# Patient Record
Sex: FEMALE | Race: WHITE | NOT HISPANIC OR LATINO | Employment: OTHER | URBAN - METROPOLITAN AREA
[De-identification: names, ages, dates, MRNs, and addresses within clinical notes are randomized per-mention and may not be internally consistent; named-entity substitution may affect disease eponyms.]

---

## 2017-01-11 ENCOUNTER — OFFICE VISIT (OUTPATIENT)
Dept: FAMILY MEDICINE CLINIC | Facility: CLINIC | Age: 57
End: 2017-01-11

## 2017-01-11 VITALS
BODY MASS INDEX: 27.06 KG/M2 | DIASTOLIC BLOOD PRESSURE: 80 MMHG | WEIGHT: 178 LBS | HEART RATE: 104 BPM | TEMPERATURE: 98 F | SYSTOLIC BLOOD PRESSURE: 110 MMHG | RESPIRATION RATE: 16 BRPM

## 2017-01-11 DIAGNOSIS — Z87.81 HISTORY OF FRACTURED KNEECAP: Primary | ICD-10-CM

## 2017-01-11 DIAGNOSIS — S42.91XG: ICD-10-CM

## 2017-01-11 PROCEDURE — 99213 OFFICE O/P EST LOW 20 MIN: CPT | Performed by: FAMILY MEDICINE

## 2017-01-11 RX ORDER — HYDROCODONE BITARTRATE AND ACETAMINOPHEN 7.5; 325 MG/1; MG/1
1 TABLET ORAL EVERY 6 HOURS PRN
Qty: 120 TABLET | Refills: 0 | Status: SHIPPED | OUTPATIENT
Start: 2017-01-11 | End: 2017-05-17

## 2017-01-11 NOTE — PROGRESS NOTES
Subjective   Octavia Rice is a 56 y.o. female.     History of Present Illness   Physical therapy for shoulders and left knee routinely causing pain. Walking without assistance.  Shoulder and neck soreness.  Doing more activities around home.  Slow to recall some words and complete sentence.  Uses ice on back, Lidocaine patches have helped.  Taking pain medication each 4 hours during day especially with physical therapy.   Glucose doing much better, continues to loose weight.  Constipation slowly improved with drinking more fluids.     The following portions of the patient's history were reviewed and updated as appropriate: allergies, current medications, past family history, past medical history, past social history, past surgical history and problem list.    Review of Systems   Constitutional: Negative.    Eyes: Negative.    Respiratory: Negative.    Cardiovascular: Negative.    Gastrointestinal: Positive for nausea.   Endocrine: Negative.    Musculoskeletal: Positive for arthralgias and neck stiffness. Negative for joint swelling.   Skin: Negative.    Neurological: Positive for headaches. Negative for dizziness, tremors and numbness (left hand fingers 4-5).   Psychiatric/Behavioral: Positive for confusion.       Objective   Physical Exam   Constitutional: She appears well-developed.   Neck: Neck supple.   Pulmonary/Chest: Effort normal.   Musculoskeletal: She exhibits no edema.        Right shoulder: She exhibits decreased range of motion and tenderness.        Left shoulder: She exhibits tenderness.        Right elbow: Normal.       Left elbow: Normal.   Left knee in brace   Neurological: She is alert.   Recall 3/3 items   Vitals reviewed.      Assessment/Plan   Octavia was seen today for follow-up.    Diagnoses and all orders for this visit:    History of fractured kneecap  -     HYDROcodone-acetaminophen (NORCO) 7.5-325 MG per tablet; Take 1 tablet by mouth Every 6 (Six) Hours As Needed for moderate pain  (4-6).    Fracture dislocation of right shoulder joint, with delayed healing, subsequent encounter

## 2017-01-11 NOTE — MR AVS SNAPSHOT
Octavia WOODWARD Dwayne   1/11/2017 11:15 AM   Office Visit    Provider:  Lex Medeiros MD   Department:  [facility] MEDIC[facility]   Dept Phone:  [phone]                Your Full Care Plan              Today's Medication Changes          These changes are accurate as of: 1/11/17 12:31 PM.  If you have any questions, ask your nurse or doctor.               Medication(s)that have changed:     lidocaine 5 %   Commonly known as:  LIDODERM   APPLY 1 PATCH TO AFFECTED AREA. LEAVE ON 12 HOURS AND OFF FOR 12 HOURS   What changed:  Another medication with the same name was removed. Continue taking this medication, and follow the directions you see here.   Changed by:  Lex Medeiros MD            Where to Get Your Medications      You can get these medications from any pharmacy     Bring a paper prescription for each of these medications     HYDROcodone-acetaminophen 7.5-325 MG per tablet                  Your Updated Medication List          This list is accurate as of: 1/11/17 12:31 PM.  Always use your most recent med list.                calcium carbonate 500 MG chewable tablet   Commonly known as:  TUMS   Chew 2 tablets 3 (Three) Times a Day As Needed for indigestion or heartburn.        MG capsule   Take 100 mg by mouth 2 (Two) Times a Day.       glucose blood test strip   Commonly known as:  ONE TOUCH ULTRA TEST   Check X 1 per day       * HYDROcodone-acetaminophen 7.5-325 MG per tablet   Commonly known as:  NORCO       * HYDROcodone-acetaminophen 7.5-325 MG per tablet   Commonly known as:  NORCO   Take 1 tablet by mouth Every 6 (Six) Hours As Needed for moderate pain (4-6).       Insulin Pen Needle 32G X 4 MM misc   Commonly known as:  BD PEN NEEDLE ELVIA U/F   Uses X 1/day       lidocaine 5 %   Commonly known as:  LIDODERM       Liraglutide 18 MG/3ML solution pen-injector   1.8 mg sub q daily       metFORMIN  MG 24 hr tablet   Commonly known  as:  GLUCOPHAGE-XR   Use 1 tablet bid with food       Naloxegol Oxalate 12.5 MG tablet   Commonly known as:  MOVANTIK   Take 1 tablet by mouth Daily.       ondansetron 4 MG tablet   Commonly known as:  ZOFRAN   Take 1 tablet by mouth Every 8 (Eight) Hours As Needed for nausea or vomiting.       simvastatin 40 MG tablet   Commonly known as:  ZOCOR       * Notice:  This list has 2 medication(s) that are the same as other medications prescribed for you. Read the directions carefully, and ask your doctor or other care provider to review them with you.            You Were Diagnosed With        Codes Comments    History of fractured kneecap    -  Primary ICD-10-CM: Z87.81  ICD-9-CM: V15.51     Fracture dislocation of right shoulder joint, with delayed healing, subsequent encounter     ICD-10-CM: S42.91XG  ICD-9-CM: V54.11       Instructions     None    Patient Instructions History      IsentioharBizanga Signup     [facility] [employer] allows you to send messages to your doctor, view your test results, renew your prescriptions, schedule appointments, and more. To sign up, go to [employer] and click on the Sign Up Now link in the New User? box. Enter your [employer] Activation Code exactly as it appears below along with the last four digits of your Social Security Number and your Date of Birth () to complete the sign-up process. If you do not sign up before the expiration date, you must request a new code.    [employer] Activation Code: [email or web address]  Expires: 2017 12:31 PM    If you have questions, you can email [email or web address] or call [phone] to talk to our [employer] staff. Remember, [employer] is NOT to be used for urgent needs. For medical emergencies, dial 911.               Other Info from Your Visit           Your Appointments     2017  9:00 AM EST   Follow Up with Priscilla RUSSO MD   [facility] INTERNAL MEDICINE AND ENDOCRINOLOGY (--)    [address]  [facility] [address]   [phone]           Arrive 15 minutes prior to appointment.              Allergies     Codeine  Nausea And Vomiting    Morphine And Related  Itching    Severe hives    Penicillins      Had pcn as child and unsure of reaction      Reason for Visit     Follow-up           Vital Signs     Blood Pressure Pulse Temperature Respirations Weight Body Mass Index    110/80 104 98 °F (36.7 °C) 16 178 lb (80.7 kg) 27.06 kg/m2    Smoking Status                   Current Every Day Smoker           Problems and Diagnoses Noted     Fracture dislocation of right shoulder joint    History of fractured kneecap    -  Primary

## 2017-01-16 ENCOUNTER — TELEPHONE (OUTPATIENT)
Dept: FAMILY MEDICINE CLINIC | Facility: CLINIC | Age: 57
End: 2017-01-16

## 2017-01-16 NOTE — TELEPHONE ENCOUNTER
----- Message from Levi Maldonado sent at 1/16/2017  8:12 AM EST -----  Regarding: having trouble filling pain med  Contact: [phone]  Having trouble getting pain med. The drug store will not fill it till the 25th. Please call and speak with her    Pt wants new rx for pain meds increased in dosing, advised pt that will need to come in.  BBherrera, CMA

## 2017-01-17 ENCOUNTER — OFFICE VISIT (OUTPATIENT)
Dept: FAMILY MEDICINE CLINIC | Facility: CLINIC | Age: 57
End: 2017-01-17

## 2017-01-17 VITALS
BODY MASS INDEX: 26.36 KG/M2 | OXYGEN SATURATION: 97 % | WEIGHT: 178 LBS | HEIGHT: 69 IN | HEART RATE: 97 BPM | DIASTOLIC BLOOD PRESSURE: 70 MMHG | SYSTOLIC BLOOD PRESSURE: 130 MMHG | TEMPERATURE: 98 F

## 2017-01-17 DIAGNOSIS — S42.91XG: ICD-10-CM

## 2017-01-17 DIAGNOSIS — Z87.81 HISTORY OF FRACTURED KNEECAP: Primary | ICD-10-CM

## 2017-01-17 PROCEDURE — 99213 OFFICE O/P EST LOW 20 MIN: CPT | Performed by: FAMILY MEDICINE

## 2017-01-17 RX ORDER — TRAMADOL HYDROCHLORIDE 50 MG/1
50 TABLET ORAL EVERY 6 HOURS PRN
Qty: 120 TABLET | Refills: 0 | Status: SHIPPED | OUTPATIENT
Start: 2017-01-17 | End: 2017-05-17

## 2017-01-17 NOTE — MR AVS SNAPSHOT
Octavia Rice   1/17/2017 9:45 AM   Office Visit    Provider:  Lex Medeiros MD   Department:  [facility] MEDICINE   Dept Phone:  [phone]                Your Full Care Plan              Today's Medication Changes          These changes are accurate as of: 1/17/17 10:53 AM.  If you have any questions, ask your nurse or doctor.               New Medication(s)Ordered:     traMADol 50 MG tablet   Commonly known as:  ULTRAM   Take 1 tablet by mouth Every 6 (Six) Hours As Needed for moderate pain (4-6).   Started by:  Lex Medeiros MD            Where to Get Your Medications      You can get these medications from any pharmacy     Bring a paper prescription for each of these medications     traMADol 50 MG tablet                  Your Updated Medication List          This list is accurate as of: 1/17/17 10:53 AM.  Always use your most recent med list.                calcium carbonate 500 MG chewable tablet   Commonly known as:  TUMS   Chew 2 tablets 3 (Three) Times a Day As Needed for indigestion or heartburn.        MG capsule   Take 100 mg by mouth 2 (Two) Times a Day.       glucose blood test strip   Commonly known as:  ONE TOUCH ULTRA TEST   Check X 1 per day       * HYDROcodone-acetaminophen 7.5-325 MG per tablet   Commonly known as:  NORCO       * HYDROcodone-acetaminophen 7.5-325 MG per tablet   Commonly known as:  NORCO   Take 1 tablet by mouth Every 6 (Six) Hours As Needed for moderate pain (4-6).       Insulin Pen Needle 32G X 4 MM misc   Commonly known as:  BD PEN NEEDLE ELVIA U/F   Uses X 1/day       lidocaine 5 %   Commonly known as:  LIDODERM       Liraglutide 18 MG/3ML solution pen-injector   1.8 mg sub q daily       metFORMIN  MG 24 hr tablet   Commonly known as:  GLUCOPHAGE-XR   Use 1 tablet bid with food       Naloxegol Oxalate 12.5 MG tablet   Commonly known as:  MOVANTIK   Take 1 tablet by mouth Daily.       ondansetron 4 MG tablet   Commonly known as:  ZOFRAN   Take 1 tablet by mouth Every 8 (Eight) Hours As Needed for nausea or vomiting.       simvastatin 40 MG tablet   Commonly known as:  ZOCOR       traMADol 50 MG tablet   Commonly known as:  ULTRAM   Take 1 tablet by mouth Every 6 (Six) Hours As Needed for moderate pain (4-6).       * Notice:  This list has 2 medication(s) that are the same as other medications prescribed for you. Read the directions carefully, and ask your doctor or other care provider to review them with you.            You Were Diagnosed With        Codes Comments    History of fractured kneecap    -  Primary ICD-10-CM: Z87.81  ICD-9-CM: V15.51     Fracture dislocation of right shoulder joint, with delayed healing, subsequent encounter     ICD-10-CM: S42.91XG  ICD-9-CM: V54.11       Instructions     None    Patient Instructions History      AlgentisharIM-Sense Signup     [facility] [employer] allows you to send messages to your doctor, view your test results, renew your prescriptions, schedule appointments, and more. To sign up, go to [employer] and click on the Sign Up Now link in the New User? box. Enter your [employer] Activation Code exactly as it appears below along with the last four digits of your Social Security Number and your Date of Birth () to complete the sign-up process. If you do not sign up before the expiration date, you must request a new code.    [employer] Activation Code: [email or web address]  Expires: 2017 12:31 PM    If you have questions, you can email [email or web address] or call [phone] to talk to our [employer] staff. Remember, [employer] is NOT to be used for urgent needs. For medical emergencies, dial 911.               Other Info from Your Visit           Your Appointments     2017  9:00 AM EST   Follow Up with Priscilla RUSSO MD   [facility]OCRINOLOGY (--)    [address]  "[address] [phone]           Arrive 15 minutes prior to appointment.              Allergies     Codeine  Nausea And Vomiting    Morphine And Related  Itching    Severe hives    Penicillins      Had pcn as child and unsure of reaction      Reason for Visit     Med Refill           Vital Signs     Blood Pressure Pulse Temperature Height Weight Oxygen Saturation    130/70 97 98 °F (36.7 °C) 69\" (175.3 cm) 178 lb (80.7 kg) 97%    Body Mass Index Smoking Status                26.29 kg/m2 Current Every Day Smoker          Problems and Diagnoses Noted     Fracture dislocation of right shoulder joint    History of fractured kneecap    -  Primary      "

## 2017-01-17 NOTE — PROGRESS NOTES
Subjective   Octavia Rice is a 56 y.o. female.     History of Present Illness   Had a rough two days pharmacy will not fill medication until Jan 25.  Taking additional tablets prior to doing physical therapy.   Sleeping poor.  Hurts upper back, shoulders and knees. No nausea. Bowels working again without cramping.  Energy low.  Cries.  Not steady walking unassisted especially steps.  Headache,  Appetite slow without much taste. Last orthopedic procedure Oct 31, physical therapy started late Dec. Home from nursing facility Dec 7.   Home glucose running in normal range.   The following portions of the patient's history were reviewed and updated as appropriate: allergies, current medications, past family history, past medical history, past social history, past surgical history and problem list.    Review of Systems   Constitutional: Negative.    Eyes: Negative.    Respiratory: Negative.    Cardiovascular: Negative.    Gastrointestinal: Negative.    Endocrine: Negative.    Musculoskeletal: Negative.    Skin: Negative.        Objective   Physical Exam   Constitutional: She appears lethargic. She appears distressed.   HENT:   Head: Normocephalic.   Neck: Neck supple.   Pulmonary/Chest: Effort normal.   Abdominal: Soft.   Musculoskeletal:   Brace left knee.  Moving slowly.    Neurological: She appears lethargic.   Skin: Skin is warm.   Psychiatric: She has a normal mood and affect.   Vitals reviewed.      Assessment/Plan   Octavia was seen today for med refill.    Diagnoses and all orders for this visit:    History of fractured kneecap  -     traMADol (ULTRAM) 50 MG tablet; Take 1 tablet by mouth Every 6 (Six) Hours As Needed for moderate pain (4-6).    Fracture dislocation of right shoulder joint, with delayed healing, subsequent encounter

## 2017-01-19 ENCOUNTER — OFFICE VISIT (OUTPATIENT)
Dept: ENDOCRINOLOGY | Facility: CLINIC | Age: 57
End: 2017-01-19

## 2017-01-19 VITALS
WEIGHT: 185.6 LBS | SYSTOLIC BLOOD PRESSURE: 132 MMHG | BODY MASS INDEX: 27.49 KG/M2 | DIASTOLIC BLOOD PRESSURE: 82 MMHG | OXYGEN SATURATION: 98 % | HEIGHT: 69 IN | HEART RATE: 92 BPM

## 2017-01-19 DIAGNOSIS — E11.8 UNCONTROLLED TYPE 2 DIABETES MELLITUS WITH COMPLICATION, UNSPECIFIED LONG TERM INSULIN USE STATUS: Primary | ICD-10-CM

## 2017-01-19 DIAGNOSIS — E11.65 UNCONTROLLED TYPE 2 DIABETES MELLITUS WITH COMPLICATION, UNSPECIFIED LONG TERM INSULIN USE STATUS: Primary | ICD-10-CM

## 2017-01-19 DIAGNOSIS — E78.2 MIXED HYPERLIPIDEMIA: ICD-10-CM

## 2017-01-19 DIAGNOSIS — IMO0002 UNCONTROLLED TYPE 2 DIABETES MELLITUS WITH COMPLICATION, WITHOUT LONG-TERM CURRENT USE OF INSULIN: ICD-10-CM

## 2017-01-19 DIAGNOSIS — IMO0001 UNCONTROLLED TYPE 2 DIABETES MELLITUS WITHOUT COMPLICATION, WITHOUT LONG-TERM CURRENT USE OF INSULIN: ICD-10-CM

## 2017-01-19 LAB
25(OH)D3 SERPL-MCNC: 24.7 NG/ML
ALBUMIN SERPL-MCNC: 4.5 G/DL (ref 3.2–4.8)
ALBUMIN/GLOB SERPL: 1.8 G/DL (ref 1.5–2.5)
ALP SERPL-CCNC: 92 U/L (ref 25–100)
ALT SERPL W P-5'-P-CCNC: 10 U/L (ref 7–40)
ANION GAP SERPL CALCULATED.3IONS-SCNC: 14 MMOL/L (ref 3–11)
ARTICHOKE IGE QN: 75 MG/DL (ref 0–130)
AST SERPL-CCNC: 9 U/L (ref 0–33)
BASOPHILS # BLD AUTO: 0.02 10*3/MM3 (ref 0–0.2)
BASOPHILS NFR BLD AUTO: 0.2 % (ref 0–1)
BILIRUB SERPL-MCNC: 0.5 MG/DL (ref 0.3–1.2)
BUN BLD-MCNC: 9 MG/DL (ref 9–23)
BUN/CREAT SERPL: 22.5 (ref 7–25)
CALCIUM SPEC-SCNC: 10 MG/DL (ref 8.7–10.4)
CHLORIDE SERPL-SCNC: 105 MMOL/L (ref 99–109)
CHOLEST SERPL-MCNC: 157 MG/DL (ref 0–200)
CO2 SERPL-SCNC: 23 MMOL/L (ref 20–31)
CREAT BLD-MCNC: 0.4 MG/DL (ref 0.6–1.3)
DEPRECATED RDW RBC AUTO: 44.3 FL (ref 37–54)
EOSINOPHIL # BLD AUTO: 0.04 10*3/MM3 (ref 0.1–0.3)
EOSINOPHIL NFR BLD AUTO: 0.4 % (ref 0–3)
ERYTHROCYTE [DISTWIDTH] IN BLOOD BY AUTOMATED COUNT: 13.4 % (ref 11.3–14.5)
GFR SERPL CREATININE-BSD FRML MDRD: >150 ML/MIN/1.73
GLOBULIN UR ELPH-MCNC: 2.5 GM/DL
GLUCOSE BLD-MCNC: 119 MG/DL (ref 70–100)
GLUCOSE BLDC GLUCOMTR-MCNC: 138 MG/DL (ref 70–130)
HBA1C MFR BLD: 6.5 %
HCT VFR BLD AUTO: 42.1 % (ref 34.5–44)
HDLC SERPL-MCNC: 42 MG/DL (ref 40–60)
HGB BLD-MCNC: 14 G/DL (ref 11.5–15.5)
IMM GRANULOCYTES # BLD: 0.04 10*3/MM3 (ref 0–0.03)
IMM GRANULOCYTES NFR BLD: 0.4 % (ref 0–0.6)
LYMPHOCYTES # BLD AUTO: 2.47 10*3/MM3 (ref 0.6–4.8)
LYMPHOCYTES NFR BLD AUTO: 21.7 % (ref 24–44)
MCH RBC QN AUTO: 29.9 PG (ref 27–31)
MCHC RBC AUTO-ENTMCNC: 33.3 G/DL (ref 32–36)
MCV RBC AUTO: 89.8 FL (ref 80–99)
MONOCYTES # BLD AUTO: 0.58 10*3/MM3 (ref 0–1)
MONOCYTES NFR BLD AUTO: 5.1 % (ref 0–12)
NEUTROPHILS # BLD AUTO: 8.25 10*3/MM3 (ref 1.5–8.3)
NEUTROPHILS NFR BLD AUTO: 72.2 % (ref 41–71)
PLATELET # BLD AUTO: 303 10*3/MM3 (ref 150–450)
PMV BLD AUTO: 10.7 FL (ref 6–12)
POTASSIUM BLD-SCNC: 4.2 MMOL/L (ref 3.5–5.5)
PROT SERPL-MCNC: 7 G/DL (ref 5.7–8.2)
RBC # BLD AUTO: 4.69 10*6/MM3 (ref 3.89–5.14)
SODIUM BLD-SCNC: 142 MMOL/L (ref 132–146)
TRIGL SERPL-MCNC: 228 MG/DL (ref 0–150)
TSH SERPL DL<=0.05 MIU/L-ACNC: 2.51 MIU/ML (ref 0.35–5.35)
WBC NRBC COR # BLD: 11.4 10*3/MM3 (ref 3.5–10.8)

## 2017-01-19 PROCEDURE — 82306 VITAMIN D 25 HYDROXY: CPT | Performed by: INTERNAL MEDICINE

## 2017-01-19 PROCEDURE — 80053 COMPREHEN METABOLIC PANEL: CPT | Performed by: INTERNAL MEDICINE

## 2017-01-19 PROCEDURE — 82043 UR ALBUMIN QUANTITATIVE: CPT | Performed by: INTERNAL MEDICINE

## 2017-01-19 PROCEDURE — 83036 HEMOGLOBIN GLYCOSYLATED A1C: CPT | Performed by: INTERNAL MEDICINE

## 2017-01-19 PROCEDURE — 82947 ASSAY GLUCOSE BLOOD QUANT: CPT | Performed by: INTERNAL MEDICINE

## 2017-01-19 PROCEDURE — 80061 LIPID PANEL: CPT | Performed by: INTERNAL MEDICINE

## 2017-01-19 PROCEDURE — 99214 OFFICE O/P EST MOD 30 MIN: CPT | Performed by: INTERNAL MEDICINE

## 2017-01-19 PROCEDURE — 84443 ASSAY THYROID STIM HORMONE: CPT | Performed by: INTERNAL MEDICINE

## 2017-01-19 PROCEDURE — 85025 COMPLETE CBC W/AUTO DIFF WBC: CPT | Performed by: INTERNAL MEDICINE

## 2017-01-19 PROCEDURE — 82570 ASSAY OF URINE CREATININE: CPT | Performed by: INTERNAL MEDICINE

## 2017-01-19 RX ORDER — CHOLECALCIFEROL (VITAMIN D3) 50 MCG
2000 TABLET ORAL DAILY
Qty: 90 TABLET | Refills: 3 | Status: SHIPPED | OUTPATIENT
Start: 2017-01-19 | End: 2017-10-29 | Stop reason: SDUPTHER

## 2017-01-19 RX ORDER — METFORMIN HYDROCHLORIDE 500 MG/1
1000 TABLET, EXTENDED RELEASE ORAL
Qty: 180 TABLET | Refills: 3 | Status: SHIPPED | OUTPATIENT
Start: 2017-01-19 | End: 2017-10-04 | Stop reason: SDUPTHER

## 2017-01-19 NOTE — MR AVS SNAPSHOT
Octavia Rice   1/19/2017 9:00 AM   Office Visit    Dept Phone:  [phone]   Encounter #:  [id number]    Provider:  Priscilla RUSSO MD   Department:  [facility] INTERNAL MEDICINE AND ENDOCRINOLOGY                Your Full Care Plan              Today's Medication Changes          These changes are accurate as of: 1/19/17  9:57 AM.  If you have any questions, ask your nurse or doctor.               New Medication(s)Ordered:     Vitamin D 2000 UNITS tablet   Take 2,000 Units by mouth Daily.   Started by:  Priscilla RUSSO MD         Medication(s)that have changed:     * glucose blood test strip   Commonly known as:  ONE TOUCH ULTRA TEST   Check X 1 per day   What changed:  Another medication with the same name was added. Make sure you understand how and when to take each.   Changed by:  SANDER Pozo       * glucose blood test strip   Use as instructed 3 times a day   What changed:  You were already taking a medication with the same name, and this prescription was added. Make sure you understand how and when to take each.   Changed by:  Priscilla RUSSO MD       HYDROcodone-acetaminophen 7.5-325 MG per tablet   Commonly known as:  NORCO   Take 1 tablet by mouth Every 6 (Six) Hours As Needed for moderate pain (4-6).   What changed:  Another medication with the same name was removed. Continue taking this medication, and follow the directions you see here.   Changed by:  Lex Medeiros MD       Liraglutide 18 MG/3ML solution pen-injector   Take 1.8 mg by mouth Daily. 1.8 mg sub q daily   What changed:    - how much to take  - how to take this  - when to take this   Changed by:  Priscilla RUSSO MD       metFORMIN  MG 24 hr tablet   Commonly known as:  GLUCOPHAGE-XR   Take 2 tablets by mouth Daily With Breakfast. Use 1 tablet bid with food   What changed:    - how much to take  - how to take this  - when to take this   Changed by:  Priscilla RUSSO MD       *  Notice:  This list has 2 medication(s) that are the same as other medications prescribed for you. Read the directions carefully, and ask your doctor or other care provider to review them with you.         Where to Get Your Medications      These medications were sent to [facility]/pharmacy #[id number] - [place], [place] - [address] [facility] - [phone]  - [phone] FX  [address] [address]     Phone:  [phone]     glucose blood test strip    Insulin Pen Needle 32G X 4 MM misc    Liraglutide 18 MG/3ML solution pen-injector    metFORMIN  MG 24 hr tablet    Vitamin D 2000 UNITS tablet                  Your Updated Medication List          This list is accurate as of: 1/19/17  9:57 AM.  Always use your most recent med list.                calcium carbonate 500 MG chewable tablet   Commonly known as:  TUMS   Chew 2 tablets 3 (Three) Times a Day As Needed for indigestion or heartburn.        MG capsule   Take 100 mg by mouth 2 (Two) Times a Day.       * glucose blood test strip   Commonly known as:  ONE TOUCH ULTRA TEST   Check X 1 per day       * glucose blood test strip   Use as instructed 3 times a day       HYDROcodone-acetaminophen 7.5-325 MG per tablet   Commonly known as:  NORCO   Take 1 tablet by mouth Every 6 (Six) Hours As Needed for moderate pain (4-6).       Insulin Pen Needle 32G X 4 MM misc   Commonly known as:  BD PEN NEEDLE ELVIA U/F   Uses X 1/day       lidocaine 5 %   Commonly known as:  LIDODERM       Liraglutide 18 MG/3ML solution pen-injector   Take 1.8 mg by mouth Daily. 1.8 mg sub q daily       metFORMIN  MG 24 hr tablet   Commonly known as:  GLUCOPHAGE-XR   Take 2 tablets by mouth Daily With Breakfast. Use 1 tablet bid with food       Naloxegol Oxalate 12.5 MG tablet   Commonly known as:  MOVANTIK   Take 1 tablet by mouth Daily.       ondansetron 4 MG tablet   Commonly known as:  ZOFRAN   Take 1 tablet by mouth Every 8 (Eight) Hours As Needed for nausea or vomiting.        simvastatin 40 MG tablet   Commonly known as:  ZOCOR       traMADol 50 MG tablet   Commonly known as:  ULTRAM   Take 1 tablet by mouth Every 6 (Six) Hours As Needed for moderate pain (4-6).       Vitamin D 2000 UNITS tablet   Take 2,000 Units by mouth Daily.       * Notice:  This list has 2 medication(s) that are the same as other medications prescribed for you. Read the directions carefully, and ask your doctor or other care provider to review them with you.            We Performed the Following     CBC & Differential     CBC Auto Differential     Comprehensive Metabolic Panel     Lipid Panel     Microalbumin / Creatinine Urine Ratio     POC Glucose Fingerstick     POC Glycosylated Hemoglobin (Hb A1C)     TSH     Vitamin D 25 Hydroxy       You Were Diagnosed With        Codes Comments    Uncontrolled type 2 diabetes mellitus with complication, unspecified long term insulin use status    -  Primary ICD-10-CM: E11.8, E11.65  ICD-9-CM: 250.92     Mixed hyperlipidemia     ICD-10-CM: E78.2  ICD-9-CM: 272.2     Uncontrolled type 2 diabetes mellitus with complication, without long-term current use of insulin     ICD-10-CM: E11.8, E11.65  ICD-9-CM: 250.82     Uncontrolled type 2 diabetes mellitus without complication, without long-term current use of insulin     ICD-10-CM: E11.65  ICD-9-CM: 250.02       Instructions     None    Patient Instructions History      Upcoming Appointments     Visit Type Date Time Department    FOLLOW UP 1/19/2017  9:00 AM [facility]    OFFICE VISIT 5/17/2017  8:00 AM [facility]      MyChart Signup     [facility] [employer] allows you to send messages to your doctor, view your test results, renew your prescriptions, schedule appointments, and more. To sign up, go to [employer] and click on the Sign Up Now link in the New User? box. Enter your [employer] Activation Code exactly as it appears below along with the last four digits of your Social Security Number and your Date of  "Birth () to complete the sign-up process. If you do not sign up before the expiration date, you must request a new code.    [employer] Activation Code: [email or web address]  Expires: 2017 12:31 PM    If you have questions, you can email [email or web address] or call [phone] to talk to our [employer] staff. Remember, [employer] is NOT to be used for urgent needs. For medical emergencies, dial 911.               Other Info from Your Visit           Your Appointments     May 17, 2017  8:00 AM EDT   Office Visit with Priscilla RUSSO MD   [facility] INTERNAL MEDICINE AND ENDOCRINOLOGY (--)    [address] [place] [address] [phone]           Arrive 15 minutes prior to appointment.              Allergies     Codeine  Nausea And Vomiting    Morphine And Related  Itching    Severe hives    Penicillins      Had pcn as child and unsure of reaction      Reason for Visit     Diabetes F/u for type 2 diabetes, testing 1-2x weekly ~ pt stated that her readings have been running in the normal ranges.       Vital Signs     Blood Pressure Pulse Height Weight Oxygen Saturation Body Mass Index    132/82 92 69\" (175.3 cm) 185 lb 9.6 oz (84.2 kg) 98% 27.41 kg/m2    Smoking Status                   Former Smoker           Problems and Diagnoses Noted     High cholesterol or triglycerides    Type II diabetes mellitus without control      Results     POC Glycosylated Hemoglobin (Hb A1C)      Component Value Standard Range & Units    Hemoglobin A1C 6.5 %                POC Glucose Fingerstick      Component Value Standard Range & Units    Glucose 138 70 - 130 mg/dL                    "

## 2017-01-19 NOTE — PROGRESS NOTES
"Chief complaint  Diabetes (F/u for type 2 diabetes, testing 1-2x weekly ~ pt stated that her readings have been running in the normal ranges. )    Subjective   Octavia Rice is a 56 y.o. female is here today for follow-up of:    Diabetes Mellitus type 2: dx in 2003    Diabetic complications: none  Eye exam current (within one year): no retinopathy      Current diabetic medications include Metformin  mg - 2 tablets daily  Victoza 1.8 mg daily   When she wa sin the hospital and PT - received insulin.     Past medications: 08/2016 Jardiance 10 mg daily in am  was added. She stopped it September 2016 because of frequent urination      Monitoring   not checking regularly.  Glucometer  verio onetouch given .    Home blood sugar records: glucometer downloaded, reviewed and scanned to chart  Hypoglycemia:    Nutrition:   discussed  carb consistent diet 45-60 gm carb per meal.   Current diet: in general, a \"healthy\" diet    Current exercise: physical therapy twice a week    Foot care and dental care: discussed    Labs:   Lab Results   Component Value Date    HGBA1C 6.5 01/19/2017     Lab Results   Component Value Date    CREATININE 0.40 (L) 11/01/2016   No results found for: LDLCALCNo results found for: MICROALBUR, [id number]  Lab Results   Component Value Date    TSH 1.672 02/04/2016       Patient  Had a fall in Oct 2016 and she had head trauma and shoulder trauma, completed physical therapy, s/p surgery. She is still in chronic pain, had dehydration, UTI .  Patient lost weight, has no appetite. SHe is in chronic pain and continues PT at home.     Other med problems: include hyperlipidemia on simvastatin.     Medications    Current Outpatient Prescriptions:   •  calcium carbonate (TUMS) 500 MG chewable tablet, Chew 2 tablets 3 (Three) Times a Day As Needed for indigestion or heartburn., Disp: , Rfl:   •  docusate sodium 100 MG capsule, Take 100 mg by mouth 2 (Two) Times a Day., Disp: , Rfl: 0  •  glucose blood (ONE " TOUCH ULTRA TEST) test strip, Check X 1 per day, Disp: 50 each, Rfl: 5  •  glucose blood test strip, Use as instructed 3 times a day, Disp: 100 each, Rfl: 11  •  HYDROcodone-acetaminophen (NORCO) 7.5-325 MG per tablet, Take 1 tablet by mouth Every 6 (Six) Hours As Needed for moderate pain (4-6)., Disp: 120 tablet, Rfl: 0  •  Insulin Pen Needle (BD PEN NEEDLE ELVIA U/F) 32G X 4 MM misc, Uses X 1/day, Disp: 100 each, Rfl: 3  •  lidocaine (LIDODERM) 5 %, APPLY 1 PATCH TO AFFECTED AREA. LEAVE ON 12 HOURS AND OFF FOR 12 HOURS, Disp: , Rfl: 0  •  Liraglutide 18 MG/3ML solution pen-injector, Take 1.8 mg by mouth Daily. 1.8 mg sub q daily, Disp: 10 pen, Rfl: 3  •  metFORMIN XR (GLUCOPHAGE-XR) 500 MG 24 hr tablet, Take 2 tablets by mouth Daily With Breakfast. Use 1 tablet bid with food, Disp: 180 tablet, Rfl: 3  •  Naloxegol Oxalate (MOVANTIK) 12.5 MG tablet, Take 1 tablet by mouth Daily., Disp: 30 tablet, Rfl: 1  •  ondansetron (ZOFRAN) 4 MG tablet, Take 1 tablet by mouth Every 8 (Eight) Hours As Needed for nausea or vomiting., Disp: 40 tablet, Rfl: 1  •  simvastatin (ZOCOR) 40 MG tablet, Take 1 tablet by mouth every night., Disp: , Rfl:   •  traMADol (ULTRAM) 50 MG tablet, Take 1 tablet by mouth Every 6 (Six) Hours As Needed for moderate pain (4-6)., Disp: 120 tablet, Rfl: 0    PMH  The following portions of the patient's history were reviewed and updated as appropriate: allergies, current medications, past family history, past medical history, past social history, past surgical history and problem list.    Review of systems  Review of Systems   Constitutional: Positive for appetite change (decreased appetite), fatigue and unexpected weight change (weight loss).   HENT: Negative for congestion.    Eyes: Negative for visual disturbance.   Respiratory: Negative.  Negative for shortness of breath.    Cardiovascular: Negative for chest pain and leg swelling.   Gastrointestinal: Positive for diarrhea (occasional) and nausea.  "Negative for constipation.   Endocrine: Negative for cold intolerance, polydipsia and polyuria.   Musculoskeletal: Positive for arthralgias (shoulder pain with shooting pain down the arm). Negative for back pain, joint swelling and myalgias.   Skin: Negative for rash and wound.   Allergic/Immunologic: Positive for environmental allergies.   Neurological: Positive for headaches. Negative for numbness.   Hematological: Negative.    Psychiatric/Behavioral: Positive for behavioral problems. Negative for self-injury.       Physical exam  Objective   Blood pressure 132/82, pulse 92, height 69\" (175.3 cm), weight 185 lb 9.6 oz (84.2 kg), SpO2 98 %. Body mass index is 27.41 kg/(m^2).  Physical Exam   Constitutional: She is oriented to person, place, and time. She appears well-developed and well-nourished.   HENT:   Head: Normocephalic and atraumatic.   Mouth/Throat: Oropharynx is clear and moist.   Neck: No tracheal deviation present. No thyromegaly present.   Cardiovascular: Normal rate, regular rhythm, normal heart sounds and intact distal pulses.    Pulmonary/Chest: Effort normal and breath sounds normal.   Musculoskeletal: She exhibits no edema, tenderness or deformity.        Right shoulder: She exhibits decreased range of motion and pain.        Left shoulder: She exhibits decreased range of motion and pain.   Neurological: She is alert and oriented to person, place, and time.   Skin: Skin is warm and dry.   Psychiatric: Her behavior is normal. Judgment and thought content normal. She exhibits a depressed mood.   Nursing note and vitals reviewed.        LABS AND IMAGING  Results for orders placed or performed in visit on 01/19/17   POC Glycosylated Hemoglobin (Hb A1C)   Result Value Ref Range    Hemoglobin A1C 6.5 %   POC Glucose Fingerstick   Result Value Ref Range    Glucose 138 (A) 70 - 130 mg/dL                   Assessment:         Diagnoses and all orders for this visit:    Uncontrolled type 2 diabetes mellitus " with complication, unspecified long term insulin use status  -     POC Glycosylated Hemoglobin (Hb A1C)  -     POC Glucose Fingerstick    Mixed hyperlipidemia    Uncontrolled type 2 diabetes mellitus with complication, without long-term current use of insulin  -     Insulin Pen Needle (BD PEN NEEDLE ELVIA U/F) 32G X 4 MM misc; Uses X 1/day  -     metFORMIN XR (GLUCOPHAGE-XR) 500 MG 24 hr tablet; Take 2 tablets by mouth Daily With Breakfast. Use 1 tablet bid with food    Uncontrolled type 2 diabetes mellitus without complication, without long-term current use of insulin  -     Liraglutide 18 MG/3ML solution pen-injector; Take 1.8 mg by mouth Daily. 1.8 mg sub q daily    Other orders  -     glucose blood test strip; Use as instructed 3 times a day        Plan:      1.  RX changes:   Continue Victoza and metformin for now, monitor glucose and call the office if the glucose is above 200.     2.  Education performed during this visit: glucose meter dispensed to patient, home glucose monitoring emphasized, all medications, side effects and compliance discussed carefully and Hypoglycemia management and prevention reviewed.      3.  Follow up:  3 months.       18   min  of  30 min face-to-face visit time spent for coordination of care and counselling regarding identified problems as outlined in the objective, assessment and discussion portions of the documentation.

## 2017-01-20 LAB
CREAT 24H UR-MCNC: 175.2 MG/DL
MICROALB/CRT. RATIO UR: 12.8 MG/G CREAT (ref 0–30)
MICROALBUMIN UR-MCNC: 22.4 UG/ML

## 2017-02-14 ENCOUNTER — OFFICE VISIT (OUTPATIENT)
Dept: FAMILY MEDICINE CLINIC | Facility: CLINIC | Age: 57
End: 2017-02-14

## 2017-02-14 VITALS
RESPIRATION RATE: 16 BRPM | SYSTOLIC BLOOD PRESSURE: 130 MMHG | TEMPERATURE: 98 F | WEIGHT: 178 LBS | BODY MASS INDEX: 26.29 KG/M2 | HEART RATE: 116 BPM | DIASTOLIC BLOOD PRESSURE: 90 MMHG

## 2017-02-14 DIAGNOSIS — J06.9 ACUTE URI: Primary | ICD-10-CM

## 2017-02-14 DIAGNOSIS — F17.219 CIGARETTE NICOTINE DEPENDENCE WITH NICOTINE-INDUCED DISORDER: ICD-10-CM

## 2017-02-14 DIAGNOSIS — S42.91XG: ICD-10-CM

## 2017-02-14 PROCEDURE — 99213 OFFICE O/P EST LOW 20 MIN: CPT | Performed by: FAMILY MEDICINE

## 2017-02-14 RX ORDER — NICOTINE 21 MG/24HR
1 PATCH, TRANSDERMAL 24 HOURS TRANSDERMAL EVERY 24 HOURS
Qty: 28 PATCH | Refills: 2 | Status: SHIPPED | OUTPATIENT
Start: 2017-02-14 | End: 2017-08-15 | Stop reason: DRUGHIGH

## 2017-02-14 RX ORDER — AZITHROMYCIN 250 MG/1
TABLET, FILM COATED ORAL
Qty: 5 TABLET | Refills: 0 | Status: SHIPPED | OUTPATIENT
Start: 2017-02-14 | End: 2017-05-17

## 2017-02-14 NOTE — PROGRESS NOTES
Subjective   Octavia Rice is a 57 y.o. female.     History of Present Illness   Congestion and drainage two days. Sneezing. Feverish and chill.   Right shoulder limited movement since surgery, getting therapy twice a week.  A1c 6.5% at endocrinology   Bowels improved and became loose, stopped the medication, more fiber and water helps keep regular.  Resumed smoking.   The following portions of the patient's history were reviewed and updated as appropriate: allergies, current medications, past family history, past medical history, past social history, past surgical history and problem list.    Review of Systems   Constitutional: Negative.    Eyes: Negative.    Respiratory: Negative.    Cardiovascular: Negative.    Musculoskeletal: Negative.    Skin: Negative.        Objective   Physical Exam   Constitutional: She appears well-developed. No distress.   HENT:   Right Ear: Tympanic membrane normal.   Left Ear: Tympanic membrane normal.   Nose: Nose normal.   Mouth/Throat: Oropharynx is clear and moist.   Head congestion   Pulmonary/Chest: Effort normal.   Musculoskeletal:   Moving more easily, limited using right arm.  Hands not swollen.  Walking without any assistance devices.    Vitals reviewed.      Assessment/Plan   Octavia was seen today for follow-up.    Diagnoses and all orders for this visit:    Acute URI  -     azithromycin (ZITHROMAX) 250 MG tablet; Take 2 tablets the first day, then 1 tablet daily for 4 days.    Fracture dislocation of right shoulder joint, with delayed healing, subsequent encounter    Cigarette nicotine dependence with nicotine-induced disorder  -     nicotine (NICODERM CQ) 21 MG/24HR patch; Place 1 patch on the skin Daily.        Needs to quit smoking  Continue physical therapy for recovery.   To see orthopedic for left kneecap appliance removal March 1.

## 2017-03-02 ENCOUNTER — TRANSCRIBE ORDERS (OUTPATIENT)
Dept: ADMINISTRATIVE | Facility: HOSPITAL | Age: 57
End: 2017-03-02

## 2017-03-02 DIAGNOSIS — G56.22 ULNAR NEUROPATHY OF LEFT UPPER EXTREMITY: Primary | ICD-10-CM

## 2017-03-15 ENCOUNTER — HOSPITAL ENCOUNTER (OUTPATIENT)
Dept: NEUROLOGY | Facility: HOSPITAL | Age: 57
Discharge: HOME | End: 2017-03-15
Attending: ORTHOPAEDIC SURGERY | Admitting: ORTHOPAEDIC SURGERY

## 2017-03-15 ENCOUNTER — APPOINTMENT (OUTPATIENT)
Dept: NEUROLOGY | Facility: HOSPITAL | Age: 57
End: 2017-03-15
Attending: ORTHOPAEDIC SURGERY

## 2017-03-15 ENCOUNTER — TELEPHONE (OUTPATIENT)
Dept: FAMILY MEDICINE CLINIC | Facility: CLINIC | Age: 57
End: 2017-03-15

## 2017-03-15 DIAGNOSIS — G56.22 ULNAR NEUROPATHY OF LEFT UPPER EXTREMITY: ICD-10-CM

## 2017-03-15 PROCEDURE — 95908 NRV CNDJ TST 3-4 STUDIES: CPT

## 2017-03-15 PROCEDURE — 95886 MUSC TEST DONE W/N TEST COMP: CPT

## 2017-03-15 NOTE — TELEPHONE ENCOUNTER
----- Message from Tere Miramontes MA sent at 3/15/2017 10:31 AM EDT -----  Patient would like to talk to you about getting a referral. Wouldn't provide further information. #[phone]      Pt needed help with referral to mental health providers.  Spoke with pt and gave her names of providers that we refer to, and also sent a sheet with names.  MAGALY Jackson

## 2017-05-10 ENCOUNTER — TELEPHONE (OUTPATIENT)
Dept: INTERNAL MEDICINE | Facility: CLINIC | Age: 57
End: 2017-05-10

## 2017-05-17 ENCOUNTER — OFFICE VISIT (OUTPATIENT)
Dept: ENDOCRINOLOGY | Facility: CLINIC | Age: 57
End: 2017-05-17

## 2017-05-17 VITALS
OXYGEN SATURATION: 98 % | SYSTOLIC BLOOD PRESSURE: 126 MMHG | WEIGHT: 193 LBS | BODY MASS INDEX: 28.5 KG/M2 | HEART RATE: 89 BPM | DIASTOLIC BLOOD PRESSURE: 78 MMHG

## 2017-05-17 DIAGNOSIS — IMO0001 UNCONTROLLED TYPE 2 DIABETES MELLITUS WITHOUT COMPLICATION, WITHOUT LONG-TERM CURRENT USE OF INSULIN: Primary | ICD-10-CM

## 2017-05-17 LAB
GLUCOSE BLDC GLUCOMTR-MCNC: 129 MG/DL (ref 70–130)
HBA1C MFR BLD: 6.6 %

## 2017-05-17 PROCEDURE — 82947 ASSAY GLUCOSE BLOOD QUANT: CPT | Performed by: INTERNAL MEDICINE

## 2017-05-17 PROCEDURE — 99213 OFFICE O/P EST LOW 20 MIN: CPT | Performed by: INTERNAL MEDICINE

## 2017-05-17 PROCEDURE — 83036 HEMOGLOBIN GLYCOSYLATED A1C: CPT | Performed by: INTERNAL MEDICINE

## 2017-05-17 RX ORDER — SIMVASTATIN 40 MG
40 TABLET ORAL NIGHTLY
Qty: 90 TABLET | Refills: 0 | Status: SHIPPED | OUTPATIENT
Start: 2017-05-17 | End: 2017-05-17 | Stop reason: SDUPTHER

## 2017-05-17 RX ORDER — ACETAMINOPHEN 160 MG
1 TABLET,DISINTEGRATING ORAL DAILY
Refills: 3 | COMMUNITY
Start: 2017-04-17 | End: 2017-07-24 | Stop reason: SDUPTHER

## 2017-05-17 RX ORDER — SIMVASTATIN 40 MG
40 TABLET ORAL NIGHTLY
Qty: 90 TABLET | Refills: 2 | Status: SHIPPED | OUTPATIENT
Start: 2017-05-17 | End: 2018-01-07 | Stop reason: SDUPTHER

## 2017-05-25 ENCOUNTER — TELEPHONE (OUTPATIENT)
Dept: ENDOCRINOLOGY | Facility: CLINIC | Age: 57
End: 2017-05-25

## 2017-07-24 ENCOUNTER — HOSPITAL ENCOUNTER (OUTPATIENT)
Dept: GENERAL RADIOLOGY | Facility: HOSPITAL | Age: 57
Discharge: HOME | End: 2017-07-24
Admitting: ORTHOPAEDIC SURGERY

## 2017-07-24 ENCOUNTER — APPOINTMENT (OUTPATIENT)
Dept: PREADMISSION TESTING | Facility: HOSPITAL | Age: 57
End: 2017-07-24

## 2017-07-24 VITALS — BODY MASS INDEX: 30.37 KG/M2 | WEIGHT: 205.03 LBS | HEIGHT: 69 IN

## 2017-07-24 LAB
ANION GAP SERPL CALCULATED.3IONS-SCNC: 8 MMOL/L (ref 3–11)
BUN BLD-MCNC: 13 MG/DL (ref 9–23)
BUN/CREAT SERPL: 26 (ref 7–25)
CALCIUM SPEC-SCNC: 9.6 MG/DL (ref 8.7–10.4)
CHLORIDE SERPL-SCNC: 107 MMOL/L (ref 99–109)
CO2 SERPL-SCNC: 24 MMOL/L (ref 20–31)
CREAT BLD-MCNC: 0.5 MG/DL (ref 0.6–1.3)
DEPRECATED RDW RBC AUTO: 44 FL (ref 37–54)
ERYTHROCYTE [DISTWIDTH] IN BLOOD BY AUTOMATED COUNT: 12.6 % (ref 11.3–14.5)
GFR SERPL CREATININE-BSD FRML MDRD: 127 ML/MIN/1.73
GLUCOSE BLD-MCNC: 167 MG/DL (ref 70–100)
HBA1C MFR BLD: 6.5 % (ref 4.8–5.6)
HCT VFR BLD AUTO: 41.8 % (ref 34.5–44)
HGB BLD-MCNC: 13.4 G/DL (ref 11.5–15.5)
MCH RBC QN AUTO: 30.6 PG (ref 27–31)
MCHC RBC AUTO-ENTMCNC: 32.1 G/DL (ref 32–36)
MCV RBC AUTO: 95.4 FL (ref 80–99)
PLATELET # BLD AUTO: 258 10*3/MM3 (ref 150–450)
PMV BLD AUTO: 9.9 FL (ref 6–12)
POTASSIUM BLD-SCNC: 3.9 MMOL/L (ref 3.5–5.5)
RBC # BLD AUTO: 4.38 10*6/MM3 (ref 3.89–5.14)
SODIUM BLD-SCNC: 139 MMOL/L (ref 132–146)
WBC NRBC COR # BLD: 7.7 10*3/MM3 (ref 3.5–10.8)

## 2017-07-24 PROCEDURE — 83036 HEMOGLOBIN GLYCOSYLATED A1C: CPT | Performed by: ORTHOPAEDIC SURGERY

## 2017-07-24 PROCEDURE — 71020 HC CHEST PA AND LATERAL: CPT

## 2017-07-24 PROCEDURE — 85027 COMPLETE CBC AUTOMATED: CPT | Performed by: ORTHOPAEDIC SURGERY

## 2017-07-24 PROCEDURE — 93010 ELECTROCARDIOGRAM REPORT: CPT | Performed by: INTERNAL MEDICINE

## 2017-07-24 PROCEDURE — 93005 ELECTROCARDIOGRAM TRACING: CPT

## 2017-07-24 PROCEDURE — 80048 BASIC METABOLIC PNL TOTAL CA: CPT | Performed by: ORTHOPAEDIC SURGERY

## 2017-07-24 PROCEDURE — 36415 COLL VENOUS BLD VENIPUNCTURE: CPT

## 2017-07-24 RX ORDER — IBUPROFEN 200 MG
600 TABLET ORAL EVERY 12 HOURS PRN
COMMUNITY
End: 2017-08-08 | Stop reason: HOSPADM

## 2017-07-24 RX ORDER — NAPROXEN 250 MG/1
250 TABLET ORAL 2 TIMES DAILY PRN
COMMUNITY
End: 2017-08-08 | Stop reason: HOSPADM

## 2017-07-24 RX ORDER — ACETAMINOPHEN 325 MG/1
650 TABLET ORAL EVERY 6 HOURS PRN
COMMUNITY
End: 2017-08-15

## 2017-07-24 NOTE — DISCHARGE INSTRUCTIONS
The following information and instructions were given:    NPO after MN except sips of water with routine prescribed medication (except blood thinner, diabetes, or weight reducing medication) unless otherwise instructed by your physician.  Do not eat, drink, smoke or chew gum after MN the night before surgery. This also includes no mints.    DO NOT shave, wear makeup or dark nail polish.    Remove all jewelry (advised to go to jeweler if unable to remove).    Leave anything you consider valuable at home.    Leave your suitcase in the car until after your surgery.    Bring the following with you (if applicable)   -picture ID and insurance cards   -Co-pay/deductible required by insurance   -Medications in the original bottles (not a list) including all over-the-counter  medications if not brought to PAT   -Copy of advance directive, living will or power of  documents if not  brought to PAT   -CPAP or BIPAP mask and tubing (do not bring machine)   -Skin prep instructions sheet   -PAT Pass   Education booklet, brochure, handout or binder given to patient.    Pain Control After Surgery handout given to patient.    Respirex use (handout given to patient) and pneumonia prevention.    Signs and Symptoms of infection.    DVT Prevention stressing the importance of ambulation.    Patient to apply Chlorhexadine wipes to surgical area (as instructed) the night before procedure and the AM of procedure.      Shoulder book given

## 2017-08-07 ENCOUNTER — ANESTHESIA (OUTPATIENT)
Dept: PERIOP | Facility: HOSPITAL | Age: 57
End: 2017-08-07

## 2017-08-07 ENCOUNTER — HOSPITAL ENCOUNTER (INPATIENT)
Facility: HOSPITAL | Age: 57
LOS: 1 days | Discharge: HOME | End: 2017-08-08
Attending: ORTHOPAEDIC SURGERY | Admitting: ORTHOPAEDIC SURGERY

## 2017-08-07 ENCOUNTER — APPOINTMENT (OUTPATIENT)
Dept: GENERAL RADIOLOGY | Facility: HOSPITAL | Age: 57
End: 2017-08-07

## 2017-08-07 ENCOUNTER — ANESTHESIA EVENT (OUTPATIENT)
Dept: PERIOP | Facility: HOSPITAL | Age: 57
End: 2017-08-07

## 2017-08-07 DIAGNOSIS — Z78.9 IMPAIRED MOBILITY AND ADLS: Primary | ICD-10-CM

## 2017-08-07 DIAGNOSIS — Z74.09 IMPAIRED MOBILITY AND ADLS: Primary | ICD-10-CM

## 2017-08-07 PROBLEM — Z72.0 TOBACCO ABUSE: Status: ACTIVE | Noted: 2017-08-07

## 2017-08-07 PROBLEM — G47.00 INSOMNIA: Status: ACTIVE | Noted: 2017-08-07

## 2017-08-07 PROBLEM — S42.291A CLOSED 4-PART FRACTURE OF PROXIMAL END OF RIGHT HUMERUS: Status: ACTIVE | Noted: 2017-08-07

## 2017-08-07 PROBLEM — E11.9 TYPE 2 DIABETES MELLITUS: Status: ACTIVE | Noted: 2017-08-07

## 2017-08-07 PROBLEM — F41.9 ANXIETY: Status: ACTIVE | Noted: 2017-08-07

## 2017-08-07 LAB
ALBUMIN SERPL-MCNC: 3.8 G/DL (ref 3.2–4.8)
ALBUMIN/GLOB SERPL: 1.6 G/DL (ref 1.5–2.5)
ALP SERPL-CCNC: 74 U/L (ref 25–100)
ALT SERPL W P-5'-P-CCNC: 15 U/L (ref 7–40)
ANION GAP SERPL CALCULATED.3IONS-SCNC: 19 MMOL/L (ref 3–11)
AST SERPL-CCNC: 22 U/L (ref 0–33)
BASOPHILS # BLD AUTO: 0.01 10*3/MM3 (ref 0–0.2)
BASOPHILS NFR BLD AUTO: 0.1 % (ref 0–1)
BILIRUB SERPL-MCNC: 0.5 MG/DL (ref 0.3–1.2)
BILIRUB UR QL STRIP: NEGATIVE
BUN BLD-MCNC: 16 MG/DL (ref 9–23)
BUN/CREAT SERPL: 22.9 (ref 7–25)
CALCIUM SPEC-SCNC: 8.8 MG/DL (ref 8.7–10.4)
CHLORIDE SERPL-SCNC: 106 MMOL/L (ref 99–109)
CLARITY UR: CLEAR
CO2 SERPL-SCNC: 14 MMOL/L (ref 20–31)
COLOR UR: YELLOW
CREAT BLD-MCNC: 0.7 MG/DL (ref 0.6–1.3)
DEPRECATED RDW RBC AUTO: 43.8 FL (ref 37–54)
EOSINOPHIL # BLD AUTO: 0 10*3/MM3 (ref 0–0.3)
EOSINOPHIL NFR BLD AUTO: 0 % (ref 0–3)
ERYTHROCYTE [DISTWIDTH] IN BLOOD BY AUTOMATED COUNT: 12.6 % (ref 11.3–14.5)
GFR SERPL CREATININE-BSD FRML MDRD: 86 ML/MIN/1.73
GLOBULIN UR ELPH-MCNC: 2.4 GM/DL
GLUCOSE BLD-MCNC: 414 MG/DL (ref 70–100)
GLUCOSE BLDC GLUCOMTR-MCNC: 125 MG/DL (ref 70–130)
GLUCOSE BLDC GLUCOMTR-MCNC: 363 MG/DL (ref 70–130)
GLUCOSE BLDC GLUCOMTR-MCNC: 400 MG/DL (ref 70–130)
GLUCOSE BLDC GLUCOMTR-MCNC: 401 MG/DL (ref 70–130)
GLUCOSE BLDC GLUCOMTR-MCNC: 411 MG/DL (ref 70–130)
GLUCOSE BLDC GLUCOMTR-MCNC: 480 MG/DL (ref 70–130)
GLUCOSE UR STRIP-MCNC: ABNORMAL MG/DL
HBA1C MFR BLD: 7.2 % (ref 4.8–5.6)
HCT VFR BLD AUTO: 35.9 % (ref 34.5–44)
HGB BLD-MCNC: 11.6 G/DL (ref 11.5–15.5)
HGB UR QL STRIP.AUTO: NEGATIVE
IMM GRANULOCYTES # BLD: 0.03 10*3/MM3 (ref 0–0.03)
IMM GRANULOCYTES NFR BLD: 0.2 % (ref 0–0.6)
KETONES UR QL STRIP: ABNORMAL
LEUKOCYTE ESTERASE UR QL STRIP.AUTO: NEGATIVE
LYMPHOCYTES # BLD AUTO: 0.93 10*3/MM3 (ref 0.6–4.8)
LYMPHOCYTES NFR BLD AUTO: 6.1 % (ref 24–44)
MCH RBC QN AUTO: 30.7 PG (ref 27–31)
MCHC RBC AUTO-ENTMCNC: 32.3 G/DL (ref 32–36)
MCV RBC AUTO: 95 FL (ref 80–99)
MONOCYTES # BLD AUTO: 0.35 10*3/MM3 (ref 0–1)
MONOCYTES NFR BLD AUTO: 2.3 % (ref 0–12)
NEUTROPHILS # BLD AUTO: 13.91 10*3/MM3 (ref 1.5–8.3)
NEUTROPHILS NFR BLD AUTO: 91.3 % (ref 41–71)
NITRITE UR QL STRIP: NEGATIVE
PH UR STRIP.AUTO: <=5 [PH] (ref 5–8)
PLATELET # BLD AUTO: 224 10*3/MM3 (ref 150–450)
PMV BLD AUTO: 10.2 FL (ref 6–12)
POTASSIUM BLD-SCNC: 4.6 MMOL/L (ref 3.5–5.5)
POTASSIUM BLDA-SCNC: 4.1 MMOL/L (ref 3.5–5.3)
PROT SERPL-MCNC: 6.2 G/DL (ref 5.7–8.2)
PROT UR QL STRIP: NEGATIVE
RBC # BLD AUTO: 3.78 10*6/MM3 (ref 3.89–5.14)
SODIUM BLD-SCNC: 139 MMOL/L (ref 132–146)
SP GR UR STRIP: 1.02 (ref 1–1.03)
UROBILINOGEN UR QL STRIP: ABNORMAL
WBC NRBC COR # BLD: 15.23 10*3/MM3 (ref 3.5–10.8)

## 2017-08-07 PROCEDURE — 0RPJ04Z REMOVAL OF INTERNAL FIXATION DEVICE FROM RIGHT SHOULDER JOINT, OPEN APPROACH: ICD-10-PCS | Performed by: ORTHOPAEDIC SURGERY

## 2017-08-07 PROCEDURE — 25010000002 HYDROMORPHONE PER 4 MG: Performed by: ORTHOPAEDIC SURGERY

## 2017-08-07 PROCEDURE — 25010000002 ONDANSETRON PER 1 MG: Performed by: NURSE ANESTHETIST, CERTIFIED REGISTERED

## 2017-08-07 PROCEDURE — 25010000002 FENTANYL CITRATE (PF) 100 MCG/2ML SOLUTION: Performed by: NURSE ANESTHETIST, CERTIFIED REGISTERED

## 2017-08-07 PROCEDURE — C1713 ANCHOR/SCREW BN/BN,TIS/BN: HCPCS | Performed by: ORTHOPAEDIC SURGERY

## 2017-08-07 PROCEDURE — 80053 COMPREHEN METABOLIC PANEL: CPT | Performed by: NURSE PRACTITIONER

## 2017-08-07 PROCEDURE — 25010000002 VANCOMYCIN: Performed by: ORTHOPAEDIC SURGERY

## 2017-08-07 PROCEDURE — 25010000002 FENTANYL CITRATE (PF) 100 MCG/2ML SOLUTION: Performed by: ANESTHESIOLOGY

## 2017-08-07 PROCEDURE — 25010000002 PHENYLEPHRINE PER 1 ML: Performed by: NURSE ANESTHETIST, CERTIFIED REGISTERED

## 2017-08-07 PROCEDURE — 25010000002 LORAZEPAM PER 2 MG: Performed by: INTERNAL MEDICINE

## 2017-08-07 PROCEDURE — 84132 ASSAY OF SERUM POTASSIUM: CPT | Performed by: ANESTHESIOLOGY

## 2017-08-07 PROCEDURE — 63710000001 INSULIN LISPRO (HUMAN) PER 5 UNITS: Performed by: NURSE PRACTITIONER

## 2017-08-07 PROCEDURE — 82962 GLUCOSE BLOOD TEST: CPT

## 2017-08-07 PROCEDURE — 0RRJ00Z REPLACEMENT OF RIGHT SHOULDER JOINT WITH REVERSE BALL AND SOCKET SYNTHETIC SUBSTITUTE, OPEN APPROACH: ICD-10-PCS | Performed by: ORTHOPAEDIC SURGERY

## 2017-08-07 PROCEDURE — 99406 BEHAV CHNG SMOKING 3-10 MIN: CPT | Performed by: NURSE PRACTITIONER

## 2017-08-07 PROCEDURE — 73030 X-RAY EXAM OF SHOULDER: CPT

## 2017-08-07 PROCEDURE — C1776 JOINT DEVICE (IMPLANTABLE): HCPCS | Performed by: ORTHOPAEDIC SURGERY

## 2017-08-07 PROCEDURE — 25010000002 MIDAZOLAM PER 1 MG: Performed by: ANESTHESIOLOGY

## 2017-08-07 PROCEDURE — 99222 1ST HOSP IP/OBS MODERATE 55: CPT | Performed by: NURSE PRACTITIONER

## 2017-08-07 PROCEDURE — 0LS30ZZ REPOSITION RIGHT UPPER ARM TENDON, OPEN APPROACH: ICD-10-PCS | Performed by: ORTHOPAEDIC SURGERY

## 2017-08-07 PROCEDURE — 85025 COMPLETE CBC W/AUTO DIFF WBC: CPT | Performed by: NURSE PRACTITIONER

## 2017-08-07 PROCEDURE — 81003 URINALYSIS AUTO W/O SCOPE: CPT | Performed by: NURSE PRACTITIONER

## 2017-08-07 PROCEDURE — 25010000002 PROPOFOL 10 MG/ML EMULSION: Performed by: NURSE ANESTHETIST, CERTIFIED REGISTERED

## 2017-08-07 PROCEDURE — 25010000002 ROPIVACAINE PER 1 MG: Performed by: NURSE ANESTHETIST, CERTIFIED REGISTERED

## 2017-08-07 PROCEDURE — 83036 HEMOGLOBIN GLYCOSYLATED A1C: CPT | Performed by: NURSE PRACTITIONER

## 2017-08-07 DEVICE — IMPLANTABLE DEVICE: Type: IMPLANTABLE DEVICE | Status: FUNCTIONAL

## 2017-08-07 DEVICE — STEM HUM PRI EQUINX PF 11MM: Type: IMPLANTABLE DEVICE | Status: FUNCTIONAL

## 2017-08-07 DEVICE — SCRW COMPR EQUINOXE LK 4.5X18MM: Type: IMPLANTABLE DEVICE | Status: FUNCTIONAL

## 2017-08-07 DEVICE — SCRW COMPR EQUINOXE LK 4.5X34MM: Type: IMPLANTABLE DEVICE | Status: FUNCTIONAL

## 2017-08-07 DEVICE — PLT/JOINT GLEN EQUINOXE STD/POST: Type: IMPLANTABLE DEVICE | Status: FUNCTIONAL

## 2017-08-07 DEVICE — SCRW EQUINOXE TORQ DEFINE REV SHLDR KT: Type: IMPLANTABLE DEVICE | Status: FUNCTIONAL

## 2017-08-07 DEVICE — TRY HUM EQUINOXE ADPT REV SHLDR PLS0: Type: IMPLANTABLE DEVICE | Status: FUNCTIONAL

## 2017-08-07 DEVICE — LINER HUM EQUINOXE REV SHLDR 38 PLS0: Type: IMPLANTABLE DEVICE | Status: FUNCTIONAL

## 2017-08-07 DEVICE — SCRW LK EQUINOXE GLENOSPHERE REV/SHLDR: Type: IMPLANTABLE DEVICE | Status: FUNCTIONAL

## 2017-08-07 DEVICE — GLENOSPHERE SHLDR/REV EQUINOXE 38MM: Type: IMPLANTABLE DEVICE | Status: FUNCTIONAL

## 2017-08-07 DEVICE — SCRW COMPR EQUINOXE LK 4.5X26MM: Type: IMPLANTABLE DEVICE | Status: FUNCTIONAL

## 2017-08-07 RX ORDER — BUPIVACAINE HYDROCHLORIDE 2.5 MG/ML
INJECTION, SOLUTION EPIDURAL; INFILTRATION; INTRACAUDAL AS NEEDED
Status: DISCONTINUED | OUTPATIENT
Start: 2017-08-07 | End: 2017-08-07 | Stop reason: SURG

## 2017-08-07 RX ORDER — SENNA AND DOCUSATE SODIUM 50; 8.6 MG/1; MG/1
2 TABLET, FILM COATED ORAL 2 TIMES DAILY
Status: DISCONTINUED | OUTPATIENT
Start: 2017-08-07 | End: 2017-08-08 | Stop reason: HOSPADM

## 2017-08-07 RX ORDER — ROPIVACAINE HYDROCHLORIDE 2 MG/ML
6 INJECTION, SOLUTION EPIDURAL; INFILTRATION CONTINUOUS
Status: DISCONTINUED | OUTPATIENT
Start: 2017-08-07 | End: 2017-08-07

## 2017-08-07 RX ORDER — ONDANSETRON 2 MG/ML
4 INJECTION INTRAMUSCULAR; INTRAVENOUS ONCE AS NEEDED
Status: DISCONTINUED | OUTPATIENT
Start: 2017-08-07 | End: 2017-08-07 | Stop reason: HOSPADM

## 2017-08-07 RX ORDER — NICOTINE POLACRILEX 4 MG
15 LOZENGE BUCCAL
Status: DISCONTINUED | OUTPATIENT
Start: 2017-08-07 | End: 2017-08-08 | Stop reason: HOSPADM

## 2017-08-07 RX ORDER — NICOTINE 21 MG/24HR
1 PATCH, TRANSDERMAL 24 HOURS TRANSDERMAL
Status: DISCONTINUED | OUTPATIENT
Start: 2017-08-07 | End: 2017-08-08 | Stop reason: HOSPADM

## 2017-08-07 RX ORDER — MIDAZOLAM HYDROCHLORIDE 1 MG/ML
INJECTION INTRAMUSCULAR; INTRAVENOUS AS NEEDED
Status: DISCONTINUED | OUTPATIENT
Start: 2017-08-07 | End: 2017-08-07 | Stop reason: SURG

## 2017-08-07 RX ORDER — SODIUM CHLORIDE, SODIUM LACTATE, POTASSIUM CHLORIDE, CALCIUM CHLORIDE 600; 310; 30; 20 MG/100ML; MG/100ML; MG/100ML; MG/100ML
9 INJECTION, SOLUTION INTRAVENOUS CONTINUOUS
Status: DISCONTINUED | OUTPATIENT
Start: 2017-08-07 | End: 2017-08-07 | Stop reason: SDUPTHER

## 2017-08-07 RX ORDER — ONDANSETRON 2 MG/ML
4 INJECTION INTRAMUSCULAR; INTRAVENOUS EVERY 6 HOURS PRN
Status: DISCONTINUED | OUTPATIENT
Start: 2017-08-07 | End: 2017-08-08 | Stop reason: HOSPADM

## 2017-08-07 RX ORDER — FENTANYL CITRATE 50 UG/ML
50 INJECTION, SOLUTION INTRAMUSCULAR; INTRAVENOUS
Status: DISCONTINUED | OUTPATIENT
Start: 2017-08-07 | End: 2017-08-07 | Stop reason: HOSPADM

## 2017-08-07 RX ORDER — ONDANSETRON 2 MG/ML
INJECTION INTRAMUSCULAR; INTRAVENOUS AS NEEDED
Status: DISCONTINUED | OUTPATIENT
Start: 2017-08-07 | End: 2017-08-07 | Stop reason: SURG

## 2017-08-07 RX ORDER — ATORVASTATIN CALCIUM 20 MG/1
20 TABLET, FILM COATED ORAL DAILY
Status: DISCONTINUED | OUTPATIENT
Start: 2017-08-07 | End: 2017-08-08 | Stop reason: HOSPADM

## 2017-08-07 RX ORDER — PROPOFOL 10 MG/ML
VIAL (ML) INTRAVENOUS AS NEEDED
Status: DISCONTINUED | OUTPATIENT
Start: 2017-08-07 | End: 2017-08-07 | Stop reason: SURG

## 2017-08-07 RX ORDER — HYDROCODONE BITARTRATE AND ACETAMINOPHEN 10; 325 MG/1; MG/1
1 TABLET ORAL EVERY 4 HOURS PRN
Status: DISCONTINUED | OUTPATIENT
Start: 2017-08-07 | End: 2017-08-08 | Stop reason: HOSPADM

## 2017-08-07 RX ORDER — ATORVASTATIN CALCIUM 20 MG/1
20 TABLET, FILM COATED ORAL DAILY
Status: DISCONTINUED | OUTPATIENT
Start: 2017-08-08 | End: 2017-08-07

## 2017-08-07 RX ORDER — MELATONIN
2000 DAILY
Status: DISCONTINUED | OUTPATIENT
Start: 2017-08-08 | End: 2017-08-08 | Stop reason: HOSPADM

## 2017-08-07 RX ORDER — FAMOTIDINE 20 MG/1
20 TABLET, FILM COATED ORAL ONCE
Status: COMPLETED | OUTPATIENT
Start: 2017-08-07 | End: 2017-08-07

## 2017-08-07 RX ORDER — FENTANYL CITRATE 50 UG/ML
INJECTION, SOLUTION INTRAMUSCULAR; INTRAVENOUS AS NEEDED
Status: DISCONTINUED | OUTPATIENT
Start: 2017-08-07 | End: 2017-08-07 | Stop reason: SURG

## 2017-08-07 RX ORDER — CLINDAMYCIN PHOSPHATE 900 MG/50ML
900 INJECTION, SOLUTION INTRAVENOUS ONCE
Status: COMPLETED | OUTPATIENT
Start: 2017-08-07 | End: 2017-08-07

## 2017-08-07 RX ORDER — NALOXONE HCL 0.4 MG/ML
0.1 VIAL (ML) INJECTION
Status: DISCONTINUED | OUTPATIENT
Start: 2017-08-07 | End: 2017-08-08 | Stop reason: HOSPADM

## 2017-08-07 RX ORDER — DEXTROSE MONOHYDRATE 25 G/50ML
25 INJECTION, SOLUTION INTRAVENOUS
Status: DISCONTINUED | OUTPATIENT
Start: 2017-08-07 | End: 2017-08-08 | Stop reason: HOSPADM

## 2017-08-07 RX ORDER — MAGNESIUM HYDROXIDE 1200 MG/15ML
LIQUID ORAL AS NEEDED
Status: DISCONTINUED | OUTPATIENT
Start: 2017-08-07 | End: 2017-08-07 | Stop reason: HOSPADM

## 2017-08-07 RX ORDER — DOCUSATE SODIUM 100 MG/1
100 CAPSULE, LIQUID FILLED ORAL 2 TIMES DAILY PRN
Status: DISCONTINUED | OUTPATIENT
Start: 2017-08-07 | End: 2017-08-08 | Stop reason: HOSPADM

## 2017-08-07 RX ORDER — SODIUM CHLORIDE 0.9 % (FLUSH) 0.9 %
1-10 SYRINGE (ML) INJECTION AS NEEDED
Status: DISCONTINUED | OUTPATIENT
Start: 2017-08-07 | End: 2017-08-07 | Stop reason: HOSPADM

## 2017-08-07 RX ORDER — BISACODYL 5 MG/1
10 TABLET, DELAYED RELEASE ORAL DAILY PRN
Status: DISCONTINUED | OUTPATIENT
Start: 2017-08-07 | End: 2017-08-08 | Stop reason: HOSPADM

## 2017-08-07 RX ORDER — ONDANSETRON 4 MG/1
4 TABLET, FILM COATED ORAL EVERY 6 HOURS PRN
Status: DISCONTINUED | OUTPATIENT
Start: 2017-08-07 | End: 2017-08-08 | Stop reason: HOSPADM

## 2017-08-07 RX ORDER — ACETAMINOPHEN 325 MG/1
650 TABLET ORAL EVERY 6 HOURS SCHEDULED
Status: DISCONTINUED | OUTPATIENT
Start: 2017-08-07 | End: 2017-08-08 | Stop reason: HOSPADM

## 2017-08-07 RX ORDER — SODIUM CHLORIDE, SODIUM LACTATE, POTASSIUM CHLORIDE, CALCIUM CHLORIDE 600; 310; 30; 20 MG/100ML; MG/100ML; MG/100ML; MG/100ML
100 INJECTION, SOLUTION INTRAVENOUS CONTINUOUS
Status: DISCONTINUED | OUTPATIENT
Start: 2017-08-07 | End: 2017-08-08 | Stop reason: HOSPADM

## 2017-08-07 RX ORDER — LORAZEPAM 2 MG/ML
0.25 INJECTION INTRAMUSCULAR ONCE
Status: COMPLETED | OUTPATIENT
Start: 2017-08-07 | End: 2017-08-07

## 2017-08-07 RX ORDER — ROPIVACAINE HYDROCHLORIDE 2 MG/ML
6 INJECTION, SOLUTION EPIDURAL; INFILTRATION CONTINUOUS
Status: DISCONTINUED | OUTPATIENT
Start: 2017-08-07 | End: 2017-08-08

## 2017-08-07 RX ORDER — FAMOTIDINE 10 MG/ML
20 INJECTION, SOLUTION INTRAVENOUS ONCE
Status: DISCONTINUED | OUTPATIENT
Start: 2017-08-07 | End: 2017-08-07 | Stop reason: HOSPADM

## 2017-08-07 RX ORDER — SODIUM CHLORIDE 450 MG/100ML
50 INJECTION, SOLUTION INTRAVENOUS CONTINUOUS
Status: DISCONTINUED | OUTPATIENT
Start: 2017-08-07 | End: 2017-08-08 | Stop reason: HOSPADM

## 2017-08-07 RX ORDER — HYDROMORPHONE HYDROCHLORIDE 1 MG/ML
0.5 INJECTION, SOLUTION INTRAMUSCULAR; INTRAVENOUS; SUBCUTANEOUS
Status: DISCONTINUED | OUTPATIENT
Start: 2017-08-07 | End: 2017-08-08 | Stop reason: HOSPADM

## 2017-08-07 RX ORDER — LIDOCAINE HYDROCHLORIDE 10 MG/ML
0.5 INJECTION, SOLUTION EPIDURAL; INFILTRATION; INTRACAUDAL; PERINEURAL ONCE AS NEEDED
Status: COMPLETED | OUTPATIENT
Start: 2017-08-07 | End: 2017-08-07

## 2017-08-07 RX ORDER — LIDOCAINE HYDROCHLORIDE 10 MG/ML
INJECTION, SOLUTION EPIDURAL; INFILTRATION; INTRACAUDAL; PERINEURAL AS NEEDED
Status: DISCONTINUED | OUTPATIENT
Start: 2017-08-07 | End: 2017-08-07 | Stop reason: SURG

## 2017-08-07 RX ORDER — VANCOMYCIN/0.9 % SOD CHLORIDE 1.5G/250ML
15 PLASTIC BAG, INJECTION (ML) INTRAVENOUS ONCE
Status: COMPLETED | OUTPATIENT
Start: 2017-08-08 | End: 2017-08-08

## 2017-08-07 RX ADMIN — INSULIN LISPRO 7 UNITS: 100 INJECTION, SOLUTION INTRAVENOUS; SUBCUTANEOUS at 22:18

## 2017-08-07 RX ADMIN — PHENYLEPHRINE HYDROCHLORIDE 100 MCG: 10 INJECTION INTRAVENOUS at 16:48

## 2017-08-07 RX ADMIN — HYDROCODONE BITARTRATE AND ACETAMINOPHEN 1 TABLET: 10; 325 TABLET ORAL at 21:03

## 2017-08-07 RX ADMIN — HYDROMORPHONE HYDROCHLORIDE 0.5 MG: 1 INJECTION, SOLUTION INTRAMUSCULAR; INTRAVENOUS; SUBCUTANEOUS at 18:48

## 2017-08-07 RX ADMIN — FAMOTIDINE 20 MG: 20 TABLET ORAL at 13:05

## 2017-08-07 RX ADMIN — FENTANYL CITRATE 50 MCG: 50 INJECTION INTRAMUSCULAR; INTRAVENOUS at 17:50

## 2017-08-07 RX ADMIN — FENTANYL CITRATE 100 MCG: 50 INJECTION, SOLUTION INTRAMUSCULAR; INTRAVENOUS at 14:00

## 2017-08-07 RX ADMIN — CLINDAMYCIN PHOSPHATE 900 MG: 900 INJECTION, SOLUTION INTRAVENOUS at 14:40

## 2017-08-07 RX ADMIN — Medication 5 MG: at 22:17

## 2017-08-07 RX ADMIN — HYDROMORPHONE HYDROCHLORIDE 0.5 MG: 1 INJECTION, SOLUTION INTRAMUSCULAR; INTRAVENOUS; SUBCUTANEOUS at 22:17

## 2017-08-07 RX ADMIN — MIDAZOLAM HYDROCHLORIDE 2 MG: 1 INJECTION, SOLUTION INTRAMUSCULAR; INTRAVENOUS at 14:00

## 2017-08-07 RX ADMIN — BUPIVACAINE HYDROCHLORIDE 15 ML: 2.5 INJECTION, SOLUTION EPIDURAL; INFILTRATION; INTRACAUDAL; PERINEURAL at 14:00

## 2017-08-07 RX ADMIN — ONDANSETRON 4 MG: 2 INJECTION INTRAMUSCULAR; INTRAVENOUS at 15:45

## 2017-08-07 RX ADMIN — VANCOMYCIN HYDROCHLORIDE 1500 MG: 1 INJECTION, POWDER, LYOPHILIZED, FOR SOLUTION INTRAVENOUS at 14:37

## 2017-08-07 RX ADMIN — PROPOFOL 50 MG: 10 INJECTION, EMULSION INTRAVENOUS at 17:03

## 2017-08-07 RX ADMIN — PHENYLEPHRINE HYDROCHLORIDE 100 MCG: 10 INJECTION INTRAVENOUS at 16:52

## 2017-08-07 RX ADMIN — LORAZEPAM 0.25 MG: 2 INJECTION INTRAMUSCULAR; INTRAVENOUS at 22:17

## 2017-08-07 RX ADMIN — BUPIVACAINE HYDROCHLORIDE 10 ML: 2.5 INJECTION, SOLUTION EPIDURAL; INFILTRATION; INTRACAUDAL; PERINEURAL at 17:05

## 2017-08-07 RX ADMIN — PHENYLEPHRINE HYDROCHLORIDE 100 MCG: 10 INJECTION INTRAVENOUS at 15:36

## 2017-08-07 RX ADMIN — SODIUM CHLORIDE, POTASSIUM CHLORIDE, SODIUM LACTATE AND CALCIUM CHLORIDE: 600; 310; 30; 20 INJECTION, SOLUTION INTRAVENOUS at 15:17

## 2017-08-07 RX ADMIN — LIDOCAINE HYDROCHLORIDE 50 MG: 10 INJECTION, SOLUTION EPIDURAL; INFILTRATION; INTRACAUDAL; PERINEURAL at 15:23

## 2017-08-07 RX ADMIN — PHENYLEPHRINE HYDROCHLORIDE 100 MCG: 10 INJECTION INTRAVENOUS at 15:55

## 2017-08-07 RX ADMIN — Medication 2 TABLET: at 21:03

## 2017-08-07 RX ADMIN — PROPOFOL 200 MG: 10 INJECTION, EMULSION INTRAVENOUS at 15:23

## 2017-08-07 RX ADMIN — FENTANYL CITRATE 50 MCG: 50 INJECTION INTRAMUSCULAR; INTRAVENOUS at 17:45

## 2017-08-07 RX ADMIN — LIDOCAINE HYDROCHLORIDE 0.5 ML: 10 INJECTION, SOLUTION EPIDURAL; INFILTRATION; INTRACAUDAL; PERINEURAL at 13:06

## 2017-08-07 RX ADMIN — ROPIVACAINE HYDROCHLORIDE 6 ML/HR: 2 INJECTION, SOLUTION EPIDURAL; INFILTRATION at 17:30

## 2017-08-07 RX ADMIN — SODIUM CHLORIDE, POTASSIUM CHLORIDE, SODIUM LACTATE AND CALCIUM CHLORIDE 9 ML/HR: 600; 310; 30; 20 INJECTION, SOLUTION INTRAVENOUS at 13:07

## 2017-08-07 RX ADMIN — ATORVASTATIN CALCIUM 20 MG: 20 TABLET, FILM COATED ORAL at 22:17

## 2017-08-07 RX ADMIN — ACETAMINOPHEN 650 MG: 325 TABLET, FILM COATED ORAL at 21:03

## 2017-08-07 NOTE — ANESTHESIA PREPROCEDURE EVALUATION
Anesthesia Evaluation     Patient summary reviewed and Nursing notes reviewed          Airway   Mallampati: II  TM distance: >3 FB  Neck ROM: full  no difficulty expected  Dental - normal exam     Pulmonary - normal exam   (+) a smoker Former, asthma,   Cardiovascular - normal exam    (+) hyperlipidemia      Neuro/Psych- negative ROS  GI/Hepatic/Renal/Endo    (+) obesity,  diabetes mellitus,     Musculoskeletal (-) negative ROS    Abdominal  - normal exam    Bowel sounds: normal.   Substance History - negative use     OB/GYN negative ob/gyn ROS         Other                                        Anesthesia Plan    ASA 3     general   (Peripheral nerve block + cath for post op pain relief)  intravenous induction   Anesthetic plan and risks discussed with patient.    Plan discussed with CRNA.

## 2017-08-07 NOTE — OP NOTE
DATE OF OPERATION: 08/07/17  PREOPERATIVE DIAGNOSIS: Right shoulder proximal humerus fracture with malunion and posttraumatic arthritis.    POSTOPERATIVE DIAGNOSES:  1. Right shoulder proximal humerus fracture with malunion and posttraumatic arthritis with rotator cuff insufficiency  2. Biceps tenosynovitis.     PROCEDURES PERFORMED:  1. Right reverse total shoulder arthroplasty.    2. Right biceps tenodesis.    3. Removal deep implant, right shoulder - proximal humerus plate  SURGEON: Jaguar Marsh MD  ASSISTANTS:  1. Norman Lanza MD, PGY-5.    ANESTHESIA: General plus block.    ESTIMATED BLOOD LOSS:250mL.    COMPLICATIONS: None.    DISPOSITION: Recovery room in stable condition.    IMPLANTS: [employer] Equinoxe reverse total shoulder system, 11 mm stem press-fit, 0 metal liner tray, 38-0 polyethylene tray, standard baseplate with 4 screws with locking caps, and a 38mm glenosphere.    INDICATIONS: This is a -year-old 57-year-old female who sustained a right proximal humerus fracture treated with open reduction internal fixation approximate 10 months ago.  It was fixed due to the age of the patient and Carrol extremity trauma including a left proximal humerus fracture treated nonoperatively as well as a patella fracture treated operatively.  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  Due to significant pain and limited function shoulder arthroplasty was discussed and after a discussion of risks, benefits, and alternatives, wished to proceed with shoulder arthroplasty.  DESCRIPTION OF PROCEDURE: On the day of surgery, the patient identified the right shoulder as the correct operative extremity. This was initialed by the surgeon with the patients's acknowledgment. The patient underwent placement of an interscalene block and was taken to the operating room and placed in the supine position. Upon induction of adequate anesthesia, the patient was brought up to the beach  chair position and the shoulder and upper extremity were prepped and draped in the usual sterile fashion. Timeout confirmed the correct patient and operative extremity as well as that antibiotics were on board.  Her previous incision was utilized and was carried sharply through the skin and subcutaneous tissue. Medial and lateral flaps were developed over the deltopectoral fascia. The cephalic vein was identified and mobilized medially with the deltoid but was ligated due to friability. The subdeltoid and subpectoral spaces were mobilized and a blunt retractor was placed deep to this.  The proximal humerus plate was identified and subperiosteally exposed.  Old suture material was removed.  Screws were removed uneventfully as was the plate.  The fracture appears to have united though in a malunion position.  The clavipectoral fascia was opened on the lateral edge of the conjoined tendon and the retractor was moved deep to this. The leading edge of the pectoralis was released exposing the long head of the biceps. This was tenosynovitic. It was tenodesed to the pectoralis and released proximal to this. The clavipectoral fascia was opened on the lateral edge of conjoined tendon and the retractor moved deep to this.  The bicipital groove was exposed and a lesser tuberosity osteotomy was performed and rotator interval was released to the glenoid exposing the humeral head. The inferior capsule was released directly off the humerus to allow greater than 90° of external rotation. The anatomic neck was exposed and extensive deformity was identified and the humeral head osteotomy was performed in approximately 20° of retroversion. The remainder of the osteophytes were removed.  Malunion of the greater tuberosity was accepted to minimize risk of nonunion.  In adductor in the arm, the supraspinatus peeled off the proximal humerus with the infraspinatus remained intact.  This made the decision to proceed with a reverse shoulder  replacement especially in light of the severe deformity present.  The canal was then entered, reamed, and broached. The final stem impacted in in approximately 20° of retroversion. A head protector was placed. The humerus was subluxed posteriorly. The glenoid was then exposed. Circumferential labral excision and capsular release were performed. A 270° mobilization of the subscapularis was carried out as well.  A centering hole was drilled. The glenoid was gently reamed and then the large central hole for the baseplate was drilled and the cage glenoid baseplate impacted in.  Next, the inferior screw hole was drilled and a screw placed with excellent purchase.  Next, an charity-inferior screw was placed, then a postero-inferior screw, then an charity-superior screw placed with acceptable purchase in all.  The glenosphere was then inserted and locked into place with a set screw.  The humerus was carefully subluxed back anteriorly. A liner tray and polyethylene were placed and trialing was carried out. The appropriate final sizes were chosen and locked into place.  The shoulder was then reduced.  This allowed nearly full passive range of motion with no instability. The joint was copiously irrigated with orthopedic irrigation mixed with Betadine after the final implants were assembled and locked into place.  The lesser tuberosity osteotomy was repaired through drill holes to the proximal humerus. Passive range range of motion will be full elevation but external rotation will be limited to 30° in the perioperative period. The deltopectoral interval was approximated with 0 Vicryl, the subcutaneous tissue with 2-0 Vicryl, and the skin with Monocryl and Dermabond. A sterile dressing was placed. Anesthesia was reversed and the patient was taken to the recovery room in stable condition. All instrument, needle, and sponge counts were correct.      Jaguar Marsh MD*

## 2017-08-07 NOTE — H&P
Pre-Op H&P    Chief complaint: RUE pain    HPI:    Patient is a 57 y.o.female presents with s/p closed fracture of right humeral neck and underwent ORIF of right valgus impacted 4 part proximal humerus fracture on  10/31/16.  Here today for removal of right proximal humerus plate, right total shoulder arthroplasty vs reverse total shoulder arthroplasty     Review of Systems:  General ROS: negative for chills, fever or skin lesions;  No changes since last office visit  Cardiovascular ROS: no chest pain or dyspnea on exertion  Respiratory ROS: no cough, shortness of breath, or wheezing    Allergies:   Allergies   Allergen Reactions   • Codeine Nausea And Vomiting   • Morphine And Related Itching     Severe hives- purple color to face   • Penicillins      Had pcn as child and unsure of reaction       Home Meds    Prescriptions Prior to Admission   Medication Sig Dispense Refill Last Dose   • acetaminophen (TYLENOL) 325 MG tablet Take 650 mg by mouth Every 6 (Six) Hours As Needed for Mild Pain (1-3).   8/6/2017 at 2350   • Cholecalciferol (VITAMIN D) 2000 UNITS tablet Take 2,000 Units by mouth Daily. 90 tablet 3 Past Week at Unknown time   • ibuprofen (ADVIL,MOTRIN) 200 MG tablet Take 600 mg by mouth Every 12 (Twelve) Hours As Needed for Mild Pain (1-3).   Past Week at Unknown time   • Liraglutide (VICTOZA) 18 MG/3ML solution pen-injector Inject 1 pen under the skin Every Night. 1.8   8/6/2017 at 2350   • metFORMIN XR (GLUCOPHAGE-XR) 500 MG 24 hr tablet Take 2 tablets by mouth Daily With Breakfast. Use 1 tablet bid with food (Patient taking differently: Take 500 mg by mouth Daily With Breakfast. Use 1 tablet bid with food) 180 tablet 3 8/6/2017 at 2350   • nicotine (NICODERM CQ) 21 MG/24HR patch Place 1 patch on the skin Daily. 28 patch 2 8/6/2017 at Unknown time   • simvastatin (ZOCOR) 40 MG tablet Take 1 tablet by mouth Every Night. 90 tablet 2 8/6/2017 at 2350   • naproxen (NAPROSYN) 250 MG tablet Take 250 mg by  "mouth 2 (Two) Times a Day As Needed for Mild Pain (1-3).   More than a month at Unknown time       PMH:   Past Medical History:   Diagnosis Date   • Acute upper respiratory infection    • Community acquired pneumonia    • Depression    • Diabetes mellitus     dx 1998- checks fsbs weekly   • Fracture, stress, metatarsal     STRESS FRACTURE ALONG THE SHAFT OF THE FIFTH RIGHT METATARSAL   • Hyperlipidemia    • IBS (irritable bowel syndrome)    • Irritable bowel syndrome    • Obesity    • Shoulder pain    • Type 2 diabetes mellitus, uncontrolled    • Vitamin D deficiency      PSH:    Past Surgical History:   Procedure Laterality Date   • COLONOSCOPY      within last 5 years   • KNEE ARTHROSCOPY W/ MENISCAL REPAIR Right    • PATELLA OPEN REDUCTION INTERNAL FIXATION Left 10/27/2016    Procedure: LEFT PATELLA OPEN REDUCTION INTERNAL FIXATION;  Surgeon: Marshall Meyers MD;  Location:  [facility];  Service:    • SHOULDER OPEN REDUCTION INTERNAL FIXATION Right 10/27/2016    Procedure: RIGHT SHOULDER CLOSED REDUCTION ;  Surgeon: Marshall Meyers MD;  Location:  [facility];  Service:    • SHOULDER OPEN REDUCTION INTERNAL FIXATION Right 10/31/2016    Procedure: RIGHT SHOULDER OPEN REDUCTION INTERNAL FIXATION WITH PLATE FOR FRACTURE;  Surgeon: Jaguar Marsh MD;  Location:  [facility];  Service:    • TONSILLECTOMY     • TOTAL ABDOMINAL HYSTERECTOMY WITH SALPINGO OOPHORECTOMY     • URETHRAL SUSPENSION      FOR STRESS INCONTINENCE       Immunization History:  Influenza: yes 2016  Pneumococcal: yes < 5 years  Tetanus: unknown    Social History:   Tobacco:   History   Smoking Status   • Former Smoker   • Packs/day: 1.00   • Types: Cigarettes   Smokeless Tobacco   • Never Used     Comment: quit 10/27/2016      Alcohol:   History   Alcohol Use   • Yes     Comment: occasionally       Physical Exam:/74 (BP Location: Right arm, Patient Position: Lying)  Pulse 86  Temp 97.6 °F (36.4 °C) (Tympanic)   Resp 16  Ht 69\" (175.3 " cm)  Wt 203 lb (92.1 kg)  SpO2 96%  BMI 29.98 kg/m2    General Appearance:    Alert, cooperative, no distress, appears stated age   Head:    Normocephalic, without obvious abnormality, atraumatic   Lungs:     Clear to auscultation bilaterally, respirations unlabored    Heart:   Regular rate and rhythm, S1 and S2 normal, no murmur, rub    or gallop    Abdomen:    Soft, non-tender.  +bowel sounds   Breast Exam:    deferred   Genitalia:    deferred   Extremities:   Extremities normal, atraumatic, no cyanosis or edema   Skin:   Skin color, texture, turgor normal, no rashes or lesions   Neurologic:   Grossly intact   Results Review  I reviewed the patient's new clinical results.    Cancer Staging (if applicable)  Cancer Patient: __ yes _x_no __unknown; If yes, clinical stage T:__ N:__M:__, stage group or __N/A    Impression/Plan:  S/P closed fracture of right humeral neck and underwent ORIF of right valgus impacted 4 part proximal humerus fracture on  10/31/16.  Here today for removal of right proximal humerus plate, right total shoulder arthroplasty vs reverse total shoulder arthroplasty      Trang Yeh, APRN 8/7/2017 12:59 PM

## 2017-08-07 NOTE — BRIEF OP NOTE
TOTAL SHOULDER ARTHROPLASTY  Procedure Note    Octavia Rice  8/7/2017    Pre-op Diagnosis:   * No pre-op diagnosis entered *    Post-op Diagnosis:     Post-Op Diagnosis Codes:     * Closed 4-part fracture of proximal end of right humerus, with malunion, subsequent encounter [S42.201P]     * Right bicipital tenosynovitis [M75.21]    Procedure/CPT® Codes:  GA RECONSTR TOTAL SHOULDER IMPLANT [08177]  GA REPAIR BICEPS LONG TENDON [95950]  GA REMOVAL DEEP IMPLANT [66698]    Procedure(s):  REMOVAL RIGHT PROXIMAL HUMERUS PLATE, RIGHT REVERSE TOTAL SHOULDER ARTHROPLASTY, SHOULDER HARDWARE REMOVAL    Surgeon(s):  MD Norman Mauricio MD, PGY-5    Anesthesia: General with Block    Staff:   Circulator: Jackelyn Ricardo RN  Scrub Person: Bereket Smith  Vendor Representative: Bharath Ramirez  Nursing Assistant: Gayatri Covarrubias CNA    Estimated Blood Loss: *No blood loss documented*  Urine Voided: * No values recorded between 8/7/2017  3:16 PM and 8/7/2017  4:59 PM *    Specimens:                * No specimens in log *      Drains:           Findings: per dictation    Complications: none      Jaguar Marsh MD     Date: 8/7/2017  Time: 4:59 PM

## 2017-08-07 NOTE — ANESTHESIA POSTPROCEDURE EVALUATION
Patient: Octavia Rice    Procedure Summary     Date Anesthesia Start Anesthesia Stop Room / Location    08/07/17 1517 1726 [facility] / [facility]       Procedure Diagnosis Surgeon Provider    REMOVAL RIGHT PROXIMAL HUMERUS PLATE, RIGHT REVERSE TOTAL SHOULDER ARTHROPLASTY, SHOULDER HARDWARE REMOVAL (Right Shoulder) Closed 4-part fracture of proximal end of right humerus, with malunion, subsequent encounter; Right bicipital tenosynovitis MD Antonio Mauricio MD          Anesthesia Type: general  Last vitals  /72       Temp     98   Pulse 88      Resp     16   SpO2 99         Post Anesthesia Care and Evaluation    Patient location during evaluation: PACU  Patient participation: complete - patient participated  Level of consciousness: awake and alert  Pain score: 0  Pain management: adequate  Airway patency: patent  Anesthetic complications: No anesthetic complications  PONV Status: none  Cardiovascular status: hemodynamically stable and acceptable  Respiratory status: nonlabored ventilation, acceptable and nasal cannula  Hydration status: acceptable

## 2017-08-07 NOTE — ANESTHESIA PROCEDURE NOTES
Peripheral Block    Patient location during procedure: pre-op  Reason for block: at surgeon's request and post-op pain management  Performed by  Anesthesiologist: MARLYS KELLEY  Assisted by: JACQUELINE KEARNEY  Preanesthetic Checklist  Completed: patient identified, site marked, surgical consent, pre-op evaluation, timeout performed, IV checked, risks and benefits discussed and monitors and equipment checked  Prep:  Sterile barriers:cap, gloves, mask and sterile barriers  Prep: ChloraPrep  Patient monitoring: blood pressure monitoring, continuous pulse oximetry and EKG  Procedure  Sedation:yes  Guidance:ultrasound guided  Images:still images obtained    Laterality:right  Block Type:interscalene  Injection Technique:catheter  Needle Type:Tuohy and echogenic  Needle Gauge:18 G  Catheter Size:20 G (20g)  Cath Depth at skin: 7 cm  Medications  Local Injected:bupivacaine 0.25% Local Amount Injected:15mL  Post Assessment  Injection Assessment: negative aspiration for heme, no paresthesia on injection and incremental injection  Patient Tolerance:comfortable throughout block  Complications:no  Additional Notes  Procedure:                 The pt was placed in semifowlers position with a slight tilt of the thorax contralateral to the insertion site.  The Insertion Site was prepped and draped in sterile fashion.  The pt was anesthetized with  IV Sedation( see meds) and  Skin and cutaneous tissue was infiltrated and anesthetized with 1% Lidocaine 3 mls via a 25g needle.  Utilizing ultrasound guidance, a BBraun 2 inch 18 g Contiplex echogenic touhy needle was advanced in-plane.  Hydro dissection of tissue was achieved with Normal saline. Major vessels(carotid and Internal Jugular) where visualized as the brachial plexus was approached at the approximate level of C-7/ T-1.  Cervical 5 and Branches of Cervical 6 nerve roots where visualized and the needle tip was placed posterior at the level of C-6 roots.  LA spread was  visualized and injection was made incrementally every 5 mls with aspiration. Injection pressure was normal or little, there was no intraneural injection, no vascular injection.      The BBraun 20 g wire stylet  catheter was then placed under US guidance on the posterior aspect of the Brachial Plexus.  Location of catheter was confirmed with NS injection visualized with US . The tuohy was then removed and the skin was sealed with Skin AFix at catheter insertion site.  Skin was prepped with mastisol and the labeled catheter  was secured with steristrips and a CHG tegaderm. Thank You.

## 2017-08-07 NOTE — ANESTHESIA PROCEDURE NOTES
Airway  Urgency: elective    Date/Time: 8/7/2017 3:44 PM  End Time:8/7/2017 3:44 PM  Airway not difficult    General Information and Staff    Patient location during procedure: OR  Anesthesiologist: CELESTE RAYO  CRNA: KISHORE HERNÁNDEZ    Indications and Patient Condition  Indications for airway management: airway protection    Preoxygenated: yes  Mask difficulty assessment: 1 - vent by mask    Final Airway Details  Final airway type: supraglottic airway      Successful airway: classic  Size 4    Number of attempts at approach: 1    Additional Comments  LMA placed without difficulty, ventilation with assist, equal breath sounds and symmetric chest rise and fall

## 2017-08-08 VITALS
HEIGHT: 69 IN | WEIGHT: 203 LBS | TEMPERATURE: 98.3 F | OXYGEN SATURATION: 96 % | RESPIRATION RATE: 16 BRPM | SYSTOLIC BLOOD PRESSURE: 112 MMHG | DIASTOLIC BLOOD PRESSURE: 66 MMHG | BODY MASS INDEX: 30.07 KG/M2 | HEART RATE: 97 BPM

## 2017-08-08 PROBLEM — S42.201S: Status: ACTIVE | Noted: 2017-08-07

## 2017-08-08 LAB
ANION GAP SERPL CALCULATED.3IONS-SCNC: 6 MMOL/L (ref 3–11)
ARTICHOKE IGE QN: 87 MG/DL (ref 0–130)
BASOPHILS # BLD AUTO: 0.01 10*3/MM3 (ref 0–0.2)
BASOPHILS NFR BLD AUTO: 0.1 % (ref 0–1)
BUN BLD-MCNC: 16 MG/DL (ref 9–23)
BUN/CREAT SERPL: 40 (ref 7–25)
CALCIUM SPEC-SCNC: 9.1 MG/DL (ref 8.7–10.4)
CHLORIDE SERPL-SCNC: 104 MMOL/L (ref 99–109)
CHOLEST SERPL-MCNC: 152 MG/DL (ref 0–200)
CO2 SERPL-SCNC: 28 MMOL/L (ref 20–31)
CREAT BLD-MCNC: 0.4 MG/DL (ref 0.6–1.3)
DEPRECATED RDW RBC AUTO: 43.9 FL (ref 37–54)
EOSINOPHIL # BLD AUTO: 0 10*3/MM3 (ref 0–0.3)
EOSINOPHIL NFR BLD AUTO: 0 % (ref 0–3)
ERYTHROCYTE [DISTWIDTH] IN BLOOD BY AUTOMATED COUNT: 12.8 % (ref 11.3–14.5)
GFR SERPL CREATININE-BSD FRML MDRD: >150 ML/MIN/1.73
GLUCOSE BLD-MCNC: 168 MG/DL (ref 70–100)
GLUCOSE BLDC GLUCOMTR-MCNC: 178 MG/DL (ref 70–130)
GLUCOSE BLDC GLUCOMTR-MCNC: 196 MG/DL (ref 70–130)
GLUCOSE BLDC GLUCOMTR-MCNC: 225 MG/DL (ref 70–130)
GLUCOSE BLDC GLUCOMTR-MCNC: 240 MG/DL (ref 70–130)
HCT VFR BLD AUTO: 33.1 % (ref 34.5–44)
HDLC SERPL-MCNC: 48 MG/DL (ref 40–60)
HGB BLD-MCNC: 11.1 G/DL (ref 11.5–15.5)
IMM GRANULOCYTES # BLD: 0.02 10*3/MM3 (ref 0–0.03)
IMM GRANULOCYTES NFR BLD: 0.2 % (ref 0–0.6)
LYMPHOCYTES # BLD AUTO: 0.89 10*3/MM3 (ref 0.6–4.8)
LYMPHOCYTES NFR BLD AUTO: 8.1 % (ref 24–44)
MCH RBC QN AUTO: 31.4 PG (ref 27–31)
MCHC RBC AUTO-ENTMCNC: 33.5 G/DL (ref 32–36)
MCV RBC AUTO: 93.8 FL (ref 80–99)
MONOCYTES # BLD AUTO: 0.91 10*3/MM3 (ref 0–1)
MONOCYTES NFR BLD AUTO: 8.3 % (ref 0–12)
NEUTROPHILS # BLD AUTO: 9.13 10*3/MM3 (ref 1.5–8.3)
NEUTROPHILS NFR BLD AUTO: 83.3 % (ref 41–71)
PLATELET # BLD AUTO: 216 10*3/MM3 (ref 150–450)
PMV BLD AUTO: 10.3 FL (ref 6–12)
POTASSIUM BLD-SCNC: 4.3 MMOL/L (ref 3.5–5.5)
RBC # BLD AUTO: 3.53 10*6/MM3 (ref 3.89–5.14)
SODIUM BLD-SCNC: 138 MMOL/L (ref 132–146)
TRIGL SERPL-MCNC: 166 MG/DL (ref 0–150)
WBC NRBC COR # BLD: 10.96 10*3/MM3 (ref 3.5–10.8)

## 2017-08-08 PROCEDURE — 25010000002 VANCOMYCIN HCL IN NACL 1.5-0.9 GM/500ML-% SOLUTION: Performed by: ORTHOPAEDIC SURGERY

## 2017-08-08 PROCEDURE — 25010000002 ROPIVACAINE PER 1 MG: Performed by: NURSE ANESTHETIST, CERTIFIED REGISTERED

## 2017-08-08 PROCEDURE — 85025 COMPLETE CBC W/AUTO DIFF WBC: CPT | Performed by: NURSE PRACTITIONER

## 2017-08-08 PROCEDURE — 25010000002 HYDROMORPHONE PER 4 MG: Performed by: ORTHOPAEDIC SURGERY

## 2017-08-08 PROCEDURE — 82962 GLUCOSE BLOOD TEST: CPT

## 2017-08-08 PROCEDURE — 80061 LIPID PANEL: CPT | Performed by: NURSE PRACTITIONER

## 2017-08-08 PROCEDURE — G0108 DIAB MANAGE TRN  PER INDIV: HCPCS | Performed by: REGISTERED NURSE

## 2017-08-08 PROCEDURE — 80048 BASIC METABOLIC PNL TOTAL CA: CPT | Performed by: NURSE PRACTITIONER

## 2017-08-08 PROCEDURE — 63710000001 INSULIN LISPRO (HUMAN) PER 5 UNITS: Performed by: NURSE PRACTITIONER

## 2017-08-08 PROCEDURE — 97530 THERAPEUTIC ACTIVITIES: CPT | Performed by: OCCUPATIONAL THERAPIST

## 2017-08-08 PROCEDURE — 63710000001 DIPHENHYDRAMINE PER 50 MG: Performed by: PHYSICIAN ASSISTANT

## 2017-08-08 PROCEDURE — 97166 OT EVAL MOD COMPLEX 45 MIN: CPT | Performed by: OCCUPATIONAL THERAPIST

## 2017-08-08 RX ORDER — ROPIVACAINE HYDROCHLORIDE 2 MG/ML
6 INJECTION, SOLUTION EPIDURAL; INFILTRATION CONTINUOUS
Status: DISCONTINUED | OUTPATIENT
Start: 2017-08-08 | End: 2017-08-08 | Stop reason: HOSPADM

## 2017-08-08 RX ORDER — HYDROCODONE BITARTRATE AND ACETAMINOPHEN 10; 325 MG/1; MG/1
1 TABLET ORAL EVERY 4 HOURS PRN
Qty: 40 TABLET | Refills: 0 | Status: SHIPPED | OUTPATIENT
Start: 2017-08-08 | End: 2017-08-15 | Stop reason: DRUGHIGH

## 2017-08-08 RX ORDER — DIPHENHYDRAMINE HCL 25 MG
25 CAPSULE ORAL EVERY 6 HOURS PRN
Status: DISCONTINUED | OUTPATIENT
Start: 2017-08-08 | End: 2017-08-08 | Stop reason: HOSPADM

## 2017-08-08 RX ADMIN — HYDROCODONE BITARTRATE AND ACETAMINOPHEN 1 TABLET: 10; 325 TABLET ORAL at 02:38

## 2017-08-08 RX ADMIN — DIPHENHYDRAMINE HYDROCHLORIDE 25 MG: 25 CAPSULE ORAL at 07:29

## 2017-08-08 RX ADMIN — Medication 2 TABLET: at 08:40

## 2017-08-08 RX ADMIN — HYDROCODONE BITARTRATE AND ACETAMINOPHEN 1 TABLET: 10; 325 TABLET ORAL at 07:29

## 2017-08-08 RX ADMIN — VANCOMYCIN HCL-SODIUM CHLORIDE IV SOLN 1.5 GM/250ML-0.9% 1500 MG: 1.5-0.9/25 SOLUTION at 02:00

## 2017-08-08 RX ADMIN — INSULIN LISPRO 3 UNITS: 100 INJECTION, SOLUTION INTRAVENOUS; SUBCUTANEOUS at 09:10

## 2017-08-08 RX ADMIN — HYDROCODONE BITARTRATE AND ACETAMINOPHEN 1 TABLET: 10; 325 TABLET ORAL at 10:55

## 2017-08-08 RX ADMIN — VITAMIN D, TAB 1000IU (100/BT) 2000 UNITS: 25 TAB at 08:40

## 2017-08-08 RX ADMIN — HYDROMORPHONE HYDROCHLORIDE 0.5 MG: 1 INJECTION, SOLUTION INTRAMUSCULAR; INTRAVENOUS; SUBCUTANEOUS at 00:12

## 2017-08-08 RX ADMIN — HYDROMORPHONE HYDROCHLORIDE 0.5 MG: 1 INJECTION, SOLUTION INTRAMUSCULAR; INTRAVENOUS; SUBCUTANEOUS at 02:44

## 2017-08-08 RX ADMIN — HYDROMORPHONE HYDROCHLORIDE 0.5 MG: 1 INJECTION, SOLUTION INTRAMUSCULAR; INTRAVENOUS; SUBCUTANEOUS at 06:56

## 2017-08-08 RX ADMIN — INSULIN LISPRO 7 UNITS: 100 INJECTION, SOLUTION INTRAVENOUS; SUBCUTANEOUS at 00:12

## 2017-08-08 RX ADMIN — Medication 6 ML/HR: at 11:37

## 2017-08-08 NOTE — PROGRESS NOTES
Discharge Planning Assessment  [facility]     Patient Name: Octavia Rice  MRN: [id number]  Today's Date: 8/8/2017    Admit Date: 8/7/2017          Discharge Needs Assessment       08/08/17 1201    Living Environment    Lives With spouse    Living Arrangements house    Home Accessibility bed and bath are not on the first floor;stairs within home    Number of Stairs Within Home 12    Transportation Available car;family or friend will provide    Living Environment    Quality Of Family Relationships supportive;helpful;involved    Able to Return to Prior Living Arrangements yes    Living Arrangement Comments Ms. Rice lives with her  in a two story home in [facility].  Her  will be able to assist her at home as needed.    Discharge Needs Assessment    Concerns To Be Addressed no discharge needs identified    Readmission Within The Last 30 Days no previous admission in last 30 days    Equipment Currently Used at Home glucometer;raised toilet;shower chair    Equipment Needed After Discharge none    Discharge Disposition home or self-care    Discharge Contact Information if Applicable  Keegan, feliberto [phone]            Discharge Plan       08/08/17 1204    Case Management/Social Work Plan    Plan Home    Patient/Family In Agreement With Plan yes    Additional Comments Met with Ms. Rice and her , Keegan, at the bedside to discuss discharge planning.  Ms. Rice's  will be assisting her at home.  No DME needs.  She has used [place] outpatient PT in the past, but will not need PT initially after surgery.  Keegan will be transporting patient home by car at discharge.  CM will continue to follow.        Discharge Placement     No information found        Expected Discharge Date and Time     Expected Discharge Date Expected Discharge Time    Aug 8, 2017               Demographic Summary       08/08/17 5679    Referral Information    Admission Type inpatient    Arrived From home or  self-care    Reason For Consult discharge planning    Record Reviewed clinical discipline documentation;history and physical;medical record    Contact Information    Permission Granted to Share Information With ;family/designee    Primary Care Physician Information    Name Lex Medeiros MD            Functional Status       08/08/17 1159    Functional Status Prior    Ambulation 0-->independent    Transferring 0-->independent    Toileting 0-->independent    Bathing 0-->independent    Dressing 0-->independent    Eating 0-->independent    Communication 0-->understands/communicates without difficulty    Swallowing 0-->swallows foods/liquids without difficulty    IADL    Medications independent    Meal Preparation independent    Housekeeping independent    Laundry independent    Shopping independent    Oral Care independent    Activity Tolerance    Current Activity Limitations --   See OT notes    Usual Activity Tolerance good    Cognitive/Perceptual/Developmental    Current Mental Status/Cognitive Functioning no deficits noted    Employment/Financial    Employment/Finance Comments Ms. Rice has prescription drug coverage with [employer].  Verified insurance with patient.  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            Psychosocial     None            Abuse/Neglect     None            Legal       08/08/17 1200    Legal    Legal Comments No advance directives.            Substance Abuse       08/08/17 1200    Substance Use, Patient    Substance Use current tobacco use;current alcohol use    Tobacco Type cigarettes, tobacco    Number of Cigarette Packs per Day 1    Reported Level Of Alcohol Use social    Problems Related to Alcohol Use no            Patient Forms     None          Gabrielle Braun

## 2017-08-08 NOTE — CONSULTS
Reviewed home management of her type 2 diabetes. Discussed her A1C result of 7.2% and urged good glucose control to promote good wound healing.   listened intently; pt fell asleep during the visit.  Urged them to test glucose bid and contact PCP if ac >180 mg/dl 2 test in a row post op.  She has a meter and both were knowledgeable regarding her meds and monitoring.

## 2017-08-08 NOTE — CONSULTS
Hospital Medicine Consult    Date of Admission: 8/7/2017  Date of Consult: 08/07/17    Primary Care Physician: Lex Medeiros MD  Referring Physician:     Chief complaint/Reason for consultation: Medical management    Subjective     History of Present Illness    Ms. Octavia Rice is a 57 year old  who presents with s/p closed fracture of right humeral neck and underwent ORIF of right valgus impacted 4 part proximal humerus fracture on 10/31/16. She was admitted on 8/7/17 per Dr. Marsh for removal of right proximal humerus plate, right total shoulder arthroplasty vs reverse total shoulder arthroplasty. Hospital Medicine service was consulted for medical management.    She has PMH significant for type 2 diabetes, hyperlipidemia, obesity, tobacco abuse, anxiety, and insomnia. She was diagnosed with T2DM in 1998. She follows her PCP and Endocrinologist for management. She currently takes Victoza 18 mg/3 ml inject 1 pen under skin nightly and Metformin  mg BID. She typically checks her fingerstick twice per week. Her average fasting fingerstick is 120's. Her last Hgb A1C was 6.50 on 7/24/17. Her last lipid panel was drawn on 1/19/2017 and results are as follows: , HDL 42, LDL 75, Triglycerides 228. She currently takes Simvastatin 40 mg nightly. She does not follow any particular diet such as a diabetic diet. She states that she eats basically what she wants. Typically she eats lots of fruits, all types of meat, occasional pasta, and eats out at fast food restaurants such as [employer] and [employer] 1-2 times per week. She has not been able to be as active since her shoulder fracture last year. She reports that she usually does household chores like dishes and laundry and walks about 1 mile twice per week. She currently smokes cigarettes and uses a nicotine patch.     At the time of my evaluation, she reports current 10/10 pain in right shoulder. She denies F/C, headaches, visual  disturbances, SOA, CP, palpitations, leg swelling, abdominal pain, N/V/D, constipation, polydipsia, polyphagia, polyuria, back pain, dizziness, light-headedness, syncope, or any other acute symptoms. She does report urinary frequency and urgency. She denies dysuria. Her Hgb A1C today was 7.20 and her glucose from her CMP was 414. Subsequent fingerstick was 411. She did report that she ate a sandwhich from [employer] with fries today. Hospital Medicine service will proceed with medical management.      Review of Systems   Constitutional: Negative for chills, fatigue and fever.   Eyes: Negative for visual disturbance.   Respiratory: Negative for cough, shortness of breath and wheezing.    Cardiovascular: Negative for chest pain, palpitations and leg swelling.   Gastrointestinal: Negative for abdominal pain, constipation, diarrhea, nausea and vomiting.   Endocrine: Negative for polydipsia, polyphagia and polyuria.   Genitourinary: Positive for frequency and urgency. Negative for decreased urine volume, difficulty urinating, dysuria, hematuria, vaginal discharge and vaginal pain.   Musculoskeletal: Positive for arthralgias (reports 10/10 pain of right shoulder). Negative for back pain, gait problem, joint swelling and myalgias.   Skin: Negative for color change and pallor.   Neurological: Negative for dizziness, tremors, weakness, light-headedness and headaches.   Hematological: Does not bruise/bleed easily.   Psychiatric/Behavioral: Negative for confusion.   All other systems reviewed and are negative.     Otherwise complete ROS is negative except as mentioned above.    Past Medical History:  has a past medical history of Acute upper respiratory infection; Community acquired pneumonia; Depression; Diabetes mellitus; Fracture, stress, metatarsal; Hyperlipidemia; IBS (irritable bowel syndrome); Irritable bowel syndrome; Obesity; Shoulder pain; Type 2 diabetes mellitus (8/7/2017); Type 2 diabetes mellitus, uncontrolled;  and Vitamin D deficiency.    Past Surgical History:  has a past surgical history that includes Tonsillectomy; Urethral Suspension; Total abdominal hysterectomy w/ bilateral salpingoophorectomy; Patella Open Reduction Internal Fixation (Left, 10/27/2016); Shoulder Open Reduction Internal Fixation (Right, 10/27/2016); Shoulder Open Reduction Internal Fixation (Right, 10/31/2016); Knee arthroscopy w/ meniscal repair (Right); and Colonoscopy.    Family History: family history includes Arthritis in her mother; Asthma in her father; Cancer in her mother; Diabetes in her father and mother; Heart attack in her other; Heart defect in her father; Hypertension in her other; Stroke in her father.    Social History:  reports that she has quit smoking. Her smoking use included Cigarettes. She smoked 1.00 pack per day. She has never used smokeless tobacco. She reports that she drinks alcohol. She reports that she does not use illicit drugs.    Medications: Scheduled Meds:    acetaminophen 650 mg Oral Q6H   insulin lispro 0-7 Units Subcutaneous 4x Daily AC & at Bedtime   sennosides-docusate sodium 2 tablet Oral BID   [START ON 8/8/2017] vancomycin 15 mg/kg Intravenous Once     Continuous Infusions:    lactated ringers 100 mL/hr    ropivacaine (NAROPIN) 0.2% peripheral nerve cath (moog) 200 mL 6 mL/hr Last Rate: 6 mL/hr (08/07/17 1730)   sodium chloride 50 mL/hr      PRN Meds:.bisacodyl  •  dextrose  •  dextrose  •  docusate sodium  •  glucagon (human recombinant)  •  HYDROcodone-acetaminophen  •  HYDROmorphone **AND** naloxone  •  magnesium hydroxide  •  ondansetron **OR** ondansetron  Allergies   Allergen Reactions   • Codeine Nausea And Vomiting   • Morphine And Related Itching     Severe hives- purple color to face   • Penicillins      Had pcn as child and unsure of reaction       Objective    Physical Exam:   Vital Signs have been reviewed  Physical Exam   Constitutional: She is oriented to person, place, and time. She appears  well-developed and well-nourished. She is cooperative. She is easily aroused.   Obese  female. Appears in acute pain. No apparent distress noted.   HENT:   Head: Normocephalic and atraumatic.   Eyes: EOM are normal. Pupils are equal, round, and reactive to light. No scleral icterus.   Neck: Normal range of motion. Neck supple. No thyromegaly present.   Cardiovascular: Normal rate, regular rhythm, normal heart sounds and intact distal pulses.  Exam reveals no gallop and no friction rub.    No murmur heard.  Pulses:       Radial pulses are 2+ on the right side, and 2+ on the left side.        Dorsalis pedis pulses are 2+ on the right side, and 2+ on the left side.        Posterior tibial pulses are 2+ on the right side, and 2+ on the left side.   Pulmonary/Chest: Effort normal and breath sounds normal. No respiratory distress. She has no wheezes. She has no rales. She exhibits no tenderness.   Abdominal: Soft. Bowel sounds are normal. She exhibits no distension and no mass. There is no tenderness. There is no rebound and no guarding. No hernia.   Musculoskeletal: She exhibits tenderness (of right shoulder to palpation). She exhibits no edema or deformity.        Right shoulder: She exhibits decreased range of motion and tenderness. She exhibits no swelling and no effusion.   Sling and swath   Lymphadenopathy:     She has no cervical adenopathy.   Neurological: She is alert, oriented to person, place, and time and easily aroused. GCS eye subscore is 4. GCS verbal subscore is 5. GCS motor subscore is 6.   Skin: Skin is warm and dry.   Psychiatric: Her speech is normal and behavior is normal. Judgment and thought content normal. Her mood appears anxious. Cognition and memory are normal.   Vitals reviewed.    Results Reviewed:  I have personally reviewed current lab, radiology, and data and agree with results.    Lab Results (last 24 hours)     Procedure Component Value Units Date/Time    POC Glucose Fingerstick  [[id number]]  (Normal) Collected:  08/07/17 1310    Specimen:  Blood Updated:  08/07/17 1315     Glucose 125 mg/dL     Narrative:       Meter: [id number] : [id number] Brumiel Ita    OR Potassium [[id number]]  (Normal) Collected:  08/07/17 1320    Specimen:  Blood Updated:  08/07/17 1323     Potassium, OR 4.10 mmol/L     CBC & Differential [[id number]] Collected:  08/07/17 2050    Specimen:  Blood Updated:  08/07/17 2106    Narrative:       The following orders were created for panel order CBC & Differential.  Procedure                               Abnormality         Status                     ---------                               -----------         ------                     CBC Auto Differential[[id number]]        Abnormal            Final result                 Please view results for these tests on the individual orders.    CBC Auto Differential [[id number]]  (Abnormal) Collected:  08/07/17 2050    Specimen:  Blood Updated:  08/07/17 2106     WBC 15.23 (H) 10*3/mm3      RBC 3.78 (L) 10*6/mm3      Hemoglobin 11.6 g/dL      Hematocrit 35.9 %      MCV 95.0 fL      MCH 30.7 pg      MCHC 32.3 g/dL      RDW 12.6 %      RDW-SD 43.8 fl      MPV 10.2 fL      Platelets 224 10*3/mm3      Neutrophil % 91.3 (H) %      Lymphocyte % 6.1 (L) %      Monocyte % 2.3 %      Eosinophil % 0.0 %      Basophil % 0.1 %      Immature Grans % 0.2 %      Neutrophils, Absolute 13.91 (H) 10*3/mm3      Lymphocytes, Absolute 0.93 10*3/mm3      Monocytes, Absolute 0.35 10*3/mm3      Eosinophils, Absolute 0.00 10*3/mm3      Basophils, Absolute 0.01 10*3/mm3      Immature Grans, Absolute 0.03 10*3/mm3     Hemoglobin A1c [[id number]]  (Abnormal) Collected:  08/07/17 2050    Specimen:  Blood Updated:  08/07/17 2128     Hemoglobin A1C 7.20 (H) %     Narrative:       The American Diabetes Association recommends maintenance of Hemoglobin A1C at 7.0% or lower. Goals for Hemoglobin A1C reduction may need to be modified if hypoglycemia is a problem.     Comprehensive Metabolic Panel [[id number]]  (Abnormal) Collected:  08/07/17 2050    Specimen:  Blood Updated:  08/07/17 2129     Glucose 414 (C) mg/dL      BUN 16 mg/dL      Creatinine 0.70 mg/dL      Sodium 139 mmol/L      Potassium 4.6 mmol/L      Chloride 106 mmol/L      CO2 14.0 (L) mmol/L      Calcium 8.8 mg/dL      Total Protein 6.2 g/dL      Albumin 3.80 g/dL      ALT (SGPT) 15 U/L      AST (SGOT) 22 U/L      Alkaline Phosphatase 74 U/L      Total Bilirubin 0.5 mg/dL      eGFR Non African Amer 86 mL/min/1.73      Globulin 2.4 gm/dL      A/G Ratio 1.6 g/dL      BUN/Creatinine Ratio 22.9     Anion Gap 19.0 (H) mmol/L     Narrative:       National Kidney Foundation Guidelines    Stage     Description        GFR  1         Normal or High     90+  2         Mild decrease      60-89  3         Moderate decrease  30-59  4         Severe decrease    15-29  5         Kidney failure     <15          Results for GERRI HAYES (MRN [id number]) as of 8/7/2017 20:38   Ref. Range 7/24/2017 08:40 8/7/2017 13:10   Glucose Latest Ref Range: 70 - 130 mg/dL 167 (H) 125       Results for GERRI HAYES (MRN [id number]) as of 8/7/2017 20:38   Ref. Range 1/19/2017 09:15 5/17/2017 08:17 7/24/2017 08:40   Hemoglobin A1C Latest Ref Range: 4.80 - 5.60 % 6.5 6.6 6.50 (H)       Results for GERRI HAYES (MRN [id number]) as of 8/7/2017 20:38   Ref. Range 7/24/2017 08:40   Glucose Latest Ref Range: 70 - 100 mg/dL 167 (H)   Sodium Latest Ref Range: 132 - 146 mmol/L 139   Potassium Latest Ref Range: 3.5 - 5.5 mmol/L 3.9   CO2 Latest Ref Range: 20.0 - 31.0 mmol/L 24.0   Chloride Latest Ref Range: 99 - 109 mmol/L 107   Anion Gap Latest Ref Range: 3.0 - 11.0 mmol/L 8.0   Creatinine Latest Ref Range: 0.60 - 1.30 mg/dL 0.50 (L)   BUN Latest Ref Range: 9 - 23 mg/dL 13   BUN/Creatinine Ratio Latest Ref Range: 7.0 - 25.0  26.0 (H)   Calcium Latest Ref Range: 8.7 - 10.4 mg/dL 9.6   eGFR Non African Amer Latest Ref Range: >60 mL/min/1.73 127      Assessment/Plan   Assessment & Plan     Managed per Orthopedic Surgery, Dr. Marsh  1. Closed 4-part fracture of proximal end of right humerus   -antibiotics   -PRN pain control    Managed per Hospital Medicine service  1. Type 2 diabetes  -takes Victoza and Metformin  -last Hgb A1C 6.50 on 7/24/17  -Hgb A1C today 7.20  -Glucose on    -hold Victoza and Metformin while inpatient   -FSBG AC/HS and Q3H until 0600   -low dose SSI (nurse to call APRN with serial fingersticks at 0000, 0300, and 0600)   -regular, consistent carb diet    -STAT CMP, CBC, and UA   -routine AM labs   -consult Diabetes Educator in the AM   -consult Nutrition in the AM     2. Hyperlipidemia  -takes Zocor 40 mg QHS (not formulary)   -ordered Atorvastatin 20 mg QHS   -continue home meds   -FLP in the AM    3. Vitamin D deficiency  -takes Vitamin D 2000 units daily    -continue home meds    4. Anxiety   -IV ativan 0.25 mg x 1 dose    5. Insomnia   -Melatonin 5 mg nightly    6. Tobacco abuse   -nicotine patch   -smoking cessation education    I advised the patient of the risks in continuing to use tobacco, and I provided this patient with smoking cessation educational materials.  I also discussed how to quit smoking and the patient has expressed the willingness to quit.      During this visit, I spent 3-10 mintues counseling the patient regarding smoking cessation.       5. Obesity  -complicates all aspects of care    I discussed the patients findings and my recommendations with: patient and family    SANDER Mueller  08/07/17  8:57 PM    Consults

## 2017-08-08 NOTE — CONSULTS
"Diabetes Education  Assessment/Teaching    Patient Name:  Octavia Rice  YOB: 1960  MRN: [id number]  Admit Date:  8/7/2017      Assessment Date:  8/8/2017       Most Recent Value    General Information      Referral From:  A1c, Blood glucose, MD order    Height  5' 9\" (1.753 m)    Height Method  Stated    Weight  203 lb (92.1 kg)    Weight Method  Stated    Pregnancy Assessment     Diabetes History     What type of diabetes do you have?  Pre-diabetes    Length of Diabetes Diagnosis  Newly diagnosed <6 months    Current DM knowledge  good    Have you had diabetes education/teaching in the past?  no [she stated several family members have diabetes and she has helped a grandparent monitor etc.]    Do you test your blood sugar at home?  no    Education Preferences     Nutrition Information     Assessment Topics     Healthy Eating - Assessment  Needs education    Being Active - Assessment  Needs education    Taking Medication - Assessment  N/A-unable to assess    Problem Solving - Assessment  Needs education    Reducing Risk - Assessment  Needs education    Healthy Coping - Assessment  Competent    Monitoring - Assessment  Needs education    DM Goals                Most Recent Value    DM Education Needs     Meter  Meter provided    Meter Type  One Touch    Frequency of Testing  Daily    Blood Glucose Target  -- [provided ADA diagnostic criteria and discussed need for good glucoses for wound healing]    Medication  -- [discussed how we use SSI IP and why]    Problem Solving  Hypoglycemia, Hyperglycemia, Sick days, Signs, Symptoms, Treatment    Reducing Risks  A1C testing    Physical Activity  -- [here for spinal surgery]    Healthy Coping  Appropriate    Discharge Plan  Home    Motivation  Engaged, Strong    Teaching Method  Explanation, Discussion, Handouts, Teach back, Demonstration    Patient Response  Verbalized understanding            Other Comments:  Patient educated on Pre-diabetes, diabetes and " the disease process, types of DM, diagnosis/A1C, monitoring, signs and symptoms, activity and exercise and how to prevent disease from progressing. Changing behavior and goal setting relative to diet, exercise and healthy lifestyle was emphasized. Patient provided a new donated OTU Verio Flex meter and taught how to use it. Pt. was also advised to contact PCP for prescription for lancets and strips. Pt. verbalized understanding and also completed a return demonstration on using the meter using Teach Back method. Pt advised to consult with PCP for further instructions, discuss A1C result, and POC. Education handouts provided, questions answered and pt. advised to call with any other questions or concerns.        Electronically signed by:  Lay Gill RN  08/08/17 1:35 PM

## 2017-08-08 NOTE — THERAPY EVALUATION
Acute Care - Occupational Therapy Initial Evaluation  [facility]     Patient Name: Octavia Rice  : 1960  MRN: [id number]  Today's Date: 2017  Onset of Illness/Injury or Date of Surgery Date: 17  Date of Referral to OT: 17  Referring Physician: MD Maximo    Admit Date: 2017       ICD-10-CM ICD-9-CM   1. Impaired mobility and ADLs Z74.09 799.89     Patient Active Problem List   Diagnosis   • Hyperlipidemia   • Uncontrolled type 2 diabetes mellitus   • Vitamin D deficiency   • Current every day smoker   • Fracture dislocation of right shoulder joint   • Acute post-operative pain   • Achilles tendonitis   • Acute upper respiratory infection   • Asthmatic bronchitis   • Community acquired pneumonia   • Fatigue   • IBS (irritable bowel syndrome)   • Obesity   • Sinusitis   • Unspecified fracture of upper end of right humerus, sequela   • Type 2 diabetes mellitus   • Anxiety   • Insomnia   • Tobacco abuse     Past Medical History:   Diagnosis Date   • Acute upper respiratory infection    • Anxiety 2017   • Community acquired pneumonia    • Depression    • Diabetes mellitus     dx 1998- checks fsbs weekly   • Fracture, stress, metatarsal     STRESS FRACTURE ALONG THE SHAFT OF THE FIFTH RIGHT METATARSAL   • Hyperlipidemia    • IBS (irritable bowel syndrome)    • Insomnia 2017   • Irritable bowel syndrome    • Obesity    • Shoulder pain    • Tobacco abuse 2017   • Type 2 diabetes mellitus 2017   • Type 2 diabetes mellitus, uncontrolled    • Vitamin D deficiency      Past Surgical History:   Procedure Laterality Date   • COLONOSCOPY      within last 5 years   • KNEE ARTHROSCOPY W/ MENISCAL REPAIR Right    • PATELLA OPEN REDUCTION INTERNAL FIXATION Left 10/27/2016    Procedure: LEFT PATELLA OPEN REDUCTION INTERNAL FIXATION;  Surgeon: Marshall Meyers MD;  [facility];  Service:    • SHOULDER OPEN REDUCTION INTERNAL FIXATION Right 10/27/2016    Procedure: RIGHT SHOULDER  CLOSED REDUCTION ;  Surgeon: Marshall Meyers MD;  Location:  [facility];  Service:    • SHOULDER OPEN REDUCTION INTERNAL FIXATION Right 10/31/2016    Procedure: RIGHT SHOULDER OPEN REDUCTION INTERNAL FIXATION WITH PLATE FOR FRACTURE;  Surgeon: Jaguar Marsh MD;  Location:  [facility];  Service:    • TONSILLECTOMY     • TOTAL ABDOMINAL HYSTERECTOMY WITH SALPINGO OOPHORECTOMY     • TOTAL SHOULDER ARTHROPLASTY W/ DISTAL CLAVICLE EXCISION Right 8/7/2017    Procedure: REMOVAL RIGHT PROXIMAL HUMERUS PLATE, RIGHT REVERSE TOTAL SHOULDER ARTHROPLASTY, SHOULDER HARDWARE REMOVAL;  Surgeon: Jaguar Marsh MD;  Location:  [facility];  Service:    • URETHRAL SUSPENSION      FOR STRESS INCONTINENCE          OT ASSESSMENT FLOWSHEET (last 72 hours)      OT Evaluation       08/08/17 1426 08/08/17 1201 08/08/17 1159 08/08/17 1145 08/08/17 0840    Rehab Evaluation    Evaluation Not Performed    --   per OT pt does not need PT eval; to d/c today  -     General Information    Equipment Currently Used at Home  glucometer;raised toilet;shower chair  -RS       Living Environment    Lives With  spouse  -RS       Living Arrangements  house  -RS       Home Accessibility  bed and bath are not on the first floor;stairs within home  -RS       Number of Stairs Within Home  12  -RS       Transportation Available  car;family or friend will provide  -RS       Clinical Impression    Anticipated Discharge Disposition home with assist  -ST        Functional Level Prior    Ambulation   0-->independent  -RS      Transferring   0-->independent  -RS      Toileting   0-->independent  -RS      Bathing   0-->independent  -RS      Dressing   0-->independent  -RS      Eating   0-->independent  -RS      Communication   0-->understands/communicates without difficulty  -RS      Swallowing   0-->swallows foods/liquids without difficulty  -RS      Muscle Tone Assessment    Muscle Tone Assessment     RUE  -VIVIENNE    RUE Muscle Tone Assessment     moderately  increased tone  -VIVIENNE      08/08/17 0827 08/08/17 0710 08/07/17 2000 08/07/17 1848       Rehab Evaluation    Document Type  evaluation;therapy note (daily note);discharge summary  -ST       Subjective Information  no complaints;agree to therapy  -ST       Evaluation Not Performed unable to evaluate, medical status change   pain after OT  -EH        Patient Effort, Rehab Treatment  good  -ST       Symptoms Noted During/After Treatment  increased pain   RN notified, potential to be re-blocked  -ST       General Information    Patient Profile Review  yes  -ST       Onset of Illness/Injury or Date of Surgery Date  08/07/17  -ST       Referring Physician  MD Maximo  -ST       General Observations  supine upon arrival; IV and nerve cath intact;  present  -ST       Pertinent History Of Current Problem  Pt with closed fx of R humeral neck with ORIF 4-part, malunion 10/31/16; presents for hardware removal of humerus plate. S/P R reverse TSA  -ST       Precautions/Limitations  other (see comments)   sling, ROM precautions per MD, interscalene cath  -ST       Prior Level of Function  independent:;all household mobility;transfer;feeding;grooming;min assist:;dressing;bathing;home management   required A w/dressing/UBD d/t previous ROM limitations/pain  -ST       Equipment Currently Used at Home  none  -ST       Plans/Goals Discussed With  patient and family;agreed upon  -ST       Risks Reviewed  patient and family:;LOB;nausea/vomiting;dizziness;increased discomfort;change in vital signs  -ST       Benefits Reviewed  patient and family:;improve function;increase independence;increase strength;increase balance;decrease pain;increase knowledge  -ST       Barriers to Rehab  previous functional deficit  -ST       Living Environment    Lives With  spouse  -ST       Living Arrangements  house  -ST       Home Accessibility  bed and bath are not on the first floor;stairs within home  -ST       Number of Stairs Within Home  12  -ST        Stair Railings at Home  inside, present at both sides  -ST       Living Environment Comment  pt's spouse to be available 24/7  -ST       Clinical Impression    Date of Referral to OT  08/07/17  -ST       OT Diagnosis  impaired ADL performance; OT to perform: PROM FE no limitations, ER to 30 degrees, IR to chest, AROM, hand, wrist, elbow, scapular retractions  -ST       Prognosis  good  -ST       Patient/Family Goals Statement  return home/dec. pain  -ST       Criteria for Skilled Therapeutic Interventions Met  yes  -ST       Rehab Potential  good, to achieve stated therapy goals  -ST       Therapy Frequency  evaluation only  -ST       Anticipated Equipment Needs At Discharge  --   none  -ST       Anticipated Discharge Disposition  home with assist  -ST       Pain Assessment    Pain Assessment  0-10  -ST       Pain Score  5  -ST       Post Pain Score  8  -ST       Pain Type  Acute pain  -ST       Pain Location  Shoulder  -ST       Pain Orientation  Right  -ST       Pain Intervention(s)  Medication (See MAR);Repositioned  -ST       Vision Assessment/Intervention    Visual Impairment  WNL  -ST       Cognitive Assessment/Intervention    Current Cognitive/Communication Assessment  functional   lethargic from pain meds  -ST       Orientation Status  oriented x 4  -ST       Follows Commands/Answers Questions  100% of the time  -ST       Personal Safety  WNL/WFL  -ST       Personal Safety Interventions  fall prevention program maintained;gait belt;nonskid shoes/slippers when out of bed  -ST       ROM (Range of Motion)    General ROM Detail  LUE WFL; RUE not formally tested d/t sx precautions  -ST       MMT (Manual Muscle Testing)    General MMT Assessment Detail  CARMENE WFL; RUE not formally tested d/t sx precautions  -ST       Muscle Tone Assessment    Muscle Tone Assessment   RUE  -BH RUE  -KB     RUE Muscle Tone Assessment   moderately increased tone  -BH moderately increased tone  -KB     Bed Mobility,  Assessment/Treatment    Bed Mobility, Assistive Device  head of bed elevated  -       Bed Mob, Supine to Sit, [place]  conditional independence  -       Bed Mob, Sit to Supine, [place]  conditional independence  -ST       Transfer Assessment/Treatment    Transfers, Sit-Stand [place]  supervision required  -       Transfers, Stand-Sit [place]  supervision required  -       Functional Mobility    Functional Mobility- Ind. Level  supervision required  -       Functional Mobility-Distance (Feet)  15  -ST       Functional Mobility- Comment  mobility in room; no LOB or dizziness noted  -       Upper Body Bathing Assessment/Training    UB Bathing Assess/Train Assistive Device  walker technique  -       UB Bathing Assess/Train, Position  sitting  -ST       UB Bathing Assess/Train, [place] Level  supervision required  -       UB Bathing Assess/Train, Impairments  ROM decreased;strength decreased  -       UB Bathing Assess/Train, Comment  simulation of axillary care sitting EOB with UE passively hanging over EOB  -       Upper Body Dressing Assessment/Training    UB Dressing Assess/Train, Clothing Type  doffing:;donning:;pull over;t-shirt   sling  -       UB Dressing Assess/Train, Assist Device  walker technique  -       UB Dressing Assess/Train, Position  sitting  -       UB Dressing Assess/Train, [place]  minimum assist (75% patient effort)  -       UB Dressing Assess/Train, Impairments  ROM decreased;strength decreased  -       UB Dressing Assess/Train, Comment  pt's  assisting pt s/p education and demo by OT; pt's  successfully donned/doffed sling X2 trials w/appropriate adjustments made; pt able to assist with donning shirt by dressing sx UE first   -       Lower Body Dressing Assessment/Training    LB Dressing Assess/Train, Clothing Type  donning:;pants   underwear  -ST       LB Dressing Assess/Train, Position  sitting  -ST       LB Dressing  Assess/Train, [place]  supervision required  -ST       LB Dressing Assess/Train, Impairments  ROM decreased  -ST       LB Dressing Assess/Train, Comment  completed walker technique   -ST       Therapy Exercises    Right Upper Extremity  PROM:;AAROM:;AROM:;10 reps;supine   AROM: digit f/e, scapular retractions, requiring AAROM...  -ST       RUE Resistance  --   wrist/elbow f/e, pronation/supination; PROM: IR chest/ER 30.  -ST       Left Upper Extremity  --   Pt only able to tolerate ~50 degrees of shoulder flexion...  -ST       LUE Resistance  --   d/t pain; RN notified and potential for re-block  -ST       Bilateral Upper Extremity  --   pt's  completed reps of all exercises for teach back  -ST       BUE Resistance  --   at home  -ST       Sensory Assessment/Intervention    Light Touch  RUE  -ST       RUE Light Touch  mild impairment   d/t nerve cath  -ST       General Therapy Interventions    Home Exercise Program  completed education on MD protocol; pt's  with appropriate teach-back   -ST       Positioning and Restraints    Pre-Treatment Position  in bed  -ST       Post Treatment Position  bed  -ST       In Bed  notified nsg;supine;call light within reach;encouraged to call for assist;with family/caregiver;RUE elevated  -ST         User Key  (r) = Recorded By, (t) = Taken By, (c) = Cosigned By    Initials Name Effective Dates    EH Delmi Perry, PT 06/19/15 -     ST Gena Nicholas, OTR 02/20/17 -     ALFRED Miller, LPN 01/25/16 -     BUCK Braun V 05/02/16 -     VIVIENNE Coelho, RN 06/16/16 -      Milly Mckeon RN 06/16/16 -            Occupational Therapy Education     Title: PT OT SLP Therapies (Active)     Topic: Occupational Therapy (Done)     Point: ADL training (Done)    Description: Instruct learner(s) on proper safety adaptation and remediation techniques during self care or transfers.   Instruct in proper use of assistive devices.    Learning Progress Summary     Learner Readiness Method Response Comment Documented by Status   Patient Acceptance E,TB,D,H VU,DU benfits of activity, sling use/donning/doffing, wear schedule, ROM HEP, shoulder percautions, UBD, axillary care, home/activity mods ST 08/08/17 1044 Done   Family Acceptance E,TB,D,H VU,DU benfits of activity, sling use/donning/doffing, wear schedule, ROM HEP, shoulder percautions, UBD, axillary care, home/activity mods ST 08/08/17 1044 Done               Point: Home exercise program (Done)    Description: Instruct learner(s) on appropriate technique for monitoring, assisting and/or progressing therapeutic exercises/activities.    Learning Progress Summary    Learner Readiness Method Response Comment Documented by Status   Patient Acceptance E,TB,D,H VU,DU benfits of activity, sling use/donning/doffing, wear schedule, ROM HEP, shoulder percautions, UBD, axillary care, home/activity mods ST 08/08/17 1044 Done   Family Acceptance E,TB,D,H VU,DU benfits of activity, sling use/donning/doffing, wear schedule, ROM HEP, shoulder percautions, UBD, axillary care, home/activity mods ST 08/08/17 1044 Done               Point: Precautions (Done)    Description: Instruct learner(s) on prescribed precautions during self-care and functional transfers.    Learning Progress Summary    Learner Readiness Method Response Comment Documented by Status   Patient Acceptance E,TB,D,H VU,DU benfits of activity, sling use/donning/doffing, wear schedule, ROM HEP, shoulder percautions, UBD, axillary care, home/activity mods ST 08/08/17 1044 Done   Family Acceptance E,TB,D,H VU,DU benfits of activity, sling use/donning/doffing, wear schedule, ROM HEP, shoulder percautions, UBD, axillary care, home/activity mods ST 08/08/17 1044 Done               Point: Body mechanics (Done)    Description: Instruct learner(s) on proper positioning and spine alignment during self-care, functional mobility activities and/or exercises.    Learning Progress Summary     Learner Readiness Method Response Comment Documented by Status   Patient Acceptance E,TB,D,H VU,DU benfits of activity, sling use/donning/doffing, wear schedule, ROM HEP, shoulder percautions, UBD, axillary care, home/activity mods  08/08/17 1044 Done   Family Acceptance E,TB,D,H VU,DU benfits of activity, sling use/donning/doffing, wear schedule, ROM HEP, shoulder percautions, UBD, axillary care, home/activity mods  08/08/17 1044 Done                      User Key     Initials Effective Dates Name Provider Type Discipline     02/20/17 -  Gena Nicholas OTR Occupational Therapist OT                  OT Recommendation and Plan  Anticipated Equipment Needs At Discharge:  (none)  Anticipated Discharge Disposition: home with assist  Therapy Frequency: evaluation only  Plan of Care Review  Plan Of Care Reviewed With: patient, spouse  Outcome Summary/Follow up Plan: Pt demonstrates significant pain, with ability to isra. ~only 50 degrees shoulder flexion. Pt/family however exhibit excellent understanding of all exercises, ROM and shoulder precautions, walekr dressing techniques and UBD/axillary care. Plan to d/c home today with 24/7 assist.           OT Goals       08/08/17 1423          Patient Education OT LTG    Patient Education OT LTG, Time to Achieve 2 days  -      Patient Education OT LTG, Education Type written program;HEP;precautions per surgeon;brace use/care;positioning;yaa/doff brace  -      Patient Education OT LTG, Education Understanding demonstrates adequately;verbalizes understanding  -      Patient Education OT LTG Outcome goal met  -ST        User Key  (r) = Recorded By, (t) = Taken By, (c) = Cosigned By    Initials Name Provider Type     UMER Singh Occupational Therapist                Outcome Measures       08/08/17 0710          How much help from another is currently needed...    Putting on and taking off regular lower body clothing? 2  -ST      Bathing (including  washing, rinsing, and drying) 2  -ST      Toileting (which includes using toilet bed pan or urinal) 2  -ST      Putting on and taking off regular upper body clothing 2  -ST      Taking care of personal grooming (such as brushing teeth) 3  -ST      Eating meals 3  -ST      Score 14  -ST      Functional Assessment    Outcome Measure Options AM-PAC 6 Clicks Daily Activity (OT)  -ST        User Key  (r) = Recorded By, (t) = Taken By, (c) = Cosigned By    Initials Name Provider Type    ST UMER Singh Occupational Therapist          Time Calculation:   OT Start Time: 0710    Therapy Charges for Today     Code Description Service Date Service Provider Modifiers Qty    [id number]  OT THERAPEUTIC ACT EA 15 MIN 8/8/2017 UMER Singh GO 2    [id number]  OT EVAL MOD COMPLEXITY 3 8/8/2017 UMER Singh GO 1               UMER Reagan  8/8/2017

## 2017-08-08 NOTE — PROGRESS NOTES
[facility]    Acute pain service Inpatient Progress Note    Patient Name: Octavia Rice  :  1960  MRN:  [id number]        Acute Pain  Service Inpatient Progress Note:    Analgesia:Good  Pain Score:5/10  LOC: alert and awake  Resp Status: room air  Cardiac: VS stable  Side Effects:None  Catheter Site:clean  Cath type: peripheral nerve cath(MOOG pump)  Infusion rate: 6ml/hr  Catheter Plan:Catheter to remain Insitu and Continue catheter infusion rate unchanged  Comments: Patient having shoulder and axillary pain overnight.  I bolused her with 6ml of 0.2% ropivacaine.  Will plan to send home with on-q today.

## 2017-08-08 NOTE — PROGRESS NOTES
Orthopedic Daily Progress Note      CC: right painful proximal humerus malunion s/p removal of hardware, right reverse total shoulder arthroplasty / biceps tenodesis    Pain well controlled this morning but rated pain 7-9 out of 10 over night.  General: no fevers, chills  Abdomen: denies nausea, vomiting, or diarrhea    No other complaints    Physical Exam:  I have reviewed the vital signs.  Temp:  [97.4 °F (36.3 °C)-98.9 °F (37.2 °C)] 97.9 °F (36.6 °C)  Heart Rate:  [] 100  Resp:  [16-18] 16  BP: (106-143)/(53-74) 117/71    Objective  General Appearance:    Alert, cooperative, no distress  Extremities: No clubbing, cyanosis, or edema to lower extremities  Pulses:  2+ in distal surgical extremity  Skin: Dressing Clean/dry/intact      Results Review:    I have reviewed the labs, radiology results and diagnostic studies:    3 view right shoulder radiographs demonstrate well positioned implants with reduced humeral / glenosphere articulation.  Stem is within medullary canal, extends roughly 2 cm distal to most distal screw hole from previous plate.      Results from last 7 days  Lab Units 08/08/17  0507   WBC 10*3/mm3 10.96*   HEMOGLOBIN g/dL 11.1*   PLATELETS 10*3/mm3 216       Results from last 7 days  Lab Units 08/08/17  0507   SODIUM mmol/L 138   POTASSIUM mmol/L 4.3   CO2 mmol/L 28.0   CREATININE mg/dL 0.40*   GLUCOSE mg/dL 168*       I have reviewed the medications.    Assessment/Problem List  POD# 1 S/p removal of hardware, right reverse total shoulder arthroplasty / biceps tenodesis    Plan  Ultrasling at all times.  PT/OT  Continue indwelling catheter -- will ask anesthesia pain team to reevaluate block as she had some pain control issues overnight.  Anticipate dc home today.  F/u 1 week [facility] clinic next Tuesday 8/15/17 for wound check.      Discharge Planning: I expect patient to be discharged to home today.    Norman Lanza MD  08/08/17  7:57 AM

## 2017-08-08 NOTE — NURSING NOTE
Acute Pain Service:  On-Q teaching completed with patient and spouse.  Video demonstration, handout and bracelet provided with [facility] on call central phone number.  Instructed to call with any questions or concerns.  Patient verbalized understanding.  Service will continue to follow until catheter DC'd.  Please contact patient at [phone] or [place] at [phone] if needed.       Dressing loose. Changed dressing sterile technique.  Catheter intact and stabilized at skin.  Replaced steristrips and chg tegaderm.

## 2017-08-08 NOTE — PLAN OF CARE
Problem: Patient Care Overview (Adult)  Goal: Plan of Care Review  Outcome: Outcome(s) achieved Date Met:  08/08/17 08/08/17 1423   Coping/Psychosocial Response Interventions   Plan Of Care Reviewed With patient;spouse   Outcome Evaluation   Outcome Summary/Follow up Plan Pt demonstrates significant pain, with ability to isra. ~only 50 degrees shoulder flexion. Pt/family however exhibit excellent understanding of all exercises, ROM and shoulder precautions, walker dressing techniques and UBD/axillary care. Plan to d/c home today with 24/7 assist.          Problem: Inpatient Occupational Therapy  Goal: Patient Education Goal LTG- OT  Outcome: Outcome(s) achieved Date Met:  08/08/17 08/08/17 1423   Patient Education OT LTG   Patient Education OT LTG, Time to Achieve 2 days   Patient Education OT LTG, Education Type written program;HEP;precautions per surgeon;brace use/care;positioning;yaa/doff brace   Patient Education OT LTG, Education Understanding demonstrates adequately;verbalizes understanding   Patient Education OT LTG Outcome goal met

## 2017-08-09 NOTE — PROGRESS NOTES
Patient: Octavia Rice     8/9/2017 1:24 PM      Chief complaint-Post-op pain following the    VAS pain score 4    Site Exams: Clean, Dry, Intact    Side Effects/Complications:None     Plan:Catheter to remain insitu    OnQ/Pump rate @ 8ml/hr  Patient male health care provider asked questions and discussed turning On Q up for 2 hours to decrease pain score and re score pain again.

## 2017-08-10 NOTE — PROGRESS NOTES
ELVIS Graham    Nerve Cath Post Op Call    Patient Name: Octavia Rice  :  1960  MRN:  [id number]  Date of Discharge: 2017    Nerve Cath Post Op Call:    Analgesia:Good  Pain Score:3/10  Side Effects:None  Catheter Site:clean  Patient Controlled ON Q pump infusion rate: 6ml/hr  Catheter Plan:Will continue with plan at home without changes and The patient was instructed to call ON CALL Anesthesia provider for any questions or problems

## 2017-08-12 NOTE — PROGRESS NOTES
ELVIS Graham    Nerve Cath Post Op Call    Patient Name: Octavia Rice  :  1960  MRN:  [id number]  Date of Discharge: 2017    Nerve Cath Post Op Call:    Catheter Plan:Patient/Family member report nerve catheter previously discontinued, tip intact

## 2017-08-12 NOTE — THERAPY DISCHARGE NOTE
Acute Care - Occupational Therapy Discharge Summary  [facility]     Patient Name: Octavia Rice  : 1960  MRN: [id number]    Today's Date: 2017  Onset of Illness/Injury or Date of Surgery Date: 17    Date of Referral to OT: 17  Referring Physician: MD Maximo      Admit Date: 2017        OT Recommendation and Plan    Visit Dx:    ICD-10-CM ICD-9-CM   1. Impaired mobility and ADLs Z74.09 799.89                     OT Goals       17 1423          Patient Education OT LTG    Patient Education OT LTG, Time to Achieve 2 days  -ST      Patient Education OT LTG, Education Type written program;HEP;precautions per surgeon;brace use/care;positioning;yaa/doff brace  -ST      Patient Education OT LTG, Education Understanding demonstrates adequately;verbalizes understanding  -ST      Patient Education OT LTG Outcome goal met  -ST        User Key  (r) = Recorded By, (t) = Taken By, (c) = Cosigned By    Initials Name Provider Type     Gena Nicholas OTR Occupational Therapist                  OT Discharge Summary  Reason for Discharge: Discharge from facility  Outcomes Achieved: Refer to plan of care for updates on goals achieved  Discharge Destination: Home      Radha Jenkins OT  2017

## 2017-08-15 ENCOUNTER — OFFICE VISIT (OUTPATIENT)
Dept: FAMILY MEDICINE CLINIC | Facility: CLINIC | Age: 57
End: 2017-08-15

## 2017-08-15 VITALS
HEART RATE: 108 BPM | DIASTOLIC BLOOD PRESSURE: 76 MMHG | WEIGHT: 208 LBS | SYSTOLIC BLOOD PRESSURE: 120 MMHG | RESPIRATION RATE: 16 BRPM | BODY MASS INDEX: 30.72 KG/M2 | TEMPERATURE: 98.7 F

## 2017-08-15 DIAGNOSIS — S42.201S UNSPECIFIED FRACTURE OF UPPER END OF RIGHT HUMERUS, SEQUELA: ICD-10-CM

## 2017-08-15 DIAGNOSIS — K59.00 CONSTIPATION, UNSPECIFIED CONSTIPATION TYPE: ICD-10-CM

## 2017-08-15 DIAGNOSIS — Z96.611 STATUS POST TOTAL SHOULDER REPLACEMENT, RIGHT: Primary | ICD-10-CM

## 2017-08-15 DIAGNOSIS — F17.219 CIGARETTE NICOTINE DEPENDENCE WITH NICOTINE-INDUCED DISORDER: ICD-10-CM

## 2017-08-15 PROCEDURE — 99214 OFFICE O/P EST MOD 30 MIN: CPT | Performed by: FAMILY MEDICINE

## 2017-08-15 RX ORDER — LIDOCAINE 50 MG/G
1 PATCH TOPICAL EVERY 24 HOURS
Qty: 30 PATCH | Refills: 3 | Status: SHIPPED | OUTPATIENT
Start: 2017-08-15 | End: 2018-11-16 | Stop reason: SDUPTHER

## 2017-08-15 RX ORDER — LIDOCAINE 50 MG/G
PATCH TOPICAL
Refills: 3 | COMMUNITY
Start: 2017-08-04 | End: 2017-08-15 | Stop reason: SDUPTHER

## 2017-08-15 RX ORDER — HYDROCODONE BITARTRATE AND ACETAMINOPHEN 7.5; 325 MG/1; MG/1
1 TABLET ORAL EVERY 8 HOURS PRN
Qty: 90 TABLET | Refills: 0 | Status: SHIPPED | OUTPATIENT
Start: 2017-08-15 | End: 2017-09-12 | Stop reason: DRUGHIGH

## 2017-08-15 RX ORDER — DOCUSATE SODIUM 100 MG/1
100 CAPSULE, LIQUID FILLED ORAL 2 TIMES DAILY
Qty: 60 CAPSULE | Refills: 3 | Status: SHIPPED | OUTPATIENT
Start: 2017-08-15 | End: 2018-11-16

## 2017-08-15 RX ORDER — NICOTINE 21 MG/24HR
1 PATCH, TRANSDERMAL 24 HOURS TRANSDERMAL EVERY 24 HOURS
Qty: 30 PATCH | Refills: 3 | Status: SHIPPED | OUTPATIENT
Start: 2017-08-15 | End: 2018-11-16

## 2017-08-15 NOTE — PROGRESS NOTES
Subjective   Octavia Rice is a 57 y.o. female.     History of Present Illness   One week post surgery for right shoulder replacement.  Took FPC July 1. Sleeping more in recliner.  Taking Benadryl with pain medicine at night.  Resumed smoking.   Using more water and fiber, tried OTC stool softener and occasional MOM for bowels.   The following portions of the patient's history were reviewed and updated as appropriate: allergies, current medications, past family history, past medical history, past social history, past surgical history and problem list.    Review of Systems   Constitutional: Negative.    Respiratory: Negative.    Cardiovascular: Negative.    Gastrointestinal: Positive for constipation.   Musculoskeletal: Positive for arthralgias.       Objective   Physical Exam   Constitutional: She is oriented to person, place, and time. She appears well-developed.   Right arm in sling with brusing of elbow. Right hand not swollen.    Neck: Neck supple.   Cardiovascular: Regular rhythm.    Pulmonary/Chest: Effort normal and breath sounds normal.   Musculoskeletal: She exhibits no edema.   Neurological: She is alert and oriented to person, place, and time.   Vitals reviewed.      Assessment/Plan   Octavia was seen today for follow-up.    Diagnoses and all orders for this visit:    Status post total shoulder replacement, right  -     lidocaine (LIDODERM) 5 %; Place 1 patch on the skin Daily. Remove & Discard patch within 12 hours or as directed by MD  -     HYDROcodone-acetaminophen (NORCO) 7.5-325 MG per tablet; Take 1 tablet by mouth Every 8 (Eight) Hours As Needed for Moderate Pain .    Unspecified fracture of upper end of right humerus, sequela    Constipation, unspecified constipation type  -     docusate sodium (COLACE) 100 MG capsule; Take 1 capsule by mouth 2 (Two) Times a Day.    Cigarette nicotine dependence with nicotine-induced disorder  -     nicotine (NICODERM CQ) 14 MG/24HR patch; Place 1 patch on  the skin Daily.

## 2017-09-12 ENCOUNTER — OFFICE VISIT (OUTPATIENT)
Dept: FAMILY MEDICINE CLINIC | Facility: CLINIC | Age: 57
End: 2017-09-12

## 2017-09-12 VITALS
DIASTOLIC BLOOD PRESSURE: 90 MMHG | BODY MASS INDEX: 30.86 KG/M2 | SYSTOLIC BLOOD PRESSURE: 120 MMHG | WEIGHT: 209 LBS | TEMPERATURE: 98.8 F | HEART RATE: 84 BPM | RESPIRATION RATE: 16 BRPM

## 2017-09-12 DIAGNOSIS — Z96.611 STATUS POST TOTAL SHOULDER REPLACEMENT, RIGHT: Primary | ICD-10-CM

## 2017-09-12 PROCEDURE — 99213 OFFICE O/P EST LOW 20 MIN: CPT | Performed by: FAMILY MEDICINE

## 2017-09-12 RX ORDER — HYDROCODONE BITARTRATE AND ACETAMINOPHEN 5; 325 MG/1; MG/1
1 TABLET ORAL EVERY 8 HOURS PRN
Qty: 60 TABLET | Refills: 0 | Status: SHIPPED | OUTPATIENT
Start: 2017-09-12 | End: 2017-10-10

## 2017-09-12 NOTE — PROGRESS NOTES
Subjective   Octavia Rice is a 57 y.o. female.     History of Present Illness   Right shoulder surgery to begin 12 weeks of physical therapy next week.  Right hand changes of disuse atrophy in palm.  No hand swelling.  Right shoulder scar formation.thick at proximal end.  Sleeping eratic, not comfortable.  Uses ice bags on right shoulder.  Walking more.  Drinking more water, bowels not a problem.   Home glucose average 105.  Appetite returning.   Using less pain medications and taking ibuprofen twice daily.  Pain medicine at night and late afternoon. Left ulnar nerve sensitivity.   The following portions of the patient's history were reviewed and updated as appropriate: allergies, current medications, past family history, past medical history, past social history, past surgical history and problem list.    Review of Systems   Constitutional: Negative.    HENT: Negative.    Respiratory: Negative.    Cardiovascular: Negative.    Gastrointestinal: Negative for constipation.   Musculoskeletal: Positive for arthralgias.   Skin: Negative.        Objective   Physical Exam   Constitutional: She appears well-developed.   Right arm in sling.    Neck: No thyromegaly present.   Pulmonary/Chest: Effort normal.   Musculoskeletal: She exhibits no edema.   Lymphadenopathy:     She has no cervical adenopathy.   Vitals reviewed.      Assessment/Plan   Octavia was seen today for follow-up.    Diagnoses and all orders for this visit:    Status post total shoulder replacement, right  -     HYDROcodone-acetaminophen (NORCO) 5-325 MG per tablet; Take 1 tablet by mouth Every 8 (Eight) Hours As Needed for Moderate Pain .      Not as distressed as past. Steady improvement of strength.   ANTIONETTE reviewed.

## 2017-10-04 ENCOUNTER — OFFICE VISIT (OUTPATIENT)
Dept: ENDOCRINOLOGY | Facility: CLINIC | Age: 57
End: 2017-10-04

## 2017-10-04 VITALS
BODY MASS INDEX: 30.33 KG/M2 | HEIGHT: 69 IN | WEIGHT: 204.8 LBS | OXYGEN SATURATION: 98 % | SYSTOLIC BLOOD PRESSURE: 130 MMHG | DIASTOLIC BLOOD PRESSURE: 80 MMHG | HEART RATE: 90 BPM

## 2017-10-04 DIAGNOSIS — IMO0001 UNCONTROLLED TYPE 2 DIABETES MELLITUS WITHOUT COMPLICATION, WITHOUT LONG-TERM CURRENT USE OF INSULIN: ICD-10-CM

## 2017-10-04 DIAGNOSIS — E11.8 UNCONTROLLED TYPE 2 DIABETES MELLITUS WITH COMPLICATION, UNSPECIFIED LONG TERM INSULIN USE STATUS: ICD-10-CM

## 2017-10-04 DIAGNOSIS — E78.2 MIXED HYPERLIPIDEMIA: ICD-10-CM

## 2017-10-04 DIAGNOSIS — E11.65 UNCONTROLLED TYPE 2 DIABETES MELLITUS WITH COMPLICATION, UNSPECIFIED LONG TERM INSULIN USE STATUS: ICD-10-CM

## 2017-10-04 DIAGNOSIS — E55.9 VITAMIN D DEFICIENCY: Primary | ICD-10-CM

## 2017-10-04 DIAGNOSIS — IMO0002 UNCONTROLLED TYPE 2 DIABETES MELLITUS WITH COMPLICATION, WITHOUT LONG-TERM CURRENT USE OF INSULIN: ICD-10-CM

## 2017-10-04 PROCEDURE — 90686 IIV4 VACC NO PRSV 0.5 ML IM: CPT | Performed by: INTERNAL MEDICINE

## 2017-10-04 PROCEDURE — 99213 OFFICE O/P EST LOW 20 MIN: CPT | Performed by: INTERNAL MEDICINE

## 2017-10-04 PROCEDURE — 90471 IMMUNIZATION ADMIN: CPT | Performed by: INTERNAL MEDICINE

## 2017-10-04 RX ORDER — METFORMIN HYDROCHLORIDE 500 MG/1
1000 TABLET, EXTENDED RELEASE ORAL
Qty: 180 TABLET | Refills: 3 | Status: SHIPPED | OUTPATIENT
Start: 2017-10-04 | End: 2018-01-19 | Stop reason: SDUPTHER

## 2017-10-04 NOTE — PROGRESS NOTES
"Chief complaint  Diabetes (F/u for type 2 diabetes, pt stated she has not been checking blood sugars regularly)    Subjective   Octavia Rice is a 57 y.o. female is here today for follow-up of:    Diabetes Mellitus type 2: dx in 2003.    Diabetic complications: none  Eye exam current (within one year): no retinopathy, recent exam.     Current diabetic medications include Metformin  mg - 2 tablets daily  Victoza 1.8 mg daily.     Past medications: 08/2016 Jardiance trial was unsuccessful - frequent urination      Monitoring   not checking regularly.  Glucometer  verio onetouch given .    Home blood sugar records: glucometer downloaded, reviewed and scanned to chart  Hypoglycemia:    Nutrition:   discussed  carb consistent diet 45-60 gm carb per meal.   Current diet: in general, a \"healthy\" diet    Current exercise: physical therapy twice a week    Foot care and dental care: discussed    Labs:   Lab Results   Component Value Date    HGBA1C 7.20 (H) 08/07/2017     Lab Results   Component Value Date    CREATININE 0.40 (L) 08/08/2017   No results found for: LDLCALC  Lab Results   Component Value Date    MICROALBUR 22.4 01/19/2017     Lab Results   Component Value Date    TSH 2.506 01/19/2017       Patient had a fall in Oct 2016 and she had head trauma and shoulder trauma, completed physical therapy, s/p surgery. She is still in chronic pain . APpetite improved and she gained weight/   Cant sleep well. She underwent right shoulder surgery, planned  For left ulnar nerve surgery, left knee surgery - next Friday.   She is going through PT, doing better. She started driving and going to therapist soon. The pain is improved.     Other med problems: include hyperlipidemia on simvastatin.     Medications    Current Outpatient Prescriptions:   •  Cholecalciferol (VITAMIN D) 2000 UNITS tablet, Take 2,000 Units by mouth Daily., Disp: 90 tablet, Rfl: 3  •  docusate sodium (COLACE) 100 MG capsule, Take 1 capsule by mouth 2 " (Two) Times a Day., Disp: 60 capsule, Rfl: 3  •  HYDROcodone-acetaminophen (NORCO) 5-325 MG per tablet, Take 1 tablet by mouth Every 8 (Eight) Hours As Needed for Moderate Pain ., Disp: 60 tablet, Rfl: 0  •  lidocaine (LIDODERM) 5 %, Place 1 patch on the skin Daily. Remove & Discard patch within 12 hours or as directed by MD, Disp: 30 patch, Rfl: 3  •  Liraglutide (VICTOZA) 18 MG/3ML solution pen-injector, Inject 1 pen under the skin Every Night. 1.8, Disp: , Rfl:   •  metFORMIN XR (GLUCOPHAGE-XR) 500 MG 24 hr tablet, Take 2 tablets by mouth Daily With Breakfast. Use 1 tablet bid with food (Patient taking differently: Take 500 mg by mouth Daily With Breakfast. Use 1 tablet bid with food), Disp: 180 tablet, Rfl: 3  •  nicotine (NICODERM CQ) 14 MG/24HR patch, Place 1 patch on the skin Daily., Disp: 30 patch, Rfl: 3  •  simvastatin (ZOCOR) 40 MG tablet, Take 1 tablet by mouth Every Night., Disp: 90 tablet, Rfl: 2    PMH  The following portions of the patient's history were reviewed and updated as appropriate: allergies, current medications, past family history, past medical history, past social history, past surgical history and problem list.    Review of systems  Review of Systems   Constitutional: Positive for appetite change (decreased appetite), fatigue and unexpected weight change (weight loss).   HENT: Negative for congestion.    Eyes: Negative for visual disturbance.   Respiratory: Negative.  Negative for shortness of breath.    Cardiovascular: Negative for chest pain and leg swelling.   Gastrointestinal: Positive for diarrhea (occasional) and nausea. Negative for constipation.   Endocrine: Negative for cold intolerance, polydipsia and polyuria.   Musculoskeletal: Positive for arthralgias (shoulder pain with shooting pain down the arm). Negative for back pain, joint swelling and myalgias.   Skin: Negative for rash and wound.   Allergic/Immunologic: Positive for environmental allergies.   Neurological: Positive  "for headaches. Negative for numbness.   Hematological: Negative.    Psychiatric/Behavioral: Positive for behavioral problems. Negative for self-injury.       Physical exam  Objective   Blood pressure 130/80, pulse 90, height 69\" (175.3 cm), weight 204 lb 12.8 oz (92.9 kg), SpO2 98 %. Body mass index is 30.24 kg/(m^2).  Physical Exam   Constitutional: She is oriented to person, place, and time. She appears well-developed and well-nourished.   HENT:   Head: Normocephalic and atraumatic.   Mouth/Throat: Oropharynx is clear and moist.   Neck: No tracheal deviation present. No thyromegaly present.   Cardiovascular: Normal rate, regular rhythm, normal heart sounds and intact distal pulses.    Pulmonary/Chest: Effort normal and breath sounds normal.   Musculoskeletal: She exhibits no edema, tenderness or deformity.        Right shoulder: She exhibits decreased range of motion and pain.        Left shoulder: She exhibits decreased range of motion and pain.    Octavia had a diabetic foot exam performed today.    Neurological Sensory Findings - Unaltered hot/cold right ankle/foot discrimination and unaltered hot/cold left ankle/foot discrimination. Unaltered sharp/dull right ankle/foot discrimination and unaltered sharp/dull left ankle/foot discrimination.    Vascular Status -  Her exam exhibits right foot vasculature normal. Her exam exhibits no right foot edema. Her exam exhibits left foot vasculature normal. Her exam exhibits no left foot edema.   Skin Integrity  -  Her right foot skin is intact.     Octavia 's left foot skin is intact. .  Neurological: She is alert and oriented to person, place, and time.   Skin: Skin is warm and dry.   Psychiatric: Her behavior is normal. Judgment and thought content normal. She exhibits a depressed mood.   Nursing note and vitals reviewed.        LABS AND IMAGING  Results for orders placed or performed during the hospital encounter of 08/07/17   OR Potassium   Result Value Ref Range    " Potassium, OR 4.10 3.5 - 5.3 mmol/L   Hemoglobin A1c   Result Value Ref Range    Hemoglobin A1C 7.20 (H) 4.80 - 5.60 %   Urinalysis With / Culture If Indicated   Result Value Ref Range    Color, UA Yellow Yellow, Straw    Appearance, UA Clear Clear    pH, UA <=5.0 5.0 - 8.0    Specific Gravity, UA 1.024 1.001 - 1.030    Glucose, UA >=1000 mg/dL (3+) (A) Negative    Ketones, UA Trace (A) Negative    Bilirubin, UA Negative Negative    Blood, UA Negative Negative    Protein, UA Negative Negative    Leuk Esterase, UA Negative Negative    Nitrite, UA Negative Negative    Urobilinogen, UA 0.2 E.U./dL 0.2 - 1.0 E.U./dL   CBC Auto Differential   Result Value Ref Range    WBC 15.23 (H) 3.50 - 10.80 10*3/mm3    RBC 3.78 (L) 3.89 - 5.14 10*6/mm3    Hemoglobin 11.6 11.5 - 15.5 g/dL    Hematocrit 35.9 34.5 - 44.0 %    MCV 95.0 80.0 - 99.0 fL    MCH 30.7 27.0 - 31.0 pg    MCHC 32.3 32.0 - 36.0 g/dL    RDW 12.6 11.3 - 14.5 %    RDW-SD 43.8 37.0 - 54.0 fl    MPV 10.2 6.0 - 12.0 fL    Platelets 224 150 - 450 10*3/mm3    Neutrophil % 91.3 (H) 41.0 - 71.0 %    Lymphocyte % 6.1 (L) 24.0 - 44.0 %    Monocyte % 2.3 0.0 - 12.0 %    Eosinophil % 0.0 0.0 - 3.0 %    Basophil % 0.1 0.0 - 1.0 %    Immature Grans % 0.2 0.0 - 0.6 %    Neutrophils, Absolute 13.91 (H) 1.50 - 8.30 10*3/mm3    Lymphocytes, Absolute 0.93 0.60 - 4.80 10*3/mm3    Monocytes, Absolute 0.35 0.00 - 1.00 10*3/mm3    Eosinophils, Absolute 0.00 0.00 - 0.30 10*3/mm3    Basophils, Absolute 0.01 0.00 - 0.20 10*3/mm3    Immature Grans, Absolute 0.03 0.00 - 0.03 10*3/mm3   Comprehensive Metabolic Panel   Result Value Ref Range    Glucose 414 (C) 70 - 100 mg/dL    BUN 16 9 - 23 mg/dL    Creatinine 0.70 0.60 - 1.30 mg/dL    Sodium 139 132 - 146 mmol/L    Potassium 4.6 3.5 - 5.5 mmol/L    Chloride 106 99 - 109 mmol/L    CO2 14.0 (L) 20.0 - 31.0 mmol/L    Calcium 8.8 8.7 - 10.4 mg/dL    Total Protein 6.2 5.7 - 8.2 g/dL    Albumin 3.80 3.20 - 4.80 g/dL    ALT (SGPT) 15 7 - 40 U/L     AST (SGOT) 22 0 - 33 U/L    Alkaline Phosphatase 74 25 - 100 U/L    Total Bilirubin 0.5 0.3 - 1.2 mg/dL    eGFR Non African Amer 86 >60 mL/min/1.73    Globulin 2.4 gm/dL    A/G Ratio 1.6 1.5 - 2.5 g/dL    BUN/Creatinine Ratio 22.9 7.0 - 25.0    Anion Gap 19.0 (H) 3.0 - 11.0 mmol/L   Lipid Panel   Result Value Ref Range    Total Cholesterol 152 0 - 200 mg/dL    Triglycerides 166 (H) 0 - 150 mg/dL    HDL Cholesterol 48 40 - 60 mg/dL    LDL Cholesterol  87 0 - 130 mg/dL   Basic Metabolic Panel   Result Value Ref Range    Glucose 168 (H) 70 - 100 mg/dL    BUN 16 9 - 23 mg/dL    Creatinine 0.40 (L) 0.60 - 1.30 mg/dL    Sodium 138 132 - 146 mmol/L    Potassium 4.3 3.5 - 5.5 mmol/L    Chloride 104 99 - 109 mmol/L    CO2 28.0 20.0 - 31.0 mmol/L    Calcium 9.1 8.7 - 10.4 mg/dL    eGFR Non African Amer >150 >60 mL/min/1.73    BUN/Creatinine Ratio 40.0 (H) 7.0 - 25.0    Anion Gap 6.0 3.0 - 11.0 mmol/L   CBC Auto Differential   Result Value Ref Range    WBC 10.96 (H) 3.50 - 10.80 10*3/mm3    RBC 3.53 (L) 3.89 - 5.14 10*6/mm3    Hemoglobin 11.1 (L) 11.5 - 15.5 g/dL    Hematocrit 33.1 (L) 34.5 - 44.0 %    MCV 93.8 80.0 - 99.0 fL    MCH 31.4 (H) 27.0 - 31.0 pg    MCHC 33.5 32.0 - 36.0 g/dL    RDW 12.8 11.3 - 14.5 %    RDW-SD 43.9 37.0 - 54.0 fl    MPV 10.3 6.0 - 12.0 fL    Platelets 216 150 - 450 10*3/mm3    Neutrophil % 83.3 (H) 41.0 - 71.0 %    Lymphocyte % 8.1 (L) 24.0 - 44.0 %    Monocyte % 8.3 0.0 - 12.0 %    Eosinophil % 0.0 0.0 - 3.0 %    Basophil % 0.1 0.0 - 1.0 %    Immature Grans % 0.2 0.0 - 0.6 %    Neutrophils, Absolute 9.13 (H) 1.50 - 8.30 10*3/mm3    Lymphocytes, Absolute 0.89 0.60 - 4.80 10*3/mm3    Monocytes, Absolute 0.91 0.00 - 1.00 10*3/mm3    Eosinophils, Absolute 0.00 0.00 - 0.30 10*3/mm3    Basophils, Absolute 0.01 0.00 - 0.20 10*3/mm3    Immature Grans, Absolute 0.02 0.00 - 0.03 10*3/mm3   POC Glucose Fingerstick   Result Value Ref Range    Glucose 125 70 - 130 mg/dL   POC Glucose Fingerstick   Result Value  Ref Range    Glucose 401 (H) 70 - 130 mg/dL   POC Glucose Fingerstick   Result Value Ref Range    Glucose 480 (C) 70 - 130 mg/dL   POC Glucose Fingerstick   Result Value Ref Range    Glucose 411 (H) 70 - 130 mg/dL   POC Glucose Fingerstick   Result Value Ref Range    Glucose 363 (H) 70 - 130 mg/dL   POC Glucose Fingerstick   Result Value Ref Range    Glucose 400 (H) 70 - 130 mg/dL   POC Glucose Fingerstick   Result Value Ref Range    Glucose 196 (H) 70 - 130 mg/dL   POC Glucose Fingerstick   Result Value Ref Range    Glucose 178 (H) 70 - 130 mg/dL   POC Glucose Fingerstick   Result Value Ref Range    Glucose 225 (H) 70 - 130 mg/dL   POC Glucose Fingerstick   Result Value Ref Range    Glucose 240 (H) 70 - 130 mg/dL                   Assessment:         Diagnoses and all orders for this visit:    Vitamin D deficiency    Uncontrolled type 2 diabetes mellitus without complication, without long-term current use of insulin        Plan:      Glycemic control is well.         Continue Victoza and metformin for now, monitor glucose and call the office if the glucose is above 200.        Follow up:  3 months.

## 2017-10-10 ENCOUNTER — OFFICE VISIT (OUTPATIENT)
Dept: FAMILY MEDICINE CLINIC | Facility: CLINIC | Age: 57
End: 2017-10-10

## 2017-10-10 VITALS
RESPIRATION RATE: 16 BRPM | HEART RATE: 88 BPM | BODY MASS INDEX: 30.86 KG/M2 | TEMPERATURE: 98.4 F | DIASTOLIC BLOOD PRESSURE: 90 MMHG | SYSTOLIC BLOOD PRESSURE: 120 MMHG | WEIGHT: 209 LBS

## 2017-10-10 DIAGNOSIS — F51.01 PRIMARY INSOMNIA: Primary | ICD-10-CM

## 2017-10-10 PROCEDURE — 99213 OFFICE O/P EST LOW 20 MIN: CPT | Performed by: FAMILY MEDICINE

## 2017-10-10 RX ORDER — AMITRIPTYLINE HYDROCHLORIDE 10 MG/1
10 TABLET, FILM COATED ORAL NIGHTLY
Qty: 30 TABLET | Refills: 2 | Status: SHIPPED | OUTPATIENT
Start: 2017-10-10 | End: 2017-12-19 | Stop reason: SDUPTHER

## 2017-10-10 NOTE — PROGRESS NOTES
Subjective   Octavia Rice is a 57 y.o. female.     History of Present Illness   Not sleeping well.  Pain after physical therapy.  Uncomfortable in large crowds (football game).  Not doing housework.  creis easily.  Enjoys spending time with grand daughter. Does not feel well.  Glucose 7.2% A1c average.  Hydrocodone not very effective relieving pain, using Tylenol.   To see orthopedic to remove hardware from knee. Knees more agile, right shoulder limited range of motion to abduct or reach back.   The following portions of the patient's history were reviewed and updated as appropriate: allergies, current medications, past family history, past medical history, past social history, past surgical history and problem list.    Review of Systems   Constitutional: Negative.    Respiratory: Negative.    Cardiovascular: Negative.    Gastrointestinal: Positive for diarrhea.   Musculoskeletal: Positive for arthralgias.       Objective   Physical Exam   Constitutional: She appears well-developed.   Neck: Neck supple.   Pulmonary/Chest: Effort normal.   Musculoskeletal: She exhibits no edema.        Right shoulder: She exhibits decreased range of motion. She exhibits no bony tenderness and no swelling.   Neurological: She is alert.   Psychiatric: Her mood appears anxious.   Vitals reviewed.      Assessment/Plan   Octavia was seen today for follow-up.    Diagnoses and all orders for this visit:    Primary insomnia  -     amitriptyline (ELAVIL) 10 MG tablet; Take 1 tablet by mouth Every Night.        Consider seeing counselor again.

## 2017-10-20 ENCOUNTER — TRANSCRIBE ORDERS (OUTPATIENT)
Dept: LAB | Facility: HOSPITAL | Age: 57
End: 2017-10-20

## 2017-10-20 ENCOUNTER — LAB (OUTPATIENT)
Dept: LAB | Facility: HOSPITAL | Age: 57
End: 2017-10-20

## 2017-10-20 DIAGNOSIS — Z79.899 LONG TERM USE OF DRUG: ICD-10-CM

## 2017-10-20 DIAGNOSIS — M06.9 RHEUMATOID ARTHRITIS, INVOLVING UNSPECIFIED SITE, UNSPECIFIED RHEUMATOID FACTOR PRESENCE: Primary | ICD-10-CM

## 2017-10-20 DIAGNOSIS — M06.9 RHEUMATOID ARTHRITIS, INVOLVING UNSPECIFIED SITE, UNSPECIFIED RHEUMATOID FACTOR PRESENCE: ICD-10-CM

## 2017-10-20 LAB
ALBUMIN SERPL-MCNC: 4.5 G/DL (ref 3.2–4.8)
ALBUMIN/GLOB SERPL: 1.8 G/DL (ref 1.5–2.5)
ALP SERPL-CCNC: 88 U/L (ref 25–100)
ALT SERPL W P-5'-P-CCNC: 15 U/L (ref 7–40)
ANION GAP SERPL CALCULATED.3IONS-SCNC: 10 MMOL/L (ref 3–11)
AST SERPL-CCNC: 12 U/L (ref 0–33)
BASOPHILS # BLD AUTO: 0.04 10*3/MM3 (ref 0–0.2)
BASOPHILS NFR BLD AUTO: 0.3 % (ref 0–1)
BILIRUB SERPL-MCNC: 0.6 MG/DL (ref 0.3–1.2)
BUN BLD-MCNC: 20 MG/DL (ref 9–23)
BUN/CREAT SERPL: 15.4 (ref 7–25)
CALCIUM SPEC-SCNC: 9.6 MG/DL (ref 8.7–10.4)
CHLORIDE SERPL-SCNC: 103 MMOL/L (ref 99–109)
CO2 SERPL-SCNC: 27 MMOL/L (ref 20–31)
CREAT BLD-MCNC: 1.3 MG/DL (ref 0.6–1.3)
DEPRECATED RDW RBC AUTO: 45.8 FL (ref 37–54)
EOSINOPHIL # BLD AUTO: 0.08 10*3/MM3 (ref 0–0.3)
EOSINOPHIL NFR BLD AUTO: 0.6 % (ref 0–3)
ERYTHROCYTE [DISTWIDTH] IN BLOOD BY AUTOMATED COUNT: 14.6 % (ref 11.3–14.5)
GFR SERPL CREATININE-BSD FRML MDRD: 42 ML/MIN/1.73
GLOBULIN UR ELPH-MCNC: 2.5 GM/DL
GLUCOSE BLD-MCNC: 150 MG/DL (ref 70–100)
HCT VFR BLD AUTO: 40.4 % (ref 34.5–44)
HGB BLD-MCNC: 12.8 G/DL (ref 11.5–15.5)
IMM GRANULOCYTES # BLD: 0.04 10*3/MM3 (ref 0–0.03)
IMM GRANULOCYTES NFR BLD: 0.3 % (ref 0–0.6)
LYMPHOCYTES # BLD AUTO: 1.09 10*3/MM3 (ref 0.6–4.8)
LYMPHOCYTES NFR BLD AUTO: 8.5 % (ref 24–44)
MCH RBC QN AUTO: 28.1 PG (ref 27–31)
MCHC RBC AUTO-ENTMCNC: 31.7 G/DL (ref 32–36)
MCV RBC AUTO: 88.6 FL (ref 80–99)
MONOCYTES # BLD AUTO: 0.53 10*3/MM3 (ref 0–1)
MONOCYTES NFR BLD AUTO: 4.1 % (ref 0–12)
NEUTROPHILS # BLD AUTO: 11.1 10*3/MM3 (ref 1.5–8.3)
NEUTROPHILS NFR BLD AUTO: 86.2 % (ref 41–71)
PLATELET # BLD AUTO: 245 10*3/MM3 (ref 150–450)
PMV BLD AUTO: 10.2 FL (ref 6–12)
POTASSIUM BLD-SCNC: 4 MMOL/L (ref 3.5–5.5)
PROT SERPL-MCNC: 7 G/DL (ref 5.7–8.2)
RBC # BLD AUTO: 4.56 10*6/MM3 (ref 3.89–5.14)
SODIUM BLD-SCNC: 140 MMOL/L (ref 132–146)
WBC NRBC COR # BLD: 12.88 10*3/MM3 (ref 3.5–10.8)

## 2017-10-20 PROCEDURE — 85025 COMPLETE CBC W/AUTO DIFF WBC: CPT | Performed by: INTERNAL MEDICINE

## 2017-10-20 PROCEDURE — 36415 COLL VENOUS BLD VENIPUNCTURE: CPT | Performed by: INTERNAL MEDICINE

## 2017-10-20 PROCEDURE — 80053 COMPREHEN METABOLIC PANEL: CPT | Performed by: INTERNAL MEDICINE

## 2017-10-24 ENCOUNTER — TRANSCRIBE ORDERS (OUTPATIENT)
Dept: LAB | Facility: HOSPITAL | Age: 57
End: 2017-10-24

## 2017-10-24 ENCOUNTER — LAB (OUTPATIENT)
Dept: LAB | Facility: HOSPITAL | Age: 57
End: 2017-10-24

## 2017-10-24 DIAGNOSIS — Z01.818 PREOP EXAMINATION: ICD-10-CM

## 2017-10-24 DIAGNOSIS — Z87.68 PERSONAL HISTORY OF PERINATAL PROBLEMS: ICD-10-CM

## 2017-10-24 DIAGNOSIS — R73.09 OTHER ABNORMAL GLUCOSE: ICD-10-CM

## 2017-10-24 DIAGNOSIS — Z87.68 PERSONAL HISTORY OF PERINATAL PROBLEMS: Primary | ICD-10-CM

## 2017-10-24 LAB
ANION GAP SERPL CALCULATED.3IONS-SCNC: 6 MMOL/L (ref 3–11)
BASOPHILS # BLD AUTO: 0.02 10*3/MM3 (ref 0–0.2)
BASOPHILS NFR BLD AUTO: 0.2 % (ref 0–1)
BUN BLD-MCNC: 10 MG/DL (ref 9–23)
BUN/CREAT SERPL: 14.3 (ref 7–25)
CALCIUM SPEC-SCNC: 10 MG/DL (ref 8.7–10.4)
CHLORIDE SERPL-SCNC: 104 MMOL/L (ref 99–109)
CO2 SERPL-SCNC: 26 MMOL/L (ref 20–31)
CREAT BLD-MCNC: 0.7 MG/DL (ref 0.6–1.3)
DEPRECATED RDW RBC AUTO: 45.1 FL (ref 37–54)
EOSINOPHIL # BLD AUTO: 0.05 10*3/MM3 (ref 0–0.3)
EOSINOPHIL NFR BLD AUTO: 0.5 % (ref 0–3)
ERYTHROCYTE [DISTWIDTH] IN BLOOD BY AUTOMATED COUNT: 13.4 % (ref 11.3–14.5)
GFR SERPL CREATININE-BSD FRML MDRD: 86 ML/MIN/1.73
GLUCOSE BLD-MCNC: 158 MG/DL (ref 70–100)
HCT VFR BLD AUTO: 41.8 % (ref 34.5–44)
HGB BLD-MCNC: 13.5 G/DL (ref 11.5–15.5)
IMM GRANULOCYTES # BLD: 0.04 10*3/MM3 (ref 0–0.03)
IMM GRANULOCYTES NFR BLD: 0.4 % (ref 0–0.6)
LYMPHOCYTES # BLD AUTO: 3.29 10*3/MM3 (ref 0.6–4.8)
LYMPHOCYTES NFR BLD AUTO: 34.1 % (ref 24–44)
MCH RBC QN AUTO: 29.9 PG (ref 27–31)
MCHC RBC AUTO-ENTMCNC: 32.3 G/DL (ref 32–36)
MCV RBC AUTO: 92.5 FL (ref 80–99)
MONOCYTES # BLD AUTO: 0.55 10*3/MM3 (ref 0–1)
MONOCYTES NFR BLD AUTO: 5.7 % (ref 0–12)
NEUTROPHILS # BLD AUTO: 5.69 10*3/MM3 (ref 1.5–8.3)
NEUTROPHILS NFR BLD AUTO: 59.1 % (ref 41–71)
PLATELET # BLD AUTO: 281 10*3/MM3 (ref 150–450)
PMV BLD AUTO: 11 FL (ref 6–12)
POTASSIUM BLD-SCNC: 4.1 MMOL/L (ref 3.5–5.5)
RBC # BLD AUTO: 4.52 10*6/MM3 (ref 3.89–5.14)
SODIUM BLD-SCNC: 136 MMOL/L (ref 132–146)
WBC NRBC COR # BLD: 9.64 10*3/MM3 (ref 3.5–10.8)

## 2017-10-24 PROCEDURE — 80048 BASIC METABOLIC PNL TOTAL CA: CPT | Performed by: ORTHOPAEDIC SURGERY

## 2017-10-24 PROCEDURE — 36415 COLL VENOUS BLD VENIPUNCTURE: CPT

## 2017-10-24 PROCEDURE — 85025 COMPLETE CBC W/AUTO DIFF WBC: CPT | Performed by: ORTHOPAEDIC SURGERY

## 2017-10-29 DIAGNOSIS — IMO0002 UNCONTROLLED TYPE 2 DIABETES MELLITUS WITH COMPLICATION, WITHOUT LONG-TERM CURRENT USE OF INSULIN: ICD-10-CM

## 2017-10-29 DIAGNOSIS — E11.65 UNCONTROLLED TYPE 2 DIABETES MELLITUS WITH COMPLICATION, UNSPECIFIED LONG TERM INSULIN USE STATUS: ICD-10-CM

## 2017-10-29 DIAGNOSIS — IMO0001 UNCONTROLLED TYPE 2 DIABETES MELLITUS WITHOUT COMPLICATION, WITHOUT LONG-TERM CURRENT USE OF INSULIN: ICD-10-CM

## 2017-10-29 DIAGNOSIS — E11.8 UNCONTROLLED TYPE 2 DIABETES MELLITUS WITH COMPLICATION, UNSPECIFIED LONG TERM INSULIN USE STATUS: ICD-10-CM

## 2017-10-29 DIAGNOSIS — E78.2 MIXED HYPERLIPIDEMIA: ICD-10-CM

## 2017-10-29 RX ORDER — ACETAMINOPHEN 160 MG
TABLET,DISINTEGRATING ORAL
Qty: 90 CAPSULE | Refills: 3 | Status: SHIPPED | OUTPATIENT
Start: 2017-10-29 | End: 2018-11-16

## 2017-11-16 ENCOUNTER — OFFICE VISIT (OUTPATIENT)
Dept: FAMILY MEDICINE CLINIC | Facility: CLINIC | Age: 57
End: 2017-11-16

## 2017-11-16 VITALS
HEIGHT: 69 IN | OXYGEN SATURATION: 98 % | SYSTOLIC BLOOD PRESSURE: 118 MMHG | TEMPERATURE: 98.7 F | DIASTOLIC BLOOD PRESSURE: 78 MMHG | BODY MASS INDEX: 31.25 KG/M2 | HEART RATE: 95 BPM | RESPIRATION RATE: 16 BRPM | WEIGHT: 211 LBS

## 2017-11-16 DIAGNOSIS — I77.6 VASCULITIS (HCC): Primary | ICD-10-CM

## 2017-11-16 PROBLEM — D17.9 LIPOMA: Status: ACTIVE | Noted: 2017-11-16

## 2017-11-16 LAB
ALBUMIN SERPL-MCNC: 4.6 G/DL (ref 3.2–4.8)
ALBUMIN/GLOB SERPL: 1.7 G/DL (ref 1.5–2.5)
ALP SERPL-CCNC: 108 U/L (ref 25–100)
ALT SERPL W P-5'-P-CCNC: 22 U/L (ref 7–40)
ANION GAP SERPL CALCULATED.3IONS-SCNC: 14 MMOL/L (ref 3–11)
AST SERPL-CCNC: 20 U/L (ref 0–33)
BASOPHILS # BLD AUTO: 0.03 10*3/MM3 (ref 0–0.2)
BASOPHILS NFR BLD AUTO: 0.3 % (ref 0–1)
BILIRUB SERPL-MCNC: 0.3 MG/DL (ref 0.3–1.2)
BUN BLD-MCNC: 12 MG/DL (ref 9–23)
BUN/CREAT SERPL: 20 (ref 7–25)
CALCIUM SPEC-SCNC: 10.3 MG/DL (ref 8.7–10.4)
CHLORIDE SERPL-SCNC: 107 MMOL/L (ref 99–109)
CO2 SERPL-SCNC: 20 MMOL/L (ref 20–31)
CREAT BLD-MCNC: 0.6 MG/DL (ref 0.6–1.3)
CRP SERPL-MCNC: 0.56 MG/DL (ref 0–1)
DEPRECATED RDW RBC AUTO: 44.3 FL (ref 37–54)
EOSINOPHIL # BLD AUTO: 0.11 10*3/MM3 (ref 0–0.3)
EOSINOPHIL NFR BLD AUTO: 1.2 % (ref 0–3)
ERYTHROCYTE [DISTWIDTH] IN BLOOD BY AUTOMATED COUNT: 13.3 % (ref 11.3–14.5)
ERYTHROCYTE [SEDIMENTATION RATE] IN BLOOD: 9 MM/HR (ref 0–30)
GFR SERPL CREATININE-BSD FRML MDRD: 103 ML/MIN/1.73
GLOBULIN UR ELPH-MCNC: 2.7 GM/DL
GLUCOSE BLD-MCNC: 134 MG/DL (ref 70–100)
HCT VFR BLD AUTO: 39.7 % (ref 34.5–44)
HGB BLD-MCNC: 13 G/DL (ref 11.5–15.5)
IMM GRANULOCYTES # BLD: 0.03 10*3/MM3 (ref 0–0.03)
IMM GRANULOCYTES NFR BLD: 0.3 % (ref 0–0.6)
INR PPP: 1 (ref 0.9–1.1)
LYMPHOCYTES # BLD AUTO: 2.93 10*3/MM3 (ref 0.6–4.8)
LYMPHOCYTES NFR BLD AUTO: 31.9 % (ref 24–44)
MCH RBC QN AUTO: 29.7 PG (ref 27–31)
MCHC RBC AUTO-ENTMCNC: 32.7 G/DL (ref 32–36)
MCV RBC AUTO: 90.6 FL (ref 80–99)
MONOCYTES # BLD AUTO: 0.54 10*3/MM3 (ref 0–1)
MONOCYTES NFR BLD AUTO: 5.9 % (ref 0–12)
NEUTROPHILS # BLD AUTO: 5.55 10*3/MM3 (ref 1.5–8.3)
NEUTROPHILS NFR BLD AUTO: 60.4 % (ref 41–71)
PLATELET # BLD AUTO: 269 10*3/MM3 (ref 150–450)
PMV BLD AUTO: 11 FL (ref 6–12)
POTASSIUM BLD-SCNC: 4 MMOL/L (ref 3.5–5.5)
PROT SERPL-MCNC: 7.3 G/DL (ref 5.7–8.2)
RBC # BLD AUTO: 4.38 10*6/MM3 (ref 3.89–5.14)
SODIUM BLD-SCNC: 141 MMOL/L (ref 132–146)
WBC NRBC COR # BLD: 9.19 10*3/MM3 (ref 3.5–10.8)

## 2017-11-16 PROCEDURE — 86140 C-REACTIVE PROTEIN: CPT | Performed by: NURSE PRACTITIONER

## 2017-11-16 PROCEDURE — 36415 COLL VENOUS BLD VENIPUNCTURE: CPT | Performed by: NURSE PRACTITIONER

## 2017-11-16 PROCEDURE — 86038 ANTINUCLEAR ANTIBODIES: CPT | Performed by: NURSE PRACTITIONER

## 2017-11-16 PROCEDURE — 85652 RBC SED RATE AUTOMATED: CPT | Performed by: NURSE PRACTITIONER

## 2017-11-16 PROCEDURE — 85025 COMPLETE CBC W/AUTO DIFF WBC: CPT | Performed by: NURSE PRACTITIONER

## 2017-11-16 PROCEDURE — 99214 OFFICE O/P EST MOD 30 MIN: CPT | Performed by: NURSE PRACTITIONER

## 2017-11-16 PROCEDURE — 86063 ANTISTREPTOLYSIN O SCREEN: CPT | Performed by: NURSE PRACTITIONER

## 2017-11-16 PROCEDURE — 80053 COMPREHEN METABOLIC PANEL: CPT | Performed by: NURSE PRACTITIONER

## 2017-11-16 PROCEDURE — 85610 PROTHROMBIN TIME: CPT | Performed by: NURSE PRACTITIONER

## 2017-11-16 RX ORDER — ASPIRIN 325 MG
325 TABLET ORAL DAILY
COMMUNITY
End: 2018-11-16

## 2017-11-16 RX ORDER — METHYLPREDNISOLONE 4 MG/1
TABLET ORAL
Qty: 21 TABLET | Refills: 0 | Status: SHIPPED | OUTPATIENT
Start: 2017-11-16 | End: 2017-12-18

## 2017-11-16 RX ORDER — CLINDAMYCIN HYDROCHLORIDE 300 MG/1
CAPSULE ORAL
Refills: 0 | COMMUNITY
Start: 2017-10-04 | End: 2017-12-18

## 2017-11-16 NOTE — PROGRESS NOTES
Subjective   Octavia Rice is a 57 y.o. female.     History of Present Illness Ms Rice presents with a 4 day history of intensely pruritic bilat red hand rash. It is no where else on her body. Recent knee surgery and started on Asa. Also taking clindamycin. She babysits her grandchild and is worried that she has yeast on her hands. Treated with benadryl without relief. Itching woke her from sleep. Has not had this before. No other symptoms. Has diabetes. Last A1c was 7.2.    The following portions of the patient's history were reviewed and updated as appropriate: allergies, current medications, past family history, past medical history, past social history, past surgical history and problem list.    Review of Systems   Constitutional: Negative for fatigue, fever and unexpected weight change.   HENT: Negative for congestion, hearing loss, nosebleeds, rhinorrhea, sore throat, trouble swallowing and voice change.    Eyes: Negative for pain, discharge, redness and visual disturbance.   Respiratory: Negative for cough, chest tightness, shortness of breath and wheezing.    Cardiovascular: Negative for chest pain, palpitations and leg swelling.   Gastrointestinal: Negative for abdominal distention, abdominal pain, anal bleeding, blood in stool, constipation, diarrhea, nausea and vomiting.   Endocrine: Negative for cold intolerance, heat intolerance, polydipsia, polyphagia and polyuria.   Genitourinary: Negative for dysuria, flank pain, frequency and hematuria.   Musculoskeletal: Negative for arthralgias, gait problem, joint swelling and myalgias.   Skin: Positive for rash. Negative for color change.   Neurological: Negative for dizziness, tremors, seizures, syncope, speech difficulty, weakness, numbness and headaches.   Hematological: Negative.    Psychiatric/Behavioral: Negative.        Objective   Physical Exam   Constitutional: She is oriented to person, place, and time. She appears well-developed and well-nourished.  No distress.   HENT:   Head: Normocephalic and atraumatic.   Right Ear: External ear normal.   Left Ear: External ear normal.   Nose: Nose normal.   Eyes: Conjunctivae are normal. Pupils are equal, round, and reactive to light. Right eye exhibits no discharge. Left eye exhibits no discharge. No scleral icterus.   Neck: Neck supple.   Cardiovascular: Normal rate and regular rhythm.    Pulmonary/Chest: Effort normal.   Musculoskeletal: She exhibits no edema or deformity.   Neurological: She is alert and oriented to person, place, and time. She exhibits normal muscle tone. Coordination normal.   Skin: Skin is warm and dry. No rash noted.   bilat palms with non-blanching petechial type rash.   Psychiatric: She has a normal mood and affect. Her behavior is normal. Judgment and thought content normal.   Nursing note and vitals reviewed.          Assessment/Plan   Octavia was seen today for hand problem.    Diagnoses and all orders for this visit:    Vasculitis  -     POC Glycosylated Hemoglobin (Hb A1C)  -     CBC & Differential  -     Comprehensive Metabolic Panel  -     POC INR  -     CECI With / DsDNA, RNP, Sjogrens A / B, Smith  -     Antistreptolysin O Titer  -     C-reactive Protein  -     Sedimentation Rate  -     MethylPREDNISolone (MEDROL, MANINDER,) 4 MG tablet; Take as directed on package instructions.  -     CBC Auto Differential    Dr Medeiros assessed the patient as well. She will follow up with him symptoms persist or worsen.  Discussed the nature of the disease including, risks, complications, implications, management, safe and proper use of medications. Encouraged therapeutic lifestyle changes including low calorie diet with plenty of fruits and vegetables, daily exercise, medication compliance, and keeping scheduled follow up appointments with me and any other providers. Encouraged patient to have appointment for complete physical, fasting labs, appropriate screenings, and immunizations on an annual basis.  I  personally spent over half of a total 30 minutes face to face with the patient in counseling and discussion and/or coordination of care as described above.

## 2017-11-17 LAB — ASO AB SERPL-ACNC: NEGATIVE [IU]/ML

## 2017-11-18 LAB — ANA SER QL: NEGATIVE

## 2017-11-21 ENCOUNTER — OFFICE VISIT (OUTPATIENT)
Dept: FAMILY MEDICINE CLINIC | Facility: CLINIC | Age: 57
End: 2017-11-21

## 2017-11-21 VITALS
RESPIRATION RATE: 16 BRPM | TEMPERATURE: 98.5 F | SYSTOLIC BLOOD PRESSURE: 130 MMHG | WEIGHT: 210 LBS | BODY MASS INDEX: 31.01 KG/M2 | HEART RATE: 84 BPM | DIASTOLIC BLOOD PRESSURE: 80 MMHG

## 2017-11-21 DIAGNOSIS — I77.6 VASCULITIS (HCC): Primary | ICD-10-CM

## 2017-11-21 PROCEDURE — 99213 OFFICE O/P EST LOW 20 MIN: CPT | Performed by: FAMILY MEDICINE

## 2017-11-21 NOTE — PROGRESS NOTES
Subjective   Octavia Rice is a 57 y.o. female.     History of Present Illness   Hand rash much improved, completing steroid pack.  Itching decreased.  No rash on feet, neck or face.  No joint stiffness, no fever.  Right shoulder slowly improving aided with Tylenol for analgesic.  Blood studies all well in normal for inflammatory changes.   The following portions of the patient's history were reviewed and updated as appropriate: allergies, current medications, past family history, past medical history, past social history, past surgical history and problem list.    Review of Systems   Constitutional: Negative.    Eyes: Negative.    Respiratory: Negative.    Cardiovascular: Negative.    Skin: Positive for rash.       Objective   Physical Exam   Constitutional: She appears well-developed and well-nourished. No distress.   Eyes: Pupils are equal, round, and reactive to light.   Neck: Neck supple.   Pulmonary/Chest: Effort normal.   Neurological: She is alert.   Skin: Rash (minimal redness of palms worse on right) noted.   Vitals reviewed.      Assessment/Plan   Octavia was seen today for follow-up.    Diagnoses and all orders for this visit:    Vasculitis      Finish steroid.

## 2017-12-07 ENCOUNTER — HOSPITAL ENCOUNTER (OUTPATIENT)
Dept: GENERAL RADIOLOGY | Facility: HOSPITAL | Age: 57
Discharge: HOME | End: 2017-12-07
Attending: ORTHOPAEDIC SURGERY | Admitting: ORTHOPAEDIC SURGERY

## 2017-12-07 ENCOUNTER — LAB (OUTPATIENT)
Dept: LAB | Facility: HOSPITAL | Age: 57
End: 2017-12-07

## 2017-12-07 ENCOUNTER — TRANSCRIBE ORDERS (OUTPATIENT)
Dept: ADMINISTRATIVE | Facility: HOSPITAL | Age: 57
End: 2017-12-07

## 2017-12-07 DIAGNOSIS — Z01.818 PRE-OP EXAMINATION: Primary | ICD-10-CM

## 2017-12-07 DIAGNOSIS — R73.09 OTHER ABNORMAL GLUCOSE: ICD-10-CM

## 2017-12-07 DIAGNOSIS — Z01.818 PREOP EXAMINATION: ICD-10-CM

## 2017-12-07 DIAGNOSIS — Z87.68 PERSONAL HISTORY OF PERINATAL PROBLEMS: ICD-10-CM

## 2017-12-07 DIAGNOSIS — Z87.68 PERSONAL HISTORY OF PERINATAL PROBLEMS: Primary | ICD-10-CM

## 2017-12-07 LAB
ANION GAP SERPL CALCULATED.3IONS-SCNC: 5 MMOL/L (ref 3–11)
BUN BLD-MCNC: 8 MG/DL (ref 9–23)
BUN/CREAT SERPL: 13.3 (ref 7–25)
CALCIUM SPEC-SCNC: 9.3 MG/DL (ref 8.7–10.4)
CHLORIDE SERPL-SCNC: 105 MMOL/L (ref 99–109)
CO2 SERPL-SCNC: 27 MMOL/L (ref 20–31)
CREAT BLD-MCNC: 0.6 MG/DL (ref 0.6–1.3)
DEPRECATED RDW RBC AUTO: 46.1 FL (ref 37–54)
ERYTHROCYTE [DISTWIDTH] IN BLOOD BY AUTOMATED COUNT: 13.8 % (ref 11.3–14.5)
GFR SERPL CREATININE-BSD FRML MDRD: 103 ML/MIN/1.73
GLUCOSE BLD-MCNC: 154 MG/DL (ref 70–100)
HBA1C MFR BLD: 8.5 % (ref 4.8–5.6)
HCT VFR BLD AUTO: 42.3 % (ref 34.5–44)
HGB BLD-MCNC: 13.5 G/DL (ref 11.5–15.5)
MCH RBC QN AUTO: 29.3 PG (ref 27–31)
MCHC RBC AUTO-ENTMCNC: 31.9 G/DL (ref 32–36)
MCV RBC AUTO: 91.8 FL (ref 80–99)
PLATELET # BLD AUTO: 325 10*3/MM3 (ref 150–450)
PMV BLD AUTO: 10.6 FL (ref 6–12)
POTASSIUM BLD-SCNC: 4 MMOL/L (ref 3.5–5.5)
RBC # BLD AUTO: 4.61 10*6/MM3 (ref 3.89–5.14)
SODIUM BLD-SCNC: 137 MMOL/L (ref 132–146)
WBC NRBC COR # BLD: 9.51 10*3/MM3 (ref 3.5–10.8)

## 2017-12-07 PROCEDURE — 80048 BASIC METABOLIC PNL TOTAL CA: CPT

## 2017-12-07 PROCEDURE — 85027 COMPLETE CBC AUTOMATED: CPT

## 2017-12-07 PROCEDURE — 36415 COLL VENOUS BLD VENIPUNCTURE: CPT

## 2017-12-07 PROCEDURE — 93010 ELECTROCARDIOGRAM REPORT: CPT | Performed by: INTERNAL MEDICINE

## 2017-12-07 PROCEDURE — 83036 HEMOGLOBIN GLYCOSYLATED A1C: CPT

## 2017-12-07 PROCEDURE — 93005 ELECTROCARDIOGRAM TRACING: CPT

## 2017-12-07 PROCEDURE — 71020 HC CHEST PA AND LATERAL: CPT

## 2017-12-18 ENCOUNTER — OFFICE VISIT (OUTPATIENT)
Dept: FAMILY MEDICINE CLINIC | Facility: CLINIC | Age: 57
End: 2017-12-18

## 2017-12-18 VITALS
BODY MASS INDEX: 31.45 KG/M2 | DIASTOLIC BLOOD PRESSURE: 70 MMHG | SYSTOLIC BLOOD PRESSURE: 120 MMHG | WEIGHT: 213 LBS | HEART RATE: 92 BPM | RESPIRATION RATE: 16 BRPM | TEMPERATURE: 98.1 F

## 2017-12-18 DIAGNOSIS — J06.9 ACUTE URI: Primary | ICD-10-CM

## 2017-12-18 PROCEDURE — 99213 OFFICE O/P EST LOW 20 MIN: CPT | Performed by: FAMILY MEDICINE

## 2017-12-18 RX ORDER — AZITHROMYCIN 250 MG/1
TABLET, FILM COATED ORAL
Qty: 5 TABLET | Refills: 0 | Status: SHIPPED | OUTPATIENT
Start: 2017-12-18 | End: 2018-01-19

## 2017-12-18 NOTE — PROGRESS NOTES
Subjective   Octavia Rice is a 57 y.o. female.     History of Present Illness   Headache, sore throat, cough two days. Feverish. Not checked glucose.  Had left ulnar tunnel surgery three days ago.   The following portions of the patient's history were reviewed and updated as appropriate: allergies, current medications, past family history, past medical history, past social history, past surgical history and problem list.    Review of Systems   Constitutional: Positive for appetite change and fatigue.   HENT: Positive for congestion, sneezing and sore throat.    Eyes: Positive for pain.   Respiratory: Negative for cough and shortness of breath.    Cardiovascular: Negative.    Musculoskeletal: Positive for myalgias.   Neurological: Positive for headaches.       Objective   Physical Exam   Constitutional: She appears well-developed.   HENT:   Right Ear: External ear normal.   Left Ear: External ear normal.   Mouth/Throat: Posterior oropharyngeal erythema present.   Head congestion   Neck: Neck supple.   Pulmonary/Chest: Breath sounds normal.   Musculoskeletal:   Sling left arm with splint left forearm.    Lymphadenopathy:     She has no cervical adenopathy.   Vitals reviewed.      Assessment/Plan   Octavia was seen today for uri.    Diagnoses and all orders for this visit:    Acute URI  -     azithromycin (ZITHROMAX) 250 MG tablet; Take 2 tablets the first day, then 1 tablet daily for 4 days.

## 2017-12-19 DIAGNOSIS — F51.01 PRIMARY INSOMNIA: ICD-10-CM

## 2017-12-19 RX ORDER — AMITRIPTYLINE HYDROCHLORIDE 10 MG/1
TABLET, FILM COATED ORAL
Qty: 30 TABLET | Refills: 2 | Status: SHIPPED | OUTPATIENT
Start: 2017-12-19 | End: 2018-01-25 | Stop reason: SDUPTHER

## 2018-01-07 RX ORDER — SIMVASTATIN 40 MG
TABLET ORAL
Qty: 90 TABLET | Refills: 2 | Status: SHIPPED | OUTPATIENT
Start: 2018-01-07 | End: 2019-01-20 | Stop reason: SDUPTHER

## 2018-01-19 ENCOUNTER — OFFICE VISIT (OUTPATIENT)
Dept: ENDOCRINOLOGY | Facility: CLINIC | Age: 58
End: 2018-01-19

## 2018-01-19 VITALS
SYSTOLIC BLOOD PRESSURE: 132 MMHG | HEART RATE: 90 BPM | DIASTOLIC BLOOD PRESSURE: 80 MMHG | OXYGEN SATURATION: 98 % | HEIGHT: 69 IN | WEIGHT: 213.2 LBS | BODY MASS INDEX: 31.58 KG/M2

## 2018-01-19 DIAGNOSIS — E11.65 UNCONTROLLED TYPE 2 DIABETES MELLITUS WITH COMPLICATION, UNSPECIFIED LONG TERM INSULIN USE STATUS: ICD-10-CM

## 2018-01-19 DIAGNOSIS — IMO0002 UNCONTROLLED TYPE 2 DIABETES MELLITUS WITH COMPLICATION, WITHOUT LONG-TERM CURRENT USE OF INSULIN: ICD-10-CM

## 2018-01-19 DIAGNOSIS — E78.2 MIXED HYPERLIPIDEMIA: ICD-10-CM

## 2018-01-19 DIAGNOSIS — E11.8 UNCONTROLLED TYPE 2 DIABETES MELLITUS WITH COMPLICATION, UNSPECIFIED LONG TERM INSULIN USE STATUS: ICD-10-CM

## 2018-01-19 DIAGNOSIS — E55.9 VITAMIN D DEFICIENCY: ICD-10-CM

## 2018-01-19 DIAGNOSIS — IMO0001 UNCONTROLLED TYPE 2 DIABETES MELLITUS WITHOUT COMPLICATION, WITHOUT LONG-TERM CURRENT USE OF INSULIN: Primary | ICD-10-CM

## 2018-01-19 PROCEDURE — 99214 OFFICE O/P EST MOD 30 MIN: CPT | Performed by: INTERNAL MEDICINE

## 2018-01-19 RX ORDER — HYDROCODONE BITARTRATE AND ACETAMINOPHEN 7.5; 325 MG/1; MG/1
TABLET ORAL
Refills: 0 | COMMUNITY
Start: 2017-12-17 | End: 2018-11-16

## 2018-01-19 RX ORDER — METFORMIN HYDROCHLORIDE 500 MG/1
1000 TABLET, EXTENDED RELEASE ORAL
Qty: 360 TABLET | Refills: 3 | Status: SHIPPED | OUTPATIENT
Start: 2018-01-19 | End: 2018-03-07 | Stop reason: SDUPTHER

## 2018-01-19 RX ORDER — PEN NEEDLE, DIABETIC 32GX 5/32"
NEEDLE, DISPOSABLE MISCELLANEOUS
Refills: 3 | COMMUNITY
Start: 2017-12-05 | End: 2018-05-24 | Stop reason: SDUPTHER

## 2018-01-19 NOTE — PROGRESS NOTES
"Chief complaint  Diabetes (F/u for type 2 diabetes and vitamin d deficiency) and Vitamin D Deficiency    Subjective   Octavia Rice is a 57 y.o. female is here today for follow-up of:    Diabetes Mellitus type 2: dx in 2003.    Diabetic complications: none  Eye exam current (within one year): no retinopathy, recent exam.     Current diabetic medications include Metformin  mg - 2 tablets daily  Victoza 1.8 mg daily.     Past medications: 08/2016 Jardiance trial was unsuccessful - frequent urination    Monitoring   not checking regularly.  Glucometer  verio onetouch given .    Home blood sugar records:170-180. 2-3 times a week.     Nutrition:   discussed  carb consistent diet 45-60 gm carb per meal.   Current diet: in general, a \"healthy\" diet    Current exercise: physical therapy twice a week    Foot care and dental care: discussed    Labs:   Lab Results   Component Value Date    HGBA1C 8.50 (H) 12/07/2017     Lab Results   Component Value Date    CREATININE 0.60 12/07/2017   No results found for: LDLCALC  Lab Results   Component Value Date    MICROALBUR 22.4 01/19/2017     Lab Results   Component Value Date    TSH 2.506 01/19/2017       Patient had a fall in Oct 2016 and she had head trauma and shoulder trauma, completed physical therapy, s/p surgery. She is still in chronic pain . She had a shoulder replacement surgery and hand surgery, doing better.   She sees counselor, feels depressed.   Cant sleep well. She underwent right shoulder surgery, planned    Other med problems: include hyperlipidemia on simvastatin.     Patient had 2 surgeries in the last 6 months, during the holidays she didn't test and ate more sweets. A1C is increased to 8.5 on 12/4/2017.     Medications    Current Outpatient Prescriptions:   •  amitriptyline (ELAVIL) 10 MG tablet, TAKE 1 TABLET BY MOUTH EVERY NIGHT., Disp: 30 tablet, Rfl: 2  •  aspirin 325 MG tablet, Take 325 mg by mouth Daily., Disp: , Rfl:   •  BD PEN NEEDLE ELVIA U/F 32G " X 4 MM misc, USES DAILY, Disp: , Rfl: 3  •  Cholecalciferol (VITAMIN D3) 2000 units capsule, TAKE 1 CAPSULE BY MOUTH DAILY., Disp: 90 capsule, Rfl: 3  •  docusate sodium (COLACE) 100 MG capsule, Take 1 capsule by mouth 2 (Two) Times a Day., Disp: 60 capsule, Rfl: 3  •  HYDROcodone-acetaminophen (NORCO) 7.5-325 MG per tablet, TAKE 1 TABLET BY MOUTH EVERY 4 HOURS AS NEEDED FOR PAIN, Disp: , Rfl: 0  •  lidocaine (LIDODERM) 5 %, Place 1 patch on the skin Daily. Remove & Discard patch within 12 hours or as directed by MD, Disp: 30 patch, Rfl: 3  •  Liraglutide (VICTOZA) 18 MG/3ML solution pen-injector injection, Inject 1.8 mg under the skin Every Night. 1.8, Disp: 6 pen, Rfl: 3  •  metFORMIN ER (GLUCOPHAGE-XR) 500 MG 24 hr tablet, Take 2 tablets by mouth Daily With Breakfast., Disp: 180 tablet, Rfl: 3  •  nicotine (NICODERM CQ) 14 MG/24HR patch, Place 1 patch on the skin Daily., Disp: 30 patch, Rfl: 3  •  ONE TOUCH ULTRA TEST test strip, USE AS INSTRUCTED 3 TIMES A DAY, Disp: , Rfl: 11  •  simvastatin (ZOCOR) 40 MG tablet, TAKE 1 TABLET BY MOUTH EVERY NIGHT., Disp: 90 tablet, Rfl: 2    PMH  The following portions of the patient's history were reviewed and updated as appropriate: allergies, current medications, past family history, past medical history, past social history, past surgical history and problem list.    Review of systems  Review of Systems   Constitutional: Positive for appetite change (decreased appetite), fatigue and unexpected weight change (weight loss).   HENT: Negative for congestion.    Eyes: Negative for visual disturbance.   Respiratory: Negative.  Negative for shortness of breath.    Cardiovascular: Negative for chest pain and leg swelling.   Gastrointestinal: Positive for diarrhea (occasional) and nausea. Negative for constipation.   Endocrine: Negative for cold intolerance, polydipsia and polyuria.   Musculoskeletal: Positive for arthralgias (shoulder pain with shooting pain down the arm).  "Negative for back pain, joint swelling and myalgias.   Skin: Negative for rash and wound.   Allergic/Immunologic: Positive for environmental allergies.   Neurological: Positive for headaches. Negative for numbness.   Hematological: Negative.    Psychiatric/Behavioral: Positive for behavioral problems. Negative for self-injury.       Physical exam  Objective   Blood pressure 132/80, pulse 90, height 175.3 cm (69\"), weight 96.7 kg (213 lb 3.2 oz), SpO2 98 %. Body mass index is 31.48 kg/(m^2).  Physical Exam   Constitutional: She is oriented to person, place, and time. She appears well-developed and well-nourished.   HENT:   Head: Normocephalic and atraumatic.   Mouth/Throat: Oropharynx is clear and moist.   Neck: No tracheal deviation present. No thyromegaly present.   Cardiovascular: Normal rate, regular rhythm, normal heart sounds and intact distal pulses.    Pulmonary/Chest: Effort normal and breath sounds normal.   Musculoskeletal: She exhibits no edema, tenderness or deformity.        Right shoulder: She exhibits decreased range of motion and pain.        Left shoulder: She exhibits decreased range of motion and pain.    Octavia had a diabetic foot exam performed today.    Neurological Sensory Findings - Unaltered hot/cold right ankle/foot discrimination and unaltered hot/cold left ankle/foot discrimination. Unaltered sharp/dull right ankle/foot discrimination and unaltered sharp/dull left ankle/foot discrimination.    Vascular Status -  Her exam exhibits right foot vasculature normal. Her exam exhibits no right foot edema. Her exam exhibits left foot vasculature normal. Her exam exhibits no left foot edema.   Skin Integrity  -  Her right foot skin is intact.     Octavia 's left foot skin is intact. .  Neurological: She is alert and oriented to person, place, and time.   Skin: Skin is warm and dry.   Psychiatric: Her behavior is normal. Judgment and thought content normal. She exhibits a depressed mood.   Nursing " note and vitals reviewed.        LABS AND IMAGING  Results for orders placed or performed in visit on 12/07/17   CBC (No Diff)   Result Value Ref Range    WBC 9.51 3.50 - 10.80 10*3/mm3    RBC 4.61 3.89 - 5.14 10*6/mm3    Hemoglobin 13.5 11.5 - 15.5 g/dL    Hematocrit 42.3 34.5 - 44.0 %    MCV 91.8 80.0 - 99.0 fL    MCH 29.3 27.0 - 31.0 pg    MCHC 31.9 (L) 32.0 - 36.0 g/dL    RDW 13.8 11.3 - 14.5 %    RDW-SD 46.1 37.0 - 54.0 fl    MPV 10.6 6.0 - 12.0 fL    Platelets 325 150 - 450 10*3/mm3   Basic Metabolic Panel   Result Value Ref Range    Glucose 154 (H) 70 - 100 mg/dL    BUN 8 (L) 9 - 23 mg/dL    Creatinine 0.60 0.60 - 1.30 mg/dL    Sodium 137 132 - 146 mmol/L    Potassium 4.0 3.5 - 5.5 mmol/L    Chloride 105 99 - 109 mmol/L    CO2 27.0 20.0 - 31.0 mmol/L    Calcium 9.3 8.7 - 10.4 mg/dL    eGFR Non African Amer 103 >60 mL/min/1.73    BUN/Creatinine Ratio 13.3 7.0 - 25.0    Anion Gap 5.0 3.0 - 11.0 mmol/L   Hemoglobin A1c   Result Value Ref Range    Hemoglobin A1C 8.50 (H) 4.80 - 5.60 %                   Assessment:         Diagnoses and all orders for this visit:    Uncontrolled type 2 diabetes mellitus without complication, without long-term current use of insulin    Other orders  -     HYDROcodone-acetaminophen (NORCO) 7.5-325 MG per tablet; TAKE 1 TABLET BY MOUTH EVERY 4 HOURS AS NEEDED FOR PAIN  -     BD PEN NEEDLE ELVIA U/F 32G X 4 MM misc; USES DAILY        Plan:           Glycemic control is worse. Increased metformin, encouraged to follow the diet, increased exercise.   Continue Victoza and metformin, monitor glucose and call the office if the glucose is above 200.     Increase metformin to 2 tablets twice a day.      Follow up:  3 months.

## 2018-01-25 ENCOUNTER — OFFICE VISIT (OUTPATIENT)
Dept: FAMILY MEDICINE CLINIC | Facility: CLINIC | Age: 58
End: 2018-01-25

## 2018-01-25 VITALS
BODY MASS INDEX: 30.72 KG/M2 | SYSTOLIC BLOOD PRESSURE: 100 MMHG | TEMPERATURE: 97.9 F | HEART RATE: 100 BPM | DIASTOLIC BLOOD PRESSURE: 70 MMHG | RESPIRATION RATE: 16 BRPM | WEIGHT: 208 LBS

## 2018-01-25 DIAGNOSIS — J06.9 ACUTE URI: Primary | ICD-10-CM

## 2018-01-25 DIAGNOSIS — F51.01 PRIMARY INSOMNIA: ICD-10-CM

## 2018-01-25 DIAGNOSIS — R68.89 FLU-LIKE SYMPTOMS: ICD-10-CM

## 2018-01-25 LAB
EXPIRATION DATE: NORMAL
FLUAV AG NPH QL: NORMAL
FLUBV AG NPH QL: NORMAL
INTERNAL CONTROL: NORMAL
Lab: NORMAL

## 2018-01-25 PROCEDURE — 87804 INFLUENZA ASSAY W/OPTIC: CPT | Performed by: FAMILY MEDICINE

## 2018-01-25 PROCEDURE — 99213 OFFICE O/P EST LOW 20 MIN: CPT | Performed by: FAMILY MEDICINE

## 2018-01-25 RX ORDER — AMITRIPTYLINE HYDROCHLORIDE 10 MG/1
10 TABLET, FILM COATED ORAL NIGHTLY
Qty: 30 TABLET | Refills: 4 | Status: SHIPPED | OUTPATIENT
Start: 2018-01-25 | End: 2018-08-26 | Stop reason: SDUPTHER

## 2018-01-25 RX ORDER — AZITHROMYCIN 250 MG/1
TABLET, FILM COATED ORAL
Qty: 6 TABLET | Refills: 0 | Status: SHIPPED | OUTPATIENT
Start: 2018-01-25 | End: 2018-05-24 | Stop reason: SDUPTHER

## 2018-01-25 NOTE — PROGRESS NOTES
Subjective   Octavia Rice is a 57 y.o. female.     History of Present Illness   Feel bad two days.  Loose stools, body aches and chills.. Ear pressure, throat sore. Mild cough.  Took Nighttime Cold and Flu.   The following portions of the patient's history were reviewed and updated as appropriate: allergies, current medications, past family history, past medical history, past social history, past surgical history and problem list.    Review of Systems   Constitutional: Positive for chills and fatigue.   HENT: Positive for ear pain and sore throat. Negative for rhinorrhea and sinus pressure.    Respiratory: Negative.    Cardiovascular: Negative.    Gastrointestinal: Positive for diarrhea.   Musculoskeletal: Positive for myalgias.   Neurological: Positive for headaches.       Objective   Physical Exam   Constitutional: She appears well-developed.   HENT:   Right Ear: External ear normal.   Left Ear: External ear normal.   Head congestion   Neck: Neck supple.   Pulmonary/Chest: Breath sounds normal.   Lymphadenopathy:     She has cervical adenopathy (anterior 1+).   Vitals reviewed.      Assessment/Plan   Octavia was seen today for uri.    Diagnoses and all orders for this visit:    Acute URI  -     azithromycin (ZITHROMAX) 250 MG tablet; Take 2 tablets the first day, then 1 tablet daily on days 2 through 5.  -     Chlorcyclizine-Pseudoephed 25-60 MG tablet; Take 1 tablet by mouth 2 (Two) Times a Day As Needed (congestion).    Flu-like symptoms  -     POCT Influenza A/B    Primary insomnia  -     amitriptyline (ELAVIL) 10 MG tablet; Take 1 tablet by mouth Every Night.

## 2018-02-01 DIAGNOSIS — IMO0001 UNCONTROLLED TYPE 2 DIABETES MELLITUS WITHOUT COMPLICATION, WITHOUT LONG-TERM CURRENT USE OF INSULIN: ICD-10-CM

## 2018-02-01 DIAGNOSIS — IMO0002 UNCONTROLLED TYPE 2 DIABETES MELLITUS WITH COMPLICATION, WITHOUT LONG-TERM CURRENT USE OF INSULIN: ICD-10-CM

## 2018-02-01 DIAGNOSIS — E78.2 MIXED HYPERLIPIDEMIA: ICD-10-CM

## 2018-02-01 DIAGNOSIS — E11.8 UNCONTROLLED TYPE 2 DIABETES MELLITUS WITH COMPLICATION, UNSPECIFIED LONG TERM INSULIN USE STATUS: ICD-10-CM

## 2018-02-01 DIAGNOSIS — E11.65 UNCONTROLLED TYPE 2 DIABETES MELLITUS WITH COMPLICATION, UNSPECIFIED LONG TERM INSULIN USE STATUS: ICD-10-CM

## 2018-02-15 DIAGNOSIS — IMO0001 UNCONTROLLED TYPE 2 DIABETES MELLITUS WITHOUT COMPLICATION, WITHOUT LONG-TERM CURRENT USE OF INSULIN: ICD-10-CM

## 2018-02-15 DIAGNOSIS — E78.2 MIXED HYPERLIPIDEMIA: ICD-10-CM

## 2018-02-15 DIAGNOSIS — E11.65 UNCONTROLLED TYPE 2 DIABETES MELLITUS WITH COMPLICATION, UNSPECIFIED LONG TERM INSULIN USE STATUS: ICD-10-CM

## 2018-02-15 DIAGNOSIS — IMO0002 UNCONTROLLED TYPE 2 DIABETES MELLITUS WITH COMPLICATION, WITHOUT LONG-TERM CURRENT USE OF INSULIN: ICD-10-CM

## 2018-02-15 DIAGNOSIS — E11.8 UNCONTROLLED TYPE 2 DIABETES MELLITUS WITH COMPLICATION, UNSPECIFIED LONG TERM INSULIN USE STATUS: ICD-10-CM

## 2018-02-15 RX ORDER — LIRAGLUTIDE 6 MG/ML
INJECTION SUBCUTANEOUS
Qty: 3 PEN | Refills: 3 | Status: SHIPPED | OUTPATIENT
Start: 2018-02-15 | End: 2018-09-23 | Stop reason: SDUPTHER

## 2018-03-03 DIAGNOSIS — E11.65 UNCONTROLLED TYPE 2 DIABETES MELLITUS WITH COMPLICATION, UNSPECIFIED LONG TERM INSULIN USE STATUS: ICD-10-CM

## 2018-03-03 DIAGNOSIS — IMO0001 UNCONTROLLED TYPE 2 DIABETES MELLITUS WITHOUT COMPLICATION, WITHOUT LONG-TERM CURRENT USE OF INSULIN: ICD-10-CM

## 2018-03-03 DIAGNOSIS — E78.2 MIXED HYPERLIPIDEMIA: ICD-10-CM

## 2018-03-03 DIAGNOSIS — E11.8 UNCONTROLLED TYPE 2 DIABETES MELLITUS WITH COMPLICATION, UNSPECIFIED LONG TERM INSULIN USE STATUS: ICD-10-CM

## 2018-03-03 DIAGNOSIS — IMO0002 UNCONTROLLED TYPE 2 DIABETES MELLITUS WITH COMPLICATION, WITHOUT LONG-TERM CURRENT USE OF INSULIN: ICD-10-CM

## 2018-03-05 RX ORDER — PEN NEEDLE, DIABETIC 32GX 5/32"
NEEDLE, DISPOSABLE MISCELLANEOUS
Qty: 100 EACH | Refills: 2 | Status: SHIPPED | OUTPATIENT
Start: 2018-03-05 | End: 2019-04-09 | Stop reason: SDUPTHER

## 2018-03-07 DIAGNOSIS — IMO0002 UNCONTROLLED TYPE 2 DIABETES MELLITUS WITH COMPLICATION, WITHOUT LONG-TERM CURRENT USE OF INSULIN: ICD-10-CM

## 2018-03-07 DIAGNOSIS — IMO0001 UNCONTROLLED TYPE 2 DIABETES MELLITUS WITHOUT COMPLICATION, WITHOUT LONG-TERM CURRENT USE OF INSULIN: ICD-10-CM

## 2018-03-07 DIAGNOSIS — E78.2 MIXED HYPERLIPIDEMIA: ICD-10-CM

## 2018-03-07 DIAGNOSIS — E11.65 UNCONTROLLED TYPE 2 DIABETES MELLITUS WITH COMPLICATION, UNSPECIFIED LONG TERM INSULIN USE STATUS: ICD-10-CM

## 2018-03-07 DIAGNOSIS — E11.8 UNCONTROLLED TYPE 2 DIABETES MELLITUS WITH COMPLICATION, UNSPECIFIED LONG TERM INSULIN USE STATUS: ICD-10-CM

## 2018-03-07 RX ORDER — METFORMIN HYDROCHLORIDE 500 MG/1
1000 TABLET, EXTENDED RELEASE ORAL
Qty: 360 TABLET | Refills: 3 | Status: SHIPPED | OUTPATIENT
Start: 2018-03-07 | End: 2018-06-29 | Stop reason: SDUPTHER

## 2018-03-07 NOTE — TELEPHONE ENCOUNTER
PT IS COMPLETELY OUT OF MEDICATION.. AT HER LAST APPT IT WAS CHANGED FOR PT TO TAKE MEDICATION 4X A DAY AND PHARMACY WILL NOT LET HER GET A REFILL UNLESS A NEW SCRIPT IS CALLED IN WITH THE CORRECT DIRECTIONS. PLEASE ADVISE AND SEND TO [facility] ON [address]. THANKS.

## 2018-05-24 ENCOUNTER — OFFICE VISIT (OUTPATIENT)
Dept: ENDOCRINOLOGY | Facility: CLINIC | Age: 58
End: 2018-05-24

## 2018-05-24 VITALS
DIASTOLIC BLOOD PRESSURE: 78 MMHG | SYSTOLIC BLOOD PRESSURE: 120 MMHG | WEIGHT: 208.8 LBS | HEIGHT: 69 IN | BODY MASS INDEX: 30.93 KG/M2 | OXYGEN SATURATION: 98 % | HEART RATE: 82 BPM

## 2018-05-24 DIAGNOSIS — IMO0001 UNCONTROLLED TYPE 2 DIABETES MELLITUS WITHOUT COMPLICATION, WITHOUT LONG-TERM CURRENT USE OF INSULIN: Primary | ICD-10-CM

## 2018-05-24 DIAGNOSIS — E78.2 MIXED HYPERLIPIDEMIA: ICD-10-CM

## 2018-05-24 DIAGNOSIS — E55.9 VITAMIN D DEFICIENCY: ICD-10-CM

## 2018-05-24 LAB
ALBUMIN SERPL-MCNC: 4.5 G/DL (ref 3.2–4.8)
ALBUMIN/GLOB SERPL: 1.8 G/DL (ref 1.5–2.5)
ALP SERPL-CCNC: 98 U/L (ref 25–100)
ALT SERPL W P-5'-P-CCNC: 21 U/L (ref 7–40)
ANION GAP SERPL CALCULATED.3IONS-SCNC: 7 MMOL/L (ref 3–11)
ARTICHOKE IGE QN: 133 MG/DL (ref 0–130)
AST SERPL-CCNC: 16 U/L (ref 0–33)
BILIRUB SERPL-MCNC: 0.6 MG/DL (ref 0.3–1.2)
BUN BLD-MCNC: 12 MG/DL (ref 9–23)
BUN/CREAT SERPL: 24 (ref 7–25)
CALCIUM SPEC-SCNC: 9.3 MG/DL (ref 8.7–10.4)
CHLORIDE SERPL-SCNC: 106 MMOL/L (ref 99–109)
CHOLEST SERPL-MCNC: 213 MG/DL (ref 0–200)
CO2 SERPL-SCNC: 28 MMOL/L (ref 20–31)
CREAT BLD-MCNC: 0.5 MG/DL (ref 0.6–1.3)
GFR SERPL CREATININE-BSD FRML MDRD: 127 ML/MIN/1.73
GLOBULIN UR ELPH-MCNC: 2.5 GM/DL
GLUCOSE BLD-MCNC: 160 MG/DL (ref 70–100)
GLUCOSE BLDC GLUCOMTR-MCNC: 79 MG/DL (ref 70–130)
HBA1C MFR BLD: 7.6 %
HCV AB SER DONR QL: NORMAL
HDLC SERPL-MCNC: 45 MG/DL (ref 40–60)
POTASSIUM BLD-SCNC: 4.4 MMOL/L (ref 3.5–5.5)
PROT SERPL-MCNC: 7 G/DL (ref 5.7–8.2)
SODIUM BLD-SCNC: 141 MMOL/L (ref 132–146)
TRIGL SERPL-MCNC: 358 MG/DL (ref 0–150)
TSH SERPL DL<=0.05 MIU/L-ACNC: 2.2 MIU/ML (ref 0.35–5.35)

## 2018-05-24 PROCEDURE — 84443 ASSAY THYROID STIM HORMONE: CPT | Performed by: INTERNAL MEDICINE

## 2018-05-24 PROCEDURE — 82947 ASSAY GLUCOSE BLOOD QUANT: CPT | Performed by: INTERNAL MEDICINE

## 2018-05-24 PROCEDURE — 86803 HEPATITIS C AB TEST: CPT | Performed by: INTERNAL MEDICINE

## 2018-05-24 PROCEDURE — 82043 UR ALBUMIN QUANTITATIVE: CPT | Performed by: INTERNAL MEDICINE

## 2018-05-24 PROCEDURE — 82570 ASSAY OF URINE CREATININE: CPT | Performed by: INTERNAL MEDICINE

## 2018-05-24 PROCEDURE — 80053 COMPREHEN METABOLIC PANEL: CPT | Performed by: INTERNAL MEDICINE

## 2018-05-24 PROCEDURE — 83036 HEMOGLOBIN GLYCOSYLATED A1C: CPT | Performed by: INTERNAL MEDICINE

## 2018-05-24 PROCEDURE — 80061 LIPID PANEL: CPT | Performed by: INTERNAL MEDICINE

## 2018-05-24 PROCEDURE — 99213 OFFICE O/P EST LOW 20 MIN: CPT | Performed by: INTERNAL MEDICINE

## 2018-05-24 NOTE — PROGRESS NOTES
"Chief complaint  Diabetes (F/u for type 2 diabetes )    Subjective   Octavia Rice is a 58 y.o. female is here today for follow-up of:    Diabetes Mellitus type 2: dx in 2003.    Diabetic complications: none  Eye exam current (within one year): no retinopathy, recent exam.     Current diabetic medications include Metformin  mg - 2 tablets BID  Victoza 1.8 mg daily.     Past medications: 08/2016 Jardiance trial was unsuccessful - frequent urination    Monitoring   not checking regularly.  Glucometer  verio onetouch given .    Home blood sugar records:170-180. 2-3 times a week.     Nutrition:   discussed  carb consistent diet 45-60 gm carb per meal.   Current diet: in general, a \"healthy\" diet    Current exercise: physical therapy twice a week    Foot care and dental care: discussed    Labs:   Lab Results   Component Value Date    HGBA1C 7.6 05/24/2018     Lab Results   Component Value Date    CREATININE 0.60 12/07/2017   No components found for: LDLCALC  Lab Results   Component Value Date    MICROALBUR 22.4 01/19/2017     Lab Results   Component Value Date    TSH 2.506 01/19/2017       Patient had a fall in Oct 2016 and she had head trauma and shoulder trauma, completed physical therapy, s/p surgery. She is still in chronic pain . She had a shoulder replacement surgery and hand surgery, doing better.   She sees counselor, feels depressed.   Cant sleep well. Still has right shoulder pain   Other med problems: include hyperlipidemia on simvastatin.         Medications    Current Outpatient Prescriptions:   •  amitriptyline (ELAVIL) 10 MG tablet, Take 1 tablet by mouth Every Night., Disp: 30 tablet, Rfl: 4  •  aspirin 325 MG tablet, Take 325 mg by mouth Daily., Disp: , Rfl:   •  BD PEN NEEDLE ELVIA U/F 32G X 4 MM misc, USES DAILY, Disp: 100 each, Rfl: 2  •  Chlorcyclizine-Pseudoephed 25-60 MG tablet, Take 1 tablet by mouth 2 (Two) Times a Day As Needed (congestion)., Disp: 14 tablet, Rfl: 1  •  Cholecalciferol " (VITAMIN D3) 2000 units capsule, TAKE 1 CAPSULE BY MOUTH DAILY., Disp: 90 capsule, Rfl: 3  •  docusate sodium (COLACE) 100 MG capsule, Take 1 capsule by mouth 2 (Two) Times a Day., Disp: 60 capsule, Rfl: 3  •  glucose blood test strip, 1 each by Other route Daily. verio strips, Disp: 30 each, Rfl: 12  •  HYDROcodone-acetaminophen (NORCO) 7.5-325 MG per tablet, TAKE 1 TABLET BY MOUTH EVERY 4 HOURS AS NEEDED FOR PAIN, Disp: , Rfl: 0  •  lidocaine (LIDODERM) 5 %, Place 1 patch on the skin Daily. Remove & Discard patch within 12 hours or as directed by MD, Disp: 30 patch, Rfl: 3  •  Liraglutide (VICTOZA) 18 MG/3ML solution pen-injector injection, Inject 1.8 mg under the skin Every Night. 1.8, Disp: 9 pen, Rfl: 3  •  metFORMIN ER (GLUCOPHAGE-XR) 500 MG 24 hr tablet, Take 2 tablets by mouth Daily With Breakfast., Disp: 360 tablet, Rfl: 3  •  nicotine (NICODERM CQ) 14 MG/24HR patch, Place 1 patch on the skin Daily., Disp: 30 patch, Rfl: 3  •  ONE TOUCH ULTRA TEST test strip, 1 each by Other route As Needed (Use as directed). Use as instructed, Disp: 100 each, Rfl: 1  •  simvastatin (ZOCOR) 40 MG tablet, TAKE 1 TABLET BY MOUTH EVERY NIGHT., Disp: 90 tablet, Rfl: 2  •  VICTOZA 18 MG/3ML solution pen-injector injection, INJECT 1.8 MG SUB-Q ONCE DAILY., Disp: 3 pen, Rfl: 3    PMH  The following portions of the patient's history were reviewed and updated as appropriate: allergies, current medications, past family history, past medical history, past social history, past surgical history and problem list.    Review of systems  Review of Systems   Constitutional: Positive for appetite change (decreased appetite), fatigue and unexpected weight change (weight loss).   HENT: Negative for congestion.    Eyes: Negative for visual disturbance.   Respiratory: Negative.  Negative for shortness of breath.    Cardiovascular: Negative for chest pain and leg swelling.   Gastrointestinal: Negative for constipation. Diarrhea: occasional.  "  Endocrine: Negative for cold intolerance, polydipsia and polyuria.   Musculoskeletal: Positive for arthralgias (shoulder pain ). Negative for back pain, joint swelling and myalgias.   Skin: Negative for rash and wound.   Allergic/Immunologic: Positive for environmental allergies.   Neurological: Positive for headaches. Negative for numbness.   Hematological: Negative.    Psychiatric/Behavioral: Positive for behavioral problems. Negative for self-injury.       Physical exam  Objective   Blood pressure 120/78, pulse 82, height 175.3 cm (69\"), weight 94.7 kg (208 lb 12.8 oz), SpO2 98 %. Body mass index is 30.83 kg/m².  Physical Exam   Constitutional: She is oriented to person, place, and time. She appears well-developed and well-nourished.   HENT:   Head: Normocephalic and atraumatic.   Mouth/Throat: Oropharynx is clear and moist.   Neck: No tracheal deviation present. No thyromegaly present.   Cardiovascular: Normal rate, regular rhythm, normal heart sounds and intact distal pulses.    Pulmonary/Chest: Effort normal and breath sounds normal.   Musculoskeletal: She exhibits no edema, tenderness or deformity.        Right shoulder: She exhibits decreased range of motion and pain.        Left shoulder: She exhibits decreased range of motion and pain.   Neurological: She is alert and oriented to person, place, and time.   Skin: Skin is warm and dry.   Psychiatric: Her behavior is normal. Judgment and thought content normal. She exhibits a depressed mood.   Nursing note and vitals reviewed.        LABS AND IMAGING  Results for orders placed or performed in visit on 05/24/18   POC Glucose Fingerstick   Result Value Ref Range    Glucose 79 70 - 130 mg/dL   POC Glycosylated Hemoglobin (Hb A1C)   Result Value Ref Range    Hemoglobin A1C 7.6 %                   Assessment:         Diagnoses and all orders for this visit:    Uncontrolled type 2 diabetes mellitus without complication, without long-term current use of insulin  - "     POC Glucose Fingerstick  -     POC Glycosylated Hemoglobin (Hb A1C)    Vitamin D deficiency    Mixed hyperlipidemia    Other orders  -     glucose blood test strip; 1 each by Other route Daily. verio strips        Plan:           Glycemic control is improved, but still above the goal.   Continue Victoza and metformin, monitor glucose and call the office if the glucose is above 200.  I have advised to move metformin to lunch and dinner administration (frequently skips med if not eating lunch)  Move Victoza to the morning.      Fatsing labs today, she is up to date with screening.      Follow up:  3 months.

## 2018-05-25 LAB
CREAT 24H UR-MCNC: 173.8 MG/DL
MICROALBUMIN UR-MCNC: 16.4 UG/ML
MICROALBUMIN/CREAT UR: 9.4 MG/G CREAT (ref 0–30)

## 2018-06-29 DIAGNOSIS — E78.2 MIXED HYPERLIPIDEMIA: ICD-10-CM

## 2018-06-29 DIAGNOSIS — IMO0002 UNCONTROLLED TYPE 2 DIABETES MELLITUS WITH COMPLICATION, WITHOUT LONG-TERM CURRENT USE OF INSULIN: ICD-10-CM

## 2018-06-29 DIAGNOSIS — E11.65 UNCONTROLLED TYPE 2 DIABETES MELLITUS WITH COMPLICATION, UNSPECIFIED LONG TERM INSULIN USE STATUS: ICD-10-CM

## 2018-06-29 DIAGNOSIS — E11.8 UNCONTROLLED TYPE 2 DIABETES MELLITUS WITH COMPLICATION, UNSPECIFIED LONG TERM INSULIN USE STATUS: ICD-10-CM

## 2018-06-29 DIAGNOSIS — IMO0001 UNCONTROLLED TYPE 2 DIABETES MELLITUS WITHOUT COMPLICATION, WITHOUT LONG-TERM CURRENT USE OF INSULIN: ICD-10-CM

## 2018-06-29 RX ORDER — METFORMIN HYDROCHLORIDE 500 MG/1
1000 TABLET, EXTENDED RELEASE ORAL
Qty: 360 TABLET | Refills: 1 | Status: SHIPPED | OUTPATIENT
Start: 2018-06-29 | End: 2019-01-14 | Stop reason: SDUPTHER

## 2018-08-26 DIAGNOSIS — F51.01 PRIMARY INSOMNIA: ICD-10-CM

## 2018-08-26 RX ORDER — AMITRIPTYLINE HYDROCHLORIDE 10 MG/1
10 TABLET, FILM COATED ORAL NIGHTLY
Qty: 30 TABLET | Refills: 1 | Status: SHIPPED | OUTPATIENT
Start: 2018-08-26 | End: 2018-10-24 | Stop reason: SDUPTHER

## 2018-09-23 DIAGNOSIS — IMO0002 UNCONTROLLED TYPE 2 DIABETES MELLITUS WITH COMPLICATION, WITHOUT LONG-TERM CURRENT USE OF INSULIN: ICD-10-CM

## 2018-09-23 DIAGNOSIS — E11.8 UNCONTROLLED TYPE 2 DIABETES MELLITUS WITH COMPLICATION, UNSPECIFIED LONG TERM INSULIN USE STATUS: ICD-10-CM

## 2018-09-23 DIAGNOSIS — E78.2 MIXED HYPERLIPIDEMIA: ICD-10-CM

## 2018-09-23 DIAGNOSIS — E11.65 UNCONTROLLED TYPE 2 DIABETES MELLITUS WITH COMPLICATION, UNSPECIFIED LONG TERM INSULIN USE STATUS: ICD-10-CM

## 2018-09-23 DIAGNOSIS — IMO0001 UNCONTROLLED TYPE 2 DIABETES MELLITUS WITHOUT COMPLICATION, WITHOUT LONG-TERM CURRENT USE OF INSULIN: ICD-10-CM

## 2018-09-24 RX ORDER — LIRAGLUTIDE 6 MG/ML
INJECTION SUBCUTANEOUS
Qty: 9 PEN | Refills: 2 | Status: SHIPPED | OUTPATIENT
Start: 2018-09-24 | End: 2019-02-08

## 2018-10-24 DIAGNOSIS — F51.01 PRIMARY INSOMNIA: ICD-10-CM

## 2018-10-24 RX ORDER — AMITRIPTYLINE HYDROCHLORIDE 10 MG/1
10 TABLET, FILM COATED ORAL NIGHTLY
Qty: 30 TABLET | Refills: 0 | Status: SHIPPED | OUTPATIENT
Start: 2018-10-24 | End: 2018-11-20 | Stop reason: SDUPTHER

## 2018-10-25 ENCOUNTER — TELEPHONE (OUTPATIENT)
Dept: INTERNAL MEDICINE | Facility: CLINIC | Age: 58
End: 2018-10-25

## 2018-10-25 RX ORDER — BLOOD-GLUCOSE METER
EACH MISCELLANEOUS
Qty: 1 EACH | Refills: 0 | Status: SHIPPED | OUTPATIENT
Start: 2018-10-25 | End: 2019-02-08

## 2018-10-25 NOTE — TELEPHONE ENCOUNTER
SILVIA,  MS HAYES CALLED THIS MORNING TO REQUEST AN RX FOR A NEW ONE TOUCH ULTRA METER. SHE STATES HERS IS NOT WORKING AT THIS TIME AND SHE DOESN'T HAVE AN APPT WITH [facility] FOR SEVERAL MONTHS. SHE USES [facility] ON [address]. SHE STATES SHE HAS PLENTY OF TEST STRIPS FOR THIS METER AT THIS TIME.

## 2018-11-16 ENCOUNTER — OFFICE VISIT (OUTPATIENT)
Dept: FAMILY MEDICINE CLINIC | Facility: CLINIC | Age: 58
End: 2018-11-16

## 2018-11-16 VITALS
HEIGHT: 69 IN | BODY MASS INDEX: 30.66 KG/M2 | WEIGHT: 207 LBS | DIASTOLIC BLOOD PRESSURE: 76 MMHG | TEMPERATURE: 96.7 F | HEART RATE: 60 BPM | SYSTOLIC BLOOD PRESSURE: 128 MMHG

## 2018-11-16 DIAGNOSIS — G89.29 OTHER CHRONIC BACK PAIN: ICD-10-CM

## 2018-11-16 DIAGNOSIS — M54.9 OTHER CHRONIC BACK PAIN: ICD-10-CM

## 2018-11-16 DIAGNOSIS — Z96.611 STATUS POST TOTAL SHOULDER REPLACEMENT, RIGHT: ICD-10-CM

## 2018-11-16 DIAGNOSIS — R39.9 UTI SYMPTOMS: ICD-10-CM

## 2018-11-16 DIAGNOSIS — K52.9 GASTROENTERITIS: Primary | ICD-10-CM

## 2018-11-16 LAB
BILIRUB BLD-MCNC: NEGATIVE MG/DL
CLARITY, POC: CLEAR
COLOR UR: YELLOW
GLUCOSE UR STRIP-MCNC: NEGATIVE MG/DL
KETONES UR QL: NEGATIVE
LEUKOCYTE EST, POC: NEGATIVE
NITRITE UR-MCNC: NEGATIVE MG/ML
PH UR: 5.5 [PH] (ref 5–8)
PROT UR STRIP-MCNC: NEGATIVE MG/DL
RBC # UR STRIP: NEGATIVE /UL
SP GR UR: 1.02 (ref 1–1.03)
UROBILINOGEN UR QL: NORMAL

## 2018-11-16 PROCEDURE — 81003 URINALYSIS AUTO W/O SCOPE: CPT | Performed by: FAMILY MEDICINE

## 2018-11-16 PROCEDURE — 99213 OFFICE O/P EST LOW 20 MIN: CPT | Performed by: FAMILY MEDICINE

## 2018-11-16 RX ORDER — LIDOCAINE 50 MG/G
1 PATCH TOPICAL EVERY 24 HOURS
Qty: 30 PATCH | Refills: 3 | Status: SHIPPED | OUTPATIENT
Start: 2018-11-16 | End: 2019-02-08

## 2018-11-16 RX ORDER — METRONIDAZOLE 250 MG/1
250 TABLET ORAL 3 TIMES DAILY
Qty: 21 TABLET | Refills: 0 | Status: SHIPPED | OUTPATIENT
Start: 2018-11-16 | End: 2019-02-08

## 2018-11-16 RX ORDER — DICYCLOMINE HYDROCHLORIDE 10 MG/1
10 CAPSULE ORAL
Qty: 30 CAPSULE | Refills: 0 | Status: SHIPPED | OUTPATIENT
Start: 2018-11-16 | End: 2019-11-06

## 2018-11-16 NOTE — PROGRESS NOTES
Subjective   Octavia Rice is a 58 y.o. female.     History of Present Illness   Sinus problem one month with cough and sneeze, chill, low energy.  Three weeks loose stools with cramping, no blood, liquid stools. Voiding often.   Uncle in hospital with C diff.  Taken up with annamaria cat  Elevated glucose to 300 two days ago.  avergeing 170. Appetite down.  The following portions of the patient's history were reviewed and updated as appropriate: allergies, current medications, past family history, past medical history, past social history, past surgical history and problem list.    Review of Systems   Constitutional: Positive for chills. Negative for fever.   Respiratory: Negative.    Gastrointestinal: Negative for abdominal distention.   Genitourinary: Positive for difficulty urinating.   Musculoskeletal: Positive for arthralgias and myalgias.       Objective   Physical Exam   Constitutional: She appears well-developed.   HENT:   Right Ear: External ear normal.   Left Ear: External ear normal.   Mouth/Throat: Mucous membranes are dry.   Neck: Neck supple.   Pulmonary/Chest: Breath sounds normal.   Vitals reviewed.        Assessment/Plan   Octavia was seen today for uri and diarrhea.    Diagnoses and all orders for this visit:    Gastroenteritis  -     metroNIDAZOLE (FLAGYL) 250 MG tablet; Take 1 tablet by mouth 3 (Three) Times a Day.  -     dicyclomine (BENTYL) 10 MG capsule; Take 1 capsule by mouth 4 (Four) Times a Day Before Meals & at Bedtime.    UTI symptoms  -     POCT urinalysis dipstick, automated    Other chronic back pain  -     lidocaine (LIDODERM) 5 %; Place 1 patch on the skin as directed by provider Daily. Remove & Discard patch within 12 hours or as directed by MD    Status post total shoulder replacement, right  -     lidocaine (LIDODERM) 5 %; Place 1 patch on the skin as directed by provider Daily. Remove & Discard patch within 12 hours or as directed by MD      No milk two days

## 2018-11-20 ENCOUNTER — OFFICE VISIT (OUTPATIENT)
Dept: ENDOCRINOLOGY | Facility: CLINIC | Age: 58
End: 2018-11-20

## 2018-11-20 VITALS
OXYGEN SATURATION: 97 % | DIASTOLIC BLOOD PRESSURE: 82 MMHG | HEART RATE: 97 BPM | SYSTOLIC BLOOD PRESSURE: 138 MMHG | WEIGHT: 205 LBS | BODY MASS INDEX: 30.27 KG/M2

## 2018-11-20 DIAGNOSIS — F51.01 PRIMARY INSOMNIA: ICD-10-CM

## 2018-11-20 DIAGNOSIS — E11.65 UNCONTROLLED TYPE 2 DIABETES MELLITUS WITH HYPERGLYCEMIA (HCC): Primary | ICD-10-CM

## 2018-11-20 LAB
GLUCOSE BLDC GLUCOMTR-MCNC: 121 MG/DL (ref 70–130)
HBA1C MFR BLD: 8.3 %

## 2018-11-20 PROCEDURE — 99214 OFFICE O/P EST MOD 30 MIN: CPT | Performed by: PHYSICIAN ASSISTANT

## 2018-11-20 PROCEDURE — 83036 HEMOGLOBIN GLYCOSYLATED A1C: CPT | Performed by: PHYSICIAN ASSISTANT

## 2018-11-20 PROCEDURE — 82947 ASSAY GLUCOSE BLOOD QUANT: CPT | Performed by: PHYSICIAN ASSISTANT

## 2018-11-20 PROCEDURE — 84681 ASSAY OF C-PEPTIDE: CPT | Performed by: PHYSICIAN ASSISTANT

## 2018-11-20 RX ORDER — MELATONIN
1000 DAILY
COMMUNITY
End: 2020-10-07 | Stop reason: SDUPTHER

## 2018-11-20 RX ORDER — GLIMEPIRIDE 2 MG/1
TABLET ORAL
Qty: 30 TABLET | Refills: 3 | Status: SHIPPED | OUTPATIENT
Start: 2018-11-20 | End: 2018-12-17 | Stop reason: SDUPTHER

## 2018-11-20 NOTE — PROGRESS NOTES
"Chief complaint  Follow-up (DM 2 High blood sugars)    Subjective   Octavia Rice is a 58 y.o. female is here today for follow-up of:    Diabetes Mellitus type 2: dx in 2003.    Diabetic complications: none  Eye exam current (within one year): no retinopathy, recent exam.     Current diabetic medications include Metformin  mg - 2 tablets BID  Victoza 1.8 mg daily.     Past medications: 08/2016 Jardiance trial was unsuccessful - frequent urination    Monitoring   not checking regularly.  Glucometer  verio onetouch given .    Home blood sugar records:170-180. 2-3 times a week.     Nutrition:   discussed  carb consistent diet 45-60 gm carb per meal.   Current diet: in general, a \"healthy\" diet    Current exercise: physical therapy twice a week    Foot care and dental care: discussed    Pt not seen since 5/2018.  Reports high BS recently.  Had an uncle in the hospital July to Sept and was very busy with this, wasn't checking BS regularly. Start checking sugar end of Sept again and noticed consistently high sugar. Around that same time she developed severe diarrhea, up to 12x/day. Saw GI just last week and was dx with intestinal infection and was given metronidazole. She will finish the abx in 2 days. Has already noticed an improvement in the diarrhea.   She is taking her medications regularly.  Diet is about the same.  Doesn't think the victoza is working.   Meter downloaded and reviewed- fasting -200, during the day, 200s.     Labs:   Lab Results   Component Value Date    HGBA1C 8.3 11/20/2018     Lab Results   Component Value Date    CREATININE 0.50 (L) 05/24/2018   No components found for: LDLCALC  Lab Results   Component Value Date    MICROALBUR 16.4 05/24/2018     Lab Results   Component Value Date    TSH 2.199 05/24/2018       Patient had a fall in Oct 2016 and she had head trauma and shoulder trauma, completed physical therapy, s/p surgery. She is still in chronic pain . She had a shoulder " replacement surgery and hand surgery, doing better.   She sees counselor, feels depressed.   Cant sleep well. Still has right shoulder pain   Other med problems: include hyperlipidemia on simvastatin.         Medications    Current Outpatient Medications:   •  amitriptyline (ELAVIL) 10 MG tablet, TAKE 1 TABLET BY MOUTH EVERY NIGHT., Disp: 30 tablet, Rfl: 0  •  BD PEN NEEDLE ELVIA U/F 32G X 4 MM misc, USES DAILY, Disp: 100 each, Rfl: 2  •  Blood Glucose Monitoring Suppl (ONE TOUCH ULTRA 2) w/Device kit, Priscila 3 Times daily, Disp: 1 each, Rfl: 0  •  cholecalciferol (VITAMIN D3) 1000 units tablet, Take 1,000 Units by mouth Daily., Disp: , Rfl:   •  metFORMIN ER (GLUCOPHAGE-XR) 500 MG 24 hr tablet, Take 2 tablets by mouth Daily With Breakfast & Dinner., Disp: 360 tablet, Rfl: 1  •  ONE TOUCH ULTRA TEST test strip, 1 each by Other route As Needed (Use as directed). Use as instructed, Disp: 100 each, Rfl: 1  •  simvastatin (ZOCOR) 40 MG tablet, TAKE 1 TABLET BY MOUTH EVERY NIGHT., Disp: 90 tablet, Rfl: 2  •  VICTOZA 18 MG/3ML solution pen-injector injection, INJECT 1.8 MG ONCE DAILY., Disp: 9 pen, Rfl: 2  •  dicyclomine (BENTYL) 10 MG capsule, Take 1 capsule by mouth 4 (Four) Times a Day Before Meals & at Bedtime., Disp: 30 capsule, Rfl: 0  •  glimepiride (AMARYL) 2 MG tablet, Take 1 po qd ac, Disp: 30 tablet, Rfl: 3  •  glucose blood test strip, 1 each by Other route Daily. verio strips, Disp: 30 each, Rfl: 12  •  lidocaine (LIDODERM) 5 %, Place 1 patch on the skin as directed by provider Daily. Remove & Discard patch within 12 hours or as directed by MD, Disp: 30 patch, Rfl: 3  •  metroNIDAZOLE (FLAGYL) 250 MG tablet, Take 1 tablet by mouth 3 (Three) Times a Day., Disp: 21 tablet, Rfl: 0    PMH  The following portions of the patient's history were reviewed and updated as appropriate: allergies, current medications, past family history, past medical history, past social history, past surgical history and problem  list.    Review of systems  Review of Systems   Constitutional: Positive for appetite change (decreased appetite), fatigue and unexpected weight change (weight loss).   HENT: Negative for congestion.    Eyes: Negative for visual disturbance.   Respiratory: Negative.  Negative for shortness of breath.    Cardiovascular: Negative for chest pain and leg swelling.   Gastrointestinal: Negative for constipation. Diarrhea: occasional.   Endocrine: Negative for cold intolerance, polydipsia and polyuria.   Musculoskeletal: Positive for arthralgias (shoulder pain ). Negative for back pain, joint swelling and myalgias.   Skin: Negative for rash and wound.   Allergic/Immunologic: Positive for environmental allergies.   Neurological: Positive for headaches. Negative for numbness.   Hematological: Negative.    Psychiatric/Behavioral: Positive for behavioral problems. Negative for self-injury.       Physical exam  Objective   Blood pressure 138/82, pulse 97, weight 93 kg (205 lb), SpO2 97 %. Body mass index is 30.27 kg/m².  Physical Exam   Constitutional: She is oriented to person, place, and time. She appears well-developed and well-nourished.   HENT:   Head: Normocephalic and atraumatic.   Mouth/Throat: Oropharynx is clear and moist.   Neck: No tracheal deviation present. No thyromegaly present.   Cardiovascular: Normal rate, regular rhythm, normal heart sounds and intact distal pulses.   Pulmonary/Chest: Effort normal and breath sounds normal.   Musculoskeletal: She exhibits no edema, tenderness or deformity.        Right shoulder: She exhibits decreased range of motion and pain.        Left shoulder: She exhibits decreased range of motion and pain.   Neurological: She is alert and oriented to person, place, and time.   Skin: Skin is warm and dry.   Psychiatric: Her behavior is normal. Judgment and thought content normal. She exhibits a depressed mood.   Nursing note and vitals reviewed.        LABS AND IMAGING  Results for  orders placed or performed in visit on 11/20/18   POC Glucose Fingerstick   Result Value Ref Range    Glucose 121 70 - 130 mg/dL   POC Glycosylated Hemoglobin (Hb A1C)   Result Value Ref Range    Hemoglobin A1C 8.3 %                   Assessment:         Diagnoses and all orders for this visit:    Uncontrolled type 2 diabetes mellitus with hyperglycemia (CMS/HCC)  -     POC Glucose Fingerstick  -     POC Glycosylated Hemoglobin (Hb A1C)  -     C-Peptide  -     glimepiride (AMARYL) 2 MG tablet; Take 1 po qd ac    Other orders  -     cholecalciferol (VITAMIN D3) 1000 units tablet; Take 1,000 Units by mouth Daily.        Plan:      She has had an intestinal infection for the past 2 months that was just treated last week and is likely contributing to high BS readings.   A1C is 8.3  Will start her on glimepiride 2mg qd ac for now.  Check c-peptide as she thinks victoza is no longer effective.  Need to see if BS improves after intestinal infection resolves completely. If still elevated, may need basal insulin.        Follow up:  6 weeks, f/u with Dr Daugherty 4 months.

## 2018-11-21 RX ORDER — AMITRIPTYLINE HYDROCHLORIDE 10 MG/1
TABLET, FILM COATED ORAL
Qty: 30 TABLET | Refills: 0 | Status: SHIPPED | OUTPATIENT
Start: 2018-11-21 | End: 2019-01-10 | Stop reason: SDUPTHER

## 2018-11-23 LAB — C PEPTIDE SERPL-MCNC: 3.2 NG/ML (ref 1.1–4.4)

## 2018-12-17 ENCOUNTER — TELEPHONE (OUTPATIENT)
Dept: INTERNAL MEDICINE | Facility: CLINIC | Age: 58
End: 2018-12-17

## 2018-12-17 DIAGNOSIS — E11.65 UNCONTROLLED TYPE 2 DIABETES MELLITUS WITH HYPERGLYCEMIA (HCC): ICD-10-CM

## 2018-12-17 RX ORDER — GLIMEPIRIDE 2 MG/1
TABLET ORAL
Qty: 30 TABLET | Refills: 3 | Status: SHIPPED | OUTPATIENT
Start: 2018-12-17 | End: 2019-01-25 | Stop reason: SDUPTHER

## 2018-12-17 NOTE — TELEPHONE ENCOUNTER
PT CALLED AND WANTED TO LET MARKY KNOW THAT THE GLIMEPRIDE SEEMS TO BE WORKING WELL SINCE PT HAS BEEN TAKEN TWICE A DAY; SHE NEEDS A REFILL SENT TO [facility] ON [address]

## 2019-01-10 ENCOUNTER — TELEPHONE (OUTPATIENT)
Dept: ENDOCRINOLOGY | Facility: CLINIC | Age: 59
End: 2019-01-10

## 2019-01-10 DIAGNOSIS — F51.01 PRIMARY INSOMNIA: ICD-10-CM

## 2019-01-10 RX ORDER — BLOOD SUGAR DIAGNOSTIC
STRIP MISCELLANEOUS
Qty: 300 EACH | Refills: 2 | Status: SHIPPED | OUTPATIENT
Start: 2019-01-10 | End: 2019-10-29 | Stop reason: ALTCHOICE

## 2019-01-10 RX ORDER — LANCETS
EACH MISCELLANEOUS
Qty: 300 EACH | Refills: 2 | Status: SHIPPED | OUTPATIENT
Start: 2019-01-10 | End: 2019-10-29 | Stop reason: ALTCHOICE

## 2019-01-10 RX ORDER — AMITRIPTYLINE HYDROCHLORIDE 10 MG/1
TABLET, FILM COATED ORAL
Qty: 30 TABLET | Refills: 0 | Status: SHIPPED | OUTPATIENT
Start: 2019-01-10 | End: 2019-02-07 | Stop reason: SDUPTHER

## 2019-01-10 NOTE — TELEPHONE ENCOUNTER
Received refill request via fax from [facility] pharmacy for Accucheck guide test strips and lancets, refilled electronically pt is within protocol.

## 2019-01-14 DIAGNOSIS — IMO0002 UNCONTROLLED TYPE 2 DIABETES MELLITUS WITH COMPLICATION, WITHOUT LONG-TERM CURRENT USE OF INSULIN: ICD-10-CM

## 2019-01-14 DIAGNOSIS — E78.2 MIXED HYPERLIPIDEMIA: ICD-10-CM

## 2019-01-14 DIAGNOSIS — IMO0002 UNCONTROLLED TYPE 2 DIABETES MELLITUS WITH COMPLICATION: ICD-10-CM

## 2019-01-14 DIAGNOSIS — IMO0001 UNCONTROLLED TYPE 2 DIABETES MELLITUS WITHOUT COMPLICATION, WITHOUT LONG-TERM CURRENT USE OF INSULIN: ICD-10-CM

## 2019-01-14 RX ORDER — METFORMIN HYDROCHLORIDE 500 MG/1
1000 TABLET, EXTENDED RELEASE ORAL
Qty: 360 TABLET | Refills: 1 | Status: SHIPPED | OUTPATIENT
Start: 2019-01-14 | End: 2019-02-08

## 2019-01-14 NOTE — TELEPHONE ENCOUNTER
Received a 90 day refill request via fax from [facility] pharmacy for Metformin 500, med refilled electronically pt is within protocol.

## 2019-01-20 RX ORDER — SIMVASTATIN 40 MG
TABLET ORAL
Qty: 30 TABLET | Refills: 0 | Status: SHIPPED | OUTPATIENT
Start: 2019-01-20 | End: 2019-01-21 | Stop reason: SDUPTHER

## 2019-01-21 RX ORDER — SIMVASTATIN 40 MG
40 TABLET ORAL NIGHTLY
Qty: 90 TABLET | Refills: 1 | Status: SHIPPED | OUTPATIENT
Start: 2019-01-21 | End: 2019-11-25 | Stop reason: SDUPTHER

## 2019-01-25 DIAGNOSIS — E11.65 UNCONTROLLED TYPE 2 DIABETES MELLITUS WITH HYPERGLYCEMIA (HCC): ICD-10-CM

## 2019-01-25 NOTE — TELEPHONE ENCOUNTER
PATIENT IS CURRENTLY OUT OF MEDICATION, SHE STATES THIS WAS REQEUSTED SEVERAL WEEKS AGO AND NEVER ORDERED.

## 2019-01-29 RX ORDER — GLIMEPIRIDE 2 MG/1
TABLET ORAL
Qty: 30 TABLET | Refills: 3 | Status: SHIPPED | OUTPATIENT
Start: 2019-01-29 | End: 2019-02-08

## 2019-02-07 DIAGNOSIS — F51.01 PRIMARY INSOMNIA: ICD-10-CM

## 2019-02-08 ENCOUNTER — OFFICE VISIT (OUTPATIENT)
Dept: ENDOCRINOLOGY | Facility: CLINIC | Age: 59
End: 2019-02-08

## 2019-02-08 ENCOUNTER — TELEPHONE (OUTPATIENT)
Dept: ENDOCRINOLOGY | Facility: CLINIC | Age: 59
End: 2019-02-08

## 2019-02-08 VITALS
SYSTOLIC BLOOD PRESSURE: 128 MMHG | HEIGHT: 69 IN | HEART RATE: 86 BPM | OXYGEN SATURATION: 98 % | DIASTOLIC BLOOD PRESSURE: 80 MMHG | WEIGHT: 212.9 LBS | BODY MASS INDEX: 31.53 KG/M2

## 2019-02-08 DIAGNOSIS — E11.65 UNCONTROLLED TYPE 2 DIABETES MELLITUS WITH HYPERGLYCEMIA (HCC): ICD-10-CM

## 2019-02-08 DIAGNOSIS — IMO0002 UNCONTROLLED TYPE 2 DIABETES MELLITUS WITH COMPLICATION, WITHOUT LONG-TERM CURRENT USE OF INSULIN: ICD-10-CM

## 2019-02-08 DIAGNOSIS — IMO0001 UNCONTROLLED TYPE 2 DIABETES MELLITUS WITHOUT COMPLICATION, WITHOUT LONG-TERM CURRENT USE OF INSULIN: ICD-10-CM

## 2019-02-08 DIAGNOSIS — E78.2 MIXED HYPERLIPIDEMIA: ICD-10-CM

## 2019-02-08 DIAGNOSIS — IMO0002 UNCONTROLLED TYPE 2 DIABETES MELLITUS WITH COMPLICATION: ICD-10-CM

## 2019-02-08 LAB
25(OH)D3 SERPL-MCNC: 32.5 NG/ML
ALBUMIN SERPL-MCNC: 4.61 G/DL (ref 3.2–4.8)
ALBUMIN/GLOB SERPL: 2.3 G/DL (ref 1.5–2.5)
ALP SERPL-CCNC: 88 U/L (ref 25–100)
ALT SERPL W P-5'-P-CCNC: 18 U/L (ref 7–40)
ANION GAP SERPL CALCULATED.3IONS-SCNC: 5 MMOL/L (ref 3–11)
ARTICHOKE IGE QN: 99 MG/DL (ref 0–130)
AST SERPL-CCNC: 17 U/L (ref 0–33)
BILIRUB SERPL-MCNC: 0.5 MG/DL (ref 0.3–1.2)
BUN BLD-MCNC: 10 MG/DL (ref 9–23)
BUN/CREAT SERPL: 18.5 (ref 7–25)
CALCIUM SPEC-SCNC: 10 MG/DL (ref 8.7–10.4)
CHLORIDE SERPL-SCNC: 103 MMOL/L (ref 99–109)
CHOLEST SERPL-MCNC: 161 MG/DL (ref 0–200)
CO2 SERPL-SCNC: 29 MMOL/L (ref 20–31)
CREAT BLD-MCNC: 0.54 MG/DL (ref 0.6–1.3)
GFR SERPL CREATININE-BSD FRML MDRD: 116 ML/MIN/1.73
GLOBULIN UR ELPH-MCNC: 2 GM/DL
GLUCOSE BLD-MCNC: 164 MG/DL (ref 70–100)
GLUCOSE BLDC GLUCOMTR-MCNC: 173 MG/DL (ref 70–130)
HBA1C MFR BLD: 7.8 %
HDLC SERPL-MCNC: 41 MG/DL (ref 40–60)
POTASSIUM BLD-SCNC: 4.2 MMOL/L (ref 3.5–5.5)
PROT SERPL-MCNC: 6.6 G/DL (ref 5.7–8.2)
SODIUM BLD-SCNC: 137 MMOL/L (ref 132–146)
TRIGL SERPL-MCNC: 223 MG/DL (ref 0–150)
TSH SERPL DL<=0.05 MIU/L-ACNC: 2.65 MIU/ML (ref 0.35–5.35)

## 2019-02-08 PROCEDURE — 82947 ASSAY GLUCOSE BLOOD QUANT: CPT | Performed by: INTERNAL MEDICINE

## 2019-02-08 PROCEDURE — 80053 COMPREHEN METABOLIC PANEL: CPT | Performed by: INTERNAL MEDICINE

## 2019-02-08 PROCEDURE — 84443 ASSAY THYROID STIM HORMONE: CPT | Performed by: INTERNAL MEDICINE

## 2019-02-08 PROCEDURE — 82570 ASSAY OF URINE CREATININE: CPT | Performed by: INTERNAL MEDICINE

## 2019-02-08 PROCEDURE — 99213 OFFICE O/P EST LOW 20 MIN: CPT | Performed by: INTERNAL MEDICINE

## 2019-02-08 PROCEDURE — 83036 HEMOGLOBIN GLYCOSYLATED A1C: CPT | Performed by: INTERNAL MEDICINE

## 2019-02-08 PROCEDURE — 82306 VITAMIN D 25 HYDROXY: CPT | Performed by: INTERNAL MEDICINE

## 2019-02-08 PROCEDURE — 80061 LIPID PANEL: CPT | Performed by: INTERNAL MEDICINE

## 2019-02-08 PROCEDURE — 82043 UR ALBUMIN QUANTITATIVE: CPT | Performed by: INTERNAL MEDICINE

## 2019-02-08 RX ORDER — AMITRIPTYLINE HYDROCHLORIDE 10 MG/1
TABLET, FILM COATED ORAL
Qty: 30 TABLET | Refills: 0 | Status: SHIPPED | OUTPATIENT
Start: 2019-02-08 | End: 2019-03-22 | Stop reason: SDUPTHER

## 2019-02-08 RX ORDER — GLIMEPIRIDE 2 MG/1
4 TABLET ORAL
Qty: 180 TABLET | Refills: 3 | Status: SHIPPED | OUTPATIENT
Start: 2019-02-08 | End: 2019-02-11 | Stop reason: SDUPTHER

## 2019-02-08 RX ORDER — METFORMIN HYDROCHLORIDE 500 MG/1
1000 TABLET, EXTENDED RELEASE ORAL
Qty: 360 TABLET | Refills: 3 | Status: SHIPPED | OUTPATIENT
Start: 2019-02-08 | End: 2019-10-29

## 2019-02-08 NOTE — TELEPHONE ENCOUNTER
PHARMACY IS CALLING, THEY NEED A CLARIFICATION ON GLIMEPIRIDE CALLED IN TODAY. PHARMACY PHONE -[phone]

## 2019-02-08 NOTE — PROGRESS NOTES
"Chief complaint  Diabetes (Follow uP  Blood Sugars have still been high.  She ran out of RX and it was not resent to Pharmacy ) and Vitamin D Deficiency    Subjective   Octavia Rice is a 59 y.o. female is here today for follow-up of:    Diabetes Mellitus type 2: dx in 2003.    Diabetic complications: none  Eye exam current (within one year): no retinopathy, recent exam.     Current diabetic medications include Metformin  mg - 2 tablets BID  Victoza 1.8 mg daily.   Glimepiride 4 mg daily started last visit with Bushra. Increased the dose to 2 tabs daily     Past medications: 08/2016 Jardiance trial was unsuccessful - frequent urination    Monitoring   She forgot to bring meter. WIth glimepiride it improved to 150, before glimepiride the glucose was 200    Nutrition:   discussed  carb consistent diet 45-60 gm carb per meal.   Current diet: in general, a \"healthy\" diet  . She is trying to incorporate exercises.     Foot care and dental care: discussed  Cant sleep well. Still has right shoulder pain   Other med problems: include hyperlipidemia on simvastatin.             Medications    Current Outpatient Medications:   •  ACCU-CHEK FASTCLIX LANCETS misc, Test three times daily., Disp: 300 each, Rfl: 2  •  ACCU-CHEK GUIDE test strip, Test three times daily., Disp: 300 each, Rfl: 2  •  amitriptyline (ELAVIL) 10 MG tablet, TAKE 1 TABLET BY MOUTH EVERY DAY AT NIGHT, Disp: 30 tablet, Rfl: 0  •  BD PEN NEEDLE ELVIA U/F 32G X 4 MM misc, USES DAILY, Disp: 100 each, Rfl: 2  •  cholecalciferol (VITAMIN D3) 1000 units tablet, Take 1,000 Units by mouth Daily., Disp: , Rfl:   •  dicyclomine (BENTYL) 10 MG capsule, Take 1 capsule by mouth 4 (Four) Times a Day Before Meals & at Bedtime., Disp: 30 capsule, Rfl: 0  •  glimepiride (AMARYL) 2 MG tablet, Take 2 tablets by mouth Every Morning Before Breakfast. Take 1 po qd ac, Disp: 180 tablet, Rfl: 3  •  Liraglutide (VICTOZA) 18 MG/3ML solution pen-injector injection, Inject 1.8 mg " "under the skin into the appropriate area as directed Daily., Disp: 9 pen, Rfl: 5  •  metFORMIN ER (GLUCOPHAGE-XR) 500 MG 24 hr tablet, Take 2 tablets by mouth Daily With Breakfast & Dinner., Disp: 360 tablet, Rfl: 3  •  simvastatin (ZOCOR) 40 MG tablet, Take 1 tablet by mouth Every Night., Disp: 90 tablet, Rfl: 1    PMH  The following portions of the patient's history were reviewed and updated as appropriate: allergies, current medications, past family history, past medical history, past social history, past surgical history and problem list.    Review of systems  Review of Systems   Constitutional: Positive for appetite change (decreased appetite), fatigue and unexpected weight change (weight loss).   HENT: Negative for congestion.    Eyes: Negative for visual disturbance.   Respiratory: Negative.  Negative for shortness of breath.    Cardiovascular: Negative for chest pain and leg swelling.   Gastrointestinal: Negative for constipation. Diarrhea: occasional.   Endocrine: Negative for cold intolerance, polydipsia and polyuria.   Musculoskeletal: Positive for arthralgias (shoulder pain right). Negative for back pain, joint swelling and myalgias.   Skin: Negative for rash and wound.   Allergic/Immunologic: Positive for environmental allergies.   Neurological: Positive for headaches. Negative for numbness.   Hematological: Negative.    Psychiatric/Behavioral: Positive for behavioral problems (phobia of crowds.). Negative for self-injury.       Physical exam  Objective   Blood pressure 128/80, pulse 86, height 175.3 cm (69\"), weight 96.6 kg (212 lb 14.4 oz), SpO2 98 %. Body mass index is 31.44 kg/m².  Physical Exam   Constitutional: She is oriented to person, place, and time. She appears well-developed and well-nourished.   HENT:   Head: Normocephalic and atraumatic.   Eyes: Conjunctivae are normal.   Neck: No thyromegaly present.   Cardiovascular: Normal rate, regular rhythm and normal heart sounds.   Pulmonary/Chest: " Effort normal and breath sounds normal.   Lymphadenopathy:     She has no cervical adenopathy.   Neurological: She is alert and oriented to person, place, and time.   Psychiatric: She has a normal mood and affect. Thought content normal.   Vitals reviewed.        LABS AND IMAGING  Results for orders placed or performed in visit on 02/08/19   POC Glucose Fingerstick   Result Value Ref Range    Glucose 173 (A) 70 - 130 mg/dL   POC Glycosylated Hemoglobin (Hb A1C)   Result Value Ref Range    Hemoglobin A1C 7.8 %                   Assessment:         Diagnoses and all orders for this visit:    Uncontrolled type 2 diabetes mellitus with hyperglycemia (CMS/HCC)  -     glimepiride (AMARYL) 2 MG tablet; Take 2 tablets by mouth Every Morning Before Breakfast. Take 1 po qd ac  -     POC Glucose Fingerstick  -     POC Glycosylated Hemoglobin (Hb A1C)    Uncontrolled type 2 diabetes mellitus with complication, without long-term current use of insulin (CMS/HCC)  -     metFORMIN ER (GLUCOPHAGE-XR) 500 MG 24 hr tablet; Take 2 tablets by mouth Daily With Breakfast & Dinner.  -     Liraglutide (VICTOZA) 18 MG/3ML solution pen-injector injection; Inject 1.8 mg under the skin into the appropriate area as directed Daily.  -     POC Glucose Fingerstick  -     POC Glycosylated Hemoglobin (Hb A1C)    Uncontrolled type 2 diabetes mellitus with complication (CMS/HCC)  -     metFORMIN ER (GLUCOPHAGE-XR) 500 MG 24 hr tablet; Take 2 tablets by mouth Daily With Breakfast & Dinner.  -     Liraglutide (VICTOZA) 18 MG/3ML solution pen-injector injection; Inject 1.8 mg under the skin into the appropriate area as directed Daily.  -     POC Glucose Fingerstick  -     POC Glycosylated Hemoglobin (Hb A1C)    Mixed hyperlipidemia  -     metFORMIN ER (GLUCOPHAGE-XR) 500 MG 24 hr tablet; Take 2 tablets by mouth Daily With Breakfast & Dinner.  -     Liraglutide (VICTOZA) 18 MG/3ML solution pen-injector injection; Inject 1.8 mg under the skin into the  appropriate area as directed Daily.    Uncontrolled type 2 diabetes mellitus without complication, without long-term current use of insulin (CMS/[facility])  -     metFORMIN ER (GLUCOPHAGE-XR) 500 MG 24 hr tablet; Take 2 tablets by mouth Daily With Breakfast & Dinner.  -     Liraglutide (VICTOZA) 18 MG/3ML solution pen-injector injection; Inject 1.8 mg under the skin into the appropriate area as directed Daily.        Plan:      A1C was 8.3 --> improved to 7.8.  cont glimepiride 4mg qd ac. Increase the dose if glucose is high   Cont Victoza and metformin.    Labs today     Cont exercise- walking.      Follow up:  6 weeks, f/u with Dr Daugherty 4 months.

## 2019-02-09 LAB
CREAT 24H UR-MCNC: 140.5 MG/DL
MICROALBUMIN UR-MCNC: 17.4 UG/ML
MICROALBUMIN/CREAT UR: 12.4 MG/G CREAT (ref 0–30)

## 2019-02-11 RX ORDER — GLIMEPIRIDE 2 MG/1
4 TABLET ORAL
Qty: 180 TABLET | Refills: 3 | Status: SHIPPED | OUTPATIENT
Start: 2019-02-11 | End: 2019-04-09 | Stop reason: SDUPTHER

## 2019-03-22 DIAGNOSIS — F51.01 PRIMARY INSOMNIA: ICD-10-CM

## 2019-03-22 RX ORDER — AMITRIPTYLINE HYDROCHLORIDE 10 MG/1
10 TABLET, FILM COATED ORAL NIGHTLY
Qty: 30 TABLET | Refills: 0 | Status: SHIPPED | OUTPATIENT
Start: 2019-03-22 | End: 2019-04-21

## 2019-04-09 DIAGNOSIS — E78.2 MIXED HYPERLIPIDEMIA: ICD-10-CM

## 2019-04-09 DIAGNOSIS — IMO0002 UNCONTROLLED TYPE 2 DIABETES MELLITUS WITH COMPLICATION: ICD-10-CM

## 2019-04-09 DIAGNOSIS — IMO0002 UNCONTROLLED TYPE 2 DIABETES MELLITUS WITH COMPLICATION, WITHOUT LONG-TERM CURRENT USE OF INSULIN: ICD-10-CM

## 2019-04-09 DIAGNOSIS — E11.65 UNCONTROLLED TYPE 2 DIABETES MELLITUS WITH HYPERGLYCEMIA (HCC): ICD-10-CM

## 2019-04-09 DIAGNOSIS — IMO0001 UNCONTROLLED TYPE 2 DIABETES MELLITUS WITHOUT COMPLICATION, WITHOUT LONG-TERM CURRENT USE OF INSULIN: ICD-10-CM

## 2019-04-09 RX ORDER — GLIMEPIRIDE 2 MG/1
4 TABLET ORAL
Qty: 180 TABLET | Refills: 0 | Status: SHIPPED | OUTPATIENT
Start: 2019-04-09 | End: 2019-07-01 | Stop reason: SDUPTHER

## 2019-04-29 DIAGNOSIS — F51.01 PRIMARY INSOMNIA: ICD-10-CM

## 2019-04-30 RX ORDER — AMITRIPTYLINE HYDROCHLORIDE 10 MG/1
10 TABLET, FILM COATED ORAL NIGHTLY
Qty: 30 TABLET | Refills: 0 | OUTPATIENT
Start: 2019-04-30 | End: 2019-05-30

## 2019-07-01 DIAGNOSIS — E11.65 UNCONTROLLED TYPE 2 DIABETES MELLITUS WITH HYPERGLYCEMIA (HCC): ICD-10-CM

## 2019-07-01 RX ORDER — GLIMEPIRIDE 2 MG/1
4 TABLET ORAL
Qty: 180 TABLET | Refills: 0 | Status: SHIPPED | OUTPATIENT
Start: 2019-07-01 | End: 2019-10-03 | Stop reason: SDUPTHER

## 2019-07-19 DIAGNOSIS — IMO0002 UNCONTROLLED TYPE 2 DIABETES MELLITUS WITH COMPLICATION, WITHOUT LONG-TERM CURRENT USE OF INSULIN: ICD-10-CM

## 2019-07-19 DIAGNOSIS — IMO0002 UNCONTROLLED TYPE 2 DIABETES MELLITUS WITH COMPLICATION: ICD-10-CM

## 2019-07-19 DIAGNOSIS — E78.2 MIXED HYPERLIPIDEMIA: ICD-10-CM

## 2019-07-19 DIAGNOSIS — IMO0001 UNCONTROLLED TYPE 2 DIABETES MELLITUS WITHOUT COMPLICATION, WITHOUT LONG-TERM CURRENT USE OF INSULIN: ICD-10-CM

## 2019-07-19 RX ORDER — LIRAGLUTIDE 6 MG/ML
INJECTION SUBCUTANEOUS
Qty: 9 PEN | Refills: 0 | Status: SHIPPED | OUTPATIENT
Start: 2019-07-19 | End: 2019-10-29 | Stop reason: SDUPTHER

## 2019-08-06 ENCOUNTER — OFFICE VISIT (OUTPATIENT)
Dept: FAMILY MEDICINE CLINIC | Facility: CLINIC | Age: 59
End: 2019-08-06

## 2019-08-06 VITALS
RESPIRATION RATE: 18 BRPM | SYSTOLIC BLOOD PRESSURE: 110 MMHG | BODY MASS INDEX: 30.21 KG/M2 | TEMPERATURE: 96.9 F | OXYGEN SATURATION: 99 % | HEIGHT: 69 IN | WEIGHT: 204 LBS | HEART RATE: 87 BPM | DIASTOLIC BLOOD PRESSURE: 80 MMHG

## 2019-08-06 DIAGNOSIS — S29.019A THORACIC MYOFASCIAL STRAIN, INITIAL ENCOUNTER: ICD-10-CM

## 2019-08-06 DIAGNOSIS — G47.09 OTHER INSOMNIA: ICD-10-CM

## 2019-08-06 DIAGNOSIS — J06.9 ACUTE URI: Primary | ICD-10-CM

## 2019-08-06 PROCEDURE — 99214 OFFICE O/P EST MOD 30 MIN: CPT | Performed by: FAMILY MEDICINE

## 2019-08-06 RX ORDER — LIDOCAINE 50 MG/G
1 PATCH TOPICAL EVERY 24 HOURS
Qty: 30 PATCH | Refills: 1 | Status: SHIPPED | OUTPATIENT
Start: 2019-08-06 | End: 2019-10-08

## 2019-08-06 RX ORDER — DICLOFENAC SODIUM 75 MG/1
75 TABLET, DELAYED RELEASE ORAL 2 TIMES DAILY
Qty: 40 TABLET | Refills: 0 | Status: SHIPPED | OUTPATIENT
Start: 2019-08-06 | End: 2019-10-08 | Stop reason: SDUPTHER

## 2019-08-06 RX ORDER — AMITRIPTYLINE HYDROCHLORIDE 10 MG/1
10 TABLET, FILM COATED ORAL NIGHTLY
Qty: 30 TABLET | Refills: 3 | Status: SHIPPED | OUTPATIENT
Start: 2019-08-06 | End: 2019-08-28 | Stop reason: SDUPTHER

## 2019-08-06 RX ORDER — CEFUROXIME AXETIL 250 MG/1
250 TABLET ORAL 2 TIMES DAILY
Qty: 20 TABLET | Refills: 0 | Status: SHIPPED | OUTPATIENT
Start: 2019-08-06 | End: 2019-10-08

## 2019-08-06 RX ORDER — CYCLOBENZAPRINE HCL 10 MG
10 TABLET ORAL NIGHTLY PRN
Qty: 30 TABLET | Refills: 1 | Status: SHIPPED | OUTPATIENT
Start: 2019-08-06 | End: 2019-10-08 | Stop reason: SDUPTHER

## 2019-08-06 RX ORDER — OLOPATADINE HYDROCHLORIDE 2 MG/ML
SOLUTION/ DROPS OPHTHALMIC
Refills: 10 | COMMUNITY
Start: 2019-07-15 | End: 2020-08-07

## 2019-08-06 NOTE — PROGRESS NOTES
Subjective   Octavia Rice is a 59 y.o. female.   Has had 5 surgeries in 2016 for shoulders, knee surgery, finger surgery.  Pt has had mid back pain for > 1 week. Thinks it may have been from repetitive motion by cleaning.  Back Pain   This is a new problem. The current episode started in the past 7 days. The problem occurs constantly. The problem is unchanged. The pain is present in the thoracic spine. The quality of the pain is described as aching. Radiates to: chest. The pain is at a severity of 9/10. The pain is moderate. The symptoms are aggravated by coughing, lying down and position (deep breath). Associated symptoms include chest pain. Pertinent negatives include no abdominal pain, bladder incontinence, dysuria, fever, numbness, perianal numbness, tingling or weight loss. Risk factors include obesity and menopause. Treatments tried: lidocaine patch, Ibuprofen or Tylenol. The treatment provided mild relief.      C/O cough, congestion on and off for months. Has worsened over the past 1 week. Has taken Benedryl little relief. Has started to change colors with sputum.   Would like to start back Amitriptyline for pain and sleep. Has been off since 12/18.    The following portions of the patient's history were reviewed and updated as appropriate: allergies, current medications, past family history, past medical history, past social history, past surgical history and problem list.  Vitals:    08/06/19 1101   BP: 110/80   Pulse: 87   Resp: 18   Temp: 96.9 °F (36.1 °C)   SpO2: 99%     Body mass index is 30.13 kg/m².  Review of Systems   Constitutional: Negative.  Negative for activity change, fatigue, fever, unexpected weight gain and unexpected weight loss.   HENT: Positive for congestion and sinus pressure. Negative for sneezing and sore throat.    Eyes: Negative.  Negative for blurred vision, double vision and visual disturbance.   Respiratory: Positive for cough. Negative for chest tightness, shortness of breath  and wheezing.    Cardiovascular: Positive for chest pain. Negative for palpitations and leg swelling.   Gastrointestinal: Negative.  Negative for abdominal distention, abdominal pain, blood in stool, constipation, diarrhea and nausea.   Endocrine: Negative.  Negative for cold intolerance and heat intolerance.   Genitourinary: Negative.  Negative for urinary incontinence, dysuria, frequency and urgency.   Musculoskeletal: Positive for back pain. Negative for arthralgias and myalgias.   Skin: Negative.  Negative for rash.   Allergic/Immunologic: Negative.    Neurological: Negative.  Negative for dizziness, tingling, syncope, numbness and memory problem.   Hematological: Negative.  Negative for adenopathy.   Psychiatric/Behavioral: Negative.  Negative for suicidal ideas and depressed mood. The patient is not nervous/anxious.    All other systems reviewed and are negative.      Objective   Physical Exam   Constitutional: She is oriented to person, place, and time. She appears well-developed and well-nourished. No distress.   HENT:   Right Ear: External ear normal.   Left Ear: External ear normal.   Nose: Nose normal.   Mouth/Throat: Oropharynx is clear and moist.   Eyes: Conjunctivae and EOM are normal. Pupils are equal, round, and reactive to light.   Neck: Normal range of motion. Neck supple. No thyromegaly present.   Cardiovascular: Normal rate, regular rhythm and normal heart sounds.   No murmur heard.  Pulmonary/Chest: Effort normal and breath sounds normal. No respiratory distress. She has no wheezes.   Abdominal: Soft. Bowel sounds are normal. She exhibits no distension and no mass. There is no tenderness. There is no rebound and no guarding. No hernia.   Musculoskeletal: Normal range of motion. She exhibits no edema or tenderness.   Lymphadenopathy:     She has no cervical adenopathy.   Neurological: She is alert and oriented to person, place, and time. She has normal reflexes.   Skin: Skin is warm and dry. No  rash noted. She is not diaphoretic. No erythema. No pallor.   Psychiatric: She has a normal mood and affect. Her behavior is normal. Judgment and thought content normal.   Nursing note and vitals reviewed.          Assessment/Plan   Octavia was seen today for back pain.    Diagnoses and all orders for this visit:    Acute URI  -     cefuroxime (CEFTIN) 250 MG tablet; Take 1 tablet by mouth 2 (Two) Times a Day.    Thoracic myofascial strain, initial encounter  -     cyclobenzaprine (FLEXERIL) 10 MG tablet; Take 1 tablet by mouth At Night As Needed for Muscle Spasms.  -     diclofenac (VOLTAREN) 75 MG EC tablet; Take 1 tablet by mouth 2 (Two) Times a Day.  -     lidocaine (LIDODERM) 5 %; Place 1 patch on the skin as directed by provider Daily. Remove & Discard patch within 12 hours or as directed by MD    Other insomnia  -     amitriptyline (ELAVIL) 10 MG tablet; Take 1 tablet by mouth Every Night.

## 2019-08-20 DIAGNOSIS — IMO0002 UNCONTROLLED TYPE 2 DIABETES MELLITUS WITH COMPLICATION: ICD-10-CM

## 2019-08-20 DIAGNOSIS — IMO0001 UNCONTROLLED TYPE 2 DIABETES MELLITUS WITHOUT COMPLICATION, WITHOUT LONG-TERM CURRENT USE OF INSULIN: ICD-10-CM

## 2019-08-20 DIAGNOSIS — E78.2 MIXED HYPERLIPIDEMIA: ICD-10-CM

## 2019-08-20 DIAGNOSIS — IMO0002 UNCONTROLLED TYPE 2 DIABETES MELLITUS WITH COMPLICATION, WITHOUT LONG-TERM CURRENT USE OF INSULIN: ICD-10-CM

## 2019-08-28 DIAGNOSIS — G47.09 OTHER INSOMNIA: ICD-10-CM

## 2019-08-28 RX ORDER — AMITRIPTYLINE HYDROCHLORIDE 10 MG/1
10 TABLET, FILM COATED ORAL NIGHTLY
Qty: 90 TABLET | Refills: 1 | Status: SHIPPED | OUTPATIENT
Start: 2019-08-28 | End: 2019-10-08

## 2019-10-03 DIAGNOSIS — E11.65 UNCONTROLLED TYPE 2 DIABETES MELLITUS WITH HYPERGLYCEMIA (HCC): ICD-10-CM

## 2019-10-03 RX ORDER — GLIMEPIRIDE 2 MG/1
4 TABLET ORAL
Qty: 180 TABLET | Refills: 0 | Status: SHIPPED | OUTPATIENT
Start: 2019-10-03 | End: 2019-10-29

## 2019-10-08 ENCOUNTER — OFFICE VISIT (OUTPATIENT)
Dept: FAMILY MEDICINE CLINIC | Facility: CLINIC | Age: 59
End: 2019-10-08

## 2019-10-08 VITALS
HEIGHT: 69 IN | WEIGHT: 212 LBS | BODY MASS INDEX: 31.4 KG/M2 | RESPIRATION RATE: 18 BRPM | TEMPERATURE: 96.9 F | SYSTOLIC BLOOD PRESSURE: 120 MMHG | DIASTOLIC BLOOD PRESSURE: 82 MMHG | HEART RATE: 95 BPM | OXYGEN SATURATION: 97 %

## 2019-10-08 DIAGNOSIS — Z12.11 COLON CANCER SCREENING: ICD-10-CM

## 2019-10-08 DIAGNOSIS — Z13.6 ENCOUNTER FOR LIPID SCREENING FOR CARDIOVASCULAR DISEASE: ICD-10-CM

## 2019-10-08 DIAGNOSIS — E55.9 VITAMIN D DEFICIENCY: ICD-10-CM

## 2019-10-08 DIAGNOSIS — F33.0 MILD EPISODE OF RECURRENT DEPRESSIVE DISORDER (HCC): ICD-10-CM

## 2019-10-08 DIAGNOSIS — S29.019A THORACIC MYOFASCIAL STRAIN, INITIAL ENCOUNTER: ICD-10-CM

## 2019-10-08 DIAGNOSIS — Z00.00 MEDICARE ANNUAL WELLNESS VISIT, SUBSEQUENT: Primary | ICD-10-CM

## 2019-10-08 DIAGNOSIS — Z13.220 ENCOUNTER FOR LIPID SCREENING FOR CARDIOVASCULAR DISEASE: ICD-10-CM

## 2019-10-08 PROBLEM — S42.201S: Status: RESOLVED | Noted: 2017-08-07 | Resolved: 2019-10-08

## 2019-10-08 PROBLEM — D17.9 LIPOMA: Status: RESOLVED | Noted: 2017-11-16 | Resolved: 2019-10-08

## 2019-10-08 PROCEDURE — 99396 PREV VISIT EST AGE 40-64: CPT | Performed by: FAMILY MEDICINE

## 2019-10-08 PROCEDURE — G0402 INITIAL PREVENTIVE EXAM: HCPCS | Performed by: FAMILY MEDICINE

## 2019-10-08 PROCEDURE — 96160 PT-FOCUSED HLTH RISK ASSMT: CPT | Performed by: FAMILY MEDICINE

## 2019-10-08 RX ORDER — CYCLOBENZAPRINE HCL 10 MG
10 TABLET ORAL NIGHTLY PRN
Qty: 30 TABLET | Refills: 1 | Status: SHIPPED | OUTPATIENT
Start: 2019-10-08 | End: 2020-07-05

## 2019-10-08 RX ORDER — DICLOFENAC SODIUM 75 MG/1
75 TABLET, DELAYED RELEASE ORAL 2 TIMES DAILY
Qty: 40 TABLET | Refills: 1 | Status: SHIPPED | OUTPATIENT
Start: 2019-10-08 | End: 2019-11-20 | Stop reason: SDUPTHER

## 2019-10-08 RX ORDER — CITALOPRAM 20 MG/1
TABLET ORAL
Qty: 30 TABLET | Refills: 0 | Status: SHIPPED | OUTPATIENT
Start: 2019-10-08 | End: 2019-10-14 | Stop reason: SDUPTHER

## 2019-10-08 NOTE — PROGRESS NOTES
Subsequent Medicare Wellness Visit   The ABC's of the Annual Wellness Visit    Chief Complaint   Patient presents with   • Medicare Wellness-subsequent       HPI:  Octavia Rice, -1960, is a 59 y.o. female who presents for a Subsequent Medicare Wellness Visit.  Not sleeping well. Has increased anxiety, depression. Has increased bilat shoulder pain and follows with ortho.  Recent Hospitalizations:  No hospitalization(s) within the last year..    Current Medical Providers:  Patient Care Team:  Daphney Avila DO as PCP - General (Family Medicine)  Priscilla Daugherty MD as Consulting Physician (Endocrinology)  Jaguar Marsh MD as Consulting Physician (Orthopedic Surgery)  Brandi Fabian MD as Obstetrician (Obstetrics and Gynecology)    Health Habits and Functional and Cognitive Screening and Depression Screening:  Functional & Cognitive Status 10/8/2019   Do you have difficulty preparing food and eating? No   Do you have difficulty bathing yourself, getting dressed or grooming yourself? No   Do you have difficulty using the toilet? No   Do you have difficulty moving around from place to place? No   Do you have trouble with steps or getting out of a bed or a chair? No   Current Diet Well Balanced Diet   Dental Exam Up to date   Eye Exam Up to date   Exercise (times per week) 2 times per week   Current Exercise Activities Include Walking   Do you need help using the phone?  No   Are you deaf or do you have serious difficulty hearing?  No   Do you need help with transportation? No   Do you need help shopping? No   Do you need help preparing meals?  No   Do you need help with housework?  No   Do you need help with laundry? No   Do you need help taking your medications? No   Do you need help managing money? No   Do you ever drive or ride in a car without wearing a seat belt? No   Have you felt unusual stress, anger or loneliness in the last month? No   Who do you live with? Spouse   If you need help, do you  have trouble finding someone available to you? No   Have you been bothered in the last four weeks by sexual problems? No   Do you have difficulty concentrating, remembering or making decisions? No       Compared to one year ago, the patient feels her physical health is the same and her mental health is the same.    Depression Screen:  PHQ-2/PHQ-9 Depression Screening 10/8/2019   Little interest or pleasure in doing things 1   Feeling down, depressed, or hopeless 1   Trouble falling or staying asleep, or sleeping too much 1   Feeling tired or having little energy 1   Poor appetite or overeating 0   Feeling bad about yourself - or that you are a failure or have let yourself or your family down 0   Trouble concentrating on things, such as reading the newspaper or watching television 0   Moving or speaking so slowly that other people could have noticed. Or the opposite - being so fidgety or restless that you have been moving around a lot more than usual 0   Thoughts that you would be better off dead, or of hurting yourself in some way 0   Total Score 4   If you checked off any problems, how difficult have these problems made it for you to do your work, take care of things at home, or get along with other people? Somewhat difficult         Past Medical/Family/Social History:  The following portions of the patient's history were reviewed and updated as appropriate: allergies, current medications, past family history, past medical history, past social history, past surgical history and problem list.    Allergies   Allergen Reactions   • Codeine Nausea And Vomiting   • Morphine And Related Itching     Severe hives- purple color to face   • Penicillins      Had pcn as child and unsure of reaction         Current Outpatient Medications:   •  ACCU-CHEK FASTCLIX LANCETS misc, Test three times daily., Disp: 300 each, Rfl: 2  •  ACCU-CHEK GUIDE test strip, Test three times daily., Disp: 300 each, Rfl: 2  •  cholecalciferol  (VITAMIN D3) 1000 units tablet, Take 1,000 Units by mouth Daily., Disp: , Rfl:   •  cyclobenzaprine (FLEXERIL) 10 MG tablet, Take 1 tablet by mouth At Night As Needed for Muscle Spasms., Disp: 30 tablet, Rfl: 1  •  diclofenac (VOLTAREN) 75 MG EC tablet, Take 1 tablet by mouth 2 (Two) Times a Day., Disp: 40 tablet, Rfl: 1  •  dicyclomine (BENTYL) 10 MG capsule, Take 1 capsule by mouth 4 (Four) Times a Day Before Meals & at Bedtime., Disp: 30 capsule, Rfl: 0  •  glimepiride (AMARYL) 2 MG tablet, TAKE 2 TABLETS BY MOUTH EVERY MORNING BEFORE BREAKFAST, Disp: 180 tablet, Rfl: 0  •  Insulin Pen Needle (BD PEN NEEDLE ELVIA U/F) 32G X 4 MM misc, USE ONCE DAILY, Disp: 100 each, Rfl: 3  •  Liraglutide (VICTOZA) 18 MG/3ML solution pen-injector injection, Inject 1.8 mg under the skin into the appropriate area as directed Daily., Disp: 9 pen, Rfl: 5  •  metFORMIN ER (GLUCOPHAGE-XR) 500 MG 24 hr tablet, Take 2 tablets by mouth Daily With Breakfast & Dinner., Disp: 360 tablet, Rfl: 3  •  olopatadine (PATADAY) 0.2 % solution ophthalmic solution, 1 DROP INTO BOTH EYES ONCE A DAY, Disp: , Rfl: 10  •  simvastatin (ZOCOR) 40 MG tablet, Take 1 tablet by mouth Every Night., Disp: 90 tablet, Rfl: 1  •  VICTOZA 18 MG/3ML solution pen-injector injection, INJECT 1.8 MG ONCE DAILY., Disp: 9 pen, Rfl: 0  •  citalopram (CELEXA) 20 MG tablet, Take 1/2 tab qd for 2 weeks then increase to 1 tab qd, Disp: 30 tablet, Rfl: 0    Aspirin use counseling: Taking ASA appropriately as indicated    Current medication list contains no high risk medications.  No harmful drug interactions have been identified.     Family History   Problem Relation Age of Onset   • Cancer Mother         breast   • Diabetes Mother    • Arthritis Mother    • Diabetes Father    • Heart defect Father    • Stroke Father    • Asthma Father    • Hypertension Other    • Heart attack Other        Social History     Tobacco Use   • Smoking status: Former Smoker     Packs/day: 1.00      Types: Cigarettes   • Smokeless tobacco: Never Used   • Tobacco comment: quit 10/27/2016   Substance Use Topics   • Alcohol use: Yes     Comment: occasionally       Past Surgical History:   Procedure Laterality Date   • COLONOSCOPY      within last 5 years   • KNEE ARTHROSCOPY W/ MENISCAL REPAIR Right    • PATELLA OPEN REDUCTION INTERNAL FIXATION Left 10/27/2016    Procedure: LEFT PATELLA OPEN REDUCTION INTERNAL FIXATION;  Surgeon: Marshall Meyers MD;  Location:  [facility];  Service:    • SHOULDER OPEN REDUCTION INTERNAL FIXATION Right 10/27/2016    Procedure: RIGHT SHOULDER CLOSED REDUCTION ;  Surgeon: Marshall Meyers MD;  Location:  [facility];  Service:    • SHOULDER OPEN REDUCTION INTERNAL FIXATION Right 10/31/2016    Procedure: RIGHT SHOULDER OPEN REDUCTION INTERNAL FIXATION WITH PLATE FOR FRACTURE;  Surgeon: Jaguar Marsh MD;  Location:  [facility];  Service:    • TONSILLECTOMY     • TOTAL ABDOMINAL HYSTERECTOMY WITH SALPINGO OOPHORECTOMY     • TOTAL SHOULDER ARTHROPLASTY W/ DISTAL CLAVICLE EXCISION Right 8/7/2017    Procedure: REMOVAL RIGHT PROXIMAL HUMERUS PLATE, RIGHT REVERSE TOTAL SHOULDER ARTHROPLASTY, SHOULDER HARDWARE REMOVAL;  Surgeon: Jaguar Marsh MD;  Location:  [facility];  Service:    • URETHRAL SUSPENSION      FOR STRESS INCONTINENCE       Patient Active Problem List   Diagnosis   • Hyperlipidemia   • Vitamin D deficiency   • Current every day smoker   • Acute post-operative pain   • Irritable bowel syndrome   • Obesity   • Type 2 diabetes mellitus (CMS/HCC)   • Anxiety   • Insomnia   • Tobacco abuse       Review of Systems   Constitutional: Negative.  Negative for activity change, fatigue, fever and unexpected weight change.   HENT: Negative.  Negative for congestion, sneezing and sore throat.    Eyes: Negative.  Negative for visual disturbance.   Respiratory: Negative.  Negative for cough, chest tightness, shortness of breath and wheezing.    Cardiovascular: Negative.  Negative for  "chest pain, palpitations and leg swelling.   Gastrointestinal: Negative.  Negative for abdominal distention, abdominal pain, blood in stool, constipation, diarrhea and nausea.   Endocrine: Negative.  Negative for cold intolerance and heat intolerance.   Genitourinary: Negative.  Negative for dysuria, frequency and urgency.   Musculoskeletal: Negative.  Negative for arthralgias and myalgias.   Skin: Negative.  Negative for rash.   Allergic/Immunologic: Negative.    Neurological: Negative.  Negative for dizziness, syncope and numbness.   Hematological: Negative.  Negative for adenopathy.   Psychiatric/Behavioral: Negative.  Negative for suicidal ideas. The patient is not nervous/anxious.        Objective     Vitals:    10/08/19 0933   BP: 120/82   BP Location: Left arm   Patient Position: Sitting   Cuff Size: Adult   Pulse: 95   Resp: 18   Temp: 96.9 °F (36.1 °C)   TempSrc: Temporal   SpO2: 97%   Weight: 96.2 kg (212 lb)   Height: 175.3 cm (69\")   PainSc:   6   PainLoc: Generalized       Patient's Body mass index is 31.31 kg/m². BMI is above normal parameters. Recommendations include: nutrition counseling.       Visual Acuity Screening    Right eye Left eye Both eyes   Without correction:      With correction: 20/20 20/20 20/20   Comments: Per ophth    Hearing Screening Comments: Normal hearing    The patient has no evidence of cognitve impairment.     Physical Exam   Constitutional: She is oriented to person, place, and time. She appears well-developed and well-nourished. No distress.   HENT:   Right Ear: External ear normal.   Left Ear: External ear normal.   Nose: Nose normal.   Mouth/Throat: Oropharynx is clear and moist.   Eyes: Conjunctivae and EOM are normal. Pupils are equal, round, and reactive to light.   Neck: Normal range of motion. Neck supple. No thyromegaly present.   Cardiovascular: Normal rate, regular rhythm and normal heart sounds.   No murmur heard.  Pulmonary/Chest: Effort normal and breath sounds " normal. No respiratory distress. She has no wheezes.   Abdominal: Soft. Bowel sounds are normal. She exhibits no distension and no mass. There is no tenderness. There is no rebound and no guarding. No hernia.   Musculoskeletal: Normal range of motion. She exhibits no edema or tenderness.   Lymphadenopathy:     She has no cervical adenopathy.   Neurological: She is alert and oriented to person, place, and time. She has normal reflexes.   Skin: Skin is warm and dry. No rash noted. She is not diaphoretic. No erythema. No pallor.   Psychiatric: She has a normal mood and affect. Her behavior is normal. Judgment and thought content normal.   Nursing note and vitals reviewed.      Recent Lab Results:     Lab Results   Component Value Date    CHOL 161 02/08/2019    TRIG 223 (H) 02/08/2019    HDL 41 02/08/2019       Assessment/Plan   Age-appropriate Screening Schedule:  Refer to the list below for future screening recommendations based on patient's age, sex and/or medical conditions.      Health Maintenance   Topic Date Due   • PNEUMOCOCCAL VACCINE (19-64 MEDIUM RISK) (1 of 1 - PPSV23) 02/01/1979   • TDAP/TD VACCINES (1 - Tdap) 02/01/1979   • ZOSTER VACCINE (1 of 2) 02/01/2010   • PAP SMEAR  05/05/2016   • DIABETIC EYE EXAM  05/25/2018   • DIABETIC FOOT EXAM  05/24/2019   • INFLUENZA VACCINE  08/01/2019   • HEMOGLOBIN A1C  08/08/2019   • MAMMOGRAM  12/13/2019   • LIPID PANEL  02/08/2020   • URINE MICROALBUMIN  02/08/2020   • COLONOSCOPY  05/19/2024       Medicare Risks and Personalized Health Plan:  Breast Cancer/Mammogram Screening  Cardiovascular risk  Chronic Pain   Colon Cancer Screening  Dementia/Memory   Depression/Dysphoria  Diabetic Lab Screening   Fall Risk  Obesity/Overweight   Polypharmacy      CMS-Preventive Services Quick Reference  Medicare Preventive Services Addressed:  Annual Wellness Visit (AWV)  Cardiovascular Disease Screening Tests (may do this order every 5 years in beneficiaries without signs or  symptoms of cardiovascular disease)  Colorectal Cancer Screening, Colonoscopy  Depression Screening (15 minutes face to face, Code )    Advance Care Planning:  Patient does not have an advance directive - not interested in additional information    Diagnoses and all orders for this visit:    1. Medicare annual wellness visit, subsequent (Primary)    2. Thoracic myofascial strain, initial encounter  -     cyclobenzaprine (FLEXERIL) 10 MG tablet; Take 1 tablet by mouth At Night As Needed for Muscle Spasms.  Dispense: 30 tablet; Refill: 1  -     diclofenac (VOLTAREN) 75 MG EC tablet; Take 1 tablet by mouth 2 (Two) Times a Day.  Dispense: 40 tablet; Refill: 1    3. Mild episode of recurrent depressive disorder (CMS/HCC)  -     citalopram (CELEXA) 20 MG tablet; Take 1/2 tab qd for 2 weeks then increase to 1 tab qd  Dispense: 30 tablet; Refill: 0  -     CBC & Differential  -     Comprehensive Metabolic Panel  -     TSH    4. Vitamin D deficiency  -     Vitamin D 25 Hydroxy    5. Encounter for lipid screening for cardiovascular disease  -     Lipid Panel    6. Colon cancer screening  -     Ambulatory Referral For Screening Colonoscopy      Discussed starting Celexa. F/U 1 month.  An After Visit Summary and PPPS with all of these plans were given to the patient.    Annual depression screen complete. 15 minutes.  Patient is here for health maintenance visit.  Discussed diet and adequate exercise.  Screening lab work discussed.  Immunizations reviewed.  Advice and education regarding nutrition, exercise, dental evaluations, routine eye examinations, reproductive health, cardiovascular risk reduction, sunscreen use, self skin examination, and seatbelt use was given today.  Annual wellness evaluations recommended.      Follow Up:  No Follow-up on file.

## 2019-10-09 LAB
25(OH)D3+25(OH)D2 SERPL-MCNC: 34.4 NG/ML (ref 30–100)
ALBUMIN SERPL-MCNC: 4.7 G/DL (ref 3.5–5.2)
ALBUMIN/GLOB SERPL: 1.7 G/DL
ALP SERPL-CCNC: 97 U/L (ref 39–117)
ALT SERPL-CCNC: 22 U/L (ref 1–33)
AST SERPL-CCNC: 15 U/L (ref 1–32)
BASOPHILS # BLD AUTO: 0.05 10*3/MM3 (ref 0–0.2)
BASOPHILS NFR BLD AUTO: 0.5 % (ref 0–1.5)
BILIRUB SERPL-MCNC: 0.4 MG/DL (ref 0.2–1.2)
BUN SERPL-MCNC: 9 MG/DL (ref 6–20)
BUN/CREAT SERPL: 15.5 (ref 7–25)
CALCIUM SERPL-MCNC: 9.6 MG/DL (ref 8.6–10.5)
CHLORIDE SERPL-SCNC: 101 MMOL/L (ref 98–107)
CHOLEST SERPL-MCNC: 181 MG/DL (ref 0–200)
CO2 SERPL-SCNC: 24.3 MMOL/L (ref 22–29)
CREAT SERPL-MCNC: 0.58 MG/DL (ref 0.57–1)
EOSINOPHIL # BLD AUTO: 0.07 10*3/MM3 (ref 0–0.4)
EOSINOPHIL NFR BLD AUTO: 0.7 % (ref 0.3–6.2)
ERYTHROCYTE [DISTWIDTH] IN BLOOD BY AUTOMATED COUNT: 12.6 % (ref 12.3–15.4)
GLOBULIN SER CALC-MCNC: 2.7 GM/DL
GLUCOSE SERPL-MCNC: 188 MG/DL (ref 65–99)
HCT VFR BLD AUTO: 42.4 % (ref 34–46.6)
HDLC SERPL-MCNC: 45 MG/DL (ref 40–60)
HGB BLD-MCNC: 13.9 G/DL (ref 12–15.9)
IMM GRANULOCYTES # BLD AUTO: 0.03 10*3/MM3 (ref 0–0.05)
IMM GRANULOCYTES NFR BLD AUTO: 0.3 % (ref 0–0.5)
LDLC SERPL CALC-MCNC: 96 MG/DL (ref 0–100)
LYMPHOCYTES # BLD AUTO: 2.41 10*3/MM3 (ref 0.7–3.1)
LYMPHOCYTES NFR BLD AUTO: 24.9 % (ref 19.6–45.3)
MCH RBC QN AUTO: 30.5 PG (ref 26.6–33)
MCHC RBC AUTO-ENTMCNC: 32.8 G/DL (ref 31.5–35.7)
MCV RBC AUTO: 93.2 FL (ref 79–97)
MONOCYTES # BLD AUTO: 0.57 10*3/MM3 (ref 0.1–0.9)
MONOCYTES NFR BLD AUTO: 5.9 % (ref 5–12)
NEUTROPHILS # BLD AUTO: 6.55 10*3/MM3 (ref 1.7–7)
NEUTROPHILS NFR BLD AUTO: 67.7 % (ref 42.7–76)
NRBC BLD AUTO-RTO: 0 /100 WBC (ref 0–0.2)
PLATELET # BLD AUTO: 270 10*3/MM3 (ref 140–450)
POTASSIUM SERPL-SCNC: 4.6 MMOL/L (ref 3.5–5.2)
PROT SERPL-MCNC: 7.4 G/DL (ref 6–8.5)
RBC # BLD AUTO: 4.55 10*6/MM3 (ref 3.77–5.28)
SODIUM SERPL-SCNC: 141 MMOL/L (ref 136–145)
TRIGL SERPL-MCNC: 199 MG/DL (ref 0–150)
TSH SERPL DL<=0.005 MIU/L-ACNC: 2.08 UIU/ML (ref 0.27–4.2)
VLDLC SERPL CALC-MCNC: 39.8 MG/DL
WBC # BLD AUTO: 9.68 10*3/MM3 (ref 3.4–10.8)

## 2019-10-10 ENCOUNTER — OFFICE VISIT (OUTPATIENT)
Dept: FAMILY MEDICINE CLINIC | Facility: CLINIC | Age: 59
End: 2019-10-10

## 2019-10-10 VITALS
HEIGHT: 70 IN | BODY MASS INDEX: 30.69 KG/M2 | WEIGHT: 214.4 LBS | SYSTOLIC BLOOD PRESSURE: 124 MMHG | DIASTOLIC BLOOD PRESSURE: 68 MMHG

## 2019-10-10 DIAGNOSIS — E11.9 DIABETES MELLITUS WITHOUT COMPLICATION (HCC): Primary | ICD-10-CM

## 2019-10-10 DIAGNOSIS — E66.9 OBESITY (BMI 30-39.9): ICD-10-CM

## 2019-10-10 DIAGNOSIS — Z71.3 NUTRITIONAL COUNSELING: ICD-10-CM

## 2019-10-10 PROCEDURE — 99213 OFFICE O/P EST LOW 20 MIN: CPT | Performed by: FAMILY MEDICINE

## 2019-10-10 NOTE — PROGRESS NOTES
Subjective   Octavia Rice is a 59 y.o. female.     History of Present Illness   Patient is here for follow-up diabetes and interested in the nutrition aspect of better diabetic control and possibility of decreasing medications.  Her  does all of the cooking.  She has been trying to eat healthier.  She follows with endocrine regarding her diabetes.  Recent labs reviewed.  She is currently stable on her current diabetic medications.  The following portions of the patient's history were reviewed and updated as appropriate: allergies, current medications, past family history, past medical history, past social history, past surgical history and problem list.  Vitals:    10/10/19 1804   BP: 124/68     Body mass index is 30.76 kg/m².  Review of Systems   Constitutional: Negative.  Negative for activity change, fatigue, fever, unexpected weight gain and unexpected weight loss.   HENT: Negative.  Negative for congestion, sneezing and sore throat.    Eyes: Negative.  Negative for blurred vision, double vision and visual disturbance.   Respiratory: Negative.  Negative for cough, chest tightness, shortness of breath and wheezing.    Cardiovascular: Negative.  Negative for chest pain, palpitations and leg swelling.   Gastrointestinal: Negative.  Negative for abdominal distention, abdominal pain, blood in stool, constipation, diarrhea and nausea.   Endocrine: Negative.  Negative for cold intolerance and heat intolerance.   Genitourinary: Negative.  Negative for dysuria, frequency, urgency and urinary incontinence.   Musculoskeletal: Negative.  Negative for arthralgias and myalgias.   Skin: Negative.  Negative for rash.   Allergic/Immunologic: Negative.    Neurological: Negative.  Negative for dizziness, syncope, numbness and memory problem.   Hematological: Negative.  Negative for adenopathy.   Psychiatric/Behavioral: Negative.  Negative for suicidal ideas and depressed mood. The patient is not nervous/anxious.    All  other systems reviewed and are negative.      Objective   Physical Exam   Constitutional: She is oriented to person, place, and time. She appears well-developed and well-nourished. No distress.   HENT:   Right Ear: External ear normal.   Left Ear: External ear normal.   Nose: Nose normal.   Mouth/Throat: Oropharynx is clear and moist.   Eyes: Conjunctivae and EOM are normal. Pupils are equal, round, and reactive to light.   Neck: Normal range of motion. Neck supple. No thyromegaly present.   Cardiovascular: Normal rate, regular rhythm and normal heart sounds.   No murmur heard.  Pulmonary/Chest: Effort normal and breath sounds normal. No respiratory distress. She has no wheezes.   Abdominal: Soft. Bowel sounds are normal. She exhibits no distension and no mass. There is no tenderness. There is no rebound and no guarding. No hernia.   Musculoskeletal: Normal range of motion. She exhibits no edema or tenderness.   Lymphadenopathy:     She has no cervical adenopathy.   Neurological: She is alert and oriented to person, place, and time. She has normal reflexes.   Skin: Skin is warm and dry. No rash noted. She is not diaphoretic. No erythema. No pallor.   Psychiatric: She has a normal mood and affect. Her behavior is normal. Judgment and thought content normal.   Nursing note and vitals reviewed.          Assessment/Plan   Octavia was seen today for diabetes and nutrition counseling.    Diagnoses and all orders for this visit:    Diabetes mellitus without complication (CMS/[facility])    Nutritional counseling    Obesity (BMI 30-39.9)    Patient participated in a group visit for nutrition counseling on plant-based medicine.  Patient had a face-to-face encounter prior to the group visit.  Discussed with patient counseling on nutrition. Discussed in detail recommendations regarding decreasing animal fat and dairy and increasing fruits and vegetables. Discussed cutting refined sugars and white breads. Recommend beans, legumes,  whole grains, nuts and seeds. Recommend cutting all processed foods. Given handouts from physicians committee on responsible medicine.

## 2019-10-11 RX ORDER — INFLUENZA A VIRUS A/MICHIGAN/45/2015 (H1N1) RECOMBINANT HEMAGGLUTININ ANTIGEN, INFLUENZA A VIRUS A/SINGAPORE/INFIMH-16-0019/2016 (H3N2) RECOMBINANT HEMAGGLUTININ ANTIGEN, INFLUENZA B VIRUS B/MARYLAND/15/2016 RECOMBINANT HEMAGGLUTININ ANTIGEN, AND INFLUENZA B VIRUS B/PHUKET/3073/2013 RECOMBINANT HEMAGGLUTININ ANTIGEN 45; 45; 45; 45 UG/.5ML; UG/.5ML; UG/.5ML; UG/.5ML
INJECTION INTRAMUSCULAR
COMMUNITY
Start: 2019-10-09 | End: 2019-10-29

## 2019-10-14 DIAGNOSIS — F33.0 MILD EPISODE OF RECURRENT DEPRESSIVE DISORDER (HCC): ICD-10-CM

## 2019-10-14 RX ORDER — CITALOPRAM 20 MG/1
TABLET ORAL
Qty: 30 TABLET | Refills: 0 | Status: SHIPPED | OUTPATIENT
Start: 2019-10-14 | End: 2019-11-06 | Stop reason: SDUPTHER

## 2019-10-28 ENCOUNTER — TELEPHONE (OUTPATIENT)
Dept: FAMILY MEDICINE CLINIC | Facility: CLINIC | Age: 59
End: 2019-10-28

## 2019-10-28 NOTE — TELEPHONE ENCOUNTER
PATIENT REQUESTING CALL BACK WOULD LIKE TO KNOW WHEN THE NEXT FORKS TO KNIVES CLASS IS.     ALSO WOULD LIKE TO BE SEEN SOONER THAN APPT ON 11/18/2019 IF POSSIBLE TO REVIEW MEDICATIONS.   CALL BACK NUMBER: [phone]  ANYTIME IS FINE.

## 2019-10-29 ENCOUNTER — OFFICE VISIT (OUTPATIENT)
Dept: ENDOCRINOLOGY | Facility: CLINIC | Age: 59
End: 2019-10-29

## 2019-10-29 ENCOUNTER — TELEPHONE (OUTPATIENT)
Dept: FAMILY MEDICINE CLINIC | Facility: CLINIC | Age: 59
End: 2019-10-29

## 2019-10-29 VITALS
HEIGHT: 69 IN | SYSTOLIC BLOOD PRESSURE: 120 MMHG | HEART RATE: 90 BPM | DIASTOLIC BLOOD PRESSURE: 76 MMHG | BODY MASS INDEX: 31.16 KG/M2 | WEIGHT: 210.4 LBS | OXYGEN SATURATION: 97 %

## 2019-10-29 DIAGNOSIS — IMO0002 UNCONTROLLED TYPE 2 DIABETES MELLITUS WITH COMPLICATION, WITHOUT LONG-TERM CURRENT USE OF INSULIN: ICD-10-CM

## 2019-10-29 DIAGNOSIS — IMO0002 UNCONTROLLED TYPE 2 DIABETES MELLITUS WITH COMPLICATION: ICD-10-CM

## 2019-10-29 DIAGNOSIS — E55.9 VITAMIN D DEFICIENCY: ICD-10-CM

## 2019-10-29 DIAGNOSIS — E78.2 MIXED HYPERLIPIDEMIA: ICD-10-CM

## 2019-10-29 DIAGNOSIS — E11.65 UNCONTROLLED TYPE 2 DIABETES MELLITUS WITH HYPERGLYCEMIA (HCC): ICD-10-CM

## 2019-10-29 DIAGNOSIS — IMO0001 UNCONTROLLED TYPE 2 DIABETES MELLITUS WITHOUT COMPLICATION, WITHOUT LONG-TERM CURRENT USE OF INSULIN: ICD-10-CM

## 2019-10-29 DIAGNOSIS — E11.65 TYPE 2 DIABETES MELLITUS WITH HYPERGLYCEMIA, WITHOUT LONG-TERM CURRENT USE OF INSULIN (HCC): Primary | ICD-10-CM

## 2019-10-29 LAB
GLUCOSE BLDC GLUCOMTR-MCNC: 98 MG/DL (ref 70–130)
HBA1C MFR BLD: 8 %

## 2019-10-29 PROCEDURE — 99213 OFFICE O/P EST LOW 20 MIN: CPT | Performed by: INTERNAL MEDICINE

## 2019-10-29 PROCEDURE — 82947 ASSAY GLUCOSE BLOOD QUANT: CPT | Performed by: INTERNAL MEDICINE

## 2019-10-29 PROCEDURE — 83036 HEMOGLOBIN GLYCOSYLATED A1C: CPT | Performed by: INTERNAL MEDICINE

## 2019-10-29 RX ORDER — BLOOD SUGAR DIAGNOSTIC
STRIP MISCELLANEOUS
Qty: 100 EACH | Refills: 12 | Status: SHIPPED | OUTPATIENT
Start: 2019-10-29 | End: 2021-05-17

## 2019-10-29 RX ORDER — LANCETS
EACH MISCELLANEOUS
Qty: 100 EACH | Refills: 12 | Status: SHIPPED | OUTPATIENT
Start: 2019-10-29 | End: 2020-10-28

## 2019-10-29 RX ORDER — BLOOD-GLUCOSE METER
EACH MISCELLANEOUS
Qty: 1 KIT | Refills: 0 | Status: SHIPPED | OUTPATIENT
Start: 2019-10-29

## 2019-10-29 RX ORDER — METFORMIN HYDROCHLORIDE 500 MG/1
1000 TABLET, EXTENDED RELEASE ORAL
Qty: 360 TABLET | Refills: 3 | Status: SHIPPED | OUTPATIENT
Start: 2019-10-29 | End: 2020-10-07 | Stop reason: SDUPTHER

## 2019-10-29 RX ORDER — GLIMEPIRIDE 2 MG/1
4 TABLET ORAL
Qty: 180 TABLET | Refills: 3 | Status: SHIPPED | OUTPATIENT
Start: 2019-10-29 | End: 2020-05-27 | Stop reason: SDUPTHER

## 2019-10-29 NOTE — TELEPHONE ENCOUNTER
She can be seen sooner.  I will be having the next class on addictive foods on Thursday, November 14

## 2019-10-29 NOTE — TELEPHONE ENCOUNTER
Tried to contact pt and advise per sridhar that her next addictive foods class is November 14. Also pt can be seen sooner than the appt she already has scheduled on 11/18. No answer from pt and no VM set up.

## 2019-10-29 NOTE — PROGRESS NOTES
Chief complaint  Diabetes (Follow UP:  Had A1C complete by OB/GYN  thinks it may be less than 3 months ago, thinkks it was 7.1 or close.  )    Subjective   Octavia Rice is a 59 y.o. female is here today for follow-up of:    Diabetes Mellitus type 2: dx in 2003.  Diabetic complications: none  Eye exam current (within one year): no retinopathy, recent exam.     Current diabetic medications include   Metformin  mg - 2 tablets BID.  Victoza 1.8 mg daily.   Glimepiride 4 mg daily.       Past medications: 08/2016 Jardiance trial was unsuccessful - frequent urination    Monitoring   Meter downloaded and reviewed.      Other med problems: include hyperlipidemia on simvastatin.     She has started dietary program - eliminated dairy and bread. Increased exercises. She is doing well. Glucose improved.       Medications    Current Outpatient Medications:   •  ACCU-CHEK FRANKY PLUS test strip, Test Three Times Daily, Disp: 100 each, Rfl: 12  •  ACCU-CHEK SOFTCLIX LANCETS lancets, Use as instructed, Disp: 100 each, Rfl: 12  •  Blood Glucose Monitoring Suppl (ACCU-CHEK FRANKY PLUS) w/Device kit, Test Three Times Daily, Disp: 1 kit, Rfl: 0  •  cholecalciferol (VITAMIN D3) 1000 units tablet, Take 1,000 Units by mouth Daily., Disp: , Rfl:   •  citalopram (CELEXA) 20 MG tablet, Take 1/2 tab qd for 2 weeks then increase to 1 tab qd, Disp: 30 tablet, Rfl: 0  •  cyclobenzaprine (FLEXERIL) 10 MG tablet, Take 1 tablet by mouth At Night As Needed for Muscle Spasms., Disp: 30 tablet, Rfl: 1  •  diclofenac (VOLTAREN) 75 MG EC tablet, Take 1 tablet by mouth 2 (Two) Times a Day., Disp: 40 tablet, Rfl: 1  •  dicyclomine (BENTYL) 10 MG capsule, Take 1 capsule by mouth 4 (Four) Times a Day Before Meals & at Bedtime., Disp: 30 capsule, Rfl: 0  •  glimepiride (AMARYL) 2 MG tablet, TAKE 2 TABLETS BY MOUTH EVERY MORNING BEFORE BREAKFAST, Disp: 180 tablet, Rfl: 0  •  Insulin Pen Needle (BD PEN NEEDLE ELVIA U/F) 32G X 4 MM misc, USE ONCE DAILY,  "Disp: 100 each, Rfl: 3  •  Liraglutide (VICTOZA) 18 MG/3ML solution pen-injector injection, Inject 1.8 mg under the skin into the appropriate area as directed Daily., Disp: 9 pen, Rfl: 5  •  metFORMIN ER (GLUCOPHAGE-XR) 500 MG 24 hr tablet, Take 2 tablets by mouth Daily With Breakfast & Dinner., Disp: 360 tablet, Rfl: 3  •  olopatadine (PATADAY) 0.2 % solution ophthalmic solution, 1 DROP INTO BOTH EYES ONCE A DAY, Disp: , Rfl: 10  •  simvastatin (ZOCOR) 40 MG tablet, Take 1 tablet by mouth Every Night., Disp: 90 tablet, Rfl: 1    PMH  The following portions of the patient's history were reviewed and updated as appropriate: allergies, current medications, past family history, past medical history, past social history, past surgical history and problem list.    Review of systems  Review of Systems   Constitutional: Positive for appetite change (decreased appetite) and unexpected weight change (weight loss).   Eyes: Negative for visual disturbance.   Respiratory: Negative.  Negative for shortness of breath.    Cardiovascular: Negative for chest pain and leg swelling.   Gastrointestinal: Negative for constipation. Diarrhea: occasional.   Endocrine: Negative for cold intolerance, polydipsia and polyuria.   Musculoskeletal: Positive for arthralgias (shoulder pain right). Negative for back pain, joint swelling and myalgias.   Skin: Negative for rash and wound.   Allergic/Immunologic: Positive for environmental allergies.   Neurological: Positive for headaches. Negative for numbness.   Hematological: Negative.    Psychiatric/Behavioral: Negative for self-injury. Behavioral problem: phobia of crowds.       Physical exam  Objective   Blood pressure 120/76, pulse 90, height 175.3 cm (69\"), weight 95.4 kg (210 lb 6.4 oz), SpO2 97 %. Body mass index is 31.07 kg/m².  Physical Exam   Constitutional: She is oriented to person, place, and time. She appears well-developed and well-nourished.   HENT:   Head: Normocephalic and " atraumatic.   Eyes: Conjunctivae are normal.   Cardiovascular: Normal rate, regular rhythm and normal heart sounds.   Pulmonary/Chest: Effort normal and breath sounds normal.   Neurological: She is alert and oriented to person, place, and time.   Psychiatric: She has a normal mood and affect. Thought content normal.   Vitals reviewed.        LABS AND IMAGING  Results for orders placed or performed in visit on 10/29/19   POC Glucose Fingerstick   Result Value Ref Range    Glucose 98 70 - 130 mg/dL   POC Glycosylated Hemoglobin (Hb A1C)   Result Value Ref Range    Hemoglobin A1C 8.0 %           Assessment:         Diagnoses and all orders for this visit:    Type 2 diabetes mellitus with hyperglycemia, without long-term current use of insulin (CMS/[facility])  -     POC Glucose Fingerstick  -     POC Glycosylated Hemoglobin (Hb A1C)    Vitamin D deficiency    Other orders  -     Blood Glucose Monitoring Suppl (ACCU-CHEK FRANKY PLUS) w/Device kit; Test Three Times Daily  -     ACCU-CHEK FRANKY PLUS test strip; Test Three Times Daily  -     ACCU-CHEK SOFTCLIX LANCETS lancets; Use as instructed        Plan:        Cont glimepiride 4 mg qd ac. Increase the dose if glucose is high   Cont Victoza and metformin.    Recent labs reviewed: thyroid function lipid panel and CMP are normal. Meds refilled.     Cont exercise- walking      Follow up:  4-6 months

## 2019-11-06 ENCOUNTER — OFFICE VISIT (OUTPATIENT)
Dept: FAMILY MEDICINE CLINIC | Facility: CLINIC | Age: 59
End: 2019-11-06

## 2019-11-06 VITALS
SYSTOLIC BLOOD PRESSURE: 112 MMHG | HEIGHT: 69 IN | OXYGEN SATURATION: 96 % | TEMPERATURE: 96.9 F | WEIGHT: 211 LBS | DIASTOLIC BLOOD PRESSURE: 70 MMHG | HEART RATE: 90 BPM | RESPIRATION RATE: 18 BRPM | BODY MASS INDEX: 31.25 KG/M2

## 2019-11-06 DIAGNOSIS — E11.9 DIABETES MELLITUS WITHOUT COMPLICATION (HCC): ICD-10-CM

## 2019-11-06 DIAGNOSIS — F33.0 MILD EPISODE OF RECURRENT DEPRESSIVE DISORDER (HCC): ICD-10-CM

## 2019-11-06 DIAGNOSIS — F41.9 ANXIETY: Primary | ICD-10-CM

## 2019-11-06 PROCEDURE — 99214 OFFICE O/P EST MOD 30 MIN: CPT | Performed by: FAMILY MEDICINE

## 2019-11-06 RX ORDER — CITALOPRAM 20 MG/1
TABLET ORAL
Qty: 45 TABLET | Refills: 2 | Status: SHIPPED | OUTPATIENT
Start: 2019-11-06 | End: 2019-11-20 | Stop reason: SDUPTHER

## 2019-11-06 RX ORDER — UBIDECARENONE 75 MG
CAPSULE ORAL
COMMUNITY
End: 2020-10-01

## 2019-11-06 RX ORDER — ASPIRIN 81 MG/1
81 TABLET ORAL DAILY
COMMUNITY
End: 2022-05-05

## 2019-11-06 RX ORDER — AMITRIPTYLINE HYDROCHLORIDE 10 MG/1
10 TABLET, FILM COATED ORAL NIGHTLY
COMMUNITY
End: 2020-02-07 | Stop reason: SDUPTHER

## 2019-11-06 NOTE — PROGRESS NOTES
Subjective   Octavia Rcie is a 59 y.o. female.   Pt has been doing plant based diet and feels good. Has been following with endocrinology.  Diabetes   She presents for her follow-up diabetic visit. She has type 2 diabetes mellitus. Her disease course has been improving. Hypoglycemia symptoms include nervousness/anxiousness. Pertinent negatives for hypoglycemia include no dizziness. There are no diabetic associated symptoms. Pertinent negatives for diabetes include no blurred vision, no chest pain, no fatigue and no weight loss. There are no hypoglycemic complications. Symptoms are stable. There are no diabetic complications. Risk factors for coronary artery disease include dyslipidemia, diabetes mellitus, hypertension and post-menopausal. Current diabetic treatment includes oral agent (dual therapy) (and Victoza). She is compliant with treatment all of the time. Her weight is stable. She has not had a previous visit with a dietitian. There is no change in her home blood glucose trend. An ACE inhibitor/angiotensin II receptor blocker is not being taken.   Depression   Visit Type: follow-up (on Celexa and stable, depression is better but anxiety could improve)  Patient presents with the following symptoms: nervousness/anxiety.  Patient is not experiencing: depressed mood, excessive worry, fatigue, insomnia, irritability, malaise, palpitations, panic, shortness of breath, suicidal ideas, suicidal planning, thoughts of death, weight gain and weight loss.  Frequency of symptoms: rarely   Severity: mild   Sleep per night: 6 hours  Sleep quality: fair  Nighttime awakenings: occasional  Compliance with medications:  %             The following portions of the patient's history were reviewed and updated as appropriate: allergies, current medications, past family history, past medical history, past social history, past surgical history and problem list.  Vitals:    11/06/19 1304   BP: 112/70   Pulse: 90   Resp: 18    Temp: 96.9 °F (36.1 °C)   SpO2: 96%     Body mass index is 31.16 kg/m².  Review of Systems   Constitutional: Negative.  Negative for activity change, fatigue, fever, irritability, unexpected weight gain and unexpected weight loss.   HENT: Negative.  Negative for congestion, sneezing and sore throat.    Eyes: Negative.  Negative for blurred vision, double vision and visual disturbance.   Respiratory: Negative.  Negative for cough, chest tightness, shortness of breath and wheezing.    Cardiovascular: Negative.  Negative for chest pain, palpitations and leg swelling.   Gastrointestinal: Negative.  Negative for abdominal distention, abdominal pain, blood in stool, constipation, diarrhea and nausea.   Endocrine: Negative.  Negative for cold intolerance and heat intolerance.   Genitourinary: Negative.  Negative for dysuria, frequency, urgency and urinary incontinence.   Musculoskeletal: Negative.  Negative for arthralgias and myalgias.   Skin: Negative.  Negative for rash.   Allergic/Immunologic: Negative.    Neurological: Negative.  Negative for dizziness, syncope, numbness and memory problem.   Hematological: Negative.  Negative for adenopathy.   Psychiatric/Behavioral: Negative for suicidal ideas and depressed mood. The patient is nervous/anxious. The patient does not have insomnia.    All other systems reviewed and are negative.      Objective   Physical Exam   Constitutional: She is oriented to person, place, and time. She appears well-developed and well-nourished. No distress.   HENT:   Right Ear: External ear normal.   Left Ear: External ear normal.   Nose: Nose normal.   Mouth/Throat: Oropharynx is clear and moist.   Eyes: Conjunctivae and EOM are normal. Pupils are equal, round, and reactive to light.   Neck: Normal range of motion. Neck supple. No thyromegaly present.   Cardiovascular: Normal rate, regular rhythm and normal heart sounds.   No murmur heard.  Pulmonary/Chest: Effort normal and breath sounds  normal. No respiratory distress. She has no wheezes.   Abdominal: Soft. Bowel sounds are normal. She exhibits no distension and no mass. There is no tenderness. There is no rebound and no guarding. No hernia.   Musculoskeletal: Normal range of motion. She exhibits no edema or tenderness.   Lymphadenopathy:     She has no cervical adenopathy.   Neurological: She is alert and oriented to person, place, and time. She has normal reflexes.   Skin: Skin is warm and dry. No rash noted. She is not diaphoretic. No erythema. No pallor.   Psychiatric: She has a normal mood and affect. Her behavior is normal. Judgment and thought content normal.   Nursing note and vitals reviewed.          Assessment/Plan   Octavia was seen today for depression and diabetes.    Diagnoses and all orders for this visit:    Anxiety    Mild episode of recurrent depressive disorder (CMS/HCC)  -     citalopram (CELEXA) 20 MG tablet; Take 1 and 1/2 tab qd    Diabetes mellitus without complication (CMS/HCC)      Increase Celexa to 30 mg daily to new current medications.  Watch for hypoglycemia. May need to decrease/d/c amaryl with current dietary changes.  Continue plant-based diet.

## 2019-11-14 ENCOUNTER — OFFICE VISIT (OUTPATIENT)
Dept: FAMILY MEDICINE CLINIC | Facility: CLINIC | Age: 59
End: 2019-11-14

## 2019-11-14 VITALS
WEIGHT: 210.6 LBS | SYSTOLIC BLOOD PRESSURE: 130 MMHG | TEMPERATURE: 97.8 F | DIASTOLIC BLOOD PRESSURE: 64 MMHG | HEIGHT: 69 IN | RESPIRATION RATE: 22 BRPM | OXYGEN SATURATION: 98 % | HEART RATE: 95 BPM | BODY MASS INDEX: 31.19 KG/M2

## 2019-11-14 DIAGNOSIS — E11.9 DIABETES MELLITUS WITHOUT COMPLICATION (HCC): Primary | ICD-10-CM

## 2019-11-14 DIAGNOSIS — Z71.3 NUTRITIONAL COUNSELING: ICD-10-CM

## 2019-11-14 DIAGNOSIS — E66.9 OBESITY (BMI 30-39.9): ICD-10-CM

## 2019-11-14 PROCEDURE — 99213 OFFICE O/P EST LOW 20 MIN: CPT | Performed by: FAMILY MEDICINE

## 2019-11-14 NOTE — PROGRESS NOTES
Subjective   Octavia Rice is a 59 y.o. female.     History of Present Illness   Has much better blood sugar control. Follows with endo.  Patient is interested in plant-based nutrition for better control of diabetes and weight.  She is interested in participating in a group nutrition visit today.  The following portions of the patient's history were reviewed and updated as appropriate: allergies, current medications, past family history, past medical history, past social history, past surgical history and problem list.  There were no vitals filed for this visit.  There is no height or weight on file to calculate BMI.  Review of Systems   Constitutional: Negative.  Negative for activity change, fatigue, fever, unexpected weight gain and unexpected weight loss.   HENT: Negative.  Negative for congestion, sneezing and sore throat.    Eyes: Negative.  Negative for blurred vision, double vision and visual disturbance.   Respiratory: Negative.  Negative for cough, chest tightness, shortness of breath and wheezing.    Cardiovascular: Negative.  Negative for chest pain, palpitations and leg swelling.   Gastrointestinal: Negative.  Negative for abdominal distention, abdominal pain, blood in stool, constipation, diarrhea and nausea.   Endocrine: Negative.  Negative for cold intolerance and heat intolerance.   Genitourinary: Negative.  Negative for dysuria, frequency, urgency and urinary incontinence.   Musculoskeletal: Negative.  Negative for arthralgias and myalgias.   Skin: Negative.  Negative for rash.   Allergic/Immunologic: Negative.    Neurological: Negative.  Negative for dizziness, syncope, numbness and memory problem.   Hematological: Negative.  Negative for adenopathy.   Psychiatric/Behavioral: Negative.  Negative for suicidal ideas and depressed mood. The patient is not nervous/anxious.    All other systems reviewed and are negative.      Objective   Physical Exam   Constitutional: She is oriented to person,  place, and time. She appears well-developed and well-nourished. No distress.   HENT:   Right Ear: External ear normal.   Left Ear: External ear normal.   Nose: Nose normal.   Mouth/Throat: Oropharynx is clear and moist.   Eyes: Conjunctivae and EOM are normal. Pupils are equal, round, and reactive to light.   Neck: Normal range of motion. Neck supple. No thyromegaly present.   Cardiovascular: Normal rate, regular rhythm and normal heart sounds.   No murmur heard.  Pulmonary/Chest: Effort normal and breath sounds normal. No respiratory distress. She has no wheezes.   Abdominal: Soft. Bowel sounds are normal. She exhibits no distension and no mass. There is no tenderness. There is no rebound and no guarding. No hernia.   Musculoskeletal: Normal range of motion. She exhibits no edema or tenderness.   Lymphadenopathy:     She has no cervical adenopathy.   Neurological: She is alert and oriented to person, place, and time. She has normal reflexes.   Skin: Skin is warm and dry. No rash noted. She is not diaphoretic. No erythema. No pallor.   Psychiatric: She has a normal mood and affect. Her behavior is normal. Judgment and thought content normal.   Nursing note and vitals reviewed.          Assessment/Plan   Octavia was seen today for diabetes, obesity and nutrition counseling.    Diagnoses and all orders for this visit:    Diabetes mellitus without complication (CMS/HCC)    Obesity (BMI 30-39.9)    Nutritional counseling    Patient will continue on current medications.  Patient participated in a group visit for nutrition counseling on plant-based medicine.  Patient had a face-to-face encounter prior to the group visit.

## 2019-11-20 DIAGNOSIS — F33.0 MILD EPISODE OF RECURRENT DEPRESSIVE DISORDER (HCC): ICD-10-CM

## 2019-11-20 DIAGNOSIS — S29.019A THORACIC MYOFASCIAL STRAIN, INITIAL ENCOUNTER: ICD-10-CM

## 2019-11-21 RX ORDER — CITALOPRAM 20 MG/1
TABLET ORAL
Qty: 30 TABLET | Refills: 0 | Status: SHIPPED | OUTPATIENT
Start: 2019-11-21 | End: 2020-02-07 | Stop reason: SDUPTHER

## 2019-11-21 RX ORDER — DICLOFENAC SODIUM 75 MG/1
TABLET, DELAYED RELEASE ORAL
Qty: 40 TABLET | Refills: 0 | Status: SHIPPED | OUTPATIENT
Start: 2019-11-21 | End: 2019-12-23

## 2019-11-25 ENCOUNTER — TELEPHONE (OUTPATIENT)
Dept: INTERNAL MEDICINE | Facility: CLINIC | Age: 59
End: 2019-11-25

## 2019-11-25 DIAGNOSIS — E78.2 MIXED HYPERLIPIDEMIA: Primary | ICD-10-CM

## 2019-11-25 RX ORDER — SIMVASTATIN 40 MG
40 TABLET ORAL NIGHTLY
Qty: 90 TABLET | Refills: 1 | Status: SHIPPED | OUTPATIENT
Start: 2019-11-25 | End: 2020-06-02

## 2019-11-25 NOTE — TELEPHONE ENCOUNTER
[facility] CALLED  NEEDS REFILL     PT HAD PRESCRIPTION FOR SIMVASTATIN   AT [facility] BUT NOW NEEDS TO COME TO [facility]     DOCTOR LINE [phone]

## 2019-12-22 DIAGNOSIS — S29.019A THORACIC MYOFASCIAL STRAIN, INITIAL ENCOUNTER: ICD-10-CM

## 2019-12-23 RX ORDER — DICLOFENAC SODIUM 75 MG/1
TABLET, DELAYED RELEASE ORAL
Qty: 40 TABLET | Refills: 0 | Status: SHIPPED | OUTPATIENT
Start: 2019-12-23 | End: 2020-01-14

## 2020-01-13 DIAGNOSIS — S29.019A THORACIC MYOFASCIAL STRAIN, INITIAL ENCOUNTER: ICD-10-CM

## 2020-01-14 RX ORDER — DICLOFENAC SODIUM 75 MG/1
TABLET, DELAYED RELEASE ORAL
Qty: 40 TABLET | Refills: 0 | Status: SHIPPED | OUTPATIENT
Start: 2020-01-14 | End: 2020-02-10

## 2020-02-06 DIAGNOSIS — S29.019A THORACIC MYOFASCIAL STRAIN, INITIAL ENCOUNTER: ICD-10-CM

## 2020-02-07 ENCOUNTER — OFFICE VISIT (OUTPATIENT)
Dept: FAMILY MEDICINE CLINIC | Facility: CLINIC | Age: 60
End: 2020-02-07

## 2020-02-07 VITALS
OXYGEN SATURATION: 97 % | TEMPERATURE: 95.7 F | WEIGHT: 214.2 LBS | RESPIRATION RATE: 16 BRPM | SYSTOLIC BLOOD PRESSURE: 142 MMHG | HEART RATE: 96 BPM | BODY MASS INDEX: 31.73 KG/M2 | HEIGHT: 69 IN | DIASTOLIC BLOOD PRESSURE: 86 MMHG

## 2020-02-07 DIAGNOSIS — Z12.11 COLON CANCER SCREENING: ICD-10-CM

## 2020-02-07 DIAGNOSIS — F41.9 ANXIETY: ICD-10-CM

## 2020-02-07 DIAGNOSIS — Z72.0 TOBACCO ABUSE: ICD-10-CM

## 2020-02-07 DIAGNOSIS — R19.7 DIARRHEA, UNSPECIFIED TYPE: ICD-10-CM

## 2020-02-07 DIAGNOSIS — J06.9 ACUTE URI: Primary | ICD-10-CM

## 2020-02-07 DIAGNOSIS — F33.0 MILD EPISODE OF RECURRENT DEPRESSIVE DISORDER (HCC): ICD-10-CM

## 2020-02-07 LAB
EXPIRATION DATE: NORMAL
FLUAV AG NPH QL: NEGATIVE
FLUBV AG NPH QL: NEGATIVE
INTERNAL CONTROL: NORMAL
Lab: NORMAL

## 2020-02-07 PROCEDURE — 87804 INFLUENZA ASSAY W/OPTIC: CPT | Performed by: FAMILY MEDICINE

## 2020-02-07 PROCEDURE — 99214 OFFICE O/P EST MOD 30 MIN: CPT | Performed by: FAMILY MEDICINE

## 2020-02-07 RX ORDER — AMITRIPTYLINE HYDROCHLORIDE 10 MG/1
10 TABLET, FILM COATED ORAL NIGHTLY
Qty: 30 TABLET | Refills: 3 | Status: SHIPPED | OUTPATIENT
Start: 2020-02-07 | End: 2020-10-07

## 2020-02-07 RX ORDER — BUPROPION HYDROCHLORIDE 150 MG/1
150 TABLET ORAL DAILY
Qty: 30 TABLET | Refills: 1 | Status: SHIPPED | OUTPATIENT
Start: 2020-02-07 | End: 2020-04-06

## 2020-02-07 RX ORDER — CITALOPRAM 20 MG/1
TABLET ORAL
Qty: 30 TABLET | Refills: 3 | OUTPATIENT
Start: 2020-02-07 | End: 2020-10-01

## 2020-02-07 RX ORDER — DICYCLOMINE HYDROCHLORIDE 10 MG/1
10 CAPSULE ORAL 2 TIMES DAILY PRN
Qty: 60 CAPSULE | Refills: 1 | Status: SHIPPED | OUTPATIENT
Start: 2020-02-07 | End: 2020-04-06

## 2020-02-07 RX ORDER — AMOXICILLIN AND CLAVULANATE POTASSIUM 875; 125 MG/1; MG/1
1 TABLET, FILM COATED ORAL 2 TIMES DAILY
Qty: 20 TABLET | Refills: 0 | Status: SHIPPED | OUTPATIENT
Start: 2020-02-07 | End: 2020-05-08

## 2020-02-07 NOTE — PROGRESS NOTES
Subjective   Octavia Rice is a 60 y.o. female.   1. Has had cough, congestion, sneezing, rhinorrhea, ear pain since Christmas. Took OTC cold med little relief.   URI    This is a new problem. The current episode started 1 to 4 weeks ago. The problem has been unchanged. There has been no fever. Associated symptoms include chest pain, congestion, coughing, ear pain, rhinorrhea, sinus pain and sneezing. Pertinent negatives include no abdominal pain, diarrhea, dysuria, nausea, rash, sore throat or wheezing. Treatments tried: OTC cough and cold med. The treatment provided mild relief.      2. Has not been sleeping well. Has been having increased naps during the day. Takes Melatonin some relief.  3. Diarrhea- has worsened. Has not had colonoscopy. Worse after eating.   4. Wants to quit smoking. Has tried to stop without relief.    The following portions of the patient's history were reviewed and updated as appropriate: allergies, current medications, past family history, past medical history, past social history, past surgical history and problem list.  Vitals:    02/07/20 1340   BP: 142/86   Pulse: 96   Resp: 16   Temp: 95.7 °F (35.4 °C)   SpO2: 97%     Body mass index is 31.62 kg/m².  Review of Systems   Constitutional: Negative.  Negative for activity change, fatigue, fever, unexpected weight gain and unexpected weight loss.   HENT: Positive for congestion, ear pain, rhinorrhea and sneezing. Negative for sore throat.    Eyes: Negative.  Negative for blurred vision, double vision and visual disturbance.   Respiratory: Positive for cough. Negative for chest tightness, shortness of breath and wheezing.    Cardiovascular: Positive for chest pain. Negative for palpitations and leg swelling.   Gastrointestinal: Negative.  Negative for abdominal distention, abdominal pain, blood in stool, constipation, diarrhea and nausea.   Endocrine: Negative.  Negative for cold intolerance and heat intolerance.   Genitourinary: Negative.   Negative for dysuria, frequency, urgency and urinary incontinence.   Musculoskeletal: Negative.  Negative for arthralgias and myalgias.   Skin: Negative.  Negative for rash.   Allergic/Immunologic: Negative.    Neurological: Negative.  Negative for dizziness, syncope, numbness and memory problem.   Hematological: Negative.  Negative for adenopathy.   Psychiatric/Behavioral: Negative.  Negative for suicidal ideas and depressed mood. The patient is not nervous/anxious.    All other systems reviewed and are negative.      Objective   Physical Exam   Constitutional: She is oriented to person, place, and time. She appears well-developed and well-nourished. No distress.   HENT:   Right Ear: External ear normal.   Left Ear: External ear normal.   Nose: Nose normal.   Mouth/Throat: Oropharynx is clear and moist.   Eyes: Pupils are equal, round, and reactive to light. Conjunctivae and EOM are normal.   Neck: Normal range of motion. Neck supple. No thyromegaly present.   Cardiovascular: Normal rate, regular rhythm and normal heart sounds.   No murmur heard.  Pulmonary/Chest: Effort normal and breath sounds normal. No respiratory distress. She has no wheezes.   Abdominal: Soft. Bowel sounds are normal. She exhibits no distension and no mass. There is no tenderness. There is no rebound and no guarding. No hernia.   Musculoskeletal: Normal range of motion. She exhibits no edema or tenderness.   Lymphadenopathy:     She has no cervical adenopathy.   Neurological: She is alert and oriented to person, place, and time. She has normal reflexes.   Skin: Skin is warm and dry. No rash noted. She is not diaphoretic. No erythema. No pallor.   Psychiatric: She has a normal mood and affect. Her behavior is normal. Judgment and thought content normal.   Nursing note and vitals reviewed.          Assessment/Plan   Octavia was seen today for cough, diarrhea and nicotine dependence.    Diagnoses and all orders for this visit:    Acute URI  -      POCT Influenza A/B  -     amoxicillin-clavulanate (AUGMENTIN) 875-125 MG per tablet; Take 1 tablet by mouth 2 (Two) Times a Day.    Colon cancer screening  -     Ambulatory Referral For Screening Colonoscopy    Tobacco abuse    Diarrhea, unspecified type  -     dicyclomine (BENTYL) 10 MG capsule; Take 1 capsule by mouth 2 (Two) Times a Day As Needed (cramping and diarrhea).    Anxiety    Mild episode of recurrent depressive disorder (CMS/HCC)  -     citalopram (CeleXA) 20 MG tablet; Take 1 tb qd  -     buPROPion XL (WELLBUTRIN XL) 150 MG 24 hr tablet; Take 1 tablet by mouth Daily.    Other orders  -     amitriptyline (ELAVIL) 10 MG tablet; Take 1 tablet by mouth Every Night.

## 2020-02-10 RX ORDER — DICLOFENAC SODIUM 75 MG/1
TABLET, DELAYED RELEASE ORAL
Qty: 40 TABLET | Refills: 0 | Status: SHIPPED | OUTPATIENT
Start: 2020-02-10 | End: 2020-05-27

## 2020-02-21 RX ORDER — SODIUM, POTASSIUM,MAG SULFATES 17.5-3.13G
1 SOLUTION, RECONSTITUTED, ORAL ORAL TAKE AS DIRECTED
Qty: 2 BOTTLE | Refills: 0 | Status: SHIPPED | OUTPATIENT
Start: 2020-02-21 | End: 2020-05-08

## 2020-02-25 ENCOUNTER — TELEPHONE (OUTPATIENT)
Dept: FAMILY MEDICINE CLINIC | Facility: CLINIC | Age: 60
End: 2020-02-25

## 2020-02-25 NOTE — TELEPHONE ENCOUNTER
Pt called to find out if her referral for the colonoscopy was a screening or diagnostic referral.    Callback at [phone]

## 2020-02-27 ENCOUNTER — LAB REQUISITION (OUTPATIENT)
Dept: LAB | Facility: HOSPITAL | Age: 60
End: 2020-02-27

## 2020-02-27 ENCOUNTER — OUTSIDE FACILITY SERVICE (OUTPATIENT)
Dept: GASTROENTEROLOGY | Facility: CLINIC | Age: 60
End: 2020-02-27

## 2020-02-27 DIAGNOSIS — K57.30 DIVERTICULOSIS OF LARGE INTESTINE WITHOUT PERFORATION OR ABSCESS WITHOUT BLEEDING: ICD-10-CM

## 2020-02-27 DIAGNOSIS — R19.7 DIARRHEA, UNSPECIFIED: ICD-10-CM

## 2020-02-27 DIAGNOSIS — Z12.11 ENCOUNTER FOR SCREENING FOR MALIGNANT NEOPLASM OF COLON: ICD-10-CM

## 2020-02-27 DIAGNOSIS — K64.8 OTHER HEMORRHOIDS: ICD-10-CM

## 2020-02-27 PROCEDURE — G0121 COLON CA SCRN NOT HI RSK IND: HCPCS | Performed by: INTERNAL MEDICINE

## 2020-02-27 PROCEDURE — 88305 TISSUE EXAM BY PATHOLOGIST: CPT | Performed by: INTERNAL MEDICINE

## 2020-02-28 LAB
CYTO UR: NORMAL
LAB AP CASE REPORT: NORMAL
LAB AP CLINICAL INFORMATION: NORMAL
PATH REPORT.FINAL DX SPEC: NORMAL
PATH REPORT.GROSS SPEC: NORMAL

## 2020-03-04 ENCOUNTER — TELEPHONE (OUTPATIENT)
Dept: GASTROENTEROLOGY | Facility: CLINIC | Age: 60
End: 2020-03-04

## 2020-03-04 NOTE — TELEPHONE ENCOUNTER
----- Message from Paramjit Horner MD sent at 3/2/2020  2:51 PM EST -----  Let Mrs. Rice know that all the biopsies were negative for microscopic colitis.  The findings are consistent with irritable bowel syndrome diarrhea predominant.  The next colon examination would be in 10 years.  Thank you,  TUYET

## 2020-04-06 DIAGNOSIS — F33.0 MILD EPISODE OF RECURRENT DEPRESSIVE DISORDER (HCC): ICD-10-CM

## 2020-04-06 DIAGNOSIS — R19.7 DIARRHEA, UNSPECIFIED TYPE: ICD-10-CM

## 2020-04-06 RX ORDER — DICYCLOMINE HYDROCHLORIDE 10 MG/1
CAPSULE ORAL
Qty: 60 CAPSULE | Refills: 0 | Status: SHIPPED | OUTPATIENT
Start: 2020-04-06 | End: 2020-06-02

## 2020-04-06 RX ORDER — BUPROPION HYDROCHLORIDE 150 MG/1
TABLET ORAL
Qty: 30 TABLET | Refills: 0 | Status: SHIPPED | OUTPATIENT
Start: 2020-04-06 | End: 2020-05-27

## 2020-05-08 ENCOUNTER — OFFICE VISIT (OUTPATIENT)
Dept: FAMILY MEDICINE CLINIC | Facility: CLINIC | Age: 60
End: 2020-05-08

## 2020-05-08 VITALS
DIASTOLIC BLOOD PRESSURE: 82 MMHG | TEMPERATURE: 98.8 F | RESPIRATION RATE: 20 BRPM | SYSTOLIC BLOOD PRESSURE: 138 MMHG | BODY MASS INDEX: 32.41 KG/M2 | WEIGHT: 218.8 LBS | HEIGHT: 69 IN

## 2020-05-08 DIAGNOSIS — M25.572 ACUTE LEFT ANKLE PAIN: Primary | ICD-10-CM

## 2020-05-08 PROCEDURE — 99213 OFFICE O/P EST LOW 20 MIN: CPT | Performed by: FAMILY MEDICINE

## 2020-05-08 RX ORDER — IBUPROFEN 200 MG
200 TABLET ORAL EVERY 6 HOURS PRN
COMMUNITY
End: 2022-02-01

## 2020-05-08 RX ORDER — DICLOFENAC SODIUM 75 MG/1
75 TABLET, DELAYED RELEASE ORAL 2 TIMES DAILY
Qty: 40 TABLET | Refills: 0 | Status: SHIPPED | OUTPATIENT
Start: 2020-05-08 | End: 2020-07-05

## 2020-05-08 NOTE — PROGRESS NOTES
Subjective   Octavia Rice is a 60 y.o. female.   Has had increased walking recently.  Ankle Pain    Incident onset: 1 week. Incident location: no injury. There was no injury mechanism. The pain is present in the left foot. The quality of the pain is described as stabbing. The pain is at a severity of 10/10. The pain is severe. The pain has been constant since onset. Associated symptoms include an inability to bear weight. Pertinent negatives include no numbness or tingling. She reports no foreign bodies present. The symptoms are aggravated by weight bearing. She has tried NSAIDs for the symptoms. The treatment provided no relief.   Has had surgery on left foot years ago.    The following portions of the patient's history were reviewed and updated as appropriate: allergies, current medications, past family history, past medical history, past social history, past surgical history and problem list.  Vitals:    05/08/20 1206   BP: 138/82   Resp: 20   Temp: 98.8 °F (37.1 °C)     Body mass index is 32.31 kg/m².  Review of Systems   Constitutional: Negative.  Negative for activity change, fatigue, fever, unexpected weight gain and unexpected weight loss.   HENT: Negative.  Negative for congestion, sneezing and sore throat.    Eyes: Negative.  Negative for blurred vision, double vision and visual disturbance.   Respiratory: Negative.  Negative for cough, chest tightness, shortness of breath and wheezing.    Cardiovascular: Negative.  Negative for chest pain, palpitations and leg swelling.   Gastrointestinal: Negative.  Negative for abdominal distention, abdominal pain, blood in stool, constipation, diarrhea and nausea.   Endocrine: Negative.  Negative for cold intolerance and heat intolerance.   Genitourinary: Negative.  Negative for dysuria, frequency, urgency and urinary incontinence.   Musculoskeletal: Negative.  Negative for arthralgias and myalgias.   Skin: Negative.  Negative for rash.   Allergic/Immunologic:  Negative.    Neurological: Negative.  Negative for dizziness, tingling, syncope, numbness and memory problem.   Hematological: Negative.  Negative for adenopathy.   Psychiatric/Behavioral: Negative.  Negative for suicidal ideas and depressed mood. The patient is not nervous/anxious.    All other systems reviewed and are negative.      Objective   Physical Exam   Constitutional: She is oriented to person, place, and time. She appears well-developed and well-nourished. No distress.   HENT:   Right Ear: External ear normal.   Left Ear: External ear normal.   Nose: Nose normal.   Mouth/Throat: Oropharynx is clear and moist.   Eyes: Pupils are equal, round, and reactive to light. Conjunctivae and EOM are normal.   Neck: Normal range of motion. Neck supple. No thyromegaly present.   Cardiovascular: Normal rate, regular rhythm and normal heart sounds.   No murmur heard.  Pulmonary/Chest: Effort normal and breath sounds normal. No respiratory distress. She has no wheezes.   Abdominal: Soft. Bowel sounds are normal. She exhibits no distension and no mass. There is no tenderness. There is no rebound and no guarding. No hernia.   Musculoskeletal: She exhibits no edema.        Left ankle: She exhibits decreased range of motion, swelling and ecchymosis. Tenderness. Lateral malleolus and medial malleolus tenderness found.        Left foot: There is decreased range of motion, tenderness and swelling. There is no bony tenderness.   Lymphadenopathy:     She has no cervical adenopathy.   Neurological: She is alert and oriented to person, place, and time. She has normal reflexes.   Skin: Skin is warm and dry. No rash noted. She is not diaphoretic. No erythema. No pallor.   Psychiatric: She has a normal mood and affect. Her behavior is normal. Judgment and thought content normal.   Nursing note and vitals reviewed.          Assessment/Plan   Octavia was seen today for ankle pain.    Diagnoses and all orders for this visit:    Acute left  ankle pain    Other orders  -     diclofenac (VOLTAREN) 75 MG EC tablet; Take 1 tablet by mouth 2 (Two) Times a Day.    Ice, Ace, elevation.  If no improvement then will need x-ray.

## 2020-05-27 ENCOUNTER — OFFICE VISIT (OUTPATIENT)
Dept: ENDOCRINOLOGY | Facility: CLINIC | Age: 60
End: 2020-05-27

## 2020-05-27 VITALS
HEART RATE: 77 BPM | DIASTOLIC BLOOD PRESSURE: 82 MMHG | OXYGEN SATURATION: 97 % | BODY MASS INDEX: 31.55 KG/M2 | HEIGHT: 69 IN | SYSTOLIC BLOOD PRESSURE: 130 MMHG | WEIGHT: 213 LBS

## 2020-05-27 DIAGNOSIS — E11.65 UNCONTROLLED TYPE 2 DIABETES MELLITUS WITH HYPERGLYCEMIA (HCC): ICD-10-CM

## 2020-05-27 DIAGNOSIS — E55.9 VITAMIN D DEFICIENCY: ICD-10-CM

## 2020-05-27 DIAGNOSIS — E11.65 TYPE 2 DIABETES MELLITUS WITH HYPERGLYCEMIA, WITHOUT LONG-TERM CURRENT USE OF INSULIN (HCC): Primary | ICD-10-CM

## 2020-05-27 LAB
EXPIRATION DATE: NORMAL
EXPIRATION DATE: NORMAL
GLUCOSE BLDC GLUCOMTR-MCNC: 124 MG/DL (ref 70–130)
HBA1C MFR BLD: 8.3 %
Lab: NORMAL
Lab: NORMAL

## 2020-05-27 PROCEDURE — 82947 ASSAY GLUCOSE BLOOD QUANT: CPT | Performed by: INTERNAL MEDICINE

## 2020-05-27 PROCEDURE — 99213 OFFICE O/P EST LOW 20 MIN: CPT | Performed by: INTERNAL MEDICINE

## 2020-05-27 PROCEDURE — 83036 HEMOGLOBIN GLYCOSYLATED A1C: CPT | Performed by: INTERNAL MEDICINE

## 2020-05-27 RX ORDER — GLIMEPIRIDE 2 MG/1
8 TABLET ORAL
Qty: 360 TABLET | Refills: 1 | OUTPATIENT
Start: 2020-05-27 | End: 2020-10-01

## 2020-05-27 NOTE — PROGRESS NOTES
Chief complaint  Diabetes (Follow up:  Thinks she may have sun poisioning )    Subjective   Octavia Rice is a 60 y.o. female is here today for follow-up of:    Diabetes Mellitus type 2: dx in 2003.  Diabetic complications: none  Eye exam current (within one year): no retinopathy, recent exam.     Current diabetic medications include   Metformin  mg - 2 tablets BID.  Victoza 1.8 mg daily.   Glimepiride 4 mg daily.       Past medications: 08/2016 Jardiance trial was unsuccessful - frequent urination    Monitoring   Meter downloaded and reviewed.      Other med problems: include hyperlipidemia on simvastatin.     She has started dietary program - eliminated dairy and bread. Increased exercises. She is doing well and feels better.   She has spent few hours in the pool on the sun and has sunburn.       Medications    Current Outpatient Medications:   •  ACCU-CHEK FRANKY PLUS test strip, Test Three Times Daily, Disp: 100 each, Rfl: 12  •  ACCU-CHEK SOFTCLIX LANCETS lancets, Use as instructed, Disp: 100 each, Rfl: 12  •  amitriptyline (ELAVIL) 10 MG tablet, Take 1 tablet by mouth Every Night., Disp: 30 tablet, Rfl: 3  •  aspirin 81 MG EC tablet, Take 81 mg by mouth Daily., Disp: , Rfl:   •  Blood Glucose Monitoring Suppl (ACCU-CHEK FRANKY PLUS) w/Device kit, Test Three Times Daily, Disp: 1 kit, Rfl: 0  •  cholecalciferol (VITAMIN D3) 1000 units tablet, Take 1,000 Units by mouth Daily., Disp: , Rfl:   •  citalopram (CeleXA) 20 MG tablet, Take 1 tb qd, Disp: 30 tablet, Rfl: 3  •  Coenzyme Q10 (CO Q-10) 200 MG capsule, Take  by mouth., Disp: , Rfl:   •  cyclobenzaprine (FLEXERIL) 10 MG tablet, Take 1 tablet by mouth At Night As Needed for Muscle Spasms., Disp: 30 tablet, Rfl: 1  •  diclofenac (VOLTAREN) 75 MG EC tablet, Take 1 tablet by mouth 2 (Two) Times a Day., Disp: 40 tablet, Rfl: 0  •  dicyclomine (BENTYL) 10 MG capsule, TAKE ONE CAPSULE BY MOUTH TWICE A DAY AS NEEDED FOR CRAMPING AND DIARRHEA (Patient taking  differently: Daily.), Disp: 60 capsule, Rfl: 0  •  glimepiride (AMARYL) 2 MG tablet, Take 4 tablets by mouth Every Morning Before Breakfast., Disp: 360 tablet, Rfl: 1  •  ibuprofen (ADVIL,MOTRIN) 200 MG tablet, Take 200 mg by mouth Every 6 (Six) Hours As Needed for Mild Pain ., Disp: , Rfl:   •  Insulin Pen Needle (BD PEN NEEDLE ELVIA U/F) 32G X 4 MM misc, USE ONCE DAILY, Disp: 100 each, Rfl: 3  •  Liraglutide (VICTOZA) 18 MG/3ML solution pen-injector injection, Inject 1.8 mg under the skin into the appropriate area as directed Daily., Disp: 9 pen, Rfl: 3  •  MELATONIN PO, Take  by mouth., Disp: , Rfl:   •  metFORMIN ER (GLUCOPHAGE-XR) 500 MG 24 hr tablet, Take 2 tablets by mouth Daily With Breakfast & Dinner., Disp: 360 tablet, Rfl: 3  •  Multiple Minerals-Vitamins (CALCIUM-MAGNESIUM-ZINC-D3 PO), Take  by mouth., Disp: , Rfl:   •  Multiple Vitamin (MULTI VITAMIN DAILY PO), Take  by mouth., Disp: , Rfl:   •  olopatadine (PATADAY) 0.2 % solution ophthalmic solution, 1 DROP INTO BOTH EYES ONCE A DAY, Disp: , Rfl: 10  •  simvastatin (ZOCOR) 40 MG tablet, Take 1 tablet by mouth Every Night., Disp: 90 tablet, Rfl: 1    PMH  The following portions of the patient's history were reviewed and updated as appropriate: allergies, current medications, past family history, past medical history, past social history, past surgical history and problem list.    Review of systems  Review of Systems   Constitutional: Positive for appetite change (decreased appetite) and unexpected weight change (weight loss).   Eyes: Negative for visual disturbance.   Respiratory: Negative.  Negative for shortness of breath.    Cardiovascular: Negative for chest pain and leg swelling.   Gastrointestinal: Negative for constipation. Diarrhea: occasional.   Endocrine: Negative for cold intolerance, polydipsia and polyuria.   Musculoskeletal: Positive for arthralgias (shoulder pain right). Negative for back pain, joint swelling and myalgias.   Skin:  "Positive for rash (sunburn). Negative for wound.   Allergic/Immunologic: Positive for environmental allergies.   Neurological: Positive for headaches. Negative for numbness.   Hematological: Negative.    Psychiatric/Behavioral: Negative for self-injury. Behavioral problem: phobia of crowds.       Physical exam  Objective   Blood pressure 130/82, pulse 77, height 175.3 cm (69\"), weight 96.6 kg (213 lb), SpO2 97 %. Body mass index is 31.45 kg/m².  Physical Exam   Constitutional: She is oriented to person, place, and time. She appears well-developed and well-nourished.   HENT:   Head: Normocephalic and atraumatic.   Eyes: Conjunctivae are normal.   Cardiovascular: Normal rate, regular rhythm and normal heart sounds.   Pulmonary/Chest: Effort normal and breath sounds normal.   Neurological: She is alert and oriented to person, place, and time.   Skin: There is erythema.   Psychiatric: She has a normal mood and affect. Thought content normal.   Vitals reviewed.        LABS AND IMAGING  Results for orders placed or performed in visit on 05/27/20   POC Glucose Fingerstick   Result Value Ref Range    Glucose 124 70 - 130 mg/dL    Lot Number [id number]     Expiration Date 11/25/2020    POC Glycosylated Hemoglobin (Hb A1C)   Result Value Ref Range    Hemoglobin A1C 8.3 %    Lot Number [id number]     Expiration Date 01/29/2022            Assessment:         Diagnoses and all orders for this visit:    Type 2 diabetes mellitus with hyperglycemia, without long-term current use of insulin (CMS/[facility])  -     POC Glucose Fingerstick  -     POC Glycosylated Hemoglobin (Hb A1C)    Vitamin D deficiency    Uncontrolled type 2 diabetes mellitus with hyperglycemia (CMS/[facility])  -     glimepiride (AMARYL) 2 MG tablet; Take 4 tablets by mouth Every Morning Before Breakfast.        Plan:        Cont glimepiride 8 mg qd ac.   Cont Victoza and metformin.    Meds refilled.     Cont exercise- walking . Dietary recommendations encouraged.      Follow " up:  4-6 months

## 2020-06-02 DIAGNOSIS — E78.2 MIXED HYPERLIPIDEMIA: ICD-10-CM

## 2020-06-02 DIAGNOSIS — R19.7 DIARRHEA, UNSPECIFIED TYPE: ICD-10-CM

## 2020-06-02 DIAGNOSIS — F33.0 MILD EPISODE OF RECURRENT DEPRESSIVE DISORDER (HCC): ICD-10-CM

## 2020-06-02 RX ORDER — SIMVASTATIN 40 MG
TABLET ORAL
Qty: 90 TABLET | Refills: 0 | Status: SHIPPED | OUTPATIENT
Start: 2020-06-02 | End: 2020-09-15

## 2020-06-02 RX ORDER — BUPROPION HYDROCHLORIDE 150 MG/1
TABLET ORAL
Qty: 30 TABLET | Refills: 0 | Status: SHIPPED | OUTPATIENT
Start: 2020-06-02 | End: 2020-07-05

## 2020-06-02 RX ORDER — DICYCLOMINE HYDROCHLORIDE 10 MG/1
CAPSULE ORAL
Qty: 60 CAPSULE | Refills: 0 | Status: SHIPPED | OUTPATIENT
Start: 2020-06-02 | End: 2020-07-05

## 2020-07-03 DIAGNOSIS — F33.0 MILD EPISODE OF RECURRENT DEPRESSIVE DISORDER (HCC): ICD-10-CM

## 2020-07-03 DIAGNOSIS — R19.7 DIARRHEA, UNSPECIFIED TYPE: ICD-10-CM

## 2020-07-03 DIAGNOSIS — S29.019A THORACIC MYOFASCIAL STRAIN, INITIAL ENCOUNTER: ICD-10-CM

## 2020-07-05 RX ORDER — CYCLOBENZAPRINE HCL 10 MG
TABLET ORAL
Qty: 30 TABLET | Refills: 0 | OUTPATIENT
Start: 2020-07-05 | End: 2020-10-01

## 2020-07-05 RX ORDER — DICYCLOMINE HYDROCHLORIDE 10 MG/1
CAPSULE ORAL
Qty: 60 CAPSULE | Refills: 0 | OUTPATIENT
Start: 2020-07-05 | End: 2020-10-01

## 2020-07-05 RX ORDER — BUPROPION HYDROCHLORIDE 150 MG/1
TABLET ORAL
Qty: 30 TABLET | Refills: 0 | OUTPATIENT
Start: 2020-07-05 | End: 2020-10-01

## 2020-07-05 RX ORDER — DICLOFENAC SODIUM 75 MG/1
TABLET, DELAYED RELEASE ORAL
Qty: 40 TABLET | Refills: 0 | OUTPATIENT
Start: 2020-07-05 | End: 2020-10-01

## 2020-08-07 ENCOUNTER — OFFICE VISIT (OUTPATIENT)
Dept: FAMILY MEDICINE CLINIC | Facility: CLINIC | Age: 60
End: 2020-08-07

## 2020-08-07 VITALS
TEMPERATURE: 96.6 F | DIASTOLIC BLOOD PRESSURE: 76 MMHG | HEIGHT: 69 IN | RESPIRATION RATE: 16 BRPM | SYSTOLIC BLOOD PRESSURE: 130 MMHG | WEIGHT: 212.8 LBS | OXYGEN SATURATION: 98 % | HEART RATE: 94 BPM | BODY MASS INDEX: 31.52 KG/M2

## 2020-08-07 DIAGNOSIS — H66.001 RIGHT ACUTE SUPPURATIVE OTITIS MEDIA: Primary | ICD-10-CM

## 2020-08-07 DIAGNOSIS — J02.9 PHARYNGITIS, UNSPECIFIED ETIOLOGY: ICD-10-CM

## 2020-08-07 PROCEDURE — 99213 OFFICE O/P EST LOW 20 MIN: CPT | Performed by: NURSE PRACTITIONER

## 2020-08-07 RX ORDER — CEPHALEXIN 500 MG/1
500 CAPSULE ORAL 2 TIMES DAILY
Qty: 20 CAPSULE | Refills: 0 | Status: SHIPPED | OUTPATIENT
Start: 2020-08-07 | End: 2020-08-17

## 2020-08-07 NOTE — PATIENT INSTRUCTIONS
Otitis Media, Adult    Otitis media occurs when there is inflammation and fluid in the middle ear. Your middle ear is a part of the ear that contains bones for hearing as well as air that helps send sounds to your brain.  What are the causes?  This condition is caused by a blockage in the eustachian tube. This tube drains fluid from the ear to the back of the nose (nasopharynx). A blockage in this tube can be caused by an object or by swelling (edema) in the tube. Problems that can cause a blockage include:  · A cold or other upper respiratory infection.  · Allergies.  · An irritant, such as tobacco smoke.  · Enlarged adenoids. The adenoids are areas of soft tissue located high in the back of the throat, behind the nose and the roof of the mouth.  · A mass in the nasopharynx.  · Damage to the ear caused by pressure changes (barotrauma).  What are the signs or symptoms?  Symptoms of this condition include:  · Ear pain.  · A fever.  · Decreased hearing.  · A headache.  · Tiredness (lethargy).  · Fluid leaking from the ear.  · Ringing in the ear.  How is this diagnosed?  This condition is diagnosed with a physical exam. During the exam your health care provider will use an instrument called an otoscope to look into your ear and check for redness, swelling, and fluid. He or she will also ask about your symptoms.  Your health care provider may also order tests, such as:  · A test to check the movement of the eardrum (pneumatic otoscopy). This test is done by squeezing a small amount of air into the ear.  · A test that changes air pressure in the middle ear to check how well the eardrum moves and whether the eustachian tube is working (tympanogram).  How is this treated?  This condition usually goes away on its own within 3-5 days. But if the condition is caused by a bacteria infection and does not go away own its own, or keeps coming back, your health care provider may:  · Prescribe antibiotic medicines to treat the  infection.  · Prescribe or recommend medicines to control pain.  Follow these instructions at home:  · Take over-the-counter and prescription medicines only as told by your health care provider.  · If you were prescribed an antibiotic medicine, take it as told by your health care provider. Do not stop taking the antibiotic even if you start to feel better.  · Keep all follow-up visits as told by your health care provider. This is important.  Contact a health care provider if:  · You have bleeding from your nose.  · There is a lump on your neck.  · You are not getting better in 5 days.  · You feel worse instead of better.  Get help right away if:  · You have severe pain that is not controlled with medicine.  · You have swelling, redness, or pain around your ear.  · You have stiffness in your neck.  · A part of your face is paralyzed.  · The bone behind your ear (mastoid) is tender when you touch it.  · You develop a severe headache.  Summary  · Otitis media is redness, soreness, and swelling of the middle ear.  · This condition usually goes away on its own within 3-5 days.  · If the problem does not go away in 3-5 days, your health care provider may prescribe or recommend medicines to treat your symptoms.  · If you were prescribed an antibiotic medicine, take it as told by your health care provider.  This information is not intended to replace advice given to you by your health care provider. Make sure you discuss any questions you have with your health care provider.  Document Released: 09/22/2005 Document Revised: 11/30/2018 Document Reviewed: 12/08/2017  [employer] Patient Education © 2020 [employer]    Pharyngitis    Pharyngitis is redness, pain, and swelling (inflammation) of the throat (pharynx). It is a very common cause of sore throat. Pharyngitis can be caused by a bacteria, but it is usually caused by a virus. Most cases of pharyngitis get better on their own without treatment.  What are the causes?  This  condition may be caused by:  · Infection by viruses (viral). Viral pharyngitis spreads from person to person (is contagious) through coughing, sneezing, and sharing of personal items or utensils such as cups, forks, spoons, and toothbrushes.  · Infection by bacteria (bacterial). Bacterial pharyngitis may be spread by touching the nose or face after coming in contact with the bacteria, or through more intimate contact, such as kissing.  · Allergies. Allergies can cause buildup of mucus in the throat (post-nasal drip), leading to inflammation and irritation. Allergies can also cause blocked nasal passages, forcing breathing through the mouth, which dries and irritates the throat.  What increases the risk?  You are more likely to develop this condition if:  · You are 5-24 years old.  · You are exposed to crowded environments such as , school, or dormitory living.  · You live in a cold climate.  · You have a weakened disease-fighting (immune) system.  What are the signs or symptoms?  Symptoms of this condition vary by the cause (viral, bacterial, or allergies) and can include:  · Sore throat.  · Fatigue.  · Low-grade fever.  · Headache.  · Joint pain and muscle aches.  · Skin rashes.  · Swollen glands in the throat (lymph nodes).  · Plaque-like film on the throat or tonsils. This is often a symptom of bacterial pharyngitis.  · Vomiting.  · Stuffy nose (nasal congestion).  · Cough.  · Red, itchy eyes (conjunctivitis).  · Loss of appetite.  How is this diagnosed?  This condition is often diagnosed based on your medical history and a physical exam. Your health care provider will ask you questions about your illness and your symptoms. A swab of your throat may be done to check for bacteria (rapid strep test). Other lab tests may also be done, depending on the suspected cause, but these are rare.  How is this treated?  This condition usually gets better in 3-4 days without medicine. Bacterial pharyngitis may be  treated with antibiotic medicines.  Follow these instructions at home:  · Take over-the-counter and prescription medicines only as told by your health care provider.  ? If you were prescribed an antibiotic medicine, take it as told by your health care provider. Do not stop taking the antibiotic even if you start to feel better.  ? Do not give children aspirin because of the association with Reye syndrome.  · Drink enough water and fluids to keep your urine clear or pale yellow.  · Get a lot of rest.  · Gargle with a salt-water mixture 3-4 times a day or as needed. To make a salt-water mixture, completely dissolve ½-1 tsp of salt in 1 cup of warm water.  · If your health care provider approves, you may use throat lozenges or sprays to soothe your throat.  Contact a health care provider if:  · You have large, tender lumps in your neck.  · You have a rash.  · You cough up green, yellow-brown, or bloody spit.  Get help right away if:  · Your neck becomes stiff.  · You drool or are unable to swallow liquids.  · You cannot drink or take medicines without vomiting.  · You have severe pain that does not go away, even after you take medicine.  · You have trouble breathing, and it is not caused by a stuffy nose.  · You have new pain and swelling in your joints such as the knees, ankles, wrists, or elbows.  Summary  · Pharyngitis is redness, pain, and swelling (inflammation) of the throat (pharynx).  · While pharyngitis can be caused by a bacteria, the most common causes are viral.  · Most cases of pharyngitis get better on their own without treatment.  · Bacterial pharyngitis is treated with antibiotic medicines.  This information is not intended to replace advice given to you by your health care provider. Make sure you discuss any questions you have with your health care provider.  Document Released: 12/18/2006 Document Revised: 11/30/2018 Document Reviewed: 01/23/2018  [employer] Patient Education © 2020 [employer]

## 2020-08-12 NOTE — PROGRESS NOTES
Subjective   Octavia Rice is a 60 y.o. female.   Chief Complaint   Patient presents with   • Earache     both ears    • Sore Throat   • Nasal Congestion      Earache    There is pain in both ears. This is a new problem. The current episode started in the past 7 days. The problem occurs constantly. The pain is at a severity of 4/10. The pain is mild. Associated symptoms include a sore throat. Pertinent negatives include no abdominal pain, coughing, diarrhea, ear discharge, headaches, hearing loss, neck pain, rash, rhinorrhea or vomiting. She has tried acetaminophen for the symptoms. The treatment provided mild relief. There is no history of a chronic ear infection, hearing loss or a tympanostomy tube.   Sore Throat    This is a new problem. The current episode started in the past 7 days. Neither side of throat is experiencing more pain than the other. There has been no fever. The pain is at a severity of 4/10. The pain is mild. Associated symptoms include ear pain. Pertinent negatives include no abdominal pain, congestion, coughing, diarrhea, ear discharge, headaches, neck pain or vomiting. She has had no exposure to strep or mono. She has tried acetaminophen for the symptoms. The treatment provided mild relief.        The following portions of the patient's history were reviewed and updated as appropriate: allergies, current medications, past family history, past medical history, past social history, past surgical history and problem list.    Review of Systems   Constitutional: Negative.    HENT: Positive for ear pain and sore throat. Negative for congestion, dental problem, ear discharge, hearing loss and rhinorrhea.    Eyes: Negative.    Respiratory: Negative.  Negative for cough.    Cardiovascular: Negative.    Gastrointestinal: Negative for abdominal pain, diarrhea and vomiting.   Musculoskeletal: Negative.  Negative for neck pain.   Skin: Negative.  Negative for rash.   Allergic/Immunologic: Negative.     Neurological: Negative.  Negative for headaches.   Hematological: Negative.    Psychiatric/Behavioral: Negative.      Past Surgical History:   Procedure Laterality Date   • COLONOSCOPY      within last 5 years   • KNEE ARTHROSCOPY W/ MENISCAL REPAIR Right    • PATELLA OPEN REDUCTION INTERNAL FIXATION Left 10/27/2016    Procedure: LEFT PATELLA OPEN REDUCTION INTERNAL FIXATION;  Surgeon: Marshall Meyers MD;  Location:  [facility];  Service:    • SHOULDER OPEN REDUCTION INTERNAL FIXATION Right 10/27/2016    Procedure: RIGHT SHOULDER CLOSED REDUCTION ;  Surgeon: Marshall Meyers MD;  Location:  [facility];  Service:    • SHOULDER OPEN REDUCTION INTERNAL FIXATION Right 10/31/2016    Procedure: RIGHT SHOULDER OPEN REDUCTION INTERNAL FIXATION WITH PLATE FOR FRACTURE;  Surgeon: Jaguar Marsh MD;  Location:  [facility];  Service:    • TONSILLECTOMY     • TOTAL ABDOMINAL HYSTERECTOMY WITH SALPINGO OOPHORECTOMY     • TOTAL SHOULDER ARTHROPLASTY W/ DISTAL CLAVICLE EXCISION Right 8/7/2017    Procedure: REMOVAL RIGHT PROXIMAL HUMERUS PLATE, RIGHT REVERSE TOTAL SHOULDER ARTHROPLASTY, SHOULDER HARDWARE REMOVAL;  Surgeon: Jaguar Marsh MD;  Location:  [facility];  Service:    • URETHRAL SUSPENSION      FOR STRESS INCONTINENCE     Past Medical History:   Diagnosis Date   • Acute upper respiratory infection    • Anxiety 8/7/2017   • Community acquired pneumonia    • Depression    • Diabetes mellitus (CMS/HCC)     dx 1998- checks fsbs weekly   • Fracture, stress, metatarsal     STRESS FRACTURE ALONG THE SHAFT OF THE FIFTH RIGHT METATARSAL   • Hyperlipidemia    • IBS (irritable bowel syndrome)    • Insomnia 8/7/2017   • Irritable bowel syndrome    • Obesity    • Shoulder pain    • Tobacco abuse 8/7/2017   • Type 2 diabetes mellitus (CMS/HCC) 8/7/2017   • Type 2 diabetes mellitus, uncontrolled (CMS/HCC)    • Vitamin D deficiency        Objective   Allergies   Allergen Reactions   • Codeine Nausea And Vomiting   • Morphine  "And Related Itching     Severe hives- purple color to face   • Penicillins      Had pcn as child and unsure of reaction     Visit Vitals  /76 (BP Location: Right arm, Patient Position: Sitting, Cuff Size: Adult)   Pulse 94   Temp 96.6 °F (35.9 °C) (Temporal)   Resp 16   Ht 175.3 cm (69\")   Wt 96.5 kg (212 lb 12.8 oz)   SpO2 98%   BMI 31.43 kg/m²       Physical Exam   Constitutional: She is oriented to person, place, and time. She appears well-developed and well-nourished. She is cooperative. She does not appear ill.   HENT:   Head: Normocephalic.   Right Ear: External ear and ear canal normal. Tympanic membrane is erythematous and bulging.   Left Ear: Tympanic membrane, external ear and ear canal normal. Tympanic membrane is not erythematous and not bulging.   Mouth/Throat: Posterior oropharyngeal erythema present. No oropharyngeal exudate.   Eyes: Pupils are equal, round, and reactive to light.   Cardiovascular: Normal rate, regular rhythm and normal heart sounds.   Pulmonary/Chest: Effort normal and breath sounds normal.   Neurological: She is alert and oriented to person, place, and time.   Skin: Skin is warm and dry.   Vitals reviewed.      Assessment/Plan   Octavia was seen today for earache, sore throat and nasal congestion.    Diagnoses and all orders for this visit:    Right acute suppurative otitis media  -     cephalexin (KEFLEX) 500 MG capsule; Take 1 capsule by mouth 2 (Two) Times a Day for 10 days.    Sore throat  -     cephalexin (KEFLEX) 500 MG capsule; Take 1 capsule by mouth 2 (Two) Times a Day for 10 days.      See otitis media and sore throat patient instructions.              Patient Instructions   Otitis Media, Adult    Otitis media occurs when there is inflammation and fluid in the middle ear. Your middle ear is a part of the ear that contains bones for hearing as well as air that helps send sounds to your brain.  What are the causes?  This condition is caused by a blockage in the " eustachian tube. This tube drains fluid from the ear to the back of the nose (nasopharynx). A blockage in this tube can be caused by an object or by swelling (edema) in the tube. Problems that can cause a blockage include:  · A cold or other upper respiratory infection.  · Allergies.  · An irritant, such as tobacco smoke.  · Enlarged adenoids. The adenoids are areas of soft tissue located high in the back of the throat, behind the nose and the roof of the mouth.  · A mass in the nasopharynx.  · Damage to the ear caused by pressure changes (barotrauma).  What are the signs or symptoms?  Symptoms of this condition include:  · Ear pain.  · A fever.  · Decreased hearing.  · A headache.  · Tiredness (lethargy).  · Fluid leaking from the ear.  · Ringing in the ear.  How is this diagnosed?  This condition is diagnosed with a physical exam. During the exam your health care provider will use an instrument called an otoscope to look into your ear and check for redness, swelling, and fluid. He or she will also ask about your symptoms.  Your health care provider may also order tests, such as:  · A test to check the movement of the eardrum (pneumatic otoscopy). This test is done by squeezing a small amount of air into the ear.  · A test that changes air pressure in the middle ear to check how well the eardrum moves and whether the eustachian tube is working (tympanogram).  How is this treated?  This condition usually goes away on its own within 3-5 days. But if the condition is caused by a bacteria infection and does not go away own its own, or keeps coming back, your health care provider may:  · Prescribe antibiotic medicines to treat the infection.  · Prescribe or recommend medicines to control pain.  Follow these instructions at home:  · Take over-the-counter and prescription medicines only as told by your health care provider.  · If you were prescribed an antibiotic medicine, take it as told by your health care provider. Do  not stop taking the antibiotic even if you start to feel better.  · Keep all follow-up visits as told by your health care provider. This is important.  Contact a health care provider if:  · You have bleeding from your nose.  · There is a lump on your neck.  · You are not getting better in 5 days.  · You feel worse instead of better.  Get help right away if:  · You have severe pain that is not controlled with medicine.  · You have swelling, redness, or pain around your ear.  · You have stiffness in your neck.  · A part of your face is paralyzed.  · The bone behind your ear (mastoid) is tender when you touch it.  · You develop a severe headache.  Summary  · Otitis media is redness, soreness, and swelling of the middle ear.  · This condition usually goes away on its own within 3-5 days.  · If the problem does not go away in 3-5 days, your health care provider may prescribe or recommend medicines to treat your symptoms.  · If you were prescribed an antibiotic medicine, take it as told by your health care provider.  This information is not intended to replace advice given to you by your health care provider. Make sure you discuss any questions you have with your health care provider.  Document Released: 09/22/2005 Document Revised: 11/30/2018 Document Reviewed: 12/08/2017  [employer] Patient Education © 2020 [employer]        SANDER Hernandez

## 2020-09-15 DIAGNOSIS — E78.2 MIXED HYPERLIPIDEMIA: ICD-10-CM

## 2020-09-15 RX ORDER — SIMVASTATIN 40 MG
TABLET ORAL
Qty: 90 TABLET | Refills: 0 | Status: SHIPPED | OUTPATIENT
Start: 2020-09-15 | End: 2020-10-07 | Stop reason: SDUPTHER

## 2020-09-18 DIAGNOSIS — IMO0002 UNCONTROLLED TYPE 2 DIABETES MELLITUS WITH COMPLICATION: ICD-10-CM

## 2020-09-18 DIAGNOSIS — E78.2 MIXED HYPERLIPIDEMIA: ICD-10-CM

## 2020-09-18 DIAGNOSIS — IMO0002 UNCONTROLLED TYPE 2 DIABETES MELLITUS WITH COMPLICATION, WITHOUT LONG-TERM CURRENT USE OF INSULIN: ICD-10-CM

## 2020-09-18 RX ORDER — PEN NEEDLE, DIABETIC 32GX 5/32"
NEEDLE, DISPOSABLE MISCELLANEOUS
Qty: 100 EACH | Refills: 3 | Status: SHIPPED | OUTPATIENT
Start: 2020-09-18 | End: 2021-09-21

## 2020-10-01 ENCOUNTER — TELEPHONE (OUTPATIENT)
Dept: FAMILY MEDICINE CLINIC | Facility: CLINIC | Age: 60
End: 2020-10-01

## 2020-10-01 NOTE — TELEPHONE ENCOUNTER
PT CALLED STATED THAT SHE HURT HER RIGHT FOOT  SHE BELIEVES THAT IT IS BROKEN, PT STEPPED OFF STAIRS TURNED FUNNY. PT WOULD LIKE TO KNOW WHAT SHE SHOULD DO, WOULD LIKE TO GET  AN XRAY DONE AND DONT KNOW WHERE TO GO.    SUGGESTED ER.    PLEASE ADVISE.  CALL BACK:[phone]

## 2020-10-07 ENCOUNTER — OFFICE VISIT (OUTPATIENT)
Dept: ENDOCRINOLOGY | Facility: CLINIC | Age: 60
End: 2020-10-07

## 2020-10-07 VITALS
HEART RATE: 89 BPM | DIASTOLIC BLOOD PRESSURE: 80 MMHG | SYSTOLIC BLOOD PRESSURE: 134 MMHG | WEIGHT: 211 LBS | HEIGHT: 69 IN | OXYGEN SATURATION: 98 % | BODY MASS INDEX: 31.25 KG/M2

## 2020-10-07 DIAGNOSIS — IMO0002 UNCONTROLLED TYPE 2 DIABETES MELLITUS WITH COMPLICATION: ICD-10-CM

## 2020-10-07 DIAGNOSIS — IMO0002 DIABETES MELLITUS TYPE 2, UNCONTROLLED, WITH COMPLICATIONS: ICD-10-CM

## 2020-10-07 DIAGNOSIS — E78.2 MIXED HYPERLIPIDEMIA: ICD-10-CM

## 2020-10-07 DIAGNOSIS — IMO0002 UNCONTROLLED TYPE 2 DIABETES MELLITUS WITH COMPLICATION, WITHOUT LONG-TERM CURRENT USE OF INSULIN: ICD-10-CM

## 2020-10-07 DIAGNOSIS — E11.65 TYPE 2 DIABETES MELLITUS WITH HYPERGLYCEMIA, WITHOUT LONG-TERM CURRENT USE OF INSULIN (HCC): Primary | ICD-10-CM

## 2020-10-07 LAB
EXPIRATION DATE: ABNORMAL
EXPIRATION DATE: NORMAL
GLUCOSE BLDC GLUCOMTR-MCNC: 150 MG/DL (ref 70–130)
HBA1C MFR BLD: 9 %
Lab: ABNORMAL
Lab: NORMAL

## 2020-10-07 PROCEDURE — 99214 OFFICE O/P EST MOD 30 MIN: CPT | Performed by: INTERNAL MEDICINE

## 2020-10-07 PROCEDURE — 83036 HEMOGLOBIN GLYCOSYLATED A1C: CPT | Performed by: INTERNAL MEDICINE

## 2020-10-07 PROCEDURE — 82947 ASSAY GLUCOSE BLOOD QUANT: CPT | Performed by: INTERNAL MEDICINE

## 2020-10-07 RX ORDER — SIMVASTATIN 40 MG
40 TABLET ORAL NIGHTLY
Qty: 90 TABLET | Refills: 3 | Status: SHIPPED | OUTPATIENT
Start: 2020-10-07 | End: 2021-06-02

## 2020-10-07 RX ORDER — EMPAGLIFLOZIN 10 MG/1
10 TABLET, FILM COATED ORAL DAILY
Qty: 30 TABLET | Refills: 0 | Status: SHIPPED | OUTPATIENT
Start: 2020-10-07 | End: 2020-10-07

## 2020-10-07 RX ORDER — METFORMIN HYDROCHLORIDE 500 MG/1
1000 TABLET, EXTENDED RELEASE ORAL
Qty: 360 TABLET | Refills: 3 | Status: SHIPPED | OUTPATIENT
Start: 2020-10-07 | End: 2021-09-21 | Stop reason: SDUPTHER

## 2020-10-07 RX ORDER — EMPAGLIFLOZIN, METFORMIN HYDROCHLORIDE 12.5; 1 MG/1; MG/1
1 TABLET, EXTENDED RELEASE ORAL 2 TIMES DAILY
Qty: 60 TABLET | Refills: 6 | Status: SHIPPED | OUTPATIENT
Start: 2020-10-07 | End: 2021-04-07 | Stop reason: SINTOL

## 2020-10-07 RX ORDER — EMPAGLIFLOZIN 25 MG/1
1 TABLET, FILM COATED ORAL DAILY
Qty: 30 TABLET | Refills: 6 | Status: SHIPPED | OUTPATIENT
Start: 2020-10-07 | End: 2020-10-07

## 2020-10-07 RX ORDER — LIRAGLUTIDE 6 MG/ML
1.8 INJECTION SUBCUTANEOUS DAILY
Qty: 9 PEN | Refills: 3 | Status: SHIPPED | OUTPATIENT
Start: 2020-10-07 | End: 2021-09-21

## 2020-10-07 RX ORDER — MELATONIN
1000 DAILY
Qty: 90 TABLET | Refills: 3 | Status: SHIPPED | OUTPATIENT
Start: 2020-10-07 | End: 2021-12-17

## 2020-10-07 RX ORDER — GLIMEPIRIDE 4 MG/1
8 TABLET ORAL
Qty: 180 TABLET | Refills: 3 | Status: SHIPPED | OUTPATIENT
Start: 2020-10-07 | End: 2021-09-21 | Stop reason: SDUPTHER

## 2020-10-07 NOTE — PATIENT INSTRUCTIONS
1. Continue victoza and glimepiride, hold metformin  Start Synjardy at 1 tablet twice a day (5/1000 mg twice a day) - 1 month    2. Increase the dose to synjardy 12.5/1000 mg twice a day , continue Victoza and glimepiride.

## 2020-10-07 NOTE — PROGRESS NOTES
Chief complaint  Diabetes (Follow UP)    Subjective   Octavia Rice is a 60 y.o. female is here today for follow-up of:    Diabetes Mellitus type 2: dx in 2003.  Diabetic complications: none  Eye exam current (within one year): no retinopathy, recent exam.     Current diabetic medications include   Metformin  mg - 2 tablets BID.  Victoza 1.8 mg daily.   Glimepiride 8 mg daily.       Past medications: 08/2016 Jardiance trial was unsuccessful - frequent urination    Monitoring   She is not testing      Other med problems: include hyperlipidemia on simvastatin.     Patient reported stress eating. She had a right ankle injury last week and will see surgeon on Monday. She is not weight bearing now. Has severe pain with the movement and is uncomfortable. Take pain medications and applies ice.      Medications    Current Outpatient Medications:   •  ACCU-CHEK FRANKY PLUS test strip, Test Three Times Daily, Disp: 100 each, Rfl: 12  •  ACCU-CHEK SOFTCLIX LANCETS lancets, Use as instructed, Disp: 100 each, Rfl: 12  •  aspirin 81 MG EC tablet, Take 81 mg by mouth Daily., Disp: , Rfl:   •  Blood Glucose Monitoring Suppl (ACCU-CHEK FRANKY PLUS) w/Device kit, Test Three Times Daily, Disp: 1 kit, Rfl: 0  •  cholecalciferol (VITAMIN D3) 25 MCG (1000 UT) tablet, Take 1 tablet by mouth Daily., Disp: 90 tablet, Rfl: 3  •  diclofenac (VOLTAREN) 1 % gel gel, Apply 4 g topically to the appropriate area as directed 4 (Four) Times a Day As Needed (musculoskeletal pain)., Disp: 100 g, Rfl: 0  •  ibuprofen (ADVIL,MOTRIN) 200 MG tablet, Take 200 mg by mouth Every 6 (Six) Hours As Needed for Mild Pain ., Disp: , Rfl:   •  Insulin Pen Needle (BD Pen Needle Sherri U/F) 32G X 4 MM misc, USE ONCE DAILY AS DIRECTED, Disp: 100 each, Rfl: 3  •  Liraglutide (Victoza) 18 MG/3ML solution pen-injector injection, Inject 1.8 mg under the skin into the appropriate area as directed Daily., Disp: 9 pen, Rfl: 3  •  MELATONIN PO, Take  by mouth., Disp: ,  "Rfl:   •  metFORMIN ER (GLUCOPHAGE-XR) 500 MG 24 hr tablet, Take 2 tablets by mouth Daily With Breakfast & Dinner., Disp: 360 tablet, Rfl: 3  •  Multiple Minerals-Vitamins (CALCIUM-MAGNESIUM-ZINC-D3 PO), Take  by mouth., Disp: , Rfl:   •  Multiple Vitamin (MULTI VITAMIN DAILY PO), Take  by mouth., Disp: , Rfl:   •  simvastatin (ZOCOR) 40 MG tablet, Take 1 tablet by mouth Every Night., Disp: 90 tablet, Rfl: 3  •  Empagliflozin-metFORMIN HCl ER (Synjardy XR) 12.5-1000 MG tablet sustained-release 24 hour, Take 1 tablet by mouth 2 (two) times a day., Disp: 60 tablet, Rfl: 6  •  glimepiride (Amaryl) 4 MG tablet, Take 2 tablets by mouth Every Morning Before Breakfast., Disp: 180 tablet, Rfl: 3    PMH  The following portions of the patient's history were reviewed and updated as appropriate: allergies, current medications, past family history, past medical history, past social history, past surgical history and problem list.    Review of systems  Review of Systems   Constitutional: Positive for appetite change (decreased appetite) and unexpected weight change (weight loss).   Eyes: Negative for visual disturbance.   Respiratory: Negative.  Negative for shortness of breath.    Cardiovascular: Negative for chest pain and leg swelling.   Gastrointestinal: Negative for constipation. Diarrhea: occasional.   Endocrine: Negative for cold intolerance, polydipsia and polyuria.   Musculoskeletal: Positive for arthralgias (ankle pain right). Negative for back pain, joint swelling and myalgias.   Skin: Negative for wound.   Allergic/Immunologic: Positive for environmental allergies.   Neurological: Positive for headaches. Negative for numbness.   Hematological: Negative.    Psychiatric/Behavioral: Negative for self-injury. Behavioral problem: phobia of crowds.       Physical exam  Objective   Blood pressure 134/80, pulse 89, height 175.3 cm (69\"), weight 95.7 kg (211 lb), SpO2 98 %. Body mass index is 31.16 kg/m².  Physical Exam "   Constitutional: She is oriented to person, place, and time. She appears well-developed.   HENT:   Head: Normocephalic and atraumatic.   Eyes: Conjunctivae are normal.   Cardiovascular: Normal rate, regular rhythm and normal heart sounds.   Pulmonary/Chest: Effort normal and breath sounds normal.   Musculoskeletal:      Comments: Right foot in brace   Neurological: She is alert and oriented to person, place, and time.   Psychiatric: Mood and thought content normal.   Vitals reviewed.        LABS AND IMAGING  Results for orders placed or performed in visit on 10/07/20   POC Glucose, Blood    Specimen: Blood   Result Value Ref Range    Glucose 150 (A) 70 - 130 mg/dL    Lot Number [id number]     Expiration Date 06/02/2021    POC Glycosylated Hemoglobin (Hb A1C)    Specimen: Blood   Result Value Ref Range    Hemoglobin A1C 9.0 %    Lot Number [id number]     Expiration Date 05/06/2022            Assessment:         Diagnoses and all orders for this visit:    Type 2 diabetes mellitus with hyperglycemia, without long-term current use of insulin (CMS/[facility])  -     POC Glucose, Blood  -     POC Glycosylated Hemoglobin (Hb A1C)    Diabetes mellitus type 2, uncontrolled, with complications (CMS/[facility])  -     Liraglutide (Victoza) 18 MG/3ML solution pen-injector injection; Inject 1.8 mg under the skin into the appropriate area as directed Daily.  -     metFORMIN ER (GLUCOPHAGE-XR) 500 MG 24 hr tablet; Take 2 tablets by mouth Daily With Breakfast & Dinner.    Uncontrolled type 2 diabetes mellitus with complication (CMS/[facility])  -     Liraglutide (Victoza) 18 MG/3ML solution pen-injector injection; Inject 1.8 mg under the skin into the appropriate area as directed Daily.  -     metFORMIN ER (GLUCOPHAGE-XR) 500 MG 24 hr tablet; Take 2 tablets by mouth Daily With Breakfast & Dinner.    Mixed hyperlipidemia  -     Liraglutide (Victoza) 18 MG/3ML solution pen-injector injection; Inject 1.8 mg under the skin into the appropriate area as  directed Daily.  -     metFORMIN ER (GLUCOPHAGE-XR) 500 MG 24 hr tablet; Take 2 tablets by mouth Daily With Breakfast & Dinner.  -     simvastatin (ZOCOR) 40 MG tablet; Take 1 tablet by mouth Every Night.    Uncontrolled type 2 diabetes mellitus with complication, without long-term current use of insulin (CMS/[facility])  -     Liraglutide (Victoza) 18 MG/3ML solution pen-injector injection; Inject 1.8 mg under the skin into the appropriate area as directed Daily.  -     metFORMIN ER (GLUCOPHAGE-XR) 500 MG 24 hr tablet; Take 2 tablets by mouth Daily With Breakfast & Dinner.    Other orders  -     glimepiride (Amaryl) 4 MG tablet; Take 2 tablets by mouth Every Morning Before Breakfast.  -     Discontinue: Empagliflozin (Jardiance) 25 MG tablet; Take 25 mg by mouth Daily.  -     cholecalciferol (VITAMIN D3) 25 MCG (1000 UT) tablet; Take 1 tablet by mouth Daily.  -     Discontinue: Empagliflozin (Jardiance) 10 MG tablet; Take 10 mg by mouth Daily. Start at 10 mg daily and increase to 25 mg in 1 month  -     Empagliflozin-metFORMIN HCl ER (Synjardy XR) 12.5-1000 MG tablet sustained-release 24 hour; Take 1 tablet by mouth 2 (two) times a day.        Plan:        Cont glimepiride 8 mg qd ac.   Cont Victoza 1.8 mg  Start Synjardy 5/1000 mg BID an dincrease to 12.5/1000 mg BID in 1 month.   Recheck the labs in 4 weeks.   Meds refilled.      Dietary recommendations and testing encouraged.     Cont simvastatin.   She will return for labs in 1 months, I will check renal function and lipids     Follow up:  3-4  months

## 2020-11-05 ENCOUNTER — TELEPHONE (OUTPATIENT)
Dept: ENDOCRINOLOGY | Facility: CLINIC | Age: 60
End: 2020-11-05

## 2020-11-05 NOTE — TELEPHONE ENCOUNTER
----- Message from Priscilla RUSSO MD sent at 10/7/2020  2:02 PM EDT -----  Please call the patient and check if glucose improved on synjardy. Please ask her to come for labs (fasting) prior to increase in the dose.

## 2020-11-05 NOTE — TELEPHONE ENCOUNTER
Spoke with patient she stated that she does not check her BS daily, but thinks they are, she will stop by for labs

## 2020-11-06 ENCOUNTER — TELEPHONE (OUTPATIENT)
Dept: ENDOCRINOLOGY | Facility: CLINIC | Age: 60
End: 2020-11-06

## 2020-11-06 NOTE — TELEPHONE ENCOUNTER
Returned pharmacy call, they stated patient has multiple RX and needs understanding. They recently filled Jardiance, Synjardy,glimperide metformin, Victoza, plus patient stated she was taking samples of Jardiance.  They need clarity on what patient is to take They said Jardiance was filled and patient is taking it, but note said she DC due to side effect.

## 2020-11-06 NOTE — TELEPHONE ENCOUNTER
Camilla Enrique, Pharmacy Manager at [facility], called and needs to verify patient's prescriptions as well as the samples patient has been taking. Pt was also told to come in for lab work and needed confirm the time frame in which this was needed as well.  Please contact Camilla Enrique at [phone].

## 2020-11-06 NOTE — TELEPHONE ENCOUNTER
Instructions from last visit:  Cont glimepiride 8 mg qd ac.   Cont Victoza 1.8 mg  Start Synjardy 5/1000 mg BID and increase to 12.5/1000 mg BID in 1 month. D/c jardiance and metformin

## 2020-11-10 NOTE — TELEPHONE ENCOUNTER
Spoke with pharmacy, advised per Dr. Daugherty that she sent all three to see what would be covered.  If Synjardy is covered, go with that RX and pt Jardiance and metformin. [facility] states Synjardy ran through with 30 $$ co-pay DC both Jardiance and metformin

## 2021-04-07 ENCOUNTER — OFFICE VISIT (OUTPATIENT)
Dept: ENDOCRINOLOGY | Facility: CLINIC | Age: 61
End: 2021-04-07

## 2021-04-07 VITALS
BODY MASS INDEX: 29.9 KG/M2 | HEIGHT: 69 IN | HEART RATE: 92 BPM | TEMPERATURE: 97.3 F | DIASTOLIC BLOOD PRESSURE: 78 MMHG | OXYGEN SATURATION: 98 % | SYSTOLIC BLOOD PRESSURE: 122 MMHG | WEIGHT: 201.9 LBS

## 2021-04-07 DIAGNOSIS — E11.65 TYPE 2 DIABETES MELLITUS WITH HYPERGLYCEMIA, WITHOUT LONG-TERM CURRENT USE OF INSULIN (HCC): Primary | ICD-10-CM

## 2021-04-07 DIAGNOSIS — E78.2 MIXED HYPERLIPIDEMIA: ICD-10-CM

## 2021-04-07 LAB
EXPIRATION DATE: NORMAL
EXPIRATION DATE: NORMAL
GLUCOSE BLDC GLUCOMTR-MCNC: 106 MG/DL (ref 70–130)
HBA1C MFR BLD: 7.6 %
Lab: NORMAL
Lab: NORMAL

## 2021-04-07 PROCEDURE — 83036 HEMOGLOBIN GLYCOSYLATED A1C: CPT | Performed by: INTERNAL MEDICINE

## 2021-04-07 PROCEDURE — 82947 ASSAY GLUCOSE BLOOD QUANT: CPT | Performed by: INTERNAL MEDICINE

## 2021-04-07 PROCEDURE — 99214 OFFICE O/P EST MOD 30 MIN: CPT | Performed by: INTERNAL MEDICINE

## 2021-04-07 NOTE — PROGRESS NOTES
Chief complaint  Diabetes (Follow Up:  Would liket o stop taking Synjardy due to side effects. She states she is going to the bnathroom so much she is losing sleep.  Keeping her up at night,  )    Subjective   Octavia Rice is a 61 y.o. female is here today for follow-up of:    Diabetes Mellitus type 2: dx in 2003.  Diabetic complications: none  Eye exam current (within one year): no retinopathy, recent exam.     Current diabetic medications include   Synjardy 12.5/1000 mg  2 tabs  Victoza 1.8 mg daily.   Glimepiride 8 mg daily.       Past medications: 08/2016 Jardiance trial was unsuccessful - frequent urination    Monitoring   She is not testing      Other med problems: include hyperlipidemia on simvastatin.     Patient reported increased frequency in urination on SYnjardy/     Medications    Current Outpatient Medications:   •  ACCU-CHEK FRANKY PLUS test strip, Test Three Times Daily, Disp: 100 each, Rfl: 12  •  aspirin 81 MG EC tablet, Take 81 mg by mouth Daily., Disp: , Rfl:   •  Blood Glucose Monitoring Suppl (ACCU-CHEK FRANKY PLUS) w/Device kit, Test Three Times Daily, Disp: 1 kit, Rfl: 0  •  cholecalciferol (VITAMIN D3) 25 MCG (1000 UT) tablet, Take 1 tablet by mouth Daily., Disp: 90 tablet, Rfl: 3  •  diclofenac (VOLTAREN) 1 % gel gel, Apply 4 g topically to the appropriate area as directed 4 (Four) Times a Day As Needed (musculoskeletal pain)., Disp: 100 g, Rfl: 0  •  ibuprofen (ADVIL,MOTRIN) 200 MG tablet, Take 200 mg by mouth Every 6 (Six) Hours As Needed for Mild Pain ., Disp: , Rfl:   •  Insulin Pen Needle (BD Pen Needle Sherri U/F) 32G X 4 MM misc, USE ONCE DAILY AS DIRECTED, Disp: 100 each, Rfl: 3  •  Liraglutide (Victoza) 18 MG/3ML solution pen-injector injection, Inject 1.8 mg under the skin into the appropriate area as directed Daily., Disp: 9 pen, Rfl: 3  •  MELATONIN PO, Take  by mouth., Disp: , Rfl:   •  methocarbamol (ROBAXIN) 750 MG tablet, Take 1 tablet by mouth 3 (Three) Times a Day As Needed  "for Muscle Spasms., Disp: 30 tablet, Rfl: 0  •  Multiple Minerals-Vitamins (CALCIUM-MAGNESIUM-ZINC-D3 PO), Take  by mouth., Disp: , Rfl:   •  Multiple Vitamin (MULTI VITAMIN DAILY PO), Take  by mouth., Disp: , Rfl:   •  simvastatin (ZOCOR) 40 MG tablet, Take 1 tablet by mouth Every Night., Disp: 90 tablet, Rfl: 3  •  glimepiride (Amaryl) 4 MG tablet, Take 2 tablets by mouth Every Morning Before Breakfast., Disp: 180 tablet, Rfl: 3  •  metFORMIN ER (GLUCOPHAGE-XR) 500 MG 24 hr tablet, Take 2 tablets by mouth Daily With Breakfast & Dinner., Disp: 360 tablet, Rfl: 3      Review of systems  Review of Systems   Constitutional: Positive for appetite change (decreased appetite) and unexpected weight change (weight loss).   Eyes: Negative for visual disturbance.   Respiratory: Negative.  Negative for shortness of breath.    Cardiovascular: Negative for chest pain and leg swelling.   Gastrointestinal: Negative for constipation. Diarrhea: occasional.   Endocrine: Negative for cold intolerance, polydipsia and polyuria.   Musculoskeletal: Positive for arthralgias (ankle pain right). Negative for back pain, joint swelling and myalgias.   Skin: Negative for wound.   Allergic/Immunologic: Positive for environmental allergies.   Neurological: Positive for headaches. Negative for numbness.   Hematological: Negative.    Psychiatric/Behavioral: Negative for self-injury. Behavioral problem: phobia of crowds.       Physical exam  Objective   Blood pressure 122/78, pulse 92, temperature 97.3 °F (36.3 °C), height 175.3 cm (69\"), weight 91.6 kg (201 lb 14.4 oz), SpO2 98 %. Body mass index is 29.82 kg/m².  Physical Exam   Constitutional: She is oriented to person, place, and time. She appears well-developed.   HENT:   Head: Normocephalic and atraumatic.   Eyes: Conjunctivae are normal.   Cardiovascular: Normal rate, regular rhythm and normal heart sounds.   Pulmonary/Chest: Effort normal and breath sounds normal.   Musculoskeletal:      " Comments: Right foot in brace   Neurological: She is alert and oriented to person, place, and time.   Psychiatric: Mood and thought content normal.   Vitals reviewed.        LABS AND IMAGING  Results for orders placed or performed in visit on 04/07/21   POC Glucose, Blood    Specimen: Blood   Result Value Ref Range    Glucose 106 70 - 130 mg/dL    Lot Number [id number]     Expiration Date 11/23/2021    POC Glycosylated Hemoglobin (Hb A1C)    Specimen: Blood   Result Value Ref Range    Hemoglobin A1C 7.6 %    Lot Number [id number]     Expiration Date 12/15/2022            Assessment:         Diagnoses and all orders for this visit:    Type 2 diabetes mellitus with hyperglycemia, without long-term current use of insulin (CMS/[facility])  -     POC Glucose, Blood  -     POC Glycosylated Hemoglobin (Hb A1C)    Mixed hyperlipidemia        Plan:        Patient Instructions     Step 1. Move 2 tablets fo Synjardy to the morning.   Cont Victoza and glimepiride.     Step 2.   Discontinue Synjardy and Victoza.   Start Soliqua at 15 units every morning.   Cont glimepiride.   Check your glucose and if it is > 150 in the morning, increase to 20 units.       Results for orders placed or performed in visit on 04/07/21   POC Glucose, Blood    Specimen: Blood   Result Value Ref Range    Glucose 106 70 - 130 mg/dL    Lot Number [id number]     Expiration Date 11/23/2021    POC Glycosylated Hemoglobin (Hb A1C)    Specimen: Blood   Result Value Ref Range    Hemoglobin A1C 7.6 %    Lot Number [id number]     Expiration Date 12/15/2022       Dietary recommendations and testing encouraged.   Due to side effects of frequent urination I have given an option to change to Soliqua - combo of GLP-1 agonist and basal insulin.    Mixed hyperlipidemia-cont simvastatin.      Follow up:  3-4  months

## 2021-04-07 NOTE — PATIENT INSTRUCTIONS
Step 1. Move 2 tablets fo Synjardy to the morning.   Cont Victoza and glimepiride.     Step 2.   Discontinue Synjardy and Victoza.   Start Soliqua at 15 units every morning.   Cont glimepiride.   Check your glucose and if it is > 150 in the morning, increase to 20 units.       Results for orders placed or performed in visit on 04/07/21   POC Glucose, Blood    Specimen: Blood   Result Value Ref Range    Glucose 106 70 - 130 mg/dL    Lot Number [id number]     Expiration Date 11/23/2021    POC Glycosylated Hemoglobin (Hb A1C)    Specimen: Blood   Result Value Ref Range    Hemoglobin A1C 7.6 %    Lot Number [id number]     Expiration Date 12/15/2022

## 2021-04-29 ENCOUNTER — TELEPHONE (OUTPATIENT)
Dept: FAMILY MEDICINE CLINIC | Facility: CLINIC | Age: 61
End: 2021-04-29

## 2021-05-04 NOTE — TELEPHONE ENCOUNTER
Pt called needs a refill on Soliqua 15 units every morning. Pt stated she was given a sample on 04/07/21 dos. Pt next appt 07/01/21

## 2021-05-05 RX ORDER — INSULIN GLARGINE AND LIXISENATIDE 100; 33 U/ML; UG/ML
15 INJECTION, SOLUTION SUBCUTANEOUS DAILY
Qty: 450 ML | Refills: 5 | Status: SHIPPED | OUTPATIENT
Start: 2021-05-05 | End: 2021-09-21

## 2021-05-07 ENCOUNTER — TELEPHONE (OUTPATIENT)
Dept: ENDOCRINOLOGY | Facility: CLINIC | Age: 61
End: 2021-05-07

## 2021-05-07 NOTE — TELEPHONE ENCOUNTER
[facility] CALLED STATED THEY NEEDED CLARIFICATION ABOUT PTS SOLIQUA.    BEST NUMBER: [phone]

## 2021-05-17 RX ORDER — BLOOD SUGAR DIAGNOSTIC
STRIP MISCELLANEOUS
Qty: 100 EACH | Refills: 12 | Status: SHIPPED | OUTPATIENT
Start: 2021-05-17 | End: 2021-09-21 | Stop reason: SDUPTHER

## 2021-06-02 DIAGNOSIS — E78.2 MIXED HYPERLIPIDEMIA: ICD-10-CM

## 2021-06-02 RX ORDER — SIMVASTATIN 40 MG
TABLET ORAL
Qty: 90 TABLET | Refills: 2 | Status: SHIPPED | OUTPATIENT
Start: 2021-06-02 | End: 2021-09-21 | Stop reason: SDUPTHER

## 2021-07-27 ENCOUNTER — TELEMEDICINE (OUTPATIENT)
Dept: FAMILY MEDICINE CLINIC | Facility: CLINIC | Age: 61
End: 2021-07-27

## 2021-07-27 DIAGNOSIS — J01.00 ACUTE NON-RECURRENT MAXILLARY SINUSITIS: Primary | ICD-10-CM

## 2021-07-27 PROCEDURE — 99213 OFFICE O/P EST LOW 20 MIN: CPT | Performed by: FAMILY MEDICINE

## 2021-07-27 RX ORDER — CEFUROXIME AXETIL 250 MG/1
250 TABLET ORAL 2 TIMES DAILY
Qty: 20 TABLET | Refills: 0 | OUTPATIENT
Start: 2021-07-27 | End: 2021-08-05

## 2021-07-27 NOTE — PROGRESS NOTES
Telemedicine vbideo visit  Consent obtained    Chief Complaint  No chief complaint on file.    Subjective     {CC  Problem List  Visit Diagnosis   Encounters  Notes  Medications  Labs  Result Review Imaging  Media :23}     Octavia Rice presents to [facility] for   URI         Objective   Vital Signs:   There were no vitals taken for this visit.    Physical Exam   Result Review :{ Labs  Result Review  Imaging  Med Tab  Media :23}   {The following data was reviewed by (Optional):95210}  {Ambulatory Labs (Optional):55863}  {Data reviewed (Optional):95856:::1}          Assessment and Plan {CC Problem List  Visit Diagnosis  ROS  Review (Popup)  Health Maintenance  Quality  BestPractice  Medications  SmartSets  SnapShot Encounters  Media :23}   Problem List Items Addressed This Visit     None        {Time Spent (Optional):60103}  Follow Up {Instructions Charge Capture  Follow-up Communications :23}  No follow-ups on file.  Patient was given instructions and counseling regarding her condition or for health maintenance advice. Please see specific information pulled into the AVS if appropriate.

## 2021-07-27 NOTE — PROGRESS NOTES
Telemedicine video visit  Consent obtained    Chief Complaint  URI    Subjective          Octavia Rice presents to [facility] for   URI   This is a new problem. The current episode started 1 to 4 weeks ago. The problem has been unchanged. There has been no fever. Associated symptoms include congestion, coughing, ear pain, headaches, rhinorrhea, sinus pain and a sore throat. Pertinent negatives include no chest pain or wheezing. She has tried acetaminophen, NSAIDs and antihistamine for the symptoms. The treatment provided mild relief.   Has had COVID vaccines    Objective   Vital Signs:   There were no vitals taken for this visit.      Review of Systems   Constitutional: Negative for fatigue, fever and unexpected weight change.   HENT: Positive for congestion, ear pain, rhinorrhea, sinus pain and sore throat.    Respiratory: Positive for cough. Negative for chest tightness, shortness of breath and wheezing.    Cardiovascular: Negative for chest pain, palpitations and leg swelling.   Neurological: Positive for headaches.     Physical Exam  Vitals and nursing note reviewed.   Constitutional:       Appearance: She is well-developed. She is not diaphoretic.   HENT:      Head: Normocephalic.      Right Ear: External ear normal.      Left Ear: External ear normal.      Nose: Nose normal.      Mouth/Throat:      Pharynx: No oropharyngeal exudate.   Eyes:      Conjunctiva/sclera: Conjunctivae normal.      Pupils: Pupils are equal, round, and reactive to light.   Neck:      Thyroid: No thyromegaly.   Cardiovascular:      Rate and Rhythm: Normal rate and regular rhythm.      Heart sounds: No murmur heard.     Pulmonary:      Effort: Pulmonary effort is normal. No respiratory distress.      Breath sounds: Normal breath sounds.   Chest:      Chest wall: No tenderness.   Abdominal:      General: There is no distension.      Palpations: Abdomen is soft. There is no mass.      Tenderness: There  is no abdominal tenderness. There is no guarding or rebound.   Musculoskeletal:         General: Normal range of motion.      Cervical back: Normal range of motion and neck supple.   Lymphadenopathy:      Cervical: No cervical adenopathy.   Skin:     General: Skin is warm and dry.      Findings: No erythema or rash.   Neurological:      Mental Status: She is alert and oriented to person, place, and time.      Motor: No abnormal muscle tone.      Coordination: Coordination normal.      Deep Tendon Reflexes: Reflexes are normal and symmetric. Reflexes normal.   Psychiatric:         Behavior: Behavior normal.         Thought Content: Thought content normal.         Judgment: Judgment normal.        Result Review :                 Assessment and Plan    Problem List Items Addressed This Visit     None      Visit Diagnoses     Acute non-recurrent maxillary sinusitis    -  Primary    Relevant Medications    cefuroxime (CEFTIN) 250 MG tablet      Mucinex OTC prn  RTC if worsening    Follow Up   No follow-ups on file.  Patient was given instructions and counseling regarding her condition or for health maintenance advice. Please see specific information pulled into the AVS if appropriate.

## 2021-08-05 PROCEDURE — U0004 COV-19 TEST NON-CDC HGH THRU: HCPCS | Performed by: PHYSICIAN ASSISTANT

## 2021-08-06 ENCOUNTER — TELEPHONE (OUTPATIENT)
Dept: URGENT CARE | Facility: CLINIC | Age: 61
End: 2021-08-06

## 2021-09-09 ENCOUNTER — TELEPHONE (OUTPATIENT)
Dept: FAMILY MEDICINE CLINIC | Facility: CLINIC | Age: 61
End: 2021-09-09

## 2021-09-09 NOTE — TELEPHONE ENCOUNTER
Caller: Octavia Rice    Relationship: Self    Best call back number: [phone]    What is the medical concern/diagnosis: DIABETES    What specialty or service is being requested: DIABETIC DOCTOR      Any additional details:PATIENT ASKED IF DR. LEE WOULD TAKE OVER MANAGEMENT OF HER DIABETES OR REFER HER TO ANOTHER PHYSICIAN. PATIENT CURRENTLY GOES TO ENDOCRINOLOGY AND DOES NOT WANT TO GO BACK       .”

## 2021-09-21 ENCOUNTER — OFFICE VISIT (OUTPATIENT)
Dept: FAMILY MEDICINE CLINIC | Facility: CLINIC | Age: 61
End: 2021-09-21

## 2021-09-21 VITALS
SYSTOLIC BLOOD PRESSURE: 130 MMHG | RESPIRATION RATE: 20 BRPM | DIASTOLIC BLOOD PRESSURE: 80 MMHG | HEART RATE: 82 BPM | TEMPERATURE: 97 F | BODY MASS INDEX: 31.4 KG/M2 | HEIGHT: 69 IN | WEIGHT: 212 LBS | OXYGEN SATURATION: 98 %

## 2021-09-21 DIAGNOSIS — E55.9 VITAMIN D DEFICIENCY: Chronic | ICD-10-CM

## 2021-09-21 DIAGNOSIS — E11.9 TYPE 2 DIABETES MELLITUS WITHOUT COMPLICATION, WITH LONG-TERM CURRENT USE OF INSULIN (HCC): Primary | ICD-10-CM

## 2021-09-21 DIAGNOSIS — S39.012S STRAIN OF LUMBAR REGION, SEQUELA: ICD-10-CM

## 2021-09-21 DIAGNOSIS — Z79.4 TYPE 2 DIABETES MELLITUS WITHOUT COMPLICATION, WITH LONG-TERM CURRENT USE OF INSULIN (HCC): Primary | ICD-10-CM

## 2021-09-21 DIAGNOSIS — E78.2 MIXED HYPERLIPIDEMIA: Chronic | ICD-10-CM

## 2021-09-21 LAB
EXPIRATION DATE: NORMAL
HBA1C MFR BLD: 8.1 %
Lab: NORMAL

## 2021-09-21 PROCEDURE — 99214 OFFICE O/P EST MOD 30 MIN: CPT | Performed by: FAMILY MEDICINE

## 2021-09-21 PROCEDURE — 36415 COLL VENOUS BLD VENIPUNCTURE: CPT | Performed by: FAMILY MEDICINE

## 2021-09-21 RX ORDER — SIMVASTATIN 40 MG
40 TABLET ORAL NIGHTLY
Qty: 90 TABLET | Refills: 2 | Status: SHIPPED | OUTPATIENT
Start: 2021-09-21 | End: 2022-09-26 | Stop reason: SDUPTHER

## 2021-09-21 RX ORDER — METFORMIN HYDROCHLORIDE 500 MG/1
1000 TABLET, EXTENDED RELEASE ORAL
Qty: 360 TABLET | Refills: 3 | Status: SHIPPED | OUTPATIENT
Start: 2021-09-21 | End: 2022-08-23 | Stop reason: SDUPTHER

## 2021-09-21 RX ORDER — BLOOD SUGAR DIAGNOSTIC
STRIP MISCELLANEOUS
Qty: 100 EACH | Refills: 12 | Status: SHIPPED | OUTPATIENT
Start: 2021-09-21 | End: 2021-09-21 | Stop reason: SDUPTHER

## 2021-09-21 RX ORDER — GLIMEPIRIDE 4 MG/1
8 TABLET ORAL
Qty: 180 TABLET | Refills: 3 | Status: SHIPPED | OUTPATIENT
Start: 2021-09-21 | End: 2022-01-12

## 2021-09-21 RX ORDER — BLOOD SUGAR DIAGNOSTIC
STRIP MISCELLANEOUS
Qty: 100 EACH | Refills: 12 | Status: SHIPPED | OUTPATIENT
Start: 2021-09-21

## 2021-09-21 NOTE — PROGRESS NOTES
"Chief Complaint  Diabetes (follow up)    Subjective          Octavia Rice presents to [facility] for   Fell in back yard over the weekend and hurt right lower leg and lower back by landing on a fire pit. Took Ibuprofen some relief.    Diabetes  She presents for her follow-up (Has been seeing endo and would like to restart Metformin, can't get into see specialist for several months) diabetic visit. She has type 2 diabetes mellitus. Her disease course has been stable. There are no hypoglycemic associated symptoms. Pertinent negatives for hypoglycemia include no dizziness or nervousness/anxiousness. There are no diabetic associated symptoms. Pertinent negatives for diabetes include no chest pain and no fatigue. There are no hypoglycemic complications. Symptoms are stable. There are no diabetic complications. Risk factors for coronary artery disease include diabetes mellitus and post-menopausal. Current diabetic treatment includes insulin injections and oral agent (monotherapy). She is compliant with treatment most of the time. Her weight is stable. She is following a generally healthy diet. She has not had a previous visit with a dietitian. There is no change in her home blood glucose trend.       Objective   Vital Signs:   /80   Pulse 82   Temp 97 °F (36.1 °C)   Resp 20   Ht 175.3 cm (69\")   Wt 96.2 kg (212 lb)   SpO2 98%   BMI 31.31 kg/m²     Review of Systems   Constitutional: Negative.  Negative for activity change, fatigue, fever and unexpected weight change.   HENT: Negative.  Negative for congestion, sneezing and sore throat.    Eyes: Negative.  Negative for visual disturbance.   Respiratory: Negative.  Negative for cough, chest tightness, shortness of breath and wheezing.    Cardiovascular: Negative.  Negative for chest pain, palpitations and leg swelling.   Gastrointestinal: Negative.  Negative for abdominal distention, abdominal pain, blood in stool, " constipation, diarrhea and nausea.   Endocrine: Negative.  Negative for cold intolerance and heat intolerance.   Genitourinary: Negative.  Negative for dysuria, frequency and urgency.   Musculoskeletal: Negative.  Negative for arthralgias and myalgias.   Skin: Negative.  Negative for rash.   Allergic/Immunologic: Negative.    Neurological: Negative.  Negative for dizziness, syncope and numbness.   Hematological: Negative.  Negative for adenopathy.   Psychiatric/Behavioral: Negative.  Negative for suicidal ideas. The patient is not nervous/anxious.        Physical Exam  Vitals and nursing note reviewed.   Constitutional:       General: She is not in acute distress.     Appearance: She is well-developed. She is not diaphoretic.   HENT:      Right Ear: External ear normal.      Left Ear: External ear normal.      Nose: Nose normal.   Eyes:      Conjunctiva/sclera: Conjunctivae normal.      Pupils: Pupils are equal, round, and reactive to light.   Neck:      Thyroid: No thyromegaly.   Cardiovascular:      Rate and Rhythm: Normal rate and regular rhythm.      Heart sounds: Normal heart sounds. No murmur heard.     Pulmonary:      Effort: Pulmonary effort is normal. No respiratory distress.      Breath sounds: Normal breath sounds. No wheezing.   Abdominal:      General: Bowel sounds are normal. There is no distension.      Palpations: Abdomen is soft. There is no mass.      Tenderness: There is no abdominal tenderness. There is no guarding or rebound.      Hernia: No hernia is present.   Musculoskeletal:         General: No tenderness. Normal range of motion.      Cervical back: Normal range of motion and neck supple.   Lymphadenopathy:      Cervical: No cervical adenopathy.   Skin:     General: Skin is warm and dry.      Coloration: Skin is not pale.      Findings: No erythema or rash.   Neurological:      Mental Status: She is alert and oriented to person, place, and time.      Deep Tendon Reflexes: Reflexes are normal  and symmetric.   Psychiatric:         Behavior: Behavior normal.         Thought Content: Thought content normal.         Judgment: Judgment normal.        Result Review :   The following data was reviewed by: Daphney Avila DO on 09/21/2021:  Most Recent A1C    HGBA1C Most Recent 9/21/21   Hemoglobin A1C 8.1           A1C Last 3 Results    HGBA1C Last 3 Results 10/7/20 4/7/21 9/21/21   Hemoglobin A1C 9.0 7.6 8.1                     Assessment and Plan    Problem List Items Addressed This Visit        Cardiac and Vasculature    Hyperlipidemia    Relevant Medications    metFORMIN ER (GLUCOPHAGE-XR) 500 MG 24 hr tablet    simvastatin (ZOCOR) 40 MG tablet       Endocrine and Metabolic    Vitamin D deficiency    Relevant Orders    Vitamin D 25 Hydroxy    Type 2 diabetes mellitus (CMS/HCC) - Primary    Relevant Medications    glimepiride (Amaryl) 4 MG tablet    metFORMIN ER (GLUCOPHAGE-XR) 500 MG 24 hr tablet    Accu-Chek Olamide Plus test strip    Other Relevant Orders    POC Glycosylated Hemoglobin (Hb A1C) (Completed)    CBC & Differential    Comprehensive Metabolic Panel    Lipid Panel    TSH      Other Visit Diagnoses     Strain of lumbar region, sequela          Has Robaxin at home  D/C Insulin. Add Metformin 000 bid. Continue Glimeperide 4 bid. May need to add back Victoza.    Follow Up   Return in about 3 months (around 12/21/2021).  Patient was given instructions and counseling regarding her condition or for health maintenance advice. Please see specific information pulled into the AVS if appropriate.

## 2021-09-22 LAB
25(OH)D3+25(OH)D2 SERPL-MCNC: 49.2 NG/ML (ref 30–100)
ALBUMIN SERPL-MCNC: 4.9 G/DL (ref 3.5–5.2)
ALBUMIN/GLOB SERPL: 2.3 G/DL
ALP SERPL-CCNC: 105 U/L (ref 39–117)
ALT SERPL-CCNC: 15 U/L (ref 1–33)
AST SERPL-CCNC: 15 U/L (ref 1–32)
BASOPHILS # BLD AUTO: 0.04 10*3/MM3 (ref 0–0.2)
BASOPHILS NFR BLD AUTO: 0.5 % (ref 0–1.5)
BILIRUB SERPL-MCNC: 0.4 MG/DL (ref 0–1.2)
BUN SERPL-MCNC: 11 MG/DL (ref 8–23)
BUN/CREAT SERPL: 19.6 (ref 7–25)
CALCIUM SERPL-MCNC: 10.1 MG/DL (ref 8.6–10.5)
CHLORIDE SERPL-SCNC: 102 MMOL/L (ref 98–107)
CHOLEST SERPL-MCNC: 154 MG/DL (ref 0–200)
CO2 SERPL-SCNC: 23.4 MMOL/L (ref 22–29)
CREAT SERPL-MCNC: 0.56 MG/DL (ref 0.57–1)
EOSINOPHIL # BLD AUTO: 0.04 10*3/MM3 (ref 0–0.4)
EOSINOPHIL NFR BLD AUTO: 0.5 % (ref 0.3–6.2)
ERYTHROCYTE [DISTWIDTH] IN BLOOD BY AUTOMATED COUNT: 11.9 % (ref 12.3–15.4)
GLOBULIN SER CALC-MCNC: 2.1 GM/DL
GLUCOSE SERPL-MCNC: 174 MG/DL (ref 65–99)
HCT VFR BLD AUTO: 43.3 % (ref 34–46.6)
HDLC SERPL-MCNC: 38 MG/DL (ref 40–60)
HGB BLD-MCNC: 14.5 G/DL (ref 12–15.9)
IMM GRANULOCYTES # BLD AUTO: 0.03 10*3/MM3 (ref 0–0.05)
IMM GRANULOCYTES NFR BLD AUTO: 0.4 % (ref 0–0.5)
LDLC SERPL CALC-MCNC: 86 MG/DL (ref 0–100)
LYMPHOCYTES # BLD AUTO: 2.28 10*3/MM3 (ref 0.7–3.1)
LYMPHOCYTES NFR BLD AUTO: 26.8 % (ref 19.6–45.3)
MCH RBC QN AUTO: 30.3 PG (ref 26.6–33)
MCHC RBC AUTO-ENTMCNC: 33.5 G/DL (ref 31.5–35.7)
MCV RBC AUTO: 90.4 FL (ref 79–97)
MONOCYTES # BLD AUTO: 0.44 10*3/MM3 (ref 0.1–0.9)
MONOCYTES NFR BLD AUTO: 5.2 % (ref 5–12)
NEUTROPHILS # BLD AUTO: 5.69 10*3/MM3 (ref 1.7–7)
NEUTROPHILS NFR BLD AUTO: 66.6 % (ref 42.7–76)
NRBC BLD AUTO-RTO: 0 /100 WBC (ref 0–0.2)
PLATELET # BLD AUTO: 270 10*3/MM3 (ref 140–450)
POTASSIUM SERPL-SCNC: 4.1 MMOL/L (ref 3.5–5.2)
PROT SERPL-MCNC: 7 G/DL (ref 6–8.5)
RBC # BLD AUTO: 4.79 10*6/MM3 (ref 3.77–5.28)
SODIUM SERPL-SCNC: 139 MMOL/L (ref 136–145)
TRIGL SERPL-MCNC: 173 MG/DL (ref 0–150)
TSH SERPL DL<=0.005 MIU/L-ACNC: 1.76 UIU/ML (ref 0.27–4.2)
VLDLC SERPL CALC-MCNC: 30 MG/DL (ref 5–40)
WBC # BLD AUTO: 8.52 10*3/MM3 (ref 3.4–10.8)

## 2021-12-17 RX ORDER — MELATONIN
Qty: 30 TABLET | Refills: 3 | Status: SHIPPED | OUTPATIENT
Start: 2021-12-17 | End: 2022-05-10

## 2022-01-12 DIAGNOSIS — E11.9 TYPE 2 DIABETES MELLITUS WITHOUT COMPLICATION, WITH LONG-TERM CURRENT USE OF INSULIN: ICD-10-CM

## 2022-01-12 DIAGNOSIS — Z79.4 TYPE 2 DIABETES MELLITUS WITHOUT COMPLICATION, WITH LONG-TERM CURRENT USE OF INSULIN: ICD-10-CM

## 2022-01-12 RX ORDER — GLIMEPIRIDE 4 MG/1
TABLET ORAL
Qty: 270 TABLET | Refills: 1 | Status: SHIPPED | OUTPATIENT
Start: 2022-01-12 | End: 2022-09-23

## 2022-02-01 ENCOUNTER — TELEPHONE (OUTPATIENT)
Dept: ENDOCRINOLOGY | Facility: CLINIC | Age: 62
End: 2022-02-01

## 2022-02-01 ENCOUNTER — OFFICE VISIT (OUTPATIENT)
Dept: ENDOCRINOLOGY | Facility: CLINIC | Age: 62
End: 2022-02-01

## 2022-02-01 VITALS
DIASTOLIC BLOOD PRESSURE: 78 MMHG | SYSTOLIC BLOOD PRESSURE: 120 MMHG | WEIGHT: 207.5 LBS | HEIGHT: 69 IN | OXYGEN SATURATION: 98 % | HEART RATE: 94 BPM | BODY MASS INDEX: 30.73 KG/M2

## 2022-02-01 DIAGNOSIS — E78.2 MIXED HYPERLIPIDEMIA: ICD-10-CM

## 2022-02-01 DIAGNOSIS — E11.65 TYPE 2 DIABETES MELLITUS WITH HYPERGLYCEMIA, WITHOUT LONG-TERM CURRENT USE OF INSULIN: Primary | ICD-10-CM

## 2022-02-01 LAB
EXPIRATION DATE: ABNORMAL
EXPIRATION DATE: NORMAL
GLUCOSE BLDC GLUCOMTR-MCNC: 174 MG/DL (ref 70–130)
HBA1C MFR BLD: 9.4 %
Lab: ABNORMAL
Lab: NORMAL

## 2022-02-01 PROCEDURE — 83036 HEMOGLOBIN GLYCOSYLATED A1C: CPT | Performed by: INTERNAL MEDICINE

## 2022-02-01 PROCEDURE — 99214 OFFICE O/P EST MOD 30 MIN: CPT | Performed by: INTERNAL MEDICINE

## 2022-02-01 PROCEDURE — 3046F HEMOGLOBIN A1C LEVEL >9.0%: CPT | Performed by: INTERNAL MEDICINE

## 2022-02-01 PROCEDURE — 82947 ASSAY GLUCOSE BLOOD QUANT: CPT | Performed by: INTERNAL MEDICINE

## 2022-02-01 RX ORDER — INSULIN GLARGINE AND LIXISENATIDE 100; 33 U/ML; UG/ML
30 INJECTION, SOLUTION SUBCUTANEOUS DAILY
Qty: 3 PEN | Refills: 6 | Status: SHIPPED | OUTPATIENT
Start: 2022-02-01 | End: 2022-08-23 | Stop reason: SDUPTHER

## 2022-02-01 RX ORDER — FLASH GLUCOSE SENSOR
1 KIT MISCELLANEOUS
Qty: 2 EACH | Refills: 6 | Status: SHIPPED | OUTPATIENT
Start: 2022-02-01 | End: 2022-07-27

## 2022-02-01 RX ORDER — FLASH GLUCOSE SENSOR
1 KIT MISCELLANEOUS
Qty: 2 EACH | Refills: 6 | Status: SHIPPED | OUTPATIENT
Start: 2022-02-01 | End: 2022-02-01 | Stop reason: SDUPTHER

## 2022-02-01 RX ORDER — UREA 10 %
LOTION (ML) TOPICAL
COMMUNITY
End: 2022-05-05

## 2022-02-01 NOTE — PROGRESS NOTES
Chief complaint  Diabetes (Follow up)    Subjective   Octavia Rice is a 62 y.o. female is here today for follow-up of:    Diabetes Mellitus type 2: dx in 2003.  Diabetic complications: none  Eye exam current (within one year): no retinopathy, recent exam.   Current diabetic medications include     Metformin 1000 mg   Glimepiride 8 mg daily.       Past medications: 08/2016 Jardiance trial was unsuccessful - frequent urination    Monitoring   She is not testing      Other med problems: include hyperlipidemia on simvastatin.     Patient has missed her appointment and it was rescheduled for 6 months. She has discontinued Synjardy due to frequent urination and discontinued Victoza at some point. She c/o severe fatigue.       Medications    Current Outpatient Medications:   •  Accu-Chek Olamide Plus test strip, USE A STRIP TO TEST THREE TIMES A DAY, Disp: 100 each, Rfl: 12  •  aspirin 81 MG EC tablet, Take 81 mg by mouth Daily., Disp: , Rfl:   •  Blood Glucose Monitoring Suppl (ACCU-CHEK OLAMIDE PLUS) w/Device kit, Test Three Times Daily, Disp: 1 kit, Rfl: 0  •  cholecalciferol (VITAMIN D3) 25 MCG (1000 UT) tablet, TAKE ONE TABLET BY MOUTH DAILY, Disp: 30 tablet, Rfl: 3  •  fluticasone (FLONASE) 50 MCG/ACT nasal spray, 2 sprays into the nostril(s) as directed by provider Daily., Disp: 16 g, Rfl: 0  •  glimepiride (AMARYL) 4 MG tablet, TAKE TWO TABLETS BY MOUTH EVERY MORNING BEFORE BREAKFAST, Disp: 270 tablet, Rfl: 1  •  melatonin 1 MG tablet, Take  by mouth., Disp: , Rfl:   •  metFORMIN ER (GLUCOPHAGE-XR) 500 MG 24 hr tablet, Take 2 tablets by mouth Daily With Breakfast & Dinner., Disp: 360 tablet, Rfl: 3  •  Multiple Minerals-Vitamins (CALCIUM-MAGNESIUM-ZINC-D3 PO), Take  by mouth., Disp: , Rfl:   •  Multiple Vitamin (MULTI VITAMIN DAILY PO), Take  by mouth., Disp: , Rfl:   •  simvastatin (ZOCOR) 40 MG tablet, Take 1 tablet by mouth Every Night., Disp: 90 tablet, Rfl: 2  •  Continuous Blood Gluc Sensor (FreeStyle Camelia 14  "Day Sensor) misc, 1 each Every 14 (Fourteen) Days., Disp: 2 each, Rfl: 6  •  Insulin Glargine-Lixisenatide (Soliqua) 100-33 UNT-MCG/ML solution pen-injector injection, Inject 30 Units under the skin into the appropriate area as directed Daily., Disp: 3 pen, Rfl: 6  •  Insulin Pen Needle 32G X 4 MM misc, 1 each Daily., Disp: 50 each, Rfl: 11  •  levocetirizine (Xyzal) 5 MG tablet, Take 1 tablet by mouth Every Evening for 30 days., Disp: 30 tablet, Rfl: 6      Review of systems  Review of Systems   Constitutional: Positive for fatigue.   Eyes: Negative for visual disturbance.   Respiratory: Negative.  Negative for shortness of breath.    Cardiovascular: Negative for chest pain and leg swelling.   Gastrointestinal: Negative for constipation. Diarrhea: occasional.   Endocrine: Negative for cold intolerance, polydipsia and polyuria.   Musculoskeletal: Positive for arthralgias (ankle pain right). Negative for back pain, joint swelling and myalgias.   Skin: Negative for wound.   Allergic/Immunologic: Positive for environmental allergies.   Neurological: Positive for headaches. Negative for numbness.   Hematological: Negative.    Psychiatric/Behavioral: Negative for self-injury. Behavioral problem: phobia of crowds.       Physical exam  Objective   Blood pressure 120/78, pulse 94, height 175.3 cm (69\"), weight 94.1 kg (207 lb 8 oz), SpO2 98 %. Body mass index is 30.64 kg/m².  Physical Exam   Constitutional: She is oriented to person, place, and time. She appears well-developed.   HENT:   Head: Normocephalic and atraumatic.   Eyes: Conjunctivae are normal.   Cardiovascular: Normal rate, regular rhythm and normal heart sounds.   Pulmonary/Chest: Effort normal and breath sounds normal.   Musculoskeletal:      Comments: Right foot in brace   Neurological: She is alert and oriented to person, place, and time.   Psychiatric: Mood and thought content normal.   Vitals reviewed.        LABS AND IMAGING  Results for orders placed or " performed in visit on 02/01/22   POC Glycosylated Hemoglobin (Hb A1C)    Specimen: Blood   Result Value Ref Range    Hemoglobin A1C 9.4 %    Lot Number [id number]     Expiration Date 09/28/2023    POC Glucose, Blood    Specimen: Blood   Result Value Ref Range    Glucose 174 (A) 70 - 130 mg/dL    Lot Number [id number]     Expiration Date 07/21/2022            Assessment:         Diagnoses and all orders for this visit:    Type 2 diabetes mellitus with hyperglycemia, without long-term current use of insulin ([facility])  -     POC Glycosylated Hemoglobin (Hb A1C)  -     POC Glucose, Blood    Mixed hyperlipidemia    Other orders  -     melatonin 1 MG tablet; Take  by mouth.  -     Insulin Glargine-Lixisenatide (Soliqua) 100-33 UNT-MCG/ML solution pen-injector injection; Inject 30 Units under the skin into the appropriate area as directed Daily.  -     Discontinue: Continuous Blood Gluc Sensor (FreeStyle Camelia 14 Day Sensor) misc; 1 each Every 14 (Fourteen) Days.  -     Continuous Blood Gluc Sensor (FreeStyle Camelia 14 Day Sensor) misc; 1 each Every 14 (Fourteen) Days.  -     Insulin Pen Needle 32G X 4 MM misc; 1 each Daily.        Plan:    Diabetes is uncontrolled with hyperglycemia. I have started Basal insulin/GLP-1 combination. She will start at a low dose 15 units and titrate up according to the glucose values. She will test glucose several times a day, titration instructions provided and hypoglycemia precautions reviewed.   I have sent the CGM rx for Freestyle.     Patient Instructions     02/01/22    Octavia Rice 1960     Your A1C is:   Lab Results   Component Value Date    HGBA1C 9.4 02/01/2022    HGBA1C 8.1 09/21/2021    HGBA1C 7.6 04/07/2021       ADA General Goals: A1c: < 7%                                                  Fasting/before meal glucose: <150 mg/dL                                    2 Hour after meal glucoses: < 180 mg/dL                                        Bedtime glucose:120-180                 Soliqua is a combination of long acting insulin and medication similar to Victoza.      Starting Dose: 15  units every morning         Change Soliqua doses every 3 days based on the following:    If before breakfast blood sugar readings are consistently higher than 150 for 3-4 days, increase the dose to 20 Units. Continue increasing by 2 units every 3 days    If after 2 weeks, before meal blood sugars are not lower than 150, call the office.    If you are having frequent blood sugars lower than 70, call the office.    Priscilla Daugherty MD       Dietary recommendations and testing encouraged.     Mixed hyperlipidemia-cont simvastatin. Lipid panel showed LDL 86      Follow up:  3  months

## 2022-02-01 NOTE — PATIENT INSTRUCTIONS
02/01/22    Octavia Rice 1960     Your A1C is:   Lab Results   Component Value Date    HGBA1C 9.4 02/01/2022    HGBA1C 8.1 09/21/2021    HGBA1C 7.6 04/07/2021       ADA General Goals: A1c: < 7%                                                  Fasting/before meal glucose: <150 mg/dL                                    2 Hour after meal glucoses: < 180 mg/dL                                        Bedtime glucose:120-180                Soliqua is a combination of long acting insulin and medication similar to Victoza.      Starting Dose: 15  units every morning         Change Soliqua doses every 3 days based on the following:    If before breakfast blood sugar readings are consistently higher than 150 for 3-4 days, increase the dose to 20 Units. Continue increasing by 2 units every 3 days    If after 2 weeks, before meal blood sugars are not lower than 150, call the office.    If you are having frequent blood sugars lower than 70, call the office.    Priscilla Daugherty MD

## 2022-05-05 ENCOUNTER — OFFICE VISIT (OUTPATIENT)
Dept: ENDOCRINOLOGY | Facility: CLINIC | Age: 62
End: 2022-05-05

## 2022-05-05 ENCOUNTER — LAB (OUTPATIENT)
Dept: LAB | Facility: HOSPITAL | Age: 62
End: 2022-05-05

## 2022-05-05 VITALS
DIASTOLIC BLOOD PRESSURE: 82 MMHG | BODY MASS INDEX: 31.58 KG/M2 | SYSTOLIC BLOOD PRESSURE: 124 MMHG | HEART RATE: 78 BPM | HEIGHT: 69 IN | WEIGHT: 213.2 LBS | OXYGEN SATURATION: 98 %

## 2022-05-05 DIAGNOSIS — E55.9 VITAMIN D DEFICIENCY: ICD-10-CM

## 2022-05-05 DIAGNOSIS — E11.65 TYPE 2 DIABETES MELLITUS WITH HYPERGLYCEMIA, WITHOUT LONG-TERM CURRENT USE OF INSULIN: Primary | ICD-10-CM

## 2022-05-05 DIAGNOSIS — E78.2 MIXED HYPERLIPIDEMIA: ICD-10-CM

## 2022-05-05 LAB
25(OH)D3+25(OH)D2 SERPL-MCNC: 38.5 NG/ML (ref 30–100)
ALBUMIN SERPL-MCNC: 4.7 G/DL (ref 3.5–5.2)
ALBUMIN/GLOB SERPL: 1.8 G/DL
ALP SERPL-CCNC: 93 U/L (ref 39–117)
ALT SERPL-CCNC: 10 U/L (ref 1–33)
AST SERPL-CCNC: 13 U/L (ref 1–32)
BILIRUB SERPL-MCNC: 0.4 MG/DL (ref 0–1.2)
BUN SERPL-MCNC: 10 MG/DL (ref 8–23)
BUN/CREAT SERPL: 17.2 (ref 7–25)
CALCIUM SERPL-MCNC: 10.1 MG/DL (ref 8.6–10.5)
CHLORIDE SERPL-SCNC: 100 MMOL/L (ref 98–107)
CO2 SERPL-SCNC: 24.4 MMOL/L (ref 22–29)
CREAT SERPL-MCNC: 0.58 MG/DL (ref 0.57–1)
EGFRCR SERPLBLD CKD-EPI 2021: 102.5 ML/MIN/1.73
EXPIRATION DATE: ABNORMAL
EXPIRATION DATE: NORMAL
GLOBULIN SER CALC-MCNC: 2.6 GM/DL
GLUCOSE BLDC GLUCOMTR-MCNC: 192 MG/DL (ref 70–130)
GLUCOSE SERPL-MCNC: 172 MG/DL (ref 65–99)
HBA1C MFR BLD: 7.9 %
Lab: ABNORMAL
Lab: NORMAL
POTASSIUM SERPL-SCNC: 4.2 MMOL/L (ref 3.5–5.2)
PROT SERPL-MCNC: 7.3 G/DL (ref 6–8.5)
SODIUM SERPL-SCNC: 137 MMOL/L (ref 136–145)
TSH SERPL DL<=0.005 MIU/L-ACNC: 1.99 UIU/ML (ref 0.27–4.2)

## 2022-05-05 PROCEDURE — 82947 ASSAY GLUCOSE BLOOD QUANT: CPT | Performed by: INTERNAL MEDICINE

## 2022-05-05 PROCEDURE — 3051F HG A1C>EQUAL 7.0%<8.0%: CPT | Performed by: INTERNAL MEDICINE

## 2022-05-05 PROCEDURE — 99214 OFFICE O/P EST MOD 30 MIN: CPT | Performed by: INTERNAL MEDICINE

## 2022-05-05 PROCEDURE — 83036 HEMOGLOBIN GLYCOSYLATED A1C: CPT | Performed by: INTERNAL MEDICINE

## 2022-05-05 RX ORDER — PEN NEEDLE, DIABETIC 32GX 5/32"
NEEDLE, DISPOSABLE MISCELLANEOUS
COMMUNITY
Start: 2022-02-07

## 2022-05-05 NOTE — PROGRESS NOTES
Chief complaint  Diabetes (Follow Up:  Was unable to get Camelia though Pharmacy will send to DME)    Subjective   Octavia Rice is a 62 y.o. female is here today for follow-up of:    Diabetes Mellitus type 2: dx in 2003.  Diabetic complications: none  Eye exam current (within one year): no retinopathy, recent exam.   Current diabetic medications include   Metformin 1000 mg.   Glimepiride 8 mg daily.     Soliqua    Past medications: 08/2016 Jardiance trial was unsuccessful - frequent urination    Monitoring  Sensor was approved but she didn't get it because she went to get it in the pharmacy and not [facility].     Other med problems: include hyperlipidemia on simvastatin.     Patient reported increased stress, her daughter is going through the divorce and sister is in hospice. She feels lack of interest in doing things, reported stress eating and difficulty sleeping. She is planning to get established with the doctor and discuss antidepressant or sleeping aid.     Medications    Current Outpatient Medications:   •  Accu-Chek Olamide Plus test strip, USE A STRIP TO TEST THREE TIMES A DAY, Disp: 100 each, Rfl: 12  •  BD Pen Needle Sherri 2nd Gen 32G X 4 MM misc, , Disp: , Rfl:   •  Blood Glucose Monitoring Suppl (ACCU-CHEK OLAMIDE PLUS) w/Device kit, Test Three Times Daily, Disp: 1 kit, Rfl: 0  •  cholecalciferol (VITAMIN D3) 25 MCG (1000 UT) tablet, TAKE ONE TABLET BY MOUTH DAILY, Disp: 30 tablet, Rfl: 3  •  Continuous Blood Gluc Sensor (FreeStyle Camelia 14 Day Sensor) misc, 1 each Every 14 (Fourteen) Days., Disp: 2 each, Rfl: 6  •  glimepiride (AMARYL) 4 MG tablet, TAKE TWO TABLETS BY MOUTH EVERY MORNING BEFORE BREAKFAST, Disp: 270 tablet, Rfl: 1  •  Insulin Glargine-Lixisenatide (Soliqua) 100-33 UNT-MCG/ML solution pen-injector injection, Inject 30 Units under the skin into the appropriate area as directed Daily., Disp: 3 pen, Rfl: 6  •  Insulin Pen Needle 32G X 4 MM misc, 1 each Daily., Disp: 50 each, Rfl: 11  •  metFORMIN  "ER (GLUCOPHAGE-XR) 500 MG 24 hr tablet, Take 2 tablets by mouth Daily With Breakfast & Dinner., Disp: 360 tablet, Rfl: 3  •  Multiple Vitamin (MULTI VITAMIN DAILY PO), Take  by mouth., Disp: , Rfl:   •  simvastatin (ZOCOR) 40 MG tablet, Take 1 tablet by mouth Every Night., Disp: 90 tablet, Rfl: 2  •  levocetirizine (Xyzal) 5 MG tablet, Take 1 tablet by mouth Every Evening for 30 days., Disp: 30 tablet, Rfl: 6  •  Multiple Minerals-Vitamins (CALCIUM-MAGNESIUM-ZINC-D3 PO), Take  by mouth., Disp: , Rfl:       Review of systems  Review of Systems   Constitutional: Positive for fatigue.   Eyes: Negative for visual disturbance.   Respiratory: Negative.  Negative for shortness of breath.    Cardiovascular: Negative for chest pain and leg swelling.   Gastrointestinal: Negative for constipation. Diarrhea: occasional.   Endocrine: Negative for cold intolerance, polydipsia and polyuria.   Musculoskeletal: Positive for arthralgias (ankle pain right). Negative for back pain, joint swelling and myalgias.   Skin: Negative for wound.   Allergic/Immunologic: Positive for environmental allergies.   Neurological: Positive for headaches. Negative for numbness.   Hematological: Negative.    Psychiatric/Behavioral: Positive for decreased concentration and sleep disturbance. Negative for self-injury. Behavioral problem: phobia of crowds. The patient is nervous/anxious.        Physical exam  Objective   Blood pressure 124/82, pulse 78, height 175.3 cm (69\"), weight 96.7 kg (213 lb 3.2 oz), SpO2 98 %. Body mass index is 31.48 kg/m².  Physical Exam   Constitutional: She is oriented to person, place, and time. She appears well-developed.   HENT:   Head: Normocephalic and atraumatic.   Eyes: Conjunctivae are normal.   Cardiovascular: Normal rate, regular rhythm, normal heart sounds and normal pulses.   Pulmonary/Chest: Effort normal and breath sounds normal.   Musculoskeletal: No swelling.      Comments: Right foot in brace   Neurological: She " is alert and oriented to person, place, and time.   Psychiatric: Mood and thought content normal.   Vitals reviewed.        LABS AND IMAGING  Results for orders placed or performed in visit on 05/05/22   POC Glycosylated Hemoglobin (Hb A1C)    Specimen: Blood   Result Value Ref Range    Hemoglobin A1C 7.9 %    Lot Number [id number]     Expiration Date 01/17/2024    POC Glucose, Blood    Specimen: Blood   Result Value Ref Range    Glucose 192 (A) 70 - 130 mg/dL    Lot Number [id number]     Expiration Date 11/10/2022            Assessment:         Diagnoses and all orders for this visit:    Type 2 diabetes mellitus with hyperglycemia, without long-term current use of insulin ([facility])  -     POC Glycosylated Hemoglobin (Hb A1C)  -     POC Glucose, Blood    Mixed hyperlipidemia    Other orders  -     BD Pen Needle Sherri 2nd Gen 32G X 4 MM misc        Plan:    Diabetes is uncontrolled with hyperglycemia. I have started Basal insulin/GLP-1 combination.     -Soliqua 22 units  -metformin  -glimepiride.     There are no Patient Instructions on file for this visit.   Dietary recommendations and testing encouraged.     Mixed hyperlipidemia-cont simvastatin. Fasting labs today      Follow up:  3  months

## 2022-05-10 RX ORDER — VITAMIN B COMPLEX
TABLET ORAL
Qty: 30 TABLET | Refills: 3 | Status: SHIPPED | OUTPATIENT
Start: 2022-05-10 | End: 2022-09-23

## 2022-06-07 ENCOUNTER — OFFICE VISIT (OUTPATIENT)
Dept: FAMILY MEDICINE CLINIC | Facility: CLINIC | Age: 62
End: 2022-06-07

## 2022-06-07 VITALS
HEIGHT: 69 IN | OXYGEN SATURATION: 97 % | BODY MASS INDEX: 30.81 KG/M2 | TEMPERATURE: 98.2 F | SYSTOLIC BLOOD PRESSURE: 124 MMHG | DIASTOLIC BLOOD PRESSURE: 72 MMHG | RESPIRATION RATE: 14 BRPM | WEIGHT: 208 LBS | HEART RATE: 94 BPM

## 2022-06-07 DIAGNOSIS — J01.10 ACUTE NON-RECURRENT FRONTAL SINUSITIS: Primary | ICD-10-CM

## 2022-06-07 DIAGNOSIS — R05.9 COUGH: ICD-10-CM

## 2022-06-07 PROCEDURE — 99213 OFFICE O/P EST LOW 20 MIN: CPT | Performed by: NURSE PRACTITIONER

## 2022-06-07 RX ORDER — DEXTROMETHORPHAN HYDROBROMIDE AND PROMETHAZINE HYDROCHLORIDE 15; 6.25 MG/5ML; MG/5ML
5 SYRUP ORAL 4 TIMES DAILY PRN
Qty: 100 ML | Refills: 0 | Status: SHIPPED | OUTPATIENT
Start: 2022-06-07 | End: 2022-07-27

## 2022-06-07 RX ORDER — DOXYCYCLINE 100 MG/1
100 TABLET ORAL 2 TIMES DAILY
Qty: 20 TABLET | Refills: 0 | Status: SHIPPED | OUTPATIENT
Start: 2022-06-07 | End: 2022-06-17

## 2022-06-07 NOTE — ASSESSMENT & PLAN NOTE
Take medications as directed  Rest  Drink plenty of fluids  Saline nasal spray  Mucinex 12 hour  Tylenol or Motrin PRN as directed  RTO or call PRN if persistent or worsening symptoms  Declined COVID test

## 2022-06-07 NOTE — PROGRESS NOTES
"Chief Complaint  Sinus Problem and Cough (Pt c/o cough lasting for about 2 days. Pt c/o yellowish color sputum. )    Subjective        Octavia Rice presents to [facility]  Sinusitis  This is a new problem. The current episode started in the past 7 days. The problem has been gradually worsening since onset. There has been no fever. Associated symptoms include congestion, coughing, a hoarse voice, sinus pressure and swollen glands. Pertinent negatives include no chills, ear pain, shortness of breath, sneezing or sore throat. Past treatments include nothing. The treatment provided no relief.       Objective   Vital Signs:  /72 (BP Location: Left arm, Patient Position: Sitting, Cuff Size: Adult)   Pulse 94   Temp 98.2 °F (36.8 °C) (Infrared)   Resp 14   Ht 175.3 cm (69\")   Wt 94.3 kg (208 lb)   SpO2 97%   BMI 30.72 kg/m²   Estimated body mass index is 30.72 kg/m² as calculated from the following:    Height as of this encounter: 175.3 cm (69\").    Weight as of this encounter: 94.3 kg (208 lb).           Physical Exam  Vitals and nursing note reviewed.   Constitutional:       General: She is not in acute distress.     Appearance: Normal appearance.   HENT:      Head: Normocephalic and atraumatic.      Right Ear: Tympanic membrane, ear canal and external ear normal.      Left Ear: Tympanic membrane, ear canal and external ear normal.      Nose: Congestion and rhinorrhea present.      Right Sinus: Frontal sinus tenderness present.      Left Sinus: Frontal sinus tenderness present.      Mouth/Throat:      Mouth: Mucous membranes are moist.      Pharynx: Oropharynx is clear. Posterior oropharyngeal erythema present.   Eyes:      Extraocular Movements: Extraocular movements intact.      Conjunctiva/sclera: Conjunctivae normal.      Pupils: Pupils are equal, round, and reactive to light.   Neck:      Vascular: No carotid bruit.   Cardiovascular:      Rate and Rhythm: Normal rate " and regular rhythm.      Heart sounds: Normal heart sounds.   Pulmonary:      Effort: Pulmonary effort is normal. No respiratory distress.      Breath sounds: Normal breath sounds. No wheezing, rhonchi or rales.   Musculoskeletal:         General: Normal range of motion.      Cervical back: Normal range of motion and neck supple. Tenderness present. No rigidity.      Right lower leg: No edema.      Left lower leg: No edema.   Lymphadenopathy:      Cervical: Cervical adenopathy present.   Skin:     General: Skin is warm and dry.   Neurological:      Mental Status: She is alert and oriented to person, place, and time.   Psychiatric:         Mood and Affect: Mood normal.         Behavior: Behavior normal.         Thought Content: Thought content normal.         Judgment: Judgment normal.        Result Review :                Assessment and Plan   Diagnoses and all orders for this visit:    1. Acute non-recurrent frontal sinusitis (Primary)  Assessment & Plan:  Take medications as directed  Rest  Drink plenty of fluids  Saline nasal spray  Mucinex 12 hour  Tylenol or Motrin PRN as directed  RTO or call PRN if persistent or worsening symptoms  Declined COVID test    Orders:  -     doxycycline (ADOXA) 100 MG tablet; Take 1 tablet by mouth 2 (Two) Times a Day for 10 days.  Dispense: 20 tablet; Refill: 0    2. Cough  Assessment & Plan:  Drink plenty of fluids  Take RX cough syrup as directed  Elevate head of bed  Mucinex suggested    Orders:  -     promethazine-dextromethorphan (PROMETHAZINE-DM) 6.25-15 MG/5ML syrup; Take 5 mL by mouth 4 (Four) Times a Day As Needed for Cough.  Dispense: 100 mL; Refill: 0           Follow Up   Return if symptoms worsen or fail to improve.  Patient was given instructions and counseling regarding her condition or for health maintenance advice. Please see specific information pulled into the AVS if appropriate.

## 2022-07-27 ENCOUNTER — OFFICE VISIT (OUTPATIENT)
Dept: FAMILY MEDICINE CLINIC | Facility: CLINIC | Age: 62
End: 2022-07-27

## 2022-07-27 ENCOUNTER — LAB (OUTPATIENT)
Dept: LAB | Facility: HOSPITAL | Age: 62
End: 2022-07-27

## 2022-07-27 VITALS
DIASTOLIC BLOOD PRESSURE: 80 MMHG | HEIGHT: 69 IN | OXYGEN SATURATION: 98 % | RESPIRATION RATE: 11 BRPM | TEMPERATURE: 98.2 F | HEART RATE: 92 BPM | SYSTOLIC BLOOD PRESSURE: 146 MMHG | BODY MASS INDEX: 30.51 KG/M2 | WEIGHT: 206 LBS

## 2022-07-27 DIAGNOSIS — K58.9 IRRITABLE BOWEL SYNDROME, UNSPECIFIED TYPE: ICD-10-CM

## 2022-07-27 DIAGNOSIS — S39.012S STRAIN OF LUMBAR REGION, SEQUELA: ICD-10-CM

## 2022-07-27 DIAGNOSIS — E11.65 TYPE 2 DIABETES MELLITUS WITH HYPERGLYCEMIA, WITHOUT LONG-TERM CURRENT USE OF INSULIN: ICD-10-CM

## 2022-07-27 DIAGNOSIS — Z12.31 ENCOUNTER FOR SCREENING MAMMOGRAM FOR MALIGNANT NEOPLASM OF BREAST: ICD-10-CM

## 2022-07-27 DIAGNOSIS — E78.2 MIXED HYPERLIPIDEMIA: ICD-10-CM

## 2022-07-27 DIAGNOSIS — E55.9 VITAMIN D DEFICIENCY: ICD-10-CM

## 2022-07-27 DIAGNOSIS — Z00.00 ANNUAL PHYSICAL EXAM: ICD-10-CM

## 2022-07-27 DIAGNOSIS — F41.9 ANXIETY: ICD-10-CM

## 2022-07-27 DIAGNOSIS — Z23 ENCOUNTER FOR IMMUNIZATION: ICD-10-CM

## 2022-07-27 DIAGNOSIS — Z72.0 TOBACCO ABUSE: ICD-10-CM

## 2022-07-27 DIAGNOSIS — Z78.0 POSTMENOPAUSAL: ICD-10-CM

## 2022-07-27 DIAGNOSIS — Z00.00 MEDICARE ANNUAL WELLNESS VISIT, SUBSEQUENT: Primary | ICD-10-CM

## 2022-07-27 DIAGNOSIS — F33.0 MILD EPISODE OF RECURRENT DEPRESSIVE DISORDER: ICD-10-CM

## 2022-07-27 DIAGNOSIS — Z00.00 MEDICARE ANNUAL WELLNESS VISIT, SUBSEQUENT: ICD-10-CM

## 2022-07-27 DIAGNOSIS — Z23 NEED FOR DIPHTHERIA-TETANUS-PERTUSSIS (TDAP) VACCINE: ICD-10-CM

## 2022-07-27 DIAGNOSIS — G47.09 OTHER INSOMNIA: ICD-10-CM

## 2022-07-27 PROBLEM — E11.9 TYPE 2 DIABETES MELLITUS: Chronic | Status: ACTIVE | Noted: 2017-08-07

## 2022-07-27 PROBLEM — S39.012A STRAIN OF LUMBAR REGION: Status: ACTIVE | Noted: 2022-07-27

## 2022-07-27 LAB
BACTERIA UR QL AUTO: NORMAL /HPF
BASOPHILS # BLD AUTO: 0.04 10*3/MM3 (ref 0–0.2)
BASOPHILS NFR BLD AUTO: 0.4 % (ref 0–1.5)
BILIRUB UR QL STRIP: NEGATIVE
CLARITY UR: ABNORMAL
COD CRY URNS QL: NORMAL /HPF
COLOR UR: ABNORMAL
DEPRECATED RDW RBC AUTO: 40.1 FL (ref 37–54)
EOSINOPHIL # BLD AUTO: 0.09 10*3/MM3 (ref 0–0.4)
EOSINOPHIL NFR BLD AUTO: 0.9 % (ref 0.3–6.2)
ERYTHROCYTE [DISTWIDTH] IN BLOOD BY AUTOMATED COUNT: 12.5 % (ref 12.3–15.4)
GLUCOSE UR STRIP-MCNC: NEGATIVE MG/DL
HCT VFR BLD AUTO: 40.2 % (ref 34–46.6)
HGB BLD-MCNC: 13.5 G/DL (ref 12–15.9)
HGB UR QL STRIP.AUTO: NEGATIVE
HYALINE CASTS UR QL AUTO: NORMAL /LPF
IMM GRANULOCYTES # BLD AUTO: 0.06 10*3/MM3 (ref 0–0.05)
IMM GRANULOCYTES NFR BLD AUTO: 0.6 % (ref 0–0.5)
KETONES UR QL STRIP: ABNORMAL
LEUKOCYTE ESTERASE UR QL STRIP.AUTO: ABNORMAL
LYMPHOCYTES # BLD AUTO: 3.14 10*3/MM3 (ref 0.7–3.1)
LYMPHOCYTES NFR BLD AUTO: 30.7 % (ref 19.6–45.3)
MCH RBC QN AUTO: 30.1 PG (ref 26.6–33)
MCHC RBC AUTO-ENTMCNC: 33.6 G/DL (ref 31.5–35.7)
MCV RBC AUTO: 89.5 FL (ref 79–97)
MONOCYTES # BLD AUTO: 0.55 10*3/MM3 (ref 0.1–0.9)
MONOCYTES NFR BLD AUTO: 5.4 % (ref 5–12)
NEUTROPHILS NFR BLD AUTO: 6.36 10*3/MM3 (ref 1.7–7)
NEUTROPHILS NFR BLD AUTO: 62 % (ref 42.7–76)
NITRITE UR QL STRIP: NEGATIVE
NRBC BLD AUTO-RTO: 0 /100 WBC (ref 0–0.2)
PH UR STRIP.AUTO: 5.5 [PH] (ref 5–8)
PLATELET # BLD AUTO: 295 10*3/MM3 (ref 140–450)
PMV BLD AUTO: 10.8 FL (ref 6–12)
PROT UR QL STRIP: ABNORMAL
RBC # BLD AUTO: 4.49 10*6/MM3 (ref 3.77–5.28)
RBC # UR STRIP: NORMAL /HPF
REF LAB TEST METHOD: NORMAL
SP GR UR STRIP: 1.03 (ref 1–1.03)
SQUAMOUS #/AREA URNS HPF: NORMAL /HPF
UROBILINOGEN UR QL STRIP: ABNORMAL
WBC # UR STRIP: NORMAL /HPF
WBC NRBC COR # BLD: 10.24 10*3/MM3 (ref 3.4–10.8)

## 2022-07-27 PROCEDURE — 82607 VITAMIN B-12: CPT

## 2022-07-27 PROCEDURE — 90471 IMMUNIZATION ADMIN: CPT | Performed by: FAMILY MEDICINE

## 2022-07-27 PROCEDURE — 90677 PCV20 VACCINE IM: CPT | Performed by: FAMILY MEDICINE

## 2022-07-27 PROCEDURE — 80053 COMPREHEN METABOLIC PANEL: CPT

## 2022-07-27 PROCEDURE — 80061 LIPID PANEL: CPT

## 2022-07-27 PROCEDURE — 1159F MED LIST DOCD IN RCRD: CPT | Performed by: FAMILY MEDICINE

## 2022-07-27 PROCEDURE — 81001 URINALYSIS AUTO W/SCOPE: CPT

## 2022-07-27 PROCEDURE — G0009 ADMIN PNEUMOCOCCAL VACCINE: HCPCS | Performed by: FAMILY MEDICINE

## 2022-07-27 PROCEDURE — 90715 TDAP VACCINE 7 YRS/> IM: CPT | Performed by: FAMILY MEDICINE

## 2022-07-27 PROCEDURE — 84443 ASSAY THYROID STIM HORMONE: CPT

## 2022-07-27 PROCEDURE — 1126F AMNT PAIN NOTED NONE PRSNT: CPT | Performed by: FAMILY MEDICINE

## 2022-07-27 PROCEDURE — G0439 PPPS, SUBSEQ VISIT: HCPCS | Performed by: FAMILY MEDICINE

## 2022-07-27 PROCEDURE — 96160 PT-FOCUSED HLTH RISK ASSMT: CPT | Performed by: FAMILY MEDICINE

## 2022-07-27 PROCEDURE — 1170F FXNL STATUS ASSESSED: CPT | Performed by: FAMILY MEDICINE

## 2022-07-27 PROCEDURE — 82306 VITAMIN D 25 HYDROXY: CPT

## 2022-07-27 PROCEDURE — 85025 COMPLETE CBC W/AUTO DIFF WBC: CPT

## 2022-07-27 RX ORDER — CYCLOBENZAPRINE HCL 10 MG
10 TABLET ORAL 2 TIMES DAILY PRN
Qty: 30 TABLET | Refills: 3 | Status: SHIPPED | OUTPATIENT
Start: 2022-07-27 | End: 2022-11-28

## 2022-07-27 RX ORDER — LEVOCETIRIZINE DIHYDROCHLORIDE 5 MG/1
5 TABLET, FILM COATED ORAL EVERY EVENING
COMMUNITY
End: 2022-08-23 | Stop reason: SDUPTHER

## 2022-07-27 RX ORDER — LISINOPRIL 10 MG/1
10 TABLET ORAL DAILY
Qty: 90 TABLET | Refills: 3 | Status: SHIPPED | OUTPATIENT
Start: 2022-07-27

## 2022-07-27 RX ORDER — CYCLOBENZAPRINE HCL 10 MG
10 TABLET ORAL NIGHTLY
COMMUNITY
End: 2022-07-27 | Stop reason: SDUPTHER

## 2022-07-27 RX ORDER — QUETIAPINE FUMARATE 25 MG/1
25 TABLET, FILM COATED ORAL NIGHTLY
Qty: 30 TABLET | Refills: 3 | Status: SHIPPED | OUTPATIENT
Start: 2022-07-27 | End: 2022-09-27

## 2022-07-27 RX ORDER — CHLORAL HYDRATE 500 MG
CAPSULE ORAL
COMMUNITY

## 2022-07-27 RX ORDER — BUPROPION HYDROCHLORIDE 150 MG/1
150 TABLET ORAL DAILY
Qty: 30 TABLET | Refills: 5 | Status: SHIPPED | OUTPATIENT
Start: 2022-07-27 | End: 2023-02-07

## 2022-07-27 NOTE — PROGRESS NOTES
Subjective   Octavia Rice is a 62 y.o. female and is here for a comprehensive physical exam. The patient reports previous with Dr Medeiros and sridhar.  hs been lapse in care.  . Patient reports last physical date of over 1 year    Difficulty sleeping takes tylenol PM and melatonin and has tried wellbutrin in past.       39 years.  She is retired [employer].  She is disabled from fall at work.  2017  She still has fears of falling.  She had shoulder replacement.  No history seizure.         Do you take any herbs or supplements that were not prescribed by a doctor? cbd  Are you taking calcium supplements? no  Are you taking aspirin daily? will start  Family history of ovarian cancer? no  Family history of breast cancer? mother  FH of endometrial cancer? no  FH of cervical cancer? no  FH of colon cancer? no    Cancer Screening  Mammogram up-to-date?  no  If yes, last exam date: will order  BMD up-to-date? no  If yes, last exam date: will order  Colonoscopy up-to-date? yes   If yes, last exam date:   Pap up-to-date? no   If yes, last exam date: [facility]     History:  LMP: No LMP recorded. Patient has had a hysterectomy. endometriosis  Menopause at 32 years, 2 years HRT  Last pap date: sees gyn, still has cervix.  Dr Fabian  Abnormal pap? no  : 2  Para: 2    Immunization History  Tdap? ordered today  HPV? not applicable  Pneumonia? today  Shingles? had first in nov at pharm

## 2022-07-27 NOTE — PROGRESS NOTES
The ABCs of the Annual Wellness Visit  Subsequent Medicare Wellness Visit    Chief Complaint   Patient presents with   • Annual Exam      Subjective    History of Present Illness:  Octavia Rice is a 62 y.o. female who presents for a Subsequent Medicare Wellness Visit.  Subjective   Octavia Rice is a 62 y.o. female and is here for a comprehensive physical exam. The patient reports previous with Dr Medeiros and sridhar.  hs been lapse in care.  . Patient reports last physical date of over 1 year    Difficulty sleeping takes tylenol PM and melatonin and has tried wellbutrin in past.       39 years.  She is retired [employer].  She is disabled from fall at work.  2017  She still has fears of falling.  She had shoulder replacement.  No history seizure.         Do you take any herbs or supplements that were not prescribed by a doctor? cbd  Are you taking calcium supplements? no  Are you taking aspirin daily? will start  Family history of ovarian cancer? no  Family history of breast cancer? mother  FH of endometrial cancer? no  FH of cervical cancer? no  FH of colon cancer? no    Cancer Screening  Mammogram up-to-date?  no  If yes, last exam date: will order  BMD up-to-date? no  If yes, last exam date: will order  Colonoscopy up-to-date? yes   If yes, last exam date:   Pap up-to-date? no   If yes, last exam date: [facility]     History:  LMP: No LMP recorded. Patient has had a hysterectomy. endometriosis  Menopause at 32 years, 2 years HRT  Last pap date: sees gyn, still has cervix.  Dr Fabian  Abnormal pap? no  : 2  Para: 2    Immunization History  Tdap? ordered today  HPV? not applicable  Pneumonia? today  Shingles? had first in nov at pharm    The following portions of the patient's history were reviewed and   updated as appropriate: allergies, current medications, past family history, past medical history, past social history, past surgical history and problem list.    Compared to one  year ago, the patient feels her physical   health is the same.    Compared to one year ago, the patient feels her mental   health is worse.    Recent Hospitalizations:  She was not admitted to the hospital during the last year.       Current Medical Providers:  Patient Care Team:  Cat Bangura MD as PCP - General (Family Medicine)  Priscilla Daugherty MD as Consulting Physician (Endocrinology)  Jaguar Marsh MD as Consulting Physician (Orthopedic Surgery)  Brandi Fabian MD as Obstetrician (Obstetrics and Gynecology)    Outpatient Medications Prior to Visit   Medication Sig Dispense Refill   • Accu-Chek Olamide Plus test strip USE A STRIP TO TEST THREE TIMES A  each 12   • BD Pen Needle Sherri 2nd Gen 32G X 4 MM misc      • Blood Glucose Monitoring Suppl (ACCU-CHEK OLAMIDE PLUS) w/Device kit Test Three Times Daily 1 kit 0   • glimepiride (AMARYL) 4 MG tablet TAKE TWO TABLETS BY MOUTH EVERY MORNING BEFORE BREAKFAST 270 tablet 1   • Insulin Glargine-Lixisenatide (Soliqua) 100-33 UNT-MCG/ML solution pen-injector injection Inject 30 Units under the skin into the appropriate area as directed Daily. 3 pen 6   • Insulin Pen Needle 32G X 4 MM misc 1 each Daily. 50 each 11   • levocetirizine (XYZAL) 5 MG tablet Take 5 mg by mouth Every Evening.     • metFORMIN ER (GLUCOPHAGE-XR) 500 MG 24 hr tablet Take 2 tablets by mouth Daily With Breakfast & Dinner. 360 tablet 3   • Multiple Minerals-Vitamins (CALCIUM-MAGNESIUM-ZINC-D3 PO) Take  by mouth.     • Multiple Vitamin (MULTI VITAMIN DAILY PO) Take  by mouth.     • Omega-3 Fatty Acids (fish oil) 1000 MG capsule capsule Take  by mouth Daily With Breakfast.     • simvastatin (ZOCOR) 40 MG tablet Take 1 tablet by mouth Every Night. 90 tablet 2   • Vitamin D 25 MCG (1000 UT) tablet TAKE ONE TABLET BY MOUTH DAILY 30 tablet 3   • cyclobenzaprine (FLEXERIL) 10 MG tablet Take 10 mg by mouth Every Night. Take prn     • levocetirizine (Xyzal) 5 MG tablet Take 1 tablet by mouth  "Every Evening for 30 days. 30 tablet 6   • Continuous Blood Gluc Sensor (FreeStyle Camelai 14 Day Sensor) misc 1 each Every 14 (Fourteen) Days. 2 each 6   • promethazine-dextromethorphan (PROMETHAZINE-DM) 6.25-15 MG/5ML syrup Take 5 mL by mouth 4 (Four) Times a Day As Needed for Cough. 100 mL 0     No facility-administered medications prior to visit.       No opioid medication identified on active medication list. I have reviewed chart for other potential  high risk medication/s and harmful drug interactions in the elderly.          Aspirin is not on active medication list.  Aspirin use is not indicated based on review of current medical condition/s. Risk of harm outweighs potential benefits.  .    Patient Active Problem List   Diagnosis   • Hyperlipidemia   • Vitamin D deficiency   • Current every day smoker   • Acute post-operative pain   • Irritable bowel syndrome   • Obesity   • Type 2 diabetes mellitus (HCC)   • Anxiety   • Insomnia   • Tobacco abuse   • Acute non-recurrent frontal sinusitis   • Cough   • Shoulder pain   • Strain of lumbar region     Advance Care Planning  Advance Directive is not on file.  ACP discussion was held with the patient during this visit. Patient does not have an advance directive, information provided.          Objective    Vitals:    07/27/22 0951   BP: 146/80   Pulse: 92   Resp: 11   Temp: 98.2 °F (36.8 °C)   SpO2: 98%   Weight: 93.4 kg (206 lb)   Height: 175.3 cm (69\")   PainSc: 0-No pain     Estimated body mass index is 30.42 kg/m² as calculated from the following:    Height as of this encounter: 175.3 cm (69\").    Weight as of this encounter: 93.4 kg (206 lb).    BMI is >= 30 and <35. (Class 1 Obesity). The following options were offered after discussion;: weight loss educational material (shared in after visit summary)      Does the patient have evidence of cognitive impairment? No    Physical Exam  Vitals and nursing note reviewed.   Constitutional:       Appearance: She is " well-developed.   HENT:      Head: Normocephalic and atraumatic.   Eyes:      General:         Right eye: No discharge.         Left eye: No discharge.      Pupils: Pupils are equal, round, and reactive to light.   Cardiovascular:      Rate and Rhythm: Normal rate and regular rhythm.      Heart sounds: Normal heart sounds.   Pulmonary:      Effort: Pulmonary effort is normal.      Breath sounds: Normal breath sounds.   Abdominal:      General: Bowel sounds are normal.      Palpations: Abdomen is soft. There is no mass.      Tenderness: There is no abdominal tenderness.   Musculoskeletal:         General: Normal range of motion.      Right shoulder: No swelling.      Cervical back: Normal range of motion and neck supple.   Skin:     General: Skin is warm and dry.      Nails: There is no clubbing.   Neurological:      Mental Status: She is alert and oriented to person, place, and time.      Deep Tendon Reflexes: Reflexes are normal and symmetric.   Psychiatric:         Behavior: Behavior normal.         Thought Content: Thought content normal.         Judgment: Judgment normal.       Lab Results   Component Value Date    HGBA1C 7.9 05/05/2022            HEALTH RISK ASSESSMENT    Smoking Status:  Social History     Tobacco Use   Smoking Status Current Every Day Smoker   • Packs/day: 0.50   • Years: 3.00   • Pack years: 1.50   • Types: Cigarettes   • Start date: 1/1/2020   Smokeless Tobacco Never Used   Tobacco Comment    quit 10/27/2016     Alcohol Consumption:  Social History     Substance and Sexual Activity   Alcohol Use Yes    Comment: occasionally     Fall Risk Screen:    [facility] Fall Risk Assessment has not been completed.    Depression Screening:  PHQ-2/PHQ-9 Depression Screening 7/27/2022   Retired PHQ-9 Total Score -   Retired Total Score -   Little Interest or Pleasure in Doing Things 0-->not at all   Feeling Down, Depressed or Hopeless 0-->not at all   PHQ-9: Brief Depression Severity Measure Score 0        Health Habits and Functional and Cognitive Screening:  Functional & Cognitive Status 10/8/2019   Do you have difficulty preparing food and eating? No   Do you have difficulty bathing yourself, getting dressed or grooming yourself? No   Do you have difficulty using the toilet? No   Do you have difficulty moving around from place to place? No   Do you have trouble with steps or getting out of a bed or a chair? No   Current Diet Well Balanced Diet   Dental Exam Up to date   Eye Exam Up to date   Exercise (times per week) 2 times per week   Current Exercise Activities Include Walking   Do you need help using the phone?  No   Are you deaf or do you have serious difficulty hearing?  No   Do you need help with transportation? No   Do you need help shopping? No   Do you need help preparing meals?  No   Do you need help with housework?  No   Do you need help with laundry? No   Do you need help taking your medications? No   Do you need help managing money? No   Do you ever drive or ride in a car without wearing a seat belt? No   Have you felt unusual stress, anger or loneliness in the last month? No   Who do you live with? Spouse   If you need help, do you have trouble finding someone available to you? No   Have you been bothered in the last four weeks by sexual problems? No   Do you have difficulty concentrating, remembering or making decisions? No       Age-appropriate Screening Schedule:  Refer to the list below for future screening recommendations based on patient's age, sex and/or medical conditions. Orders for these recommended tests are listed in the plan section. The patient has been provided with a written plan.    Health Maintenance   Topic Date Due   • TDAP/TD VACCINES (1 - Tdap) Never done   • PAP SMEAR  Never done   • DIABETIC FOOT EXAM  05/24/2019   • MAMMOGRAM  12/13/2019   • URINE MICROALBUMIN  02/08/2020   • DIABETIC EYE EXAM  07/15/2020   • ZOSTER VACCINE (2 of 2) 02/22/2022   • LIPID PANEL  09/21/2022    • INFLUENZA VACCINE  10/01/2022   • HEMOGLOBIN A1C  11/05/2022              Assessment & Plan   CMS Preventative Services Quick Reference  Risk Factors Identified During Encounter  Cardiovascular Disease  Chronic Pain   Depression/Dysphoria  Fall Risk-High or Moderate  Immunizations Discussed/Encouraged (specific Immunizations; Tdap and Prevnar 20 (Pneumococcal 20-valent conjugate)  The above risks/problems have been discussed with the patient.  Follow up actions/plans if indicated are seen below in the Assessment/Plan Section.  Pertinent information has been shared with the patient in the After Visit Summary.    Diagnoses and all orders for this visit:    1. Annual physical exam (Primary)  -     CBC & Differential; Future  -     TSH; Future  -     Comprehensive Metabolic Panel; Future  -     Lipid Panel; Future  -     Vitamin B12; Future    2. Mixed hyperlipidemia    3. Type 2 diabetes mellitus with hyperglycemia, without long-term current use of insulin (HCC)  -     lisinopril (PRINIVIL,ZESTRIL) 10 MG tablet; Take 1 tablet by mouth Daily.  Dispense: 90 tablet; Refill: 3  -     Pneumococcal Conjugate Vaccine 20-Valent (PCV20)    4. Anxiety  -     buPROPion XL (Wellbutrin XL) 150 MG 24 hr tablet; Take 1 tablet by mouth Daily.  Dispense: 30 tablet; Refill: 5    5. Tobacco abuse    6. Vitamin D deficiency  -     Vitamin D 25 Hydroxy; Future    7. Irritable bowel syndrome, unspecified type    8. Strain of lumbar region, sequela  -     cyclobenzaprine (FLEXERIL) 10 MG tablet; Take 1 tablet by mouth 2 (Two) Times a Day As Needed for Muscle Spasms. Take prn  Dispense: 30 tablet; Refill: 3    9. Other insomnia  -     QUEtiapine (SEROquel) 25 MG tablet; Take 1 tablet by mouth Every Night.  Dispense: 30 tablet; Refill: 3    10. Need for diphtheria-tetanus-pertussis (Tdap) vaccine  -     Tdap Vaccine Greater Than or Equal To 6yo IM    11. Encounter for immunization   -     Pneumococcal Conjugate Vaccine 20-Valent  (PCV20)    12. Encounter for screening mammogram for malignant neoplasm of breast  -     Mammo Screening Digital Tomosynthesis Bilateral With CAD; Future    13. Postmenopausal  -     DEXA Bone Density Axial; Future        Follow Up:   Return in about 2 months (around 9/27/2022) for Recheck.     An After Visit Summary and PPPS were made available to the patient.    We will initiate an ACE inhibitor for cardioprotection with her diabetes.  We will try her on Wellbutrin since she is trying to stop smoking anyway and she has had a lot of stressors to see if that will help all around with her pains tobacco cessation and anxiety symptoms.    We will try a little bit of Seroquel at night to see if that helps her to sleep once or twice a week when she really has an episode of insomnia.    pcv 20 today  Tdap today  Mammogram  dexa    Fu 2 mo to see how wellbutrin, seroquel, ace I being tolerated.

## 2022-07-28 LAB
25(OH)D3 SERPL-MCNC: 39.5 NG/ML (ref 30–100)
ALBUMIN SERPL-MCNC: 4.9 G/DL (ref 3.5–5.2)
ALBUMIN/GLOB SERPL: 2.6 G/DL
ALP SERPL-CCNC: 86 U/L (ref 39–117)
ALT SERPL W P-5'-P-CCNC: 12 U/L (ref 1–33)
ANION GAP SERPL CALCULATED.3IONS-SCNC: 14 MMOL/L (ref 5–15)
AST SERPL-CCNC: 12 U/L (ref 1–32)
BILIRUB SERPL-MCNC: 0.3 MG/DL (ref 0–1.2)
BUN SERPL-MCNC: 11 MG/DL (ref 8–23)
BUN/CREAT SERPL: 20.8 (ref 7–25)
CALCIUM SPEC-SCNC: 9.9 MG/DL (ref 8.6–10.5)
CHLORIDE SERPL-SCNC: 104 MMOL/L (ref 98–107)
CHOLEST SERPL-MCNC: 168 MG/DL (ref 0–200)
CO2 SERPL-SCNC: 24 MMOL/L (ref 22–29)
CREAT SERPL-MCNC: 0.53 MG/DL (ref 0.57–1)
EGFRCR SERPLBLD CKD-EPI 2021: 104.7 ML/MIN/1.73
GLOBULIN UR ELPH-MCNC: 1.9 GM/DL
GLUCOSE SERPL-MCNC: 109 MG/DL (ref 65–99)
HDLC SERPL-MCNC: 46 MG/DL (ref 40–60)
LDLC SERPL CALC-MCNC: 84 MG/DL (ref 0–100)
LDLC/HDLC SERPL: 1.65 {RATIO}
POTASSIUM SERPL-SCNC: 4.4 MMOL/L (ref 3.5–5.2)
PROT SERPL-MCNC: 6.8 G/DL (ref 6–8.5)
SODIUM SERPL-SCNC: 142 MMOL/L (ref 136–145)
TRIGL SERPL-MCNC: 231 MG/DL (ref 0–150)
TSH SERPL DL<=0.05 MIU/L-ACNC: 2.32 UIU/ML (ref 0.27–4.2)
VIT B12 BLD-MCNC: 305 PG/ML (ref 211–946)
VLDLC SERPL-MCNC: 38 MG/DL (ref 5–40)

## 2022-08-23 ENCOUNTER — OFFICE VISIT (OUTPATIENT)
Dept: ENDOCRINOLOGY | Facility: CLINIC | Age: 62
End: 2022-08-23

## 2022-08-23 VITALS
OXYGEN SATURATION: 98 % | HEART RATE: 96 BPM | SYSTOLIC BLOOD PRESSURE: 118 MMHG | HEIGHT: 69 IN | BODY MASS INDEX: 30.45 KG/M2 | DIASTOLIC BLOOD PRESSURE: 72 MMHG | WEIGHT: 205.6 LBS

## 2022-08-23 DIAGNOSIS — E11.65 TYPE 2 DIABETES MELLITUS WITH HYPERGLYCEMIA, WITHOUT LONG-TERM CURRENT USE OF INSULIN: Primary | ICD-10-CM

## 2022-08-23 DIAGNOSIS — E78.2 MIXED HYPERLIPIDEMIA: Chronic | ICD-10-CM

## 2022-08-23 LAB
EXPIRATION DATE: NORMAL
EXPIRATION DATE: NORMAL
GLUCOSE BLDC GLUCOMTR-MCNC: 125 MG/DL (ref 70–130)
HBA1C MFR BLD: 7.1 %
Lab: NORMAL
Lab: NORMAL

## 2022-08-23 PROCEDURE — 99214 OFFICE O/P EST MOD 30 MIN: CPT | Performed by: INTERNAL MEDICINE

## 2022-08-23 PROCEDURE — 83036 HEMOGLOBIN GLYCOSYLATED A1C: CPT | Performed by: INTERNAL MEDICINE

## 2022-08-23 PROCEDURE — 3051F HG A1C>EQUAL 7.0%<8.0%: CPT | Performed by: INTERNAL MEDICINE

## 2022-08-23 PROCEDURE — 82947 ASSAY GLUCOSE BLOOD QUANT: CPT | Performed by: INTERNAL MEDICINE

## 2022-08-23 RX ORDER — METFORMIN HYDROCHLORIDE 500 MG/1
1000 TABLET, EXTENDED RELEASE ORAL
Qty: 360 TABLET | Refills: 3 | Status: SHIPPED | OUTPATIENT
Start: 2022-08-23

## 2022-08-23 RX ORDER — INSULIN GLARGINE AND LIXISENATIDE 100; 33 U/ML; UG/ML
30 INJECTION, SOLUTION SUBCUTANEOUS DAILY
Qty: 3 PEN | Refills: 11 | Status: SHIPPED | OUTPATIENT
Start: 2022-08-23 | End: 2023-03-17 | Stop reason: ALTCHOICE

## 2022-08-23 NOTE — PROGRESS NOTES
Chief complaint  Diabetes (Follow UP:  Has had a very stressful 3 months)    Subjective   Octavia Rice is a 62 y.o. female is here today for follow-up of:    Diabetes Mellitus type 2: dx in 2003.  Diabetic complications: none  Eye exam current (within one year): no retinopathy, recent exam.   Current diabetic medications include   Metformin 1000 mg.   Glimepiride 8 mg daily.     Soliqua 22 Units    Past medications: 08/2016 Jardiance trial was unsuccessful - frequent urination    Monitoring  Sensor was approved but she didn't get it. Checks twice a week and glucose is around 120.     Other med problems: include hyperlipidemia on simvastatin.     Patient reported increased stress related to daughter moving in with her, sister passing away.   She had a physical with her PCP and completed labs.     Medications    Current Outpatient Medications:   •  Accu-Chek Franky Plus test strip, USE A STRIP TO TEST THREE TIMES A DAY, Disp: 100 each, Rfl: 12  •  BD Pen Needle Sherri 2nd Gen 32G X 4 MM misc, , Disp: , Rfl:   •  Blood Glucose Monitoring Suppl (ACCU-CHEK FRANKY PLUS) w/Device kit, Test Three Times Daily, Disp: 1 kit, Rfl: 0  •  buPROPion XL (Wellbutrin XL) 150 MG 24 hr tablet, Take 1 tablet by mouth Daily., Disp: 30 tablet, Rfl: 5  •  cyclobenzaprine (FLEXERIL) 10 MG tablet, Take 1 tablet by mouth 2 (Two) Times a Day As Needed for Muscle Spasms. Take prn, Disp: 30 tablet, Rfl: 3  •  glimepiride (AMARYL) 4 MG tablet, TAKE TWO TABLETS BY MOUTH EVERY MORNING BEFORE BREAKFAST, Disp: 270 tablet, Rfl: 1  •  Insulin Glargine-Lixisenatide (Soliqua) 100-33 UNT-MCG/ML solution pen-injector injection, Inject 30 Units under the skin into the appropriate area as directed Daily., Disp: 3 pen, Rfl: 11  •  Insulin Pen Needle 32G X 4 MM misc, 1 each Daily., Disp: 50 each, Rfl: 11  •  lisinopril (PRINIVIL,ZESTRIL) 10 MG tablet, Take 1 tablet by mouth Daily., Disp: 90 tablet, Rfl: 3  •  metFORMIN ER (GLUCOPHAGE-XR) 500 MG 24 hr tablet,  "Take 2 tablets by mouth Daily With Breakfast & Dinner., Disp: 360 tablet, Rfl: 3  •  Multiple Minerals-Vitamins (CALCIUM-MAGNESIUM-ZINC-D3 PO), Take  by mouth., Disp: , Rfl:   •  Multiple Vitamin (MULTI VITAMIN DAILY PO), Take  by mouth., Disp: , Rfl:   •  Omega-3 Fatty Acids (fish oil) 1000 MG capsule capsule, Take  by mouth Daily With Breakfast., Disp: , Rfl:   •  QUEtiapine (SEROquel) 25 MG tablet, Take 1 tablet by mouth Every Night., Disp: 30 tablet, Rfl: 3  •  simvastatin (ZOCOR) 40 MG tablet, Take 1 tablet by mouth Every Night., Disp: 90 tablet, Rfl: 2  •  Vitamin D 25 MCG (1000 UT) tablet, TAKE ONE TABLET BY MOUTH DAILY, Disp: 30 tablet, Rfl: 3  •  levocetirizine (Xyzal) 5 MG tablet, Take 1 tablet by mouth Every Evening for 30 days., Disp: 30 tablet, Rfl: 6      Review of systems  Review of Systems   Constitutional: Positive for fatigue.   Eyes: Negative for visual disturbance.   Respiratory: Negative.  Negative for shortness of breath.    Cardiovascular: Negative for chest pain and leg swelling.   Gastrointestinal: Negative for constipation. Diarrhea: occasional.   Endocrine: Negative for cold intolerance, polydipsia and polyuria.   Musculoskeletal: Positive for arthralgias (ankle pain right). Negative for back pain, joint swelling and myalgias.   Skin: Negative for wound.   Allergic/Immunologic: Positive for environmental allergies.   Neurological: Positive for headaches. Negative for numbness.   Hematological: Negative.    Psychiatric/Behavioral: Positive for decreased concentration and sleep disturbance. Negative for self-injury. Behavioral problem: phobia of crowds. The patient is nervous/anxious.        Physical exam  Objective   Blood pressure 118/72, pulse 96, height 175.3 cm (69\"), weight 93.3 kg (205 lb 9.6 oz), SpO2 98 %, not currently breastfeeding. Body mass index is 30.36 kg/m².  Physical Exam   Constitutional: She is oriented to person, place, and time. She appears well-developed.   HENT: "   Head: Normocephalic and atraumatic.   Eyes: Conjunctivae are normal.   Cardiovascular: Normal rate, regular rhythm, normal heart sounds and normal pulses.   Pulmonary/Chest: Effort normal and breath sounds normal.   Musculoskeletal: No swelling.      Comments: Right foot in brace   Neurological: She is alert and oriented to person, place, and time.   Psychiatric: Mood and thought content normal.   Vitals reviewed.        LABS AND IMAGING  Results for orders placed or performed in visit on 08/23/22   POC Glycosylated Hemoglobin (Hb A1C)    Specimen: Blood   Result Value Ref Range    Hemoglobin A1C 7.1 %    Lot Number [id number]     Expiration Date 4/3/24    POC Glucose, Blood    Specimen: Blood   Result Value Ref Range    Glucose 125 70 - 130 mg/dL    Lot Number [id number]     Expiration Date 2/26/23            Assessment:         Diagnoses and all orders for this visit:    Type 2 diabetes mellitus with hyperglycemia, without long-term current use of insulin (HCC)  -     POC Glycosylated Hemoglobin (Hb A1C)  -     POC Glucose, Blood    Mixed hyperlipidemia  -     metFORMIN ER (GLUCOPHAGE-XR) 500 MG 24 hr tablet; Take 2 tablets by mouth Daily With Breakfast & Dinner.    Other orders  -     Insulin Glargine-Lixisenatide (Soliqua) 100-33 UNT-MCG/ML solution pen-injector injection; Inject 30 Units under the skin into the appropriate area as directed Daily.        Plan:    Diabetes is uncontrolled with hyperglycemia. I have started Basal insulin/GLP-1 combination.     -Soliqua increased to 24 units  -metformin  -glimepiride.   Uptodate with eye exam, no retinopathy.     There are no Patient Instructions on file for this visit.   Dietary recommendations and testing encouraged. Recent lab results reviewed.     Mixed hyperlipidemia-cont simvastatin. Fasting labs reviewed     Follow up:  3  months

## 2022-09-23 DIAGNOSIS — Z79.4 TYPE 2 DIABETES MELLITUS WITHOUT COMPLICATION, WITH LONG-TERM CURRENT USE OF INSULIN: ICD-10-CM

## 2022-09-23 DIAGNOSIS — E11.9 TYPE 2 DIABETES MELLITUS WITHOUT COMPLICATION, WITH LONG-TERM CURRENT USE OF INSULIN: ICD-10-CM

## 2022-09-23 RX ORDER — MELATONIN
Qty: 30 TABLET | Refills: 1 | Status: SHIPPED | OUTPATIENT
Start: 2022-09-23 | End: 2022-11-29

## 2022-09-23 RX ORDER — GLIMEPIRIDE 4 MG/1
TABLET ORAL
Qty: 180 TABLET | Refills: 0 | Status: SHIPPED | OUTPATIENT
Start: 2022-09-23 | End: 2023-01-09

## 2022-09-26 DIAGNOSIS — E78.2 MIXED HYPERLIPIDEMIA: Chronic | ICD-10-CM

## 2022-09-26 RX ORDER — SIMVASTATIN 40 MG
40 TABLET ORAL NIGHTLY
Qty: 90 TABLET | Refills: 2 | Status: SHIPPED | OUTPATIENT
Start: 2022-09-26 | End: 2022-12-28

## 2022-09-27 ENCOUNTER — OFFICE VISIT (OUTPATIENT)
Dept: FAMILY MEDICINE CLINIC | Facility: CLINIC | Age: 62
End: 2022-09-27

## 2022-09-27 VITALS
TEMPERATURE: 97.8 F | HEART RATE: 85 BPM | WEIGHT: 210 LBS | RESPIRATION RATE: 14 BRPM | OXYGEN SATURATION: 98 % | SYSTOLIC BLOOD PRESSURE: 138 MMHG | BODY MASS INDEX: 31.01 KG/M2 | DIASTOLIC BLOOD PRESSURE: 80 MMHG

## 2022-09-27 DIAGNOSIS — G47.09 OTHER INSOMNIA: ICD-10-CM

## 2022-09-27 DIAGNOSIS — F41.9 ANXIETY: Primary | ICD-10-CM

## 2022-09-27 DIAGNOSIS — Z72.0 TOBACCO ABUSE: Chronic | ICD-10-CM

## 2022-09-27 PROCEDURE — 99214 OFFICE O/P EST MOD 30 MIN: CPT | Performed by: FAMILY MEDICINE

## 2022-09-27 RX ORDER — QUETIAPINE FUMARATE 25 MG/1
75 TABLET, FILM COATED ORAL NIGHTLY
Qty: 90 TABLET | Refills: 3 | Status: SHIPPED | OUTPATIENT
Start: 2022-09-27 | End: 2022-12-28

## 2022-09-27 RX ORDER — DULOXETIN HYDROCHLORIDE 20 MG/1
20 CAPSULE, DELAYED RELEASE ORAL DAILY
Qty: 30 CAPSULE | Refills: 5 | Status: SHIPPED | OUTPATIENT
Start: 2022-09-27

## 2022-09-27 NOTE — PROGRESS NOTES
Subjective   Octavia Rice is a 62 y.o. female.     History of Present Illness she is here for follow-up of hypertension.  She is tolerating her lisinopril 10 mg daily.    She is taking a statin Zocor 40 mg daily.    Her last hemoglobin A1c per Dr. Jurado her endocrinologist was 7.1.    She is also here to follow-up on insomnia.  At her Medicare wellness on 7/27/2022 we tried her on 25 mg of Seroquel at night.    She had mammogram and diabetic visit and she is taking asa 81 daily now.      She had fall at work ptsd and disabled.        The following portions of the patient's history were reviewed and updated as appropriate: allergies, current medications, past family history, past medical history, past social history, past surgical history and problem list.    Review of Systems   Constitutional: Negative.    HENT: Negative.    Eyes: Negative.    Respiratory: Negative.    Cardiovascular: Negative.    Gastrointestinal: Negative.    Endocrine: Negative.    Genitourinary: Negative.    Musculoskeletal: Negative.    Skin: Negative.    Allergic/Immunologic: Negative.    Neurological: Negative.    Hematological: Negative.    Psychiatric/Behavioral: The patient is nervous/anxious.    All other systems reviewed and are negative.      Objective     Vitals:    09/27/22 0852   BP: 138/80   Pulse: 85   Resp: 14   Temp: 97.8 °F (36.6 °C)   SpO2: 98%   Weight: 95.3 kg (210 lb)       Physical Exam  Vitals and nursing note reviewed.   Constitutional:       Appearance: She is well-developed.   HENT:      Head: Normocephalic and atraumatic.   Eyes:      General:         Right eye: No discharge.         Left eye: No discharge.      Pupils: Pupils are equal, round, and reactive to light.   Cardiovascular:      Rate and Rhythm: Normal rate and regular rhythm.      Heart sounds: Normal heart sounds.   Pulmonary:      Effort: Pulmonary effort is normal.      Breath sounds: Normal breath sounds.   Abdominal:      General: Bowel sounds are  normal.      Palpations: Abdomen is soft. There is no mass.      Tenderness: There is no abdominal tenderness.   Musculoskeletal:         General: Normal range of motion.      Right shoulder: No swelling.      Cervical back: Normal range of motion and neck supple.   Skin:     General: Skin is warm and dry.      Nails: There is no clubbing.   Neurological:      Mental Status: She is alert and oriented to person, place, and time.      Deep Tendon Reflexes: Reflexes are normal and symmetric.   Psychiatric:         Behavior: Behavior normal.         Thought Content: Thought content normal.         Judgment: Judgment normal.         Assessment & Plan     Problem List Items Addressed This Visit        Mental Health    Anxiety - Primary    Relevant Medications    DULoxetine (Cymbalta) 20 MG capsule       Sleep    Insomnia    Relevant Medications    QUEtiapine (SEROquel) 25 MG tablet       Tobacco    Tobacco abuse (Chronic)        We will try to increase seroqel at night to 50-75mg.   Add cymbalta 20mg to start for ptsd and anxiety.  Fu 3 mo.   bp improved.   Discussed tob cessation.

## 2022-11-26 DIAGNOSIS — S39.012S STRAIN OF LUMBAR REGION, SEQUELA: ICD-10-CM

## 2022-11-28 RX ORDER — CYCLOBENZAPRINE HCL 10 MG
TABLET ORAL
Qty: 30 TABLET | Refills: 3 | Status: SHIPPED | OUTPATIENT
Start: 2022-11-28

## 2022-11-29 ENCOUNTER — OFFICE VISIT (OUTPATIENT)
Dept: ENDOCRINOLOGY | Facility: CLINIC | Age: 62
End: 2022-11-29

## 2022-11-29 VITALS
WEIGHT: 213.4 LBS | BODY MASS INDEX: 31.61 KG/M2 | HEIGHT: 69 IN | DIASTOLIC BLOOD PRESSURE: 78 MMHG | OXYGEN SATURATION: 96 % | SYSTOLIC BLOOD PRESSURE: 126 MMHG | HEART RATE: 92 BPM

## 2022-11-29 DIAGNOSIS — E78.2 MIXED HYPERLIPIDEMIA: Chronic | ICD-10-CM

## 2022-11-29 DIAGNOSIS — E11.65 TYPE 2 DIABETES MELLITUS WITH HYPERGLYCEMIA, WITHOUT LONG-TERM CURRENT USE OF INSULIN: Primary | Chronic | ICD-10-CM

## 2022-11-29 LAB
EXPIRATION DATE: ABNORMAL
EXPIRATION DATE: NORMAL
GLUCOSE BLDC GLUCOMTR-MCNC: 150 MG/DL (ref 70–130)
HBA1C MFR BLD: 7.4 %
Lab: ABNORMAL
Lab: NORMAL

## 2022-11-29 PROCEDURE — 83036 HEMOGLOBIN GLYCOSYLATED A1C: CPT | Performed by: INTERNAL MEDICINE

## 2022-11-29 PROCEDURE — 82947 ASSAY GLUCOSE BLOOD QUANT: CPT | Performed by: INTERNAL MEDICINE

## 2022-11-29 PROCEDURE — 3051F HG A1C>EQUAL 7.0%<8.0%: CPT | Performed by: INTERNAL MEDICINE

## 2022-11-29 PROCEDURE — 99214 OFFICE O/P EST MOD 30 MIN: CPT | Performed by: INTERNAL MEDICINE

## 2022-11-29 RX ORDER — MELATONIN
Qty: 30 TABLET | Refills: 1 | Status: SHIPPED | OUTPATIENT
Start: 2022-11-29

## 2022-11-29 NOTE — PROGRESS NOTES
Chief complaint  Diabetes (Follow Up)    Subjective   Octavia Rice is a 62 y.o. female is here today for follow-up of:    Diabetes Mellitus type 2: dx in 2003.  Diabetic complications: none  Eye exam current (within one year): no retinopathy, recent exam.   Current diabetic medications include.   Metformin 1000 mg.   Glimepiride 8 mg daily.     Soliqua 24 Units--> 30 Units.    Past medications: 08/2016 Jardiance trial was unsuccessful - frequent urination.     Monitoring  Sensor was approved but she didn't pick it up.     Other med problems: include hyperlipidemia on simvastatin.     Medications    Current Outpatient Medications:   •  Accu-Chek Franky Plus test strip, USE A STRIP TO TEST THREE TIMES A DAY, Disp: 100 each, Rfl: 12  •  BD Pen Needle Sherri 2nd Gen 32G X 4 MM misc, , Disp: , Rfl:   •  Blood Glucose Monitoring Suppl (ACCU-CHEK FRANKY PLUS) w/Device kit, Test Three Times Daily, Disp: 1 kit, Rfl: 0  •  buPROPion XL (Wellbutrin XL) 150 MG 24 hr tablet, Take 1 tablet by mouth Daily., Disp: 30 tablet, Rfl: 5  •  cholecalciferol (VITAMIN D3) 25 MCG (1000 UT) tablet, TAKE ONE TABLET BY MOUTH DAILY, Disp: 30 tablet, Rfl: 1  •  cyclobenzaprine (FLEXERIL) 10 MG tablet, TAKE ONE TABLET BY MOUTH TWICE A DAY AS NEEDED FOR MUSCLE SPASMS, Disp: 30 tablet, Rfl: 3  •  DULoxetine (Cymbalta) 20 MG capsule, Take 1 capsule by mouth Daily., Disp: 30 capsule, Rfl: 5  •  glimepiride (AMARYL) 4 MG tablet, TAKE TWO TABLETS BY MOUTH EVERY MORNING BEFORE BREAKFAST, Disp: 180 tablet, Rfl: 0  •  Insulin Glargine-Lixisenatide (Soliqua) 100-33 UNT-MCG/ML solution pen-injector injection, Inject 30 Units under the skin into the appropriate area as directed Daily., Disp: 3 pen, Rfl: 11  •  Insulin Pen Needle 32G X 4 MM misc, 1 each Daily., Disp: 50 each, Rfl: 11  •  lisinopril (PRINIVIL,ZESTRIL) 10 MG tablet, Take 1 tablet by mouth Daily., Disp: 90 tablet, Rfl: 3  •  metFORMIN ER (GLUCOPHAGE-XR) 500 MG 24 hr tablet, Take 2 tablets by mouth  Daily With Breakfast & Dinner., Disp: 360 tablet, Rfl: 3  •  Multiple Minerals-Vitamins (CALCIUM-MAGNESIUM-ZINC-D3 PO), Take  by mouth., Disp: , Rfl:   •  Multiple Vitamin (MULTI VITAMIN DAILY PO), Take  by mouth., Disp: , Rfl:   •  Omega-3 Fatty Acids (fish oil) 1000 MG capsule capsule, Take  by mouth Daily With Breakfast., Disp: , Rfl:   •  QUEtiapine (SEROquel) 25 MG tablet, Take 3 tablets by mouth Every Night., Disp: 90 tablet, Rfl: 3  •  simvastatin (ZOCOR) 40 MG tablet, Take 1 tablet by mouth Every Night., Disp: 90 tablet, Rfl: 2  •  levocetirizine (Xyzal) 5 MG tablet, Take 1 tablet by mouth Every Evening for 30 days., Disp: 30 tablet, Rfl: 6      Review of systems  Review of Systems   Constitutional: Positive for fatigue.   Eyes: Negative for visual disturbance.   Respiratory: Negative.  Negative for shortness of breath.    Cardiovascular: Negative for chest pain and leg swelling.   Gastrointestinal: Negative for constipation. Diarrhea: occasional.   Endocrine: Negative for cold intolerance, polydipsia and polyuria.   Skin: Negative for wound.   Allergic/Immunologic: Positive for environmental allergies.   Neurological: Positive for headaches. Negative for numbness.   Hematological: Negative.    Psychiatric/Behavioral: Positive for decreased concentration and sleep disturbance. Negative for self-injury. Behavioral problem: phobia of crowds. The patient is nervous/anxious.        Physical exam  Objective   Vitals:    11/29/22 1158   BP: 126/78   Pulse: 92   SpO2: 96%    Body mass index is 31.51 kg/m².  Physical Exam   Constitutional: She is oriented to person, place, and time. She appears well-developed.   HENT:   Head: Normocephalic and atraumatic.   Eyes: Conjunctivae are normal.   Cardiovascular: Normal rate, regular rhythm, normal heart sounds and normal pulses.   Pulmonary/Chest: Effort normal and breath sounds normal.   Musculoskeletal: No swelling.   Neurological: She is alert and oriented to person,  place, and time.   Psychiatric: Mood and thought content normal.   Vitals reviewed.        LABS AND IMAGING  Results for orders placed or performed in visit on 11/29/22   POC Glycosylated Hemoglobin (Hb A1C)    Specimen: Blood   Result Value Ref Range    Hemoglobin A1C 7.4 %    Lot Number [id number]     Expiration Date 09/14/2024    POC Glucose, Blood    Specimen: Blood   Result Value Ref Range    Glucose 150 (A) 70 - 130 mg/dL    Lot Number [id number]     Expiration Date 06/03/2023            Assessment:         Diagnoses and all orders for this visit:    Type 2 diabetes mellitus with hyperglycemia, without long-term current use of insulin (HCC)  -     POC Glycosylated Hemoglobin (Hb A1C)  -     POC Glucose, Blood    Mixed hyperlipidemia        Plan:    Diabetes mellitus type 2 with hyperglycemia, glucose improved after basal insulin/GLP-1 is is added  -Soliqua 30 units.   -metformin.  -glimepiride.   Uptodate with eye exam, no retinopathy.   Encouraged to start testing more often     There are no Patient Instructions on file for this visit.   Dietary recommendations and testing encouraged.     Mixed hyperlipidemia-cont simvastatin. Fasting labs reviewed     Follow up:  3  months

## 2022-12-28 ENCOUNTER — TELEPHONE (OUTPATIENT)
Dept: FAMILY MEDICINE CLINIC | Facility: CLINIC | Age: 62
End: 2022-12-28
Payer: MEDICARE

## 2022-12-28 DIAGNOSIS — U07.1 COVID-19 VIRUS INFECTION: Primary | ICD-10-CM

## 2022-12-28 NOTE — TELEPHONE ENCOUNTER
Okay for the HUB to read the below message.     I called the pt and LVM advising to call the office.

## 2022-12-28 NOTE — TELEPHONE ENCOUNTER
Pt has tested positive for covid.  Wants rx for paxlovid sent in.   just finished rx of paxlovid.  Please advise

## 2023-01-04 DIAGNOSIS — Z79.4 TYPE 2 DIABETES MELLITUS WITHOUT COMPLICATION, WITH LONG-TERM CURRENT USE OF INSULIN: ICD-10-CM

## 2023-01-04 DIAGNOSIS — E11.9 TYPE 2 DIABETES MELLITUS WITHOUT COMPLICATION, WITH LONG-TERM CURRENT USE OF INSULIN: ICD-10-CM

## 2023-01-06 ENCOUNTER — TELEPHONE (OUTPATIENT)
Dept: FAMILY MEDICINE CLINIC | Facility: CLINIC | Age: 63
End: 2023-01-06

## 2023-01-06 NOTE — TELEPHONE ENCOUNTER
Caller: Octavia Rice    Relationship: Self    Best call back number: [phone]    What medication are you requesting: THERAFLU     What are your current symptoms: COUGH, CONGESTION, SORE THROAT, HEADACHE, CHILLS     How long have you been experiencing symptoms:  2 DAYS     Have you had these symptoms before:    [x] Yes  [] No    Have you been treated for these symptoms before:   [x] Yes  [] No    If a prescription is needed, what is your preferred pharmacy and phone number: [facility] [id number] [address] - [phone] [facility] [phone] FX     Additional notes: PATIENT STATES THAT SHE WAS EXPOSED TO HER GRANDDAUGHTER WHO HAS THE FLU

## 2023-01-09 RX ORDER — GLIMEPIRIDE 4 MG/1
TABLET ORAL
Qty: 180 TABLET | Refills: 0 | Status: SHIPPED | OUTPATIENT
Start: 2023-01-09

## 2023-01-23 ENCOUNTER — OFFICE VISIT (OUTPATIENT)
Dept: FAMILY MEDICINE CLINIC | Facility: CLINIC | Age: 63
End: 2023-01-23
Payer: MEDICARE

## 2023-01-23 VITALS
SYSTOLIC BLOOD PRESSURE: 130 MMHG | HEART RATE: 95 BPM | DIASTOLIC BLOOD PRESSURE: 76 MMHG | BODY MASS INDEX: 31.16 KG/M2 | RESPIRATION RATE: 30 BRPM | TEMPERATURE: 97.6 F | WEIGHT: 211 LBS | OXYGEN SATURATION: 98 %

## 2023-01-23 DIAGNOSIS — F41.9 ANXIETY: Primary | ICD-10-CM

## 2023-01-23 DIAGNOSIS — J01.10 ACUTE NON-RECURRENT FRONTAL SINUSITIS: ICD-10-CM

## 2023-01-23 PROCEDURE — 99214 OFFICE O/P EST MOD 30 MIN: CPT | Performed by: FAMILY MEDICINE

## 2023-01-23 RX ORDER — AZITHROMYCIN 250 MG/1
TABLET, FILM COATED ORAL
Qty: 6 TABLET | Refills: 0 | Status: SHIPPED | OUTPATIENT
Start: 2023-01-23

## 2023-01-23 RX ORDER — BUSPIRONE HYDROCHLORIDE 5 MG/1
5 TABLET ORAL 2 TIMES DAILY PRN
Qty: 60 TABLET | Refills: 1 | Status: SHIPPED | OUTPATIENT
Start: 2023-01-23

## 2023-01-23 RX ORDER — QUETIAPINE FUMARATE 25 MG/1
75 TABLET, FILM COATED ORAL NIGHTLY
Qty: 90 TABLET | Refills: 3 | Status: SHIPPED | OUTPATIENT
Start: 2023-01-23

## 2023-01-23 NOTE — PROGRESS NOTES
Subjective   Octavia Rice is a 62 y.o. female.     History of Present Illness she had covid after ester she took paxlovid and was seen at [facility], granddaughter had flu.  She reports she had headache and ears ache and drainage.  She is still sneezing and coughing and taking mucinex and tylenol.  She reports she fell over dog under feet faughte slef with right knee and right arm and left hand.      She is fearful of falling     She is nervous at [employer] and changed [employer] hours to 930 service.  More anxiety.  She reports nervous for birthday party.  She is taking seroquel at night and that is helping her to sleep she is requesting something for anxiety during day.  She gets sweaty.  She wakes up pajamas are wet and face sweats.  She has cut back on smoking.  Some days not smoking at all.       The following portions of the patient's history were reviewed and updated as appropriate: allergies, current medications, past family history, past medical history, past social history, past surgical history and problem list.    Review of Systems   Constitutional: Positive for fatigue.   Eyes: Negative.    Respiratory: Positive for cough. Negative for choking.    Cardiovascular: Negative.    Gastrointestinal: Negative.    Endocrine: Negative.    Genitourinary: Negative.    Musculoskeletal: Negative.    Skin: Negative.    Allergic/Immunologic: Negative.    Neurological: Positive for headaches.   Hematological: Negative.    Psychiatric/Behavioral: Positive for agitation. The patient is nervous/anxious.    All other systems reviewed and are negative.      Objective     Vitals:    01/23/23 1138   BP: 130/76   Pulse: 95   Resp: (!) 30   Temp: 97.6 °F (36.4 °C)   SpO2: 98%   Weight: 95.7 kg (211 lb)       Physical Exam  Vitals and nursing note reviewed.   Constitutional:       Appearance: She is well-developed.   HENT:      Head: Normocephalic and atraumatic.      Right Ear: Tympanic membrane normal.      Left Ear: Tympanic  membrane normal.      Nose: Congestion present.      Mouth/Throat:      Pharynx: No oropharyngeal exudate or posterior oropharyngeal erythema.   Eyes:      General:         Right eye: No discharge.         Left eye: No discharge.      Pupils: Pupils are equal, round, and reactive to light.   Cardiovascular:      Rate and Rhythm: Normal rate and regular rhythm.      Heart sounds: Normal heart sounds.   Pulmonary:      Effort: Pulmonary effort is normal.      Breath sounds: Normal breath sounds.   Abdominal:      General: Bowel sounds are normal.      Palpations: Abdomen is soft. There is no mass.      Tenderness: There is no abdominal tenderness.   Musculoskeletal:         General: Normal range of motion.      Right shoulder: No swelling.      Cervical back: Normal range of motion and neck supple.   Skin:     General: Skin is warm and dry.      Nails: There is no clubbing.   Neurological:      Mental Status: She is alert and oriented to person, place, and time.      Deep Tendon Reflexes: Reflexes are normal and symmetric.   Psychiatric:         Behavior: Behavior normal.         Thought Content: Thought content normal.         Judgment: Judgment normal.         Assessment & Plan     Problem List Items Addressed This Visit        ENT    Acute non-recurrent frontal sinusitis    Relevant Medications    azithromycin (Zithromax Z-Frankie) 250 MG tablet       Mental Health    Anxiety - Primary    Relevant Medications    QUEtiapine (SEROquel) 25 MG tablet    busPIRone (BUSPAR) 5 MG tablet

## 2023-02-06 DIAGNOSIS — F41.9 ANXIETY: ICD-10-CM

## 2023-02-07 RX ORDER — BUPROPION HYDROCHLORIDE 150 MG/1
TABLET ORAL
Qty: 30 TABLET | Refills: 5 | Status: SHIPPED | OUTPATIENT
Start: 2023-02-07

## 2023-03-01 ENCOUNTER — OFFICE VISIT (OUTPATIENT)
Dept: ENDOCRINOLOGY | Facility: CLINIC | Age: 63
End: 2023-03-01
Payer: MEDICARE

## 2023-03-01 VITALS
OXYGEN SATURATION: 98 % | HEART RATE: 97 BPM | WEIGHT: 210 LBS | DIASTOLIC BLOOD PRESSURE: 60 MMHG | BODY MASS INDEX: 31.1 KG/M2 | HEIGHT: 69 IN | SYSTOLIC BLOOD PRESSURE: 128 MMHG

## 2023-03-01 DIAGNOSIS — Z79.4 TYPE 2 DIABETES MELLITUS WITHOUT COMPLICATION, WITH LONG-TERM CURRENT USE OF INSULIN: Primary | ICD-10-CM

## 2023-03-01 DIAGNOSIS — E78.2 MIXED HYPERLIPIDEMIA: Chronic | ICD-10-CM

## 2023-03-01 DIAGNOSIS — E11.9 TYPE 2 DIABETES MELLITUS WITHOUT COMPLICATION, WITH LONG-TERM CURRENT USE OF INSULIN: Primary | ICD-10-CM

## 2023-03-01 DIAGNOSIS — E66.09 CLASS 1 OBESITY DUE TO EXCESS CALORIES WITH SERIOUS COMORBIDITY AND BODY MASS INDEX (BMI) OF 31.0 TO 31.9 IN ADULT: ICD-10-CM

## 2023-03-01 LAB
EXPIRATION DATE: NORMAL
EXPIRATION DATE: NORMAL
GLUCOSE BLDC GLUCOMTR-MCNC: 116 MG/DL (ref 70–130)
HBA1C MFR BLD: 7.2 %
Lab: NORMAL
Lab: NORMAL

## 2023-03-01 PROCEDURE — 82947 ASSAY GLUCOSE BLOOD QUANT: CPT | Performed by: INTERNAL MEDICINE

## 2023-03-01 PROCEDURE — 99214 OFFICE O/P EST MOD 30 MIN: CPT | Performed by: INTERNAL MEDICINE

## 2023-03-01 PROCEDURE — 83036 HEMOGLOBIN GLYCOSYLATED A1C: CPT | Performed by: INTERNAL MEDICINE

## 2023-03-01 PROCEDURE — 3051F HG A1C>EQUAL 7.0%<8.0%: CPT | Performed by: INTERNAL MEDICINE

## 2023-03-01 RX ORDER — SEMAGLUTIDE 1.34 MG/ML
0.5 INJECTION, SOLUTION SUBCUTANEOUS WEEKLY
Qty: 1.5 ML | Refills: 6 | Status: SHIPPED | OUTPATIENT
Start: 2023-03-01

## 2023-03-01 RX ORDER — INSULIN GLARGINE 300 U/ML
30 INJECTION, SOLUTION SUBCUTANEOUS DAILY
Qty: 15 ML | Refills: 6 | Status: SHIPPED | OUTPATIENT
Start: 2023-03-01 | End: 2023-03-17 | Stop reason: CLARIF

## 2023-03-01 NOTE — PATIENT INSTRUCTIONS
Results for orders placed or performed in visit on 03/01/23   POC Glycosylated Hemoglobin (Hb A1C)    Specimen: Blood   Result Value Ref Range    Hemoglobin A1C 7.2 %    Lot Number [id number]     Expiration Date 7,032,024    POC Glucose, Blood    Specimen: Blood   Result Value Ref Range    Glucose 116 70 - 130 mg/dL    Lot Number [id number]     Expiration Date 10,142,023       Continue glimepiride and metformin.   Continue Soliqua at 30 Units daily and change to Toujeo 30 Units once your current supply is over.   Ozempic 0.25 mg once a week for 1 month, then increase to 0.5 mg weekly     If you have low glucose after increase in Ozempic reduce glimepiride dose to 1 tablet daily.

## 2023-03-01 NOTE — PROGRESS NOTES
Chief complaint  Diabetes    Subjective   Octavia Rice is a 63 y.o. female is here today for follow-up of:    Diabetes Mellitus type 2: dx in 2003.  Diabetic complications: none  Eye exam current (within one year): no retinopathy, recent exam.   Current diabetic medications include.   Metformin 1000 mg.   Glimepiride 8 mg daily.     Soliqua 30 Units.  No hypoglycemia reported.     Past medications: 08/2016 Jardiance trial was unsuccessful - frequent urination.     Monitoring  Sensor was approved but she didn't pick it up.     Other med problems: include hyperlipidemia on simvastatin.     C/o difficulty sleeping, she also has anxiety and fear if large crowds.   She has gained some weight and asked about medication that could help with weight loss. She is caring for her granddaughter, takes her to dance and sport activities. She thinks that she would feel better and have more energy after weight loss.       Medications    Current Outpatient Medications:   •  Accu-Chek Olamide Plus test strip, USE A STRIP TO TEST THREE TIMES A DAY, Disp: 100 each, Rfl: 12  •  Blood Glucose Monitoring Suppl (ACCU-CHEK OLAMIDE PLUS) w/Device kit, Test Three Times Daily, Disp: 1 kit, Rfl: 0  •  buPROPion XL (WELLBUTRIN XL) 150 MG 24 hr tablet, TAKE ONE TABLET BY MOUTH DAILY, Disp: 30 tablet, Rfl: 5  •  busPIRone (BUSPAR) 5 MG tablet, Take 1 tablet by mouth 2 (Two) Times a Day As Needed (anxiety)., Disp: 60 tablet, Rfl: 1  •  cholecalciferol (VITAMIN D3) 25 MCG (1000 UT) tablet, TAKE ONE TABLET BY MOUTH DAILY, Disp: 30 tablet, Rfl: 1  •  cyclobenzaprine (FLEXERIL) 10 MG tablet, TAKE ONE TABLET BY MOUTH TWICE A DAY AS NEEDED FOR MUSCLE SPASMS, Disp: 30 tablet, Rfl: 3  •  DULoxetine (Cymbalta) 20 MG capsule, Take 1 capsule by mouth Daily., Disp: 30 capsule, Rfl: 5  •  glimepiride (AMARYL) 4 MG tablet, TAKE TWO TABLETS BY MOUTH EVERY MORNING BEFORE BREAKFAST, Disp: 180 tablet, Rfl: 0  •  Insulin Glargine-Lixisenatide (Soliqua) 100-33 UNT-MCG/ML  solution pen-injector injection, Inject 30 Units under the skin into the appropriate area as directed Daily., Disp: 3 pen, Rfl: 11  •  Insulin Pen Needle 32G X 4 MM misc, 1 each Daily., Disp: 50 each, Rfl: 11  •  lisinopril (PRINIVIL,ZESTRIL) 10 MG tablet, Take 1 tablet by mouth Daily., Disp: 90 tablet, Rfl: 3  •  Multiple Minerals-Vitamins (CALCIUM-MAGNESIUM-ZINC-D3 PO), Take  by mouth., Disp: , Rfl:   •  Multiple Vitamin (MULTI VITAMIN DAILY PO), Take  by mouth., Disp: , Rfl:   •  Omega-3 Fatty Acids (fish oil) 1000 MG capsule capsule, Take  by mouth Daily With Breakfast., Disp: , Rfl:   •  QUEtiapine (SEROquel) 25 MG tablet, Take 3 tablets by mouth Every Night., Disp: 90 tablet, Rfl: 3  •  azithromycin (Zithromax Z-Frankie) 250 MG tablet, Take 2 tablets by mouth on day 1, then 1 tablet daily on days 2-5, Disp: 6 tablet, Rfl: 0  •  BD Pen Needle Sherri 2nd Gen 32G X 4 MM misc, , Disp: , Rfl:   •  Insulin Glargine, 1 Unit Dial, (Toujeo SoloStar) 300 UNIT/ML solution pen-injector injection, Inject 30 Units under the skin into the appropriate area as directed Daily., Disp: 15 mL, Rfl: 6  •  levocetirizine (Xyzal) 5 MG tablet, Take 1 tablet by mouth Every Evening for 30 days., Disp: 30 tablet, Rfl: 6  •  metFORMIN ER (GLUCOPHAGE-XR) 500 MG 24 hr tablet, Take 2 tablets by mouth Daily With Breakfast & Dinner., Disp: 360 tablet, Rfl: 3  •  Semaglutide,0.25 or 0.5MG/DOS, (Ozempic, 0.25 or 0.5 MG/DOSE,) 2 MG/1.5ML solution pen-injector, Inject 0.5 mg under the skin into the appropriate area as directed 1 (One) Time Per Week. Start at 0.25 mg weekly for 1 month, then increase to 0.5 mg weekly., Disp: 1.5 mL, Rfl: 6      Review of systems  Review of Systems   Constitutional: Positive for fatigue and unexpected weight change (weight gain ).   Eyes: Negative for visual disturbance.   Gastrointestinal: Diarrhea: occasional.   Allergic/Immunologic: Positive for environmental allergies.   Hematological: Negative.     Psychiatric/Behavioral: Positive for decreased concentration and sleep disturbance. Negative for self-injury. Behavioral problem: phobia of crowds. The patient is nervous/anxious.        Physical exam  Objective   Vitals:    03/01/23 1142   BP: 128/60   Pulse: 97   SpO2: 98%    Body mass index is 31 kg/m².  Physical Exam   Constitutional: She is oriented to person, place, and time. She appears well-developed.   HENT:   Head: Normocephalic and atraumatic.   Cardiovascular: Normal rate, regular rhythm, normal heart sounds and normal pulses.   Pulmonary/Chest: Effort normal and breath sounds normal.   Musculoskeletal: No swelling.   Neurological: She is alert and oriented to person, place, and time.   Psychiatric: Mood and thought content normal.   Vitals reviewed.        LABS AND IMAGING  Results for orders placed or performed in visit on 03/01/23   POC Glycosylated Hemoglobin (Hb A1C)    Specimen: Blood   Result Value Ref Range    Hemoglobin A1C 7.2 %    Lot Number [id number]     Expiration Date 7,032,024    POC Glucose, Blood    Specimen: Blood   Result Value Ref Range    Glucose 116 70 - 130 mg/dL    Lot Number [id number]     Expiration Date 10,142,023            Assessment:         Diagnoses and all orders for this visit:    Type 2 diabetes mellitus without complication, with long-term current use of insulin ([facility])  -     POC Glycosylated Hemoglobin (Hb A1C)  -     POC Glucose, Blood    Class 1 obesity due to excess calories with serious comorbidity and body mass index (BMI) of 31.0 to 31.9 in adult    Mixed hyperlipidemia    Other orders  -     Semaglutide,0.25 or 0.5MG/DOS, (Ozempic, 0.25 or 0.5 MG/DOSE,) 2 MG/1.5ML solution pen-injector; Inject 0.5 mg under the skin into the appropriate area as directed 1 (One) Time Per Week. Start at 0.25 mg weekly for 1 month, then increase to 0.5 mg weekly.  -     Insulin Glargine, 1 Unit Dial, (Toujeo SoloStar) 300 UNIT/ML solution pen-injector injection; Inject 30 Units  under the skin into the appropriate area as directed Daily.        Plan:    Diabetes mellitus type 2 with hyperglycemia, glucose improved after basal insulin/GLP-1 is is added  -Soliqua 30 units changed to Toujeo and Ozempic weekly . This should provide additional weight loss benefit and allow increase in GLP-1. I have given instructions on the transition from Soliqua to Ozempic with the Toujeo. Cont oral meds and advised to reduce glimepiride if glucose improved or low.   -metformin.  -glimepiride.   Uptodate with eye exam, no retinopathy.   Encouraged to start testing more often.     Patient Instructions     Results for orders placed or performed in visit on 03/01/23   POC Glycosylated Hemoglobin (Hb A1C)    Specimen: Blood   Result Value Ref Range    Hemoglobin A1C 7.2 %    Lot Number [id number]     Expiration Date 7,032,024    POC Glucose, Blood    Specimen: Blood   Result Value Ref Range    Glucose 116 70 - 130 mg/dL    Lot Number [id number]     Expiration Date 10,142,023       Continue glimepiride and metformin.   Continue Soliqua at 30 Units daily and change to Toujeo 30 Units once your current supply is over.   Ozempic 0.25 mg once a week for 1 month, then increase to 0.5 mg weekly     If you have low glucose after increase in Ozempic reduce glimepiride dose to 1 tablet daily.        Dietary recommendations and testing encouraged.     Mixed hyperlipidemia-cont simvastatin. Fasting labs reviewed     Follow up:  3  months

## 2023-03-02 ENCOUNTER — PRIOR AUTHORIZATION (OUTPATIENT)
Dept: ENDOCRINOLOGY | Facility: CLINIC | Age: 63
End: 2023-03-02
Payer: MEDICARE

## 2023-03-03 NOTE — TELEPHONE ENCOUNTER
Message from Plan  PA Case: [id number], Status: Approved, Coverage Starts on: 1/1/2023 12:00:00 AM, Coverage Ends on: 12/31/2023

## 2023-03-16 ENCOUNTER — PRIOR AUTHORIZATION (OUTPATIENT)
Dept: ENDOCRINOLOGY | Facility: CLINIC | Age: 63
End: 2023-03-16
Payer: MEDICARE

## 2023-03-17 ENCOUNTER — PRIOR AUTHORIZATION (OUTPATIENT)
Dept: ENDOCRINOLOGY | Facility: CLINIC | Age: 63
End: 2023-03-17
Payer: MEDICARE

## 2023-03-17 RX ORDER — INSULIN GLARGINE 100 [IU]/ML
30 INJECTION, SOLUTION SUBCUTANEOUS DAILY
Qty: 3 ML | Refills: 3 | Status: SHIPPED | OUTPATIENT
Start: 2023-03-17 | End: 2023-03-23 | Stop reason: SDUPTHER

## 2023-03-20 ENCOUNTER — PRIOR AUTHORIZATION (OUTPATIENT)
Dept: ENDOCRINOLOGY | Facility: CLINIC | Age: 63
End: 2023-03-20
Payer: MEDICARE

## 2023-03-20 NOTE — TELEPHONE ENCOUNTER
Deniedtoday  We cover this drug when our criteria are met. The unmet criteria are: has tried or cannot use at least one of two other long-acting insulin products: insulin degludec (i.e. Tresiba) or insulin detemir (i.e. Levemir). This decision was from [facility]ap[facility]s Long Acting Insulins Pharmacy Coverage Policy.  Drug  Insulin Glargine Solostar 100UNIT/ML pen-injectors  Form  [email or web address] Electronic PA Form

## 2023-03-23 RX ORDER — INSULIN DETEMIR 100 [IU]/ML
30 INJECTION, SOLUTION SUBCUTANEOUS DAILY
Qty: 9 ML | Refills: 2 | Status: SHIPPED | OUTPATIENT
Start: 2023-03-23

## 2023-04-15 DIAGNOSIS — F41.9 ANXIETY: ICD-10-CM

## 2023-04-15 RX ORDER — DULOXETIN HYDROCHLORIDE 20 MG/1
CAPSULE, DELAYED RELEASE ORAL
Qty: 90 CAPSULE | Refills: 0 | Status: SHIPPED | OUTPATIENT
Start: 2023-04-15

## 2023-04-17 DIAGNOSIS — F41.9 ANXIETY: ICD-10-CM

## 2023-04-17 DIAGNOSIS — E11.9 TYPE 2 DIABETES MELLITUS WITHOUT COMPLICATION, WITH LONG-TERM CURRENT USE OF INSULIN: ICD-10-CM

## 2023-04-17 DIAGNOSIS — Z79.4 TYPE 2 DIABETES MELLITUS WITHOUT COMPLICATION, WITH LONG-TERM CURRENT USE OF INSULIN: ICD-10-CM

## 2023-04-17 RX ORDER — INSULIN GLARGINE 100 [IU]/ML
INJECTION, SOLUTION SUBCUTANEOUS
Qty: 9 ML | OUTPATIENT
Start: 2023-04-17

## 2023-04-18 RX ORDER — INSULIN GLARGINE 100 [IU]/ML
INJECTION, SOLUTION SUBCUTANEOUS
Qty: 9 ML | Refills: 0 | Status: SHIPPED | OUTPATIENT
Start: 2023-04-18

## 2023-04-18 RX ORDER — PEN NEEDLE, DIABETIC 32GX 5/32"
NEEDLE, DISPOSABLE MISCELLANEOUS
Qty: 100 EACH | Refills: 3 | Status: SHIPPED | OUTPATIENT
Start: 2023-04-18

## 2023-04-18 RX ORDER — MELATONIN
Qty: 90 TABLET | Refills: 0 | Status: SHIPPED | OUTPATIENT
Start: 2023-04-18

## 2023-04-18 RX ORDER — GLIMEPIRIDE 4 MG/1
TABLET ORAL
Qty: 180 TABLET | Refills: 0 | Status: SHIPPED | OUTPATIENT
Start: 2023-04-18

## 2023-04-18 NOTE — TELEPHONE ENCOUNTER
Rx Refill Note  Requested Prescriptions     Pending Prescriptions Disp Refills   • busPIRone (BUSPAR) 5 MG tablet [Pharmacy Med Name: BUSPIRONE HCL 5 MG TABLET] 60 tablet 1     Sig: TAKE ONE TABLET BY MOUTH TWICE A DAY AS NEEDED FOR ANXIETY      Last office visit with prescribing clinician: 1/23/2023   Last telemedicine visit with prescribing clinician: 4/24/2023   Next office visit with prescribing clinician: 4/24/2023                         Would you like a call back once the refill request has been completed: [] Yes [] No    If the office needs to give you a call back, can they leave a voicemail: [] Yes [] No    Zackary Keating MA  04/18/23, 07:43 EDT

## 2023-04-19 RX ORDER — BUSPIRONE HYDROCHLORIDE 5 MG/1
TABLET ORAL
Qty: 60 TABLET | Refills: 1 | Status: SHIPPED | OUTPATIENT
Start: 2023-04-19 | End: 2023-04-24

## 2023-04-24 ENCOUNTER — OFFICE VISIT (OUTPATIENT)
Dept: FAMILY MEDICINE CLINIC | Facility: CLINIC | Age: 63
End: 2023-04-24
Payer: MEDICARE

## 2023-04-24 VITALS
RESPIRATION RATE: 17 BRPM | WEIGHT: 213 LBS | OXYGEN SATURATION: 98 % | BODY MASS INDEX: 31.44 KG/M2 | DIASTOLIC BLOOD PRESSURE: 80 MMHG | SYSTOLIC BLOOD PRESSURE: 120 MMHG | HEART RATE: 90 BPM | TEMPERATURE: 98 F

## 2023-04-24 DIAGNOSIS — E11.65 TYPE 2 DIABETES MELLITUS WITH HYPERGLYCEMIA, WITHOUT LONG-TERM CURRENT USE OF INSULIN: Chronic | ICD-10-CM

## 2023-04-24 DIAGNOSIS — Z72.0 TOBACCO ABUSE: Chronic | ICD-10-CM

## 2023-04-24 DIAGNOSIS — F41.9 ANXIETY: Primary | ICD-10-CM

## 2023-04-24 DIAGNOSIS — G47.09 OTHER INSOMNIA: ICD-10-CM

## 2023-04-24 PROCEDURE — 1159F MED LIST DOCD IN RCRD: CPT | Performed by: FAMILY MEDICINE

## 2023-04-24 PROCEDURE — 3051F HG A1C>EQUAL 7.0%<8.0%: CPT | Performed by: FAMILY MEDICINE

## 2023-04-24 PROCEDURE — 1160F RVW MEDS BY RX/DR IN RCRD: CPT | Performed by: FAMILY MEDICINE

## 2023-04-24 PROCEDURE — 99213 OFFICE O/P EST LOW 20 MIN: CPT | Performed by: FAMILY MEDICINE

## 2023-04-24 RX ORDER — BUSPIRONE HYDROCHLORIDE 15 MG/1
15 TABLET ORAL 3 TIMES DAILY
Qty: 90 TABLET | Refills: 1 | Status: SHIPPED | OUTPATIENT
Start: 2023-04-24

## 2023-04-24 NOTE — PROGRESS NOTES
Subjective   Octavia Rice is a 63 y.o. female.     History of Present Illness she is overall feeling better.  She is less nervous and has been able to let some things go.  More tolerant.  Sleeping med helping.  buspar is not helping.     She has stopped smoking    Sugar is under 115.  She is feeling much better.  Doing well with sugar meds.      She has appt to see endo may 10. Sleep cycle is better.        The following portions of the patient's history were reviewed and updated as appropriate: allergies, current medications, past family history, past medical history, past social history, past surgical history and problem list.    Review of Systems   Constitutional: Negative.    HENT: Negative.    Eyes: Negative.    Respiratory: Negative.    Cardiovascular: Negative.    Gastrointestinal: Negative.    Endocrine: Negative.    Genitourinary: Negative.    Musculoskeletal: Negative.    Skin: Negative.    Allergic/Immunologic: Negative.    Neurological: Negative.    Hematological: Negative.    Psychiatric/Behavioral: Negative.    All other systems reviewed and are negative.      Objective     Vitals:    04/24/23 1519   BP: 120/80   Pulse: 90   Resp: 17   Temp: 98 °F (36.7 °C)   SpO2: 98%   Weight: 96.6 kg (213 lb)       Physical Exam  Vitals and nursing note reviewed.   Constitutional:       Appearance: She is well-developed.   HENT:      Head: Normocephalic and atraumatic.   Eyes:      General:         Right eye: No discharge.         Left eye: No discharge.      Pupils: Pupils are equal, round, and reactive to light.   Cardiovascular:      Rate and Rhythm: Normal rate and regular rhythm.      Heart sounds: Normal heart sounds.   Pulmonary:      Effort: Pulmonary effort is normal.      Breath sounds: Normal breath sounds.   Abdominal:      General: Bowel sounds are normal.      Palpations: Abdomen is soft. There is no mass.      Tenderness: There is no abdominal tenderness.   Musculoskeletal:         General: Normal  range of motion.      Right shoulder: No swelling.      Cervical back: Normal range of motion and neck supple.   Skin:     General: Skin is warm and dry.      Nails: There is no clubbing.   Neurological:      Mental Status: She is alert and oriented to person, place, and time.      Deep Tendon Reflexes: Reflexes are normal and symmetric.   Psychiatric:         Behavior: Behavior normal.         Thought Content: Thought content normal.         Judgment: Judgment normal.         Assessment & Plan     Problem List Items Addressed This Visit        Endocrine and Metabolic    Type 2 diabetes mellitus (Chronic)       Mental Health    Anxiety - Primary    Relevant Medications    busPIRone (BUSPAR) 15 MG tablet       Sleep    Insomnia       Tobacco    Tobacco abuse (Chronic)

## 2023-05-10 ENCOUNTER — OFFICE VISIT (OUTPATIENT)
Dept: ENDOCRINOLOGY | Facility: CLINIC | Age: 63
End: 2023-05-10
Payer: MEDICARE

## 2023-05-10 VITALS
WEIGHT: 208 LBS | BODY MASS INDEX: 30.81 KG/M2 | HEIGHT: 69 IN | SYSTOLIC BLOOD PRESSURE: 122 MMHG | OXYGEN SATURATION: 98 % | HEART RATE: 83 BPM | DIASTOLIC BLOOD PRESSURE: 72 MMHG

## 2023-05-10 DIAGNOSIS — Z79.4 TYPE 2 DIABETES MELLITUS WITHOUT COMPLICATION, WITH LONG-TERM CURRENT USE OF INSULIN: Primary | ICD-10-CM

## 2023-05-10 DIAGNOSIS — E11.9 TYPE 2 DIABETES MELLITUS WITHOUT COMPLICATION, WITH LONG-TERM CURRENT USE OF INSULIN: Primary | ICD-10-CM

## 2023-05-10 DIAGNOSIS — E78.2 MIXED HYPERLIPIDEMIA: Chronic | ICD-10-CM

## 2023-05-10 DIAGNOSIS — E55.9 VITAMIN D DEFICIENCY, UNSPECIFIED: ICD-10-CM

## 2023-05-10 LAB
EXPIRATION DATE: NORMAL
GLUCOSE BLDC GLUCOMTR-MCNC: 134 MG/DL (ref 70–130)
HBA1C MFR BLD: 6.9 %
Lab: NORMAL

## 2023-05-10 RX ORDER — SEMAGLUTIDE 0.68 MG/ML
0.5 INJECTION, SOLUTION SUBCUTANEOUS WEEKLY
Qty: 9 ML | Refills: 3 | Status: CANCELLED | OUTPATIENT
Start: 2023-05-10

## 2023-05-10 RX ORDER — ASPIRIN 81 MG/1
81 TABLET ORAL DAILY
COMMUNITY

## 2023-05-10 RX ORDER — SEMAGLUTIDE 0.68 MG/ML
0.5 INJECTION, SOLUTION SUBCUTANEOUS WEEKLY
COMMUNITY
End: 2023-05-10 | Stop reason: SDUPTHER

## 2023-05-10 RX ORDER — SEMAGLUTIDE 0.68 MG/ML
0.5 INJECTION, SOLUTION SUBCUTANEOUS WEEKLY
Qty: 9 ML | Refills: 3 | Status: SHIPPED | OUTPATIENT
Start: 2023-05-10 | End: 2023-05-10

## 2023-05-10 NOTE — PROGRESS NOTES
Chief complaint  Diabetes (Follow up )    Subjective   Octavia Rice is a 63 y.o. female is here today for follow-up of:    Diabetes Mellitus type 2: dx in 2003.  Diabetic complications: none  Eye exam current (within one year): no retinopathy, recent exam.   Current diabetic medications include.   Metformin 1000 mg.   Glimepiride 8 mg daily.     Levemir  Ozempic  No hypoglycemia reported. Glucose is 109-115 in the morning.     Past medications: 08/2016 Jardiance trial was unsuccessful - frequent urination.     Monitoring  Sensor was approved but she didn't pick it up.     Other med problems: include hyperlipidemia on simvastatin.     She is doing well after starting the ozempic - lost some weight and glucose improved. She denies having any hypoglycemia.   Anxiety is still not controlled and she has some insomnia. She is working with her PCP and meds adjusted.         Medications    Current Outpatient Medications:   •  Accu-Chek Franky Plus test strip, USE A STRIP TO TEST THREE TIMES A DAY, Disp: 100 each, Rfl: 12  •  aspirin 81 MG EC tablet, Take 1 tablet by mouth Daily., Disp: , Rfl:   •  BD Pen Needle Sherri 2nd Gen 32G X 4 MM misc, , Disp: , Rfl:   •  BD Pen Needle Sherri 2nd Gen 32G X 4 MM misc, USE ONE EACH DAY, Disp: 100 each, Rfl: 3  •  Blood Glucose Monitoring Suppl (ACCU-CHEK FRANKY PLUS) w/Device kit, Test Three Times Daily, Disp: 1 kit, Rfl: 0  •  buPROPion XL (WELLBUTRIN XL) 150 MG 24 hr tablet, TAKE ONE TABLET BY MOUTH DAILY, Disp: 30 tablet, Rfl: 5  •  busPIRone (BUSPAR) 15 MG tablet, Take 1 tablet by mouth 3 (Three) Times a Day., Disp: 90 tablet, Rfl: 1  •  cholecalciferol (VITAMIN D3) 25 MCG (1000 UT) tablet, TAKE ONE TABLET BY MOUTH DAILY, Disp: 90 tablet, Rfl: 0  •  cyclobenzaprine (FLEXERIL) 10 MG tablet, TAKE ONE TABLET BY MOUTH TWICE A DAY AS NEEDED FOR MUSCLE SPASMS, Disp: 30 tablet, Rfl: 3  •  DULoxetine (CYMBALTA) 20 MG capsule, TAKE ONE CAPSULE BY MOUTH DAILY, Disp: 90 capsule, Rfl: 0  •   glimepiride (AMARYL) 4 MG tablet, TAKE TWO TABLETS BY MOUTH EVERY MORNING BEFORE BREAKFAST, Disp: 180 tablet, Rfl: 0  •  Lantus SoloStar 100 UNIT/ML injection pen, INJECT 30 UNITS UNDER THE SKIN DAILY, Disp: 9 mL, Rfl: 0  •  lisinopril (PRINIVIL,ZESTRIL) 10 MG tablet, Take 1 tablet by mouth Daily., Disp: 90 tablet, Rfl: 3  •  metFORMIN ER (GLUCOPHAGE-XR) 500 MG 24 hr tablet, Take 2 tablets by mouth Daily With Breakfast & Dinner., Disp: 360 tablet, Rfl: 3  •  Multiple Minerals-Vitamins (CALCIUM-MAGNESIUM-ZINC-D3 PO), Take  by mouth., Disp: , Rfl:   •  Omega-3 Fatty Acids (fish oil) 1000 MG capsule capsule, Take  by mouth Daily With Breakfast., Disp: , Rfl:   •  QUEtiapine (SEROquel) 25 MG tablet, Take 3 tablets by mouth Every Night., Disp: 90 tablet, Rfl: 3  •  levocetirizine (Xyzal) 5 MG tablet, Take 1 tablet by mouth Every Evening for 30 days., Disp: 30 tablet, Rfl: 6  •  Semaglutide, 1 MG/DOSE, (OZEMPIC) 4 MG/3ML solution pen-injector, Inject 1 mg under the skin into the appropriate area as directed 1 (One) Time Per Week., Disp: 9 mL, Rfl: 3      Review of systems  Review of Systems   Constitutional: Positive for fatigue.   Gastrointestinal: Diarrhea: occasional.   Allergic/Immunologic: Positive for environmental allergies.   Hematological: Negative.    Psychiatric/Behavioral: Positive for decreased concentration and sleep disturbance. Negative for self-injury. Behavioral problem: phobia of crowds. The patient is nervous/anxious.        Physical exam  Objective   Vitals:    05/10/23 0942   BP: 122/72   Pulse: 83   SpO2: 98%    Body mass index is 30.72 kg/m².  Physical Exam   Constitutional: She is oriented to person, place, and time. She appears well-developed.   HENT:   Head: Normocephalic and atraumatic.   Cardiovascular: Normal rate, regular rhythm, normal heart sounds and normal pulses.   Pulmonary/Chest: Effort normal and breath sounds normal.   Musculoskeletal: No swelling.   Neurological: She is alert and  oriented to person, place, and time.   Psychiatric: Mood and thought content normal.   Vitals reviewed.        LABS AND IMAGING  Results for orders placed or performed in visit on 05/10/23   POC Glycosylated Hemoglobin (Hb A1C)    Specimen: Blood   Result Value Ref Range    Hemoglobin A1C 6.9 %    Lot Number [id number]     Expiration Date 12/12/24    POC Glucose, Blood    Specimen: Blood   Result Value Ref Range    Glucose 134 (A) 70 - 130 mg/dL           Assessment:         Diagnoses and all orders for this visit:    Type 2 diabetes mellitus without complication, with long-term current use of insulin  -     POC Glycosylated Hemoglobin (Hb A1C)  -     POC Glucose, Blood  -     Comprehensive Metabolic Panel  -     Vitamin D,25-Hydroxy  -     Microalbumin / Creatinine Urine Ratio - Urine, Clean Catch    Mixed hyperlipidemia  -     Lipid Panel    Vitamin D deficiency, unspecified  -     Vitamin D,25-Hydroxy    Other orders  -     aspirin 81 MG EC tablet; Take 1 tablet by mouth Daily.  -     Discontinue: Semaglutide,0.25 or 0.5MG/DOS, (Ozempic, 0.25 or 0.5 MG/DOSE,) 2 MG/3ML solution pen-injector; Inject 0.5 mg under the skin into the appropriate area as directed 1 (One) Time Per Week.  -     Discontinue: Semaglutide,0.25 or 0.5MG/DOS, (Ozempic, 0.25 or 0.5 MG/DOSE,) 2 MG/3ML solution pen-injector; Inject 0.5 mg under the skin into the appropriate area as directed 1 (One) Time Per Week.  -     Semaglutide, 1 MG/DOSE, (OZEMPIC) 4 MG/3ML solution pen-injector; Inject 1 mg under the skin into the appropriate area as directed 1 (One) Time Per Week.        Plan:    Diabetes mellitus type 2 with hyperglycemia, glucose improved after basal insulin/GLP-1 is is added  -Lantus 30 units and Ozempic increased to 1 mg weekly  -metformin.  -glimepiride - will attempt to titrate down and discontinue .   Uptodate with eye exam, no retinopathy.   Encouraged to start testing more often.     Patient Instructions     Results for orders  placed or performed in visit on 05/10/23   POC Glycosylated Hemoglobin (Hb A1C)    Specimen: Blood   Result Value Ref Range    Hemoglobin A1C 6.9 %    Lot Number [id number]     Expiration Date 12/12/24    POC Glucose, Blood    Specimen: Blood   Result Value Ref Range    Glucose 134 (A) 70 - 130 mg/dL        1. Increase Ozempic to 1 mg weekly with next refill.   2. Decrease glimepiride to 1 tablet daily if your midday glucose is < 100 or when you increase Ozempic dose.  3. Continue metformin and Lantus (Levemir, Toujeo, Basaglar).         Mixed hyperlipidemia-cont simvastatin. Fasting labs today     Follow up:  3  months

## 2023-05-10 NOTE — PATIENT INSTRUCTIONS
Results for orders placed or performed in visit on 05/10/23   POC Glycosylated Hemoglobin (Hb A1C)    Specimen: Blood   Result Value Ref Range    Hemoglobin A1C 6.9 %    Lot Number [id number]     Expiration Date 12/12/24    POC Glucose, Blood    Specimen: Blood   Result Value Ref Range    Glucose 134 (A) 70 - 130 mg/dL        Increase Ozempic to 1 mg weekly with next refill.   Decrease glimepiride to 1 tablet daily if your midday glucose is < 100 or when you increase Ozempic dose.  Continue metformin and Lantus (Levemir, Toujeo, Basaglar).

## 2023-05-11 LAB
25(OH)D3+25(OH)D2 SERPL-MCNC: 35.3 NG/ML (ref 30–100)
ALBUMIN SERPL-MCNC: 4.6 G/DL (ref 3.5–5.2)
ALBUMIN/CREAT UR: 10 MG/G CREAT (ref 0–29)
ALBUMIN/GLOB SERPL: 1.9 G/DL
ALP SERPL-CCNC: 88 U/L (ref 39–117)
ALT SERPL-CCNC: 13 U/L (ref 1–33)
AST SERPL-CCNC: 12 U/L (ref 1–32)
BILIRUB SERPL-MCNC: 0.3 MG/DL (ref 0–1.2)
BUN SERPL-MCNC: 17 MG/DL (ref 8–23)
BUN/CREAT SERPL: 21.8 (ref 7–25)
CALCIUM SERPL-MCNC: 10.1 MG/DL (ref 8.6–10.5)
CHLORIDE SERPL-SCNC: 105 MMOL/L (ref 98–107)
CHOLEST SERPL-MCNC: 169 MG/DL (ref 0–200)
CO2 SERPL-SCNC: 24 MMOL/L (ref 22–29)
CREAT SERPL-MCNC: 0.78 MG/DL (ref 0.57–1)
CREAT UR-MCNC: 195 MG/DL
EGFRCR SERPLBLD CKD-EPI 2021: 85.5 ML/MIN/1.73
GLOBULIN SER CALC-MCNC: 2.4 GM/DL
GLUCOSE SERPL-MCNC: 125 MG/DL (ref 65–99)
HDLC SERPL-MCNC: 48 MG/DL (ref 40–60)
LDLC SERPL CALC-MCNC: 96 MG/DL (ref 0–100)
MICROALBUMIN UR-MCNC: 18.7 UG/ML
POTASSIUM SERPL-SCNC: 4.5 MMOL/L (ref 3.5–5.2)
PROT SERPL-MCNC: 7 G/DL (ref 6–8.5)
SODIUM SERPL-SCNC: 141 MMOL/L (ref 136–145)
TRIGL SERPL-MCNC: 144 MG/DL (ref 0–150)
VLDLC SERPL CALC-MCNC: 25 MG/DL (ref 5–40)

## 2023-06-14 DIAGNOSIS — F41.9 ANXIETY: ICD-10-CM

## 2023-06-14 RX ORDER — QUETIAPINE FUMARATE 25 MG/1
TABLET, FILM COATED ORAL
Qty: 90 TABLET | Refills: 3 | Status: SHIPPED | OUTPATIENT
Start: 2023-06-14

## 2023-06-14 NOTE — TELEPHONE ENCOUNTER
Rx Refill Note  Requested Prescriptions     Pending Prescriptions Disp Refills    QUEtiapine (SEROquel) 25 MG tablet [Pharmacy Med Name: QUEtiapine FUMARATE 25 MG TAB] 90 tablet 3     Sig: TAKE THREE TABLETS BY MOUTH ONCE NIGHTLY      Last office visit with prescribing clinician: 4/24/2023   Last telemedicine visit with prescribing clinician: Visit date not found   Next office visit with prescribing clinician: 8/9/2023                         Would you like a call back once the refill request has been completed: [] Yes [] No    If the office needs to give you a call back, can they leave a voicemail: [] Yes [] No    Zahra Hopper LPN  06/14/23, 08:36 EDT

## 2023-08-02 RX ORDER — INSULIN DETEMIR 100 [IU]/ML
30 INJECTION, SOLUTION SUBCUTANEOUS DAILY
Qty: 9 ML | Refills: 2 | OUTPATIENT
Start: 2023-08-02

## 2023-08-03 ENCOUNTER — OFFICE VISIT (OUTPATIENT)
Dept: FAMILY MEDICINE CLINIC | Facility: CLINIC | Age: 63
End: 2023-08-03
Payer: MEDICARE

## 2023-08-03 VITALS
SYSTOLIC BLOOD PRESSURE: 123 MMHG | OXYGEN SATURATION: 98 % | BODY MASS INDEX: 30.16 KG/M2 | HEART RATE: 91 BPM | DIASTOLIC BLOOD PRESSURE: 76 MMHG | HEIGHT: 69 IN | WEIGHT: 203.6 LBS | TEMPERATURE: 98.6 F

## 2023-08-03 DIAGNOSIS — Z00.00 MEDICARE ANNUAL WELLNESS VISIT, SUBSEQUENT: Primary | ICD-10-CM

## 2023-08-03 DIAGNOSIS — K52.9 CHRONIC DIARRHEA: ICD-10-CM

## 2023-08-03 DIAGNOSIS — F41.9 ANXIETY: ICD-10-CM

## 2023-08-03 PROBLEM — F33.0 MILD EPISODE OF RECURRENT DEPRESSIVE DISORDER: Status: RESOLVED | Noted: 2022-07-27 | Resolved: 2023-08-03

## 2023-08-03 PROBLEM — Z23 ENCOUNTER FOR IMMUNIZATION: Status: RESOLVED | Noted: 2022-07-27 | Resolved: 2023-08-03

## 2023-08-03 PROBLEM — Z72.0 TOBACCO ABUSE: Chronic | Status: RESOLVED | Noted: 2017-08-07 | Resolved: 2023-08-03

## 2023-08-03 PROBLEM — R05.9 COUGH: Status: RESOLVED | Noted: 2022-06-07 | Resolved: 2023-08-03

## 2023-08-03 PROBLEM — J01.10 ACUTE NON-RECURRENT FRONTAL SINUSITIS: Status: RESOLVED | Noted: 2022-06-07 | Resolved: 2023-08-03

## 2023-08-03 RX ORDER — INSULIN DETEMIR 100 [IU]/ML
30 INJECTION, SOLUTION SUBCUTANEOUS DAILY
Qty: 9 ML | Refills: 2 | OUTPATIENT
Start: 2023-08-03

## 2023-08-03 RX ORDER — BUPROPION HYDROCHLORIDE 150 MG/1
TABLET ORAL
Qty: 30 TABLET | Refills: 5 | Status: SHIPPED | OUTPATIENT
Start: 2023-08-03

## 2023-08-09 ENCOUNTER — OFFICE VISIT (OUTPATIENT)
Dept: FAMILY MEDICINE CLINIC | Facility: CLINIC | Age: 63
End: 2023-08-09
Payer: MEDICARE

## 2023-08-09 VITALS
BODY MASS INDEX: 29.96 KG/M2 | WEIGHT: 203 LBS | OXYGEN SATURATION: 96 % | DIASTOLIC BLOOD PRESSURE: 78 MMHG | RESPIRATION RATE: 17 BRPM | SYSTOLIC BLOOD PRESSURE: 110 MMHG | TEMPERATURE: 97.8 F | HEART RATE: 100 BPM

## 2023-08-09 DIAGNOSIS — K52.9 CHRONIC DIARRHEA: Primary | Chronic | ICD-10-CM

## 2023-08-09 DIAGNOSIS — F41.9 ANXIETY: ICD-10-CM

## 2023-08-09 DIAGNOSIS — K58.9 IRRITABLE BOWEL SYNDROME, UNSPECIFIED TYPE: ICD-10-CM

## 2023-08-09 PROCEDURE — 3044F HG A1C LEVEL LT 7.0%: CPT | Performed by: FAMILY MEDICINE

## 2023-08-09 PROCEDURE — 99213 OFFICE O/P EST LOW 20 MIN: CPT | Performed by: FAMILY MEDICINE

## 2023-08-09 RX ORDER — QUETIAPINE FUMARATE 25 MG/1
75 TABLET, FILM COATED ORAL NIGHTLY
Qty: 360 TABLET | Refills: 1 | Status: SHIPPED | OUTPATIENT
Start: 2023-08-09

## 2023-08-09 NOTE — PROGRESS NOTES
Subjective   Octavia Rice is a 63 y.o. female.     History of Present Illness I have reviewed her last note from endocrinology on 5/10/2023.  Her hemoglobin A1c was 6.9.  Her diabetes had improved with the Ozempic and Lantus 30 units.  She is continued on metformin and the plan was to try to titrate glimepiride down.    She was just seen on 8/3/2023 for a Medicare wellness and was referred to gastroenterology for chronic over 1 year diarrhea.  My last appointment with her was 4/24/2023.  She was prescribed BuSpar.  She is also taking Wellbutrin 150 mg and Cymbalta 20 mg.    The following portions of the patient's history were reviewed and updated as appropriate: allergies, current medications, past family history, past medical history, past social history, past surgical history, and problem list.    Review of Systems   Constitutional: Negative.    HENT: Negative.     Eyes: Negative.    Respiratory: Negative.     Cardiovascular: Negative.    Gastrointestinal:  Positive for abdominal pain and diarrhea.   Endocrine: Negative.    Genitourinary: Negative.    Musculoskeletal: Negative.    Skin: Negative.    Allergic/Immunologic: Negative.    Neurological: Negative.    Hematological: Negative.    Psychiatric/Behavioral: Negative.     All other systems reviewed and are negative.    Objective     Vitals:    08/09/23 1132   BP: 110/78   Pulse: 100   Resp: 17   Temp: 97.8 øF (36.6 øC)   SpO2: 96%   Weight: 92.1 kg (203 lb)       Physical Exam  Vitals and nursing note reviewed.   Constitutional:       Appearance: She is well-developed.   HENT:      Head: Normocephalic and atraumatic.   Eyes:      General:         Right eye: No discharge.         Left eye: No discharge.      Pupils: Pupils are equal, round, and reactive to light.   Cardiovascular:      Rate and Rhythm: Normal rate and regular rhythm.      Heart sounds: Normal heart sounds.   Pulmonary:      Effort: Pulmonary effort is normal.      Breath sounds: Normal breath  sounds.   Abdominal:      General: Bowel sounds are normal.      Palpations: Abdomen is soft. There is no mass.      Tenderness: There is no abdominal tenderness.   Musculoskeletal:         General: Normal range of motion.      Right shoulder: No swelling.      Cervical back: Normal range of motion and neck supple.   Skin:     General: Skin is warm and dry.      Nails: There is no clubbing.   Neurological:      Mental Status: She is alert and oriented to person, place, and time.      Deep Tendon Reflexes: Reflexes are normal and symmetric.   Psychiatric:         Behavior: Behavior normal.         Thought Content: Thought content normal.         Judgment: Judgment normal.       Assessment & Plan     Problem List Items Addressed This Visit          Gastrointestinal Abdominal     Chronic diarrhea - Primary (Chronic)    Irritable bowel syndrome       Mental Health    Anxiety    Relevant Medications    QUEtiapine (SEROquel) 25 MG tablet

## 2023-08-10 ENCOUNTER — OFFICE VISIT (OUTPATIENT)
Dept: ENDOCRINOLOGY | Facility: CLINIC | Age: 63
End: 2023-08-10
Payer: MEDICARE

## 2023-08-10 VITALS
BODY MASS INDEX: 30.07 KG/M2 | WEIGHT: 203 LBS | HEART RATE: 87 BPM | SYSTOLIC BLOOD PRESSURE: 120 MMHG | HEIGHT: 69 IN | DIASTOLIC BLOOD PRESSURE: 74 MMHG | OXYGEN SATURATION: 98 %

## 2023-08-10 DIAGNOSIS — E11.49 DM (DIABETES MELLITUS), TYPE 2 WITH NEUROLOGICAL COMPLICATIONS: Primary | ICD-10-CM

## 2023-08-10 DIAGNOSIS — E78.2 MIXED HYPERLIPIDEMIA: Chronic | ICD-10-CM

## 2023-08-10 LAB
EXPIRATION DATE: NORMAL
EXPIRATION DATE: NORMAL
GLUCOSE BLDC GLUCOMTR-MCNC: 105 MG/DL (ref 70–130)
HBA1C MFR BLD: 6.6 %
Lab: NORMAL
Lab: NORMAL

## 2023-08-10 RX ORDER — SIMVASTATIN 40 MG
40 TABLET ORAL NIGHTLY
Qty: 90 TABLET | Refills: 3 | Status: SHIPPED | OUTPATIENT
Start: 2023-08-10

## 2023-08-10 RX ORDER — INSULIN GLARGINE 100 [IU]/ML
30 INJECTION, SOLUTION SUBCUTANEOUS DAILY
Qty: 9 ML | Refills: 10 | Status: SHIPPED | OUTPATIENT
Start: 2023-08-10

## 2023-08-10 NOTE — PROGRESS NOTES
Chief complaint  Diabetes    Subjective   Octavia Rice is a 63 y.o. female is here today for follow-up of:    Diabetes Mellitus type 2: dx in 2003.  Diabetic complications: none  Eye exam current (within one year): no retinopathy, recent exam.   Current diabetic medications include.   Metformin 1000 mg.   Glimepiride 8 mg daily.     Levemir  Ozempic  No hypoglycemia reported. Glucose is 100-120.     Past medications: 08/2016 Jardiance trial was unsuccessful - frequent urination.     Monitoring  Sensor was approved but she didn't pick it up.     Other med problems: include hyperlipidemia on simvastatin.     C/o watery diarrhea 7-8 times per day. It is getting worse in the last few weeks.       Medications    Current Outpatient Medications:     Accu-Chek Franky Plus test strip, USE A STRIP TO TEST THREE TIMES A DAY, Disp: 100 each, Rfl: 12    aspirin 81 MG EC tablet, Take 1 tablet by mouth Daily., Disp: , Rfl:     BD Pen Needle Sherri 2nd Gen 32G X 4 MM misc, , Disp: , Rfl:     Blood Glucose Monitoring Suppl (ACCU-CHEK FRANKY PLUS) w/Device kit, Test Three Times Daily, Disp: 1 kit, Rfl: 0    buPROPion XL (WELLBUTRIN XL) 150 MG 24 hr tablet, TAKE ONE TABLET BY MOUTH DAILY, Disp: 30 tablet, Rfl: 5    busPIRone (BUSPAR) 15 MG tablet, TAKE ONE TABLET BY MOUTH THREE TIMES A DAY, Disp: 90 tablet, Rfl: 1    cholecalciferol (VITAMIN D3) 25 MCG (1000 UT) tablet, TAKE ONE TABLET BY MOUTH DAILY, Disp: 90 tablet, Rfl: 0    cyclobenzaprine (FLEXERIL) 10 MG tablet, TAKE ONE TABLET BY MOUTH TWICE A DAY AS NEEDED FOR MUSCLE SPASMS, Disp: 30 tablet, Rfl: 3    DULoxetine (CYMBALTA) 20 MG capsule, TAKE ONE CAPSULE BY MOUTH DAILY, Disp: 90 capsule, Rfl: 0    Lantus SoloStar 100 UNIT/ML injection pen, INJECT 30 UNITS UNDER THE SKIN DAILY, Disp: 9 mL, Rfl: 0    lisinopril (PRINIVIL,ZESTRIL) 10 MG tablet, TAKE ONE TABLET BY MOUTH DAILY, Disp: 90 tablet, Rfl: 3    metFORMIN ER (GLUCOPHAGE-XR) 500 MG 24 hr tablet, Take 2 tablets by mouth Daily  With Breakfast & Dinner., Disp: 360 tablet, Rfl: 3    Multiple Minerals-Vitamins (CALCIUM-MAGNESIUM-ZINC-D3 PO), Take  by mouth., Disp: , Rfl:     QUEtiapine (SEROquel) 25 MG tablet, Take 3 tablets by mouth Every Night., Disp: 360 tablet, Rfl: 1    Semaglutide, 1 MG/DOSE, (OZEMPIC) 4 MG/3ML solution pen-injector, Inject 1 mg under the skin into the appropriate area as directed 1 (One) Time Per Week., Disp: 9 mL, Rfl: 3    Simvastatin 40 MG/5ML suspension, Take  by mouth., Disp: , Rfl:     BD Pen Needle Sherri 2nd Gen 32G X 4 MM misc, USE ONE EACH DAY (Patient not taking: Reported on 8/10/2023), Disp: 100 each, Rfl: 3    glimepiride (AMARYL) 4 MG tablet, TAKE TWO TABLETS BY MOUTH EVERY MORNING BEFORE BREAKFAST (Patient not taking: Reported on 8/10/2023), Disp: 180 tablet, Rfl: 0    levocetirizine (Xyzal) 5 MG tablet, Take 1 tablet by mouth Every Evening for 30 days., Disp: 30 tablet, Rfl: 6      Review of systems  Review of Systems   Constitutional:  Positive for fatigue.   Gastrointestinal:  Positive for diarrhea (occasional).   Allergic/Immunologic: Positive for environmental allergies.   Hematological: Negative.    Psychiatric/Behavioral:  Positive for decreased concentration and sleep disturbance. Negative for self-injury. Behavioral problem: phobia of crowds..The patient is nervous/anxious.      Physical exam  Objective   Vitals:    08/10/23 1355   BP: 120/74   Pulse: 87   SpO2: 98%    Body mass index is 29.98 kg/mý.  Physical Exam   Constitutional: She is oriented to person, place, and time. She appears well-developed.   HENT:   Head: Normocephalic and atraumatic.   Cardiovascular: Normal rate, regular rhythm, normal heart sounds and normal pulses.   Pulmonary/Chest: Effort normal and breath sounds normal.   Musculoskeletal: No swelling.    Octavia had a diabetic foot exam performed today.  Vascular Status -  Her right foot exhibits normal foot vasculature  and no edema. Her left foot exhibits normal foot  vasculature  and no edema.  Skin Integrity  -  Her right foot skin is intact.Her left foot skin is intact..  Neurological: She is alert and oriented to person, place, and time.   Psychiatric: Mood and thought content normal.   Vitals reviewed.      LABS AND IMAGING  Results for orders placed or performed in visit on 08/10/23   POC Glycosylated Hemoglobin (Hb A1C)    Specimen: Blood   Result Value Ref Range    Hemoglobin A1C 6.6 %    Lot Number [id number]     Expiration Date 12/12/24    POC Glucose, Blood    Specimen: Blood   Result Value Ref Range    Glucose 105 70 - 130 mg/dL    Lot Number [id number]     Expiration Date 2/26/24            Assessment:         Diagnoses and all orders for this visit:    DM (diabetes mellitus), type 2 with neurological complications  -     POC Glycosylated Hemoglobin (Hb A1C)  -     POC Glucose, Blood    Mixed hyperlipidemia    Other orders  -     Simvastatin 40 MG/5ML suspension; Take  by mouth.        Plan:    Diabetes mellitus type 2 with hyperglycemia, glucose improved after basal insulin/GLP-1 is is added  -Lantus 32 units and Ozempic 1 mg weekly  -discontinue metformin due to diarrhea.   -glimepiride discontinued.   Uptodate with eye exam, no retinopathy.   Encouraged to start testing more often.     There are no Patient Instructions on file for this visit.    Mixed hyperlipidemia-cont simvastatin. Recent fasting labs reviewed.    Follow up:  3  months

## 2023-08-25 ENCOUNTER — TELEPHONE (OUTPATIENT)
Dept: ENDOCRINOLOGY | Facility: CLINIC | Age: 63
End: 2023-08-25
Payer: MEDICARE

## 2023-08-25 DIAGNOSIS — F41.9 ANXIETY: ICD-10-CM

## 2023-08-25 RX ORDER — SEMAGLUTIDE 2.68 MG/ML
2 INJECTION, SOLUTION SUBCUTANEOUS WEEKLY
Qty: 9 ML | Refills: 3 | Status: SHIPPED | OUTPATIENT
Start: 2023-08-25

## 2023-08-25 RX ORDER — BUSPIRONE HYDROCHLORIDE 15 MG/1
TABLET ORAL
Qty: 90 TABLET | Refills: 1 | Status: SHIPPED | OUTPATIENT
Start: 2023-08-25

## 2023-08-25 NOTE — TELEPHONE ENCOUNTER
PATIENT STOPPED BY OFFICE TO RELAY MESSAGE TO DR SOLORIO STATING THAT SHE COULDN'T REACH OFFICE BY PHONE (?). PATIENT STATES THAT SHE HAS STOPPED TAKING METFORMIN, HER DIARRHEA HAS CEASED. PATIENT IS REQUESTING TO HAVE DR SOLORIO INCREASE OZEMPIC TO 2 MG.

## 2023-08-25 NOTE — TELEPHONE ENCOUNTER
Rx Refill Note  Requested Prescriptions     Pending Prescriptions Disp Refills    busPIRone (BUSPAR) 15 MG tablet [Pharmacy Med Name: BUSPIRONE HCL 15 MG TABLET] 90 tablet 1     Sig: TAKE ONE TABLET BY MOUTH THREE TIMES A DAY      Last office visit with prescribing clinician: 8/9/2023   Last telemedicine visit with prescribing clinician: Visit date not found   Next office visit with prescribing clinician: 2/12/2024                         Would you like a call back once the refill request has been completed: [] Yes [] No    If the office needs to give you a call back, can they leave a voicemail: [] Yes [] No    Natalee Sims MA  08/25/23, 10:47 EDT

## 2023-09-22 DIAGNOSIS — F41.9 ANXIETY: ICD-10-CM

## 2023-09-22 DIAGNOSIS — E78.2 MIXED HYPERLIPIDEMIA: Chronic | ICD-10-CM

## 2023-09-22 RX ORDER — MELATONIN
Qty: 90 TABLET | Refills: 0 | Status: SHIPPED | OUTPATIENT
Start: 2023-09-22

## 2023-09-22 RX ORDER — GLIMEPIRIDE 4 MG/1
TABLET ORAL
Qty: 180 TABLET
Start: 2023-09-22

## 2023-09-22 RX ORDER — INSULIN DETEMIR 100 [IU]/ML
30 INJECTION, SOLUTION SUBCUTANEOUS DAILY
Qty: 9 ML | Refills: 2 | Status: SHIPPED | OUTPATIENT
Start: 2023-09-22

## 2023-09-22 RX ORDER — METFORMIN HYDROCHLORIDE 500 MG/1
TABLET, EXTENDED RELEASE ORAL
Qty: 360 TABLET | Refills: 3
Start: 2023-09-22

## 2023-09-22 RX ORDER — DULOXETIN HYDROCHLORIDE 20 MG/1
CAPSULE, DELAYED RELEASE ORAL
Qty: 90 CAPSULE | Refills: 0 | Status: SHIPPED | OUTPATIENT
Start: 2023-09-22

## 2023-09-22 NOTE — TELEPHONE ENCOUNTER
Rx Refill Note  Requested Prescriptions     Pending Prescriptions Disp Refills    glimepiride (AMARYL) 4 MG tablet [Pharmacy Med Name: GLIMEPIRIDE 4 MG TABLET] 180 tablet      Sig: TAKE 2 TABLETS BY MOUTH EVERY MORNING BEFORE BREAKFAST    Levemir FlexPen 100 UNIT/ML injection [Pharmacy Med Name: LEVEMIR FLEXPEN 100 UNIT/ML] 9 mL 2     Sig: INJECT 30 UNITS UNDER THE SKIN INTO THE APPROPRIATE AREA AS DIRECTED DAILY    metFORMIN ER (GLUCOPHAGE-XR) 500 MG 24 hr tablet [Pharmacy Med Name: METFORMIN HCL  MG TABLET] 360 tablet 3     Sig: TAKE TWO TABLETS BY MOUTH WITH BREAKFAST AND TWO TABLETS WITH DINNER    cholecalciferol (VITAMIN D3) 25 MCG (1000 UT) tablet [Pharmacy Med Name: VITAMIN D3 1,000 UNIT TABLET] 90 tablet 0     Sig: TAKE 1 TABLET BY MOUTH DAILY      Last office visit with prescribing clinician: 8/10/2023   Last telemedicine visit with prescribing clinician: Visit date not found   Next office visit with prescribing clinician: 11/8/2023                         Would you like a call back once the refill request has been completed: [] Yes [] No    If the office needs to give you a call back, can they leave a voicemail: [] Yes [] No    Lissette Luu CMA  09/22/23, 10:20 EDT

## 2023-10-24 DIAGNOSIS — F41.9 ANXIETY: ICD-10-CM

## 2023-10-24 RX ORDER — BUSPIRONE HYDROCHLORIDE 15 MG/1
TABLET ORAL
Qty: 90 TABLET | Refills: 1 | Status: SHIPPED | OUTPATIENT
Start: 2023-10-24

## 2023-11-08 ENCOUNTER — OFFICE VISIT (OUTPATIENT)
Dept: ENDOCRINOLOGY | Facility: CLINIC | Age: 63
End: 2023-11-08
Payer: MEDICARE

## 2023-11-08 VITALS
HEIGHT: 69 IN | HEART RATE: 86 BPM | SYSTOLIC BLOOD PRESSURE: 120 MMHG | BODY MASS INDEX: 30.51 KG/M2 | WEIGHT: 206 LBS | DIASTOLIC BLOOD PRESSURE: 62 MMHG

## 2023-11-08 DIAGNOSIS — E11.49 DM (DIABETES MELLITUS), TYPE 2 WITH NEUROLOGICAL COMPLICATIONS: Primary | ICD-10-CM

## 2023-11-08 DIAGNOSIS — E78.2 MIXED HYPERLIPIDEMIA: Chronic | ICD-10-CM

## 2023-11-08 LAB
ALBUMIN SERPL-MCNC: 4.6 G/DL (ref 3.5–5.2)
ALBUMIN/GLOB SERPL: 2.4 G/DL
ALP SERPL-CCNC: 85 U/L (ref 39–117)
ALT SERPL-CCNC: 16 U/L (ref 1–33)
AST SERPL-CCNC: 16 U/L (ref 1–32)
BILIRUB SERPL-MCNC: 0.4 MG/DL (ref 0–1.2)
BUN SERPL-MCNC: 11 MG/DL (ref 8–23)
BUN/CREAT SERPL: 16.7 (ref 7–25)
CALCIUM SERPL-MCNC: 9.5 MG/DL (ref 8.6–10.5)
CHLORIDE SERPL-SCNC: 106 MMOL/L (ref 98–107)
CHOLEST SERPL-MCNC: 176 MG/DL (ref 0–200)
CO2 SERPL-SCNC: 25.8 MMOL/L (ref 22–29)
CREAT SERPL-MCNC: 0.66 MG/DL (ref 0.57–1)
EGFRCR SERPLBLD CKD-EPI 2021: 98.7 ML/MIN/1.73
EXPIRATION DATE: ABNORMAL
EXPIRATION DATE: NORMAL
GLOBULIN SER CALC-MCNC: 1.9 GM/DL
GLUCOSE BLDC GLUCOMTR-MCNC: 128 MG/DL (ref 70–130)
GLUCOSE SERPL-MCNC: 118 MG/DL (ref 65–99)
HBA1C MFR BLD: 7.1 % (ref 4.5–5.7)
HDLC SERPL-MCNC: 54 MG/DL (ref 40–60)
LDLC SERPL CALC-MCNC: 96 MG/DL (ref 0–100)
Lab: ABNORMAL
Lab: NORMAL
POTASSIUM SERPL-SCNC: 4.1 MMOL/L (ref 3.5–5.2)
PROT SERPL-MCNC: 6.5 G/DL (ref 6–8.5)
SODIUM SERPL-SCNC: 142 MMOL/L (ref 136–145)
TRIGL SERPL-MCNC: 150 MG/DL (ref 0–150)
VLDLC SERPL CALC-MCNC: 26 MG/DL (ref 5–40)

## 2023-11-08 RX ORDER — INSULIN GLARGINE 100 [IU]/ML
35 INJECTION, SOLUTION SUBCUTANEOUS DAILY
Qty: 15 ML | Refills: 10 | Status: SHIPPED | OUTPATIENT
Start: 2023-11-08

## 2023-11-08 RX ORDER — BLOOD-GLUCOSE METER
1 EACH MISCELLANEOUS DAILY
Qty: 1 KIT | Refills: 0 | Status: SHIPPED | OUTPATIENT
Start: 2023-11-08

## 2023-11-08 NOTE — PROGRESS NOTES
Chief complaint  Diabetes    Subjective   Octavia Rice is a 63 y.o. female is here today for follow-up of:    Diabetes Mellitus type 2: dx in 2003.  Diabetic complications: none  Eye exam current (within one year): no retinopathy, recent exam.   Current diabetic medications include.   Metformin 1000 mg.   Glimepiride 8 mg daily.     Levemir  Ozempic  No hypoglycemia reported. Glucose is 100-120.     Past medications: 08/2016 Jardiance trial was unsuccessful - frequent urination.     Monitoring  Sensor was approved but she didn't pick it up.     Other med problems: include hyperlipidemia on simvastatin.     She reported increased stress related to moving to a bigger house. She is snacking a little more. They were eating out for about a month before they moved.     Medications    Current Outpatient Medications:     Accu-Chek Franky Plus test strip, USE A STRIP TO TEST THREE TIMES A DAY, Disp: 100 each, Rfl: 12    aspirin 81 MG EC tablet, Take 1 tablet by mouth Daily., Disp: , Rfl:     BD Pen Needle Sherri 2nd Gen 32G X 4 MM misc, , Disp: , Rfl:     BD Pen Needle Sherri 2nd Gen 32G X 4 MM misc, USE ONE EACH DAY, Disp: 100 each, Rfl: 3    Blood Glucose Monitoring Suppl (ACCU-CHEK FRANKY PLUS) w/Device kit, Test Three Times Daily, Disp: 1 kit, Rfl: 0    buPROPion XL (WELLBUTRIN XL) 150 MG 24 hr tablet, TAKE ONE TABLET BY MOUTH DAILY, Disp: 30 tablet, Rfl: 5    busPIRone (BUSPAR) 15 MG tablet, TAKE ONE TABLET BY MOUTH THREE TIMES A DAY, Disp: 90 tablet, Rfl: 1    cholecalciferol (VITAMIN D3) 25 MCG (1000 UT) tablet, TAKE 1 TABLET BY MOUTH DAILY, Disp: 90 tablet, Rfl: 0    cyclobenzaprine (FLEXERIL) 10 MG tablet, TAKE ONE TABLET BY MOUTH TWICE A DAY AS NEEDED FOR MUSCLE SPASMS, Disp: 30 tablet, Rfl: 3    DULoxetine (CYMBALTA) 20 MG capsule, TAKE 1 CAPSULE BY MOUTH DAILY, Disp: 90 capsule, Rfl: 0    Insulin Glargine (Lantus SoloStar) 100 UNIT/ML injection pen, Inject 30 Units under the skin into the appropriate area as directed  Daily., Disp: 9 mL, Rfl: 10    lisinopril (PRINIVIL,ZESTRIL) 10 MG tablet, TAKE ONE TABLET BY MOUTH DAILY, Disp: 90 tablet, Rfl: 3    metFORMIN ER (GLUCOPHAGE-XR) 500 MG 24 hr tablet, Take 2 tablets by mouth Daily With Breakfast & Dinner., Disp: 360 tablet, Rfl: 3    Multiple Minerals-Vitamins (CALCIUM-MAGNESIUM-ZINC-D3 PO), Take  by mouth., Disp: , Rfl:     QUEtiapine (SEROquel) 25 MG tablet, Take 3 tablets by mouth Every Night., Disp: 360 tablet, Rfl: 1    Semaglutide, 2 MG/DOSE, (Ozempic, 2 MG/DOSE,) 8 MG/3ML solution pen-injector, Inject 2 mg under the skin into the appropriate area as directed 1 (One) Time Per Week., Disp: 9 mL, Rfl: 3    simvastatin (ZOCOR) 40 MG tablet, Take 1 tablet by mouth Every Night., Disp: 90 tablet, Rfl: 3    levocetirizine (Xyzal) 5 MG tablet, Take 1 tablet by mouth Every Evening for 30 days., Disp: 30 tablet, Rfl: 6      Review of systems  Review of Systems   Constitutional:  Positive for fatigue.   Gastrointestinal:  Positive for diarrhea (occasional).   Allergic/Immunologic: Positive for environmental allergies.   Hematological: Negative.    Psychiatric/Behavioral:  Positive for decreased concentration and sleep disturbance. Negative for self-injury. Behavioral problem: phobia of crowds..The patient is nervous/anxious.        Physical exam  Objective   Vitals:    11/08/23 0959   BP: 120/62   Pulse: 86    Body mass index is 30.41 kg/m².  Physical Exam   Constitutional: She is oriented to person, place, and time. She appears well-developed.   HENT:   Head: Normocephalic and atraumatic.   Cardiovascular: Normal rate, regular rhythm, normal heart sounds and normal pulses.   Pulmonary/Chest: Effort normal and breath sounds normal.   Musculoskeletal: No swelling.    Octavia had a diabetic foot exam performed today.  Vascular Status -  Her right foot exhibits normal foot vasculature  and no edema. Her left foot exhibits normal foot vasculature  and no edema.  Skin Integrity  -  Her right  foot skin is intact.Her left foot skin is intact..  Neurological: She is alert and oriented to person, place, and time.   Psychiatric: Mood and thought content normal.   Vitals reviewed.        LABS AND IMAGING  Results for orders placed or performed in visit on 11/08/23   POC Glucose, Blood    Specimen: Blood   Result Value Ref Range    Glucose 128 70 - 130 mg/dL    Lot Number [id number]     Expiration Date 03/17/2024    POC Glycosylated Hemoglobin (Hb A1C)    Specimen: Blood   Result Value Ref Range    Hemoglobin A1C 7.1 (A) 4.5 - 5.7 %    Lot Number [id number]     Expiration Date 05/02/2025            Assessment:         Diagnoses and all orders for this visit:    DM (diabetes mellitus), type 2 with neurological complications  -     POC Glucose, Blood  -     POC Glycosylated Hemoglobin (Hb A1C)    Mixed hyperlipidemia        Plan:    Diabetes mellitus type 2 with hyperglycemia, glucose improved after basal insulin/GLP-1 is is added.   -Lantus 32 units and Ozempic 2 mg weekly    Uptodate with eye exam, no retinopathy.   Encouraged to start testing more often.   Meter provided     There are no Patient Instructions on file for this visit.    Mixed hyperlipidemia-cont simvastatin. Recent fasting labs reviewed.      Follow up:  3  months

## 2023-11-29 DIAGNOSIS — F41.9 ANXIETY: ICD-10-CM

## 2023-11-29 RX ORDER — DULOXETIN HYDROCHLORIDE 20 MG/1
CAPSULE, DELAYED RELEASE ORAL
Qty: 90 CAPSULE | Refills: 0 | Status: SHIPPED | OUTPATIENT
Start: 2023-11-29

## 2023-11-29 RX ORDER — BUSPIRONE HYDROCHLORIDE 15 MG/1
TABLET ORAL
Qty: 90 TABLET | Refills: 1 | Status: SHIPPED | OUTPATIENT
Start: 2023-11-29

## 2024-02-12 ENCOUNTER — OFFICE VISIT (OUTPATIENT)
Dept: FAMILY MEDICINE CLINIC | Facility: CLINIC | Age: 64
End: 2024-02-12
Payer: MEDICARE

## 2024-02-12 VITALS
TEMPERATURE: 98 F | HEART RATE: 101 BPM | WEIGHT: 216.6 LBS | DIASTOLIC BLOOD PRESSURE: 80 MMHG | BODY MASS INDEX: 32.08 KG/M2 | HEIGHT: 69 IN | OXYGEN SATURATION: 97 % | RESPIRATION RATE: 21 BRPM | SYSTOLIC BLOOD PRESSURE: 140 MMHG

## 2024-02-12 DIAGNOSIS — E11.65 TYPE 2 DIABETES MELLITUS WITH HYPERGLYCEMIA, WITHOUT LONG-TERM CURRENT USE OF INSULIN: Primary | ICD-10-CM

## 2024-02-12 DIAGNOSIS — F51.01 PRIMARY INSOMNIA: ICD-10-CM

## 2024-02-12 DIAGNOSIS — H10.9 CONJUNCTIVITIS, UNSPECIFIED CONJUNCTIVITIS TYPE, UNSPECIFIED LATERALITY: ICD-10-CM

## 2024-02-12 DIAGNOSIS — I10 ELEVATED BLOOD PRESSURE READING IN OFFICE WITH DIAGNOSIS OF HYPERTENSION: ICD-10-CM

## 2024-02-12 DIAGNOSIS — S39.012S STRAIN OF LUMBAR REGION, SEQUELA: ICD-10-CM

## 2024-02-12 LAB
EXPIRATION DATE: ABNORMAL
HBA1C MFR BLD: 6.9 % (ref 4.5–5.7)
Lab: ABNORMAL

## 2024-02-12 RX ORDER — ERYTHROMYCIN 5 MG/G
OINTMENT OPHTHALMIC 2 TIMES DAILY
Qty: 3.5 G | Refills: 0 | Status: SHIPPED | OUTPATIENT
Start: 2024-02-12

## 2024-02-12 RX ORDER — LISINOPRIL 20 MG/1
20 TABLET ORAL DAILY
Qty: 90 TABLET | Refills: 3 | Status: SHIPPED | OUTPATIENT
Start: 2024-02-12

## 2024-02-12 RX ORDER — CYCLOBENZAPRINE HCL 10 MG
10 TABLET ORAL 2 TIMES DAILY PRN
Qty: 30 TABLET | Refills: 3 | Status: SHIPPED | OUTPATIENT
Start: 2024-02-12

## 2024-02-14 DIAGNOSIS — H10.9 CONJUNCTIVITIS, UNSPECIFIED CONJUNCTIVITIS TYPE, UNSPECIFIED LATERALITY: Primary | ICD-10-CM

## 2024-02-14 RX ORDER — POLYMYXIN B SULFATE AND TRIMETHOPRIM 1; 10000 MG/ML; [USP'U]/ML
1 SOLUTION OPHTHALMIC EVERY 4 HOURS
Qty: 10 ML | Refills: 0 | Status: SHIPPED | OUTPATIENT
Start: 2024-02-14

## 2024-03-04 RX ORDER — QUETIAPINE FUMARATE 25 MG/1
75 TABLET, FILM COATED ORAL NIGHTLY
Qty: 270 TABLET | Refills: 0 | Status: SHIPPED | OUTPATIENT
Start: 2024-03-04

## 2024-03-04 NOTE — TELEPHONE ENCOUNTER
Rx Refill Note  Requested Prescriptions     Pending Prescriptions Disp Refills    QUEtiapine (SEROquel) 25 MG tablet [Pharmacy Med Name: QUEtiapine FUMARATE 25 MG TAB] 270 tablet      Sig: TAKE THREE TABLETS BY MOUTH ONCE NIGHTLY      Last office visit with prescribing clinician: 2/12/2024   Last telemedicine visit with prescribing clinician: Visit date not found   Next office visit with prescribing clinician: 8/7/2024                         Would you like a call back once the refill request has been completed: [] Yes [] No    If the office needs to give you a call back, can they leave a voicemail: [] Yes [] No    Sheree Hopper MA  03/04/24, 13:13 EST

## 2024-04-09 ENCOUNTER — OFFICE VISIT (OUTPATIENT)
Dept: ENDOCRINOLOGY | Facility: CLINIC | Age: 64
End: 2024-04-09
Payer: MEDICARE

## 2024-04-09 VITALS
WEIGHT: 212 LBS | DIASTOLIC BLOOD PRESSURE: 74 MMHG | SYSTOLIC BLOOD PRESSURE: 136 MMHG | HEART RATE: 90 BPM | OXYGEN SATURATION: 96 % | BODY MASS INDEX: 31.29 KG/M2 | TEMPERATURE: 97.3 F

## 2024-04-09 DIAGNOSIS — E11.65 TYPE 2 DIABETES MELLITUS WITH HYPERGLYCEMIA, WITHOUT LONG-TERM CURRENT USE OF INSULIN: Primary | Chronic | ICD-10-CM

## 2024-04-09 DIAGNOSIS — E78.2 MIXED HYPERLIPIDEMIA: Chronic | ICD-10-CM

## 2024-04-09 LAB
EXPIRATION DATE: ABNORMAL
GLUCOSE BLDC GLUCOMTR-MCNC: 131 MG/DL (ref 70–130)
Lab: ABNORMAL

## 2024-04-09 PROCEDURE — 3078F DIAST BP <80 MM HG: CPT | Performed by: INTERNAL MEDICINE

## 2024-04-09 PROCEDURE — 99214 OFFICE O/P EST MOD 30 MIN: CPT | Performed by: INTERNAL MEDICINE

## 2024-04-09 PROCEDURE — 3044F HG A1C LEVEL LT 7.0%: CPT | Performed by: INTERNAL MEDICINE

## 2024-04-09 PROCEDURE — 82947 ASSAY GLUCOSE BLOOD QUANT: CPT | Performed by: INTERNAL MEDICINE

## 2024-04-09 PROCEDURE — 3075F SYST BP GE 130 - 139MM HG: CPT | Performed by: INTERNAL MEDICINE

## 2024-04-09 RX ORDER — DULOXETIN HYDROCHLORIDE 20 MG/1
20 CAPSULE, DELAYED RELEASE ORAL DAILY
Start: 2024-04-09 | End: 2025-04-09

## 2024-04-09 NOTE — PROGRESS NOTES
Chief complaint  Diabetes    Subjective   Octavia Rice is a 64 y.o. female is here today for follow-up of:    Diabetes Mellitus type 2: dx in 2003.  Diabetic complications: none  Eye exam current (within one year): no retinopathy, recent exam.   Current diabetic medications include.   Lantus  Ozempic  No hypoglycemia reported.     Past medications: 08/2016 Jardiance trial was unsuccessful - frequent urination.     Monitoring  Sensor was approved but she didn't pick it up.     Other med problems: include hyperlipidemia on simvastatin.     She is doing well, occasional tingling in the toes reported.     Medications    Current Outpatient Medications:     Accu-Chek Olamide Plus test strip, USE A STRIP TO TEST THREE TIMES A DAY, Disp: 100 each, Rfl: 12    aspirin 81 MG EC tablet, Take 1 tablet by mouth Daily., Disp: , Rfl:     BD Pen Needle Sherri 2nd Gen 32G X 4 MM misc, , Disp: , Rfl:     BD Pen Needle Sherri 2nd Gen 32G X 4 MM misc, USE ONE EACH DAY, Disp: 100 each, Rfl: 3    Blood Glucose Monitoring Suppl (OneTouch Verio) w/Device kit, Use 1 each Daily., Disp: 1 kit, Rfl: 0    cholecalciferol (VITAMIN D3) 25 MCG (1000 UT) tablet, TAKE 1 TABLET BY MOUTH DAILY, Disp: 90 tablet, Rfl: 0    cyclobenzaprine (FLEXERIL) 10 MG tablet, Take 1 tablet by mouth 2 (Two) Times a Day As Needed for Muscle Spasms., Disp: 30 tablet, Rfl: 3    glucose blood test strip, Use as instructed 2-3 times a day, provide strips for the Onetouch meter, Disp: 50 each, Rfl: 12    Insulin Glargine (Lantus SoloStar) 100 UNIT/ML injection pen, Inject 35 Units under the skin into the appropriate area as directed Daily., Disp: 15 mL, Rfl: 10    lisinopril (PRINIVIL,ZESTRIL) 20 MG tablet, Take 1 tablet by mouth Daily., Disp: 90 tablet, Rfl: 3    Multiple Minerals-Vitamins (CALCIUM-MAGNESIUM-ZINC-D3 PO), Take  by mouth., Disp: , Rfl:     QUEtiapine (SEROquel) 25 MG tablet, TAKE THREE TABLETS BY MOUTH ONCE NIGHTLY, Disp: 270 tablet, Rfl: 0    Semaglutide, 2  MG/DOSE, (Ozempic, 2 MG/DOSE,) 8 MG/3ML solution pen-injector, Inject 2 mg under the skin into the appropriate area as directed 1 (One) Time Per Week., Disp: 9 mL, Rfl: 3    simvastatin (ZOCOR) 40 MG tablet, Take 1 tablet by mouth Every Night. (Patient taking differently: Take 1 tablet by mouth Every Morning.), Disp: 90 tablet, Rfl: 3    Continuous Blood Gluc Sensor (FreeStyle Camelia 2 Sensor) misc, Use 1 each Every 14 (Fourteen) Days., Disp: 6 each, Rfl: 3    DULoxetine (Cymbalta) 20 MG capsule, Take 1 capsule by mouth Daily., Disp: , Rfl:     levocetirizine (Xyzal) 5 MG tablet, Take 1 tablet by mouth Every Evening for 30 days., Disp: 30 tablet, Rfl: 6      Review of systems  Review of Systems   Constitutional:  Positive for fatigue.   Allergic/Immunologic: Positive for environmental allergies.   Hematological: Negative.    Psychiatric/Behavioral:  Positive for decreased concentration and sleep disturbance. Negative for self-injury. Behavioral problem: phobia of crowds..The patient is nervous/anxious.        Physical exam  Objective   Vitals:    04/09/24 1000   BP: 136/74   Pulse: 90   Temp: 97.3 °F (36.3 °C)   SpO2: 96%    Body mass index is 31.29 kg/m².  Physical Exam   Constitutional: She is oriented to person, place, and time. She appears well-developed.   HENT:   Head: Normocephalic and atraumatic.   Cardiovascular: Normal rate, regular rhythm, normal heart sounds and normal pulses.   Pulmonary/Chest: Effort normal and breath sounds normal.   Musculoskeletal: No swelling.   Neurological: She is alert and oriented to person, place, and time.   Psychiatric: Mood and thought content normal.   Vitals reviewed.        LABS AND IMAGING  Results for orders placed or performed in visit on 04/09/24   POC Glucose, Blood    Specimen: Blood   Result Value Ref Range    Glucose 131 (A) 70 - 130 mg/dL    Lot Number [id number]     Expiration Date 10-            Assessment:         Diagnoses and all orders for this  visit:    Type 2 diabetes mellitus with hyperglycemia, without long-term current use of insulin  -     POC Glucose, Blood    Mixed hyperlipidemia    Other orders  -     Continuous Blood Gluc Sensor (FreeStyle Camelia 2 Sensor) misc; Use 1 each Every 14 (Fourteen) Days.  -     DULoxetine (Cymbalta) 20 MG capsule; Take 1 capsule by mouth Daily.        Plan:    Diabetes mellitus type 2 with hyperglycemia, glucose improved after basal insulin/GLP-1 is is added.   -Lantus 32 units and Ozempic 2 mg weekly  I have sent rx for the sensor to [facility].     Uptodate with eye exam, no retinopathy.   Encouraged to start testing more often.       There are no Patient Instructions on file for this visit.    Mixed hyperlipidemia-cont simvastatin. Fasting labs ordered      Follow up:  3  months

## 2024-04-10 NOTE — PROGRESS NOTES
"Chief Complaint  Sleeping Problem    Subjective     History of Present Illness:  Octavia Rice is a 64 y.o. female with a history of hyperlipidemia, diabetes mellitus, obesity, tobacco abuse, depression, PTSD, and anxiety.  She injured herself 2016 breaking her shoulders, shattered her left knee, nerve damage to left hand, and lost consciousness. She required nursing home help for PT. The patient is referred by Cat Mahan MD with primary care.  Patient has had ongoing difficulty with insomnia.  She has tried diphenhydramine, and has also been prescribed Seroquel, Cymbalta, Flexeril, and Wellbutrin.  Review of patient's self-reported questionnaire notes symptoms including snoring, daytime sleepiness and fatigue, frequent awakening, leg and body jerks, difficulty falling and staying asleep, pain at night, night sweats, lack of concentration, and uncontrollable need to sleep.  The patient's symptoms have been ongoing for more than 5 years.  She typically goes to bed at 12:30 AM waking at 10 AM on weekdays and 7-8 AM on weekends.  Patient estimates an average of 6 hours of sleep per night and it takes up to 4 hours for her to get to sleep.  She does take an occasional nap.  Patient states she \"cannot turn her brain off\".  She is a former smoker, drinks alcohol monthly or less, and formerly used marijuana.  She drinks 1 cup of regular coffee, and 2 lois daily.  The patient's significant other reports witnessed episodes of heavy snoring which occurs nightly and in all sleeping positions.  Additionally, the patient is observed kicking and jerking frequently. She had been taking antianxiety and depression medication. She has discontinued these. She states medications do not help with her getting to sleep.    Further details are as follows:    [place] Scale is (out of 24): Total score: 5     Estimated average amount of sleep per night: 6 hours  Number of times she wakes up at night: 1  Difficulty falling back asleep: " yes  It usually takes up to 4 hours to go to sleep.  She feels sleepy upon waking up: yes  Rotating or night shift work: no    Drowsiness/Sleepiness:  She exhibits the following:  excessive daytime sleepiness  excessive daytime fatigue  Occasional naps    Snoring/Breathing:  She exhibits the following:  loud snoring, snores in all sleep positions, and sore throat when waking up in the morning    Head Injury:  She exhibits the following:  Yes. Type: Loss of consciousness     Reflux:  She describes the following:  Reflux when trying to get to sleep and she keeps tums near her bead.    Narcolepsy:  She exhibits the following:  none    RLS/PLMs:  She describes the following:  none    Insomnia:  She describes the following:  problems initiating sleep at night  bothered by pain at night    Parasomnia:  She exhibits the following:  excessive sweating while sleeping    Weight:       04/15/24  1456   Weight: 95.3 kg (210 lb)      Weight change in the last year:  gain: 0 lbs    The patient's relevant past medical, surgical, family, and social history reviewed and updated in Epic as appropriate.    Review of Systems   Constitutional:  Positive for activity change, appetite change, chills and fatigue.   HENT: Negative.     Eyes:  Positive for visual disturbance.   Respiratory: Negative.     Cardiovascular: Negative.    Gastrointestinal:  Positive for constipation and diarrhea.   Endocrine: Positive for cold intolerance.   Genitourinary: Negative.    Musculoskeletal:  Positive for back pain and myalgias.   Skin: Negative.    Allergic/Immunologic: Negative.    Neurological: Negative.    Hematological: Negative.    Psychiatric/Behavioral:  Positive for agitation, decreased concentration and sleep disturbance.    All other systems reviewed and are negative.      PMH:    Past Medical History:   Diagnosis Date    Acute upper respiratory infection     Anxiety 8/7/2017    Community acquired pneumonia     Depression     Diabetes  mellitus     dx 1998- checks fsbs weekly    Fracture, stress, metatarsal     STRESS FRACTURE ALONG THE SHAFT OF THE FIFTH RIGHT METATARSAL    Hyperlipidemia     IBS (irritable bowel syndrome)     Insomnia 8/7/2017    Irritable bowel syndrome     Obesity     Shoulder pain     Tobacco abuse 8/7/2017    Type 2 diabetes mellitus 8/7/2017    Type 2 diabetes mellitus, uncontrolled     Vitamin D deficiency      Past Surgical History:   Procedure Laterality Date    COLONOSCOPY      within last 5 years    KNEE ARTHROSCOPY W/ MENISCAL REPAIR Right     PATELLA OPEN REDUCTION INTERNAL FIXATION Left 10/27/2016    Procedure: LEFT PATELLA OPEN REDUCTION INTERNAL FIXATION;  Surgeon: Marshall Meyers MD;  Location:  [facility];  Service:     SHOULDER OPEN REDUCTION INTERNAL FIXATION Right 10/27/2016    Procedure: RIGHT SHOULDER CLOSED REDUCTION ;  Surgeon: Marshall Meyers MD;  Location:  [facility];  Service:     SHOULDER OPEN REDUCTION INTERNAL FIXATION Right 10/31/2016    Procedure: RIGHT SHOULDER OPEN REDUCTION INTERNAL FIXATION WITH PLATE FOR FRACTURE;  Surgeon: Jaguar Marsh MD;  Location:  [facility];  Service:     TONSILLECTOMY      TOTAL ABDOMINAL HYSTERECTOMY WITH SALPINGO OOPHORECTOMY      TOTAL SHOULDER ARTHROPLASTY W/ DISTAL CLAVICLE EXCISION Right 8/7/2017    Procedure: REMOVAL RIGHT PROXIMAL HUMERUS PLATE, RIGHT REVERSE TOTAL SHOULDER ARTHROPLASTY, SHOULDER HARDWARE REMOVAL;  Surgeon: Jaguar Marsh MD;  Location:  [facility];  Service:     URETHRAL SUSPENSION      FOR STRESS INCONTINENCE     OB History    No obstetric history on file.       Allergies   Allergen Reactions    Codeine Nausea And Vomiting    Morphine And Related Itching     Severe hives- purple color to face    Penicillins Rash     Had pcn as child and unsure of reaction       MEDS:  Prior to Admission medications    Medication Sig Start Date End Date Taking? Authorizing Provider   Accu-Chek Olamide Plus test strip USE A STRIP TO TEST THREE TIMES  A DAY 9/21/21   Daphney Avila DO   aspirin 81 MG EC tablet Take 1 tablet by mouth Daily.    Qiana Mendoza MD   BD Pen Needle Sherri 2nd Gen 32G X 4 MM misc  2/7/22   Qiana Mendoza MD   BD Pen Needle Sherri 2nd Gen 32G X 4 MM misc USE ONE EACH DAY 4/18/23   Priscilla Daugherty MD   Blood Glucose Monitoring Suppl (OneTouch Verio) w/Device kit Use 1 each Daily. 11/8/23   Priscilla Daugherty MD   cholecalciferol (VITAMIN D3) 25 MCG (1000 UT) tablet TAKE 1 TABLET BY MOUTH DAILY 9/22/23   Priscilla Daugherty MD   Continuous Blood Gluc Sensor (FreeStyle Camelia 2 Sensor) misc Use 1 each Every 14 (Fourteen) Days. 4/9/24   Priscilla Daugherty MD   cyclobenzaprine (FLEXERIL) 10 MG tablet Take 1 tablet by mouth 2 (Two) Times a Day As Needed for Muscle Spasms. 2/12/24   Cat Bangura MD   DULoxetine (Cymbalta) 20 MG capsule Take 1 capsule by mouth Daily. 4/9/24 4/9/25  Priscilla Daugherty MD   glucose blood test strip Use as instructed 2-3 times a day, provide strips for the Onetouch meter 11/8/23   Priscilla Daugherty MD   Insulin Glargine (Lantus SoloStar) 100 UNIT/ML injection pen Inject 35 Units under the skin into the appropriate area as directed Daily. 11/8/23   Priscilla Daugherty MD   levocetirizine (Xyzal) 5 MG tablet Take 1 tablet by mouth Every Evening for 30 days. 11/14/21 12/14/21  Fabiano Robbins APRN   lisinopril (PRINIVIL,ZESTRIL) 20 MG tablet Take 1 tablet by mouth Daily. 2/12/24   Cat Bangura MD   Multiple Minerals-Vitamins (CALCIUM-MAGNESIUM-ZINC-D3 PO) Take  by mouth.    Qiana Mendoza MD   QUEtiapine (SEROquel) 25 MG tablet TAKE THREE TABLETS BY MOUTH ONCE NIGHTLY 3/4/24   Cat Bangura MD   Semaglutide, 2 MG/DOSE, (Ozempic, 2 MG/DOSE,) 8 MG/3ML solution pen-injector Inject 2 mg under the skin into the appropriate area as directed 1 (One) Time Per Week. 8/25/23   Kristofer Florence MD   simvastatin (ZOCOR) 40 MG tablet Take 1 tablet by mouth Every Night.  Patient taking differently: Take 1 tablet  "by mouth Every Morning. 8/10/23   Priscilla Daugherty MD       FH:  Family History   Problem Relation Age of Onset    Cancer Mother         breast    Diabetes Mother     Arthritis Mother     Diabetes Father     Heart defect Father     Stroke Father     Asthma Father     Hypertension Other     Heart attack Other        Objective   Vital Signs:  /64   Pulse 94   Ht 175.3 cm (69.02\")   Wt 95.3 kg (210 lb)   SpO2 94%   BMI 30.99 kg/m²     Patient's (Body mass index is 30.99 kg/m².) indicates that they are obese (BMI >30) with health related conditions that include obstructive sleep apnea, hypertension, coronary heart disease, and diabetes mellitus . Weight is improving with treatment. BMI is is above average; BMI management plan is completed. We discussed portion control, increasing exercise, and on ozempic for diabetes .           Physical Exam  Vitals reviewed.   Constitutional:       Appearance: Normal appearance.   HENT:      Head: Normocephalic and atraumatic.      Nose: Nose normal.      Mouth/Throat:      Mouth: Mucous membranes are moist.   Cardiovascular:      Rate and Rhythm: Normal rate and regular rhythm.      Heart sounds: No murmur heard.     No friction rub. No gallop.   Pulmonary:      Effort: Pulmonary effort is normal. No respiratory distress.      Breath sounds: Normal breath sounds. No wheezing or rhonchi.   Neurological:      Mental Status: She is alert and oriented to person, place, and time.   Psychiatric:         Behavior: Behavior normal.       Mallampati Score: III (soft and hard palate and base of uvula visible)    Result Review :              Assessment and Plan  Octavia Rice is a 64 y.o. female with a past medical history of hyperlipidemia, diabetes mellitus, obesity, tobacco abuse, PTSD, depression, and anxiety who presents for further evaluation of excessive daytime sleepiness and fatigue, nonrestorative sleep, and concerns for sleep disordered breathing and obstructive sleep " apnea. The patient's symptoms, particularly snoring, are concerning for significant sleep disordered breathing and obstructive sleep apnea. We will obtain a home sleep test for further evaluation.  The patient will return for follow-up and recommendations after test.  I have discussed weight loss as it pertains to obstructive sleep apnea.    With regard to insomnia, the patient is struggled with this for quite some time.  It started about the time when she injured herself.  She now reports PTSD as a result.  She had been treated for anxiety and depression but has since weaned herself off of these medications.  I have discussed standard of care for insomnia (cognitive behavioral therapy for insomnia) and given her information on the [facility] online program.  I discussed the nature of depression, anxiety, and PTSD as it pertains to insomnia.  I have given her information on sleep hygiene.  I also would recommend the patient return to therapy to work on her ongoing issues with PTSD, and perhaps depression and anxiety.    Diagnoses and all orders for this visit:    1. Snoring (Primary)  -     Home Sleep Study; Future    2. Psychophysiological insomnia  -     Home Sleep Study; Future    3. Suspected sleep apnea  -     Home Sleep Study; Future    4. Excessive daytime sleepiness  -     Home Sleep Study; Future    5. Obesity (BMI 30.0-34.9)                 I discussed the consequences of uncontrolled sleep apnea including hypertension, heart disease, diabetes, stroke, and dementia. I further discussed sleep apnea therapeutic options including CPAP, Weight loss, Oral dental appliance, and surgery.         Follow Up  Return for Follow up after study.  Patient was given instructions and counseling regarding her condition or for health maintenance advice. Please see specific information pulled into the AVS if appropriate.     SANDER Kuhn, ACNP-BC  Pulmonology, Critical Care, and Sleep Medicine

## 2024-04-15 ENCOUNTER — OFFICE VISIT (OUTPATIENT)
Dept: SLEEP MEDICINE | Age: 64
End: 2024-04-15
Payer: MEDICARE

## 2024-04-15 VITALS
HEART RATE: 94 BPM | SYSTOLIC BLOOD PRESSURE: 123 MMHG | BODY MASS INDEX: 31.1 KG/M2 | DIASTOLIC BLOOD PRESSURE: 64 MMHG | WEIGHT: 210 LBS | OXYGEN SATURATION: 94 % | HEIGHT: 69 IN

## 2024-04-15 DIAGNOSIS — R29.818 SUSPECTED SLEEP APNEA: ICD-10-CM

## 2024-04-15 DIAGNOSIS — F51.04 PSYCHOPHYSIOLOGICAL INSOMNIA: ICD-10-CM

## 2024-04-15 DIAGNOSIS — R06.83 SNORING: Primary | ICD-10-CM

## 2024-04-15 DIAGNOSIS — E66.9 OBESITY (BMI 30.0-34.9): ICD-10-CM

## 2024-04-15 DIAGNOSIS — G47.19 EXCESSIVE DAYTIME SLEEPINESS: ICD-10-CM

## 2024-04-15 PROCEDURE — 1159F MED LIST DOCD IN RCRD: CPT | Performed by: NURSE PRACTITIONER

## 2024-04-15 PROCEDURE — 3074F SYST BP LT 130 MM HG: CPT | Performed by: NURSE PRACTITIONER

## 2024-04-15 PROCEDURE — 99213 OFFICE O/P EST LOW 20 MIN: CPT | Performed by: NURSE PRACTITIONER

## 2024-04-15 PROCEDURE — 3078F DIAST BP <80 MM HG: CPT | Performed by: NURSE PRACTITIONER

## 2024-04-15 PROCEDURE — 1160F RVW MEDS BY RX/DR IN RCRD: CPT | Performed by: NURSE PRACTITIONER

## 2024-05-16 ENCOUNTER — OFFICE VISIT (OUTPATIENT)
Dept: FAMILY MEDICINE CLINIC | Facility: CLINIC | Age: 64
End: 2024-05-16
Payer: MEDICARE

## 2024-05-16 VITALS
HEIGHT: 69 IN | WEIGHT: 209 LBS | DIASTOLIC BLOOD PRESSURE: 62 MMHG | BODY MASS INDEX: 30.96 KG/M2 | RESPIRATION RATE: 20 BRPM | HEART RATE: 103 BPM | SYSTOLIC BLOOD PRESSURE: 108 MMHG | TEMPERATURE: 98 F | OXYGEN SATURATION: 99 %

## 2024-05-16 DIAGNOSIS — E11.65 TYPE 2 DIABETES MELLITUS WITH HYPERGLYCEMIA, WITHOUT LONG-TERM CURRENT USE OF INSULIN: Primary | ICD-10-CM

## 2024-05-16 DIAGNOSIS — M79.672 LEFT FOOT PAIN: ICD-10-CM

## 2024-05-16 DIAGNOSIS — M79.642 PAIN OF LEFT HAND: ICD-10-CM

## 2024-05-16 LAB
EXPIRATION DATE: ABNORMAL
HBA1C MFR BLD: 6.9 % (ref 4.5–5.7)
Lab: ABNORMAL

## 2024-05-16 PROCEDURE — 99213 OFFICE O/P EST LOW 20 MIN: CPT | Performed by: STUDENT IN AN ORGANIZED HEALTH CARE EDUCATION/TRAINING PROGRAM

## 2024-05-16 PROCEDURE — 3044F HG A1C LEVEL LT 7.0%: CPT | Performed by: STUDENT IN AN ORGANIZED HEALTH CARE EDUCATION/TRAINING PROGRAM

## 2024-05-16 PROCEDURE — 83036 HEMOGLOBIN GLYCOSYLATED A1C: CPT | Performed by: STUDENT IN AN ORGANIZED HEALTH CARE EDUCATION/TRAINING PROGRAM

## 2024-05-16 PROCEDURE — 1125F AMNT PAIN NOTED PAIN PRSNT: CPT | Performed by: STUDENT IN AN ORGANIZED HEALTH CARE EDUCATION/TRAINING PROGRAM

## 2024-05-16 PROCEDURE — 3074F SYST BP LT 130 MM HG: CPT | Performed by: STUDENT IN AN ORGANIZED HEALTH CARE EDUCATION/TRAINING PROGRAM

## 2024-05-16 PROCEDURE — 3078F DIAST BP <80 MM HG: CPT | Performed by: STUDENT IN AN ORGANIZED HEALTH CARE EDUCATION/TRAINING PROGRAM

## 2024-05-16 RX ORDER — MELOXICAM 7.5 MG/1
7.5 TABLET ORAL DAILY
Qty: 30 TABLET | Refills: 0 | Status: SHIPPED | OUTPATIENT
Start: 2024-05-16

## 2024-05-16 NOTE — PROGRESS NOTES
"Chief Complaint  Foot Pain (Left foot pain that started last Wednesdays and is progressing the more she puts pressure on it. Pt states at night she has pain that shoots up her leg. )    Foot Pain        She has foot pain on the left side. This started on Wednesday. She is not sure what she did but possible stepped on a dogs bone. It has been swelling and warm. No fever.       She says she has some chronic swelling in the left hand. She denies any tingling numbness or burning. No injury.     The following portions of the patient's history were reviewed and updated as appropriate: allergies, current medications, past family history, past medical history, past social history, past surgical history, and problem list.    OBJECTIVE:  /62   Pulse 103   Temp 98 °F (36.7 °C) (Temporal)   Resp 20   Ht 175.3 cm (69.02\")   Wt 94.8 kg (209 lb)   SpO2 99%   BMI 30.85 kg/m²       Physical Exam  Musculoskeletal:      Comments: Left foot normal rom sensations and pulses.   No pain on supination or pronation of the foot to suggest a ligament injury.   No rash or wounds  The pain is the bottom of her foot.         Left hand looks normal without any swelling with normal  strength and sensations                  Assessment and Plan   Diagnoses and all orders for this visit:    1. Type 2 diabetes mellitus with hyperglycemia, without long-term current use of insulin (Primary)  -     POC Glycosylated Hemoglobin (Hb A1C)    2. Left foot pain  -     XR Foot 3+ View Left (In Office)    3. Pain of left hand  -     XR Finger 2+ View Left (In Office)    Other orders  -     meloxicam (Mobic) 7.5 MG tablet; Take 1 tablet by mouth Daily.  Dispense: 30 tablet; Refill: 0        Hand pain  Will give meloxicam and image to looks for signs of OA. No swelling of the joints and it is not bilateral to suggest inflammatory arthritis. She does have a history of nerve damage so I have advised her to fu w pcp next week to consider alternate " "diagnosis such as tendonitis or nerve pain.    Dm  Continue ozempic.     Foot pain  Possible plantar fascitis. Recommend xray, home pt with \"rolling the ball\" and meloxicam as needed. Counseled her to not take this with any other otc nsaid as it can cause bleeding, kidney disease, stomach issues.       Return in about 1 week (around 5/23/2024) for with pcp.       Adrienne Fuentes D.O.  [facility]   "

## 2024-05-21 ENCOUNTER — TELEPHONE (OUTPATIENT)
Dept: FAMILY MEDICINE CLINIC | Facility: CLINIC | Age: 64
End: 2024-05-21
Payer: MEDICARE

## 2024-05-21 NOTE — TELEPHONE ENCOUNTER
Name: Octavia Rice    Relationship: Self    Best Callback Number: [phone]    HUB PROVIDED THE RELAY MESSAGE FROM THE OFFICE   PATIENT HAS FURTHER QUESTIONS AND WOULD LIKE A CALL BACK AT THE FOLLOWING PHONE NUMBER [phone]    ADDITIONAL INFORMATION: BEST AFTER  10AM

## 2024-05-21 NOTE — TELEPHONE ENCOUNTER
HUB TO RELAY    LVM. Please call the patient to let her know that her hand x-ray does show changes of arthritis. Continue with current plan and follow up with PCP for further evaluation. Please call the patient to let her know that her x-ray of the foot shows changes of arthritis in the foot with some soft tissue swelling around the fifth base of the toe with calcification in the plantar fascia, which is concerning for plantar fasciitis. Continue with current plan and follow up with PCP for further evaluation.

## 2024-05-22 DIAGNOSIS — M72.2 PLANTAR FASCIA SYNDROME: Primary | ICD-10-CM

## 2024-05-22 NOTE — TELEPHONE ENCOUNTER
Name: Octavia Rice      Relationship: Self      Best Callback Number: [phone]      HUB PROVIDED THE RELAY MESSAGE FROM THE OFFICE      PATIENT: HAS FURTHER QUESTIONS AND WOULD LIKE A CALL BACK AT THE FOLLOWING PHONE NUMBER   [phone]    ADDITIONAL INFORMATION: ARE THERE ANY SPECIAL INSTRUCTIONS FOR HER FOOT?

## 2024-05-29 ENCOUNTER — HOSPITAL ENCOUNTER (OUTPATIENT)
Dept: SLEEP MEDICINE | Facility: HOSPITAL | Age: 64
Discharge: HOME | End: 2024-05-29
Admitting: NURSE PRACTITIONER
Payer: MEDICARE

## 2024-05-29 VITALS — BODY MASS INDEX: 30.96 KG/M2 | HEIGHT: 69 IN | WEIGHT: 209 LBS

## 2024-05-29 DIAGNOSIS — R06.83 SNORING: ICD-10-CM

## 2024-05-29 DIAGNOSIS — G47.19 EXCESSIVE DAYTIME SLEEPINESS: ICD-10-CM

## 2024-05-29 DIAGNOSIS — F51.04 PSYCHOPHYSIOLOGICAL INSOMNIA: ICD-10-CM

## 2024-05-29 DIAGNOSIS — R29.818 SUSPECTED SLEEP APNEA: ICD-10-CM

## 2024-05-29 PROCEDURE — 95806 SLEEP STUDY UNATT&RESP EFFT: CPT

## 2024-05-30 RX ORDER — DULOXETIN HYDROCHLORIDE 20 MG/1
20 CAPSULE, DELAYED RELEASE ORAL DAILY
Qty: 90 CAPSULE | Refills: 0 | Status: SHIPPED | OUTPATIENT
Start: 2024-05-30 | End: 2025-05-30

## 2024-05-31 ENCOUNTER — TELEPHONE (OUTPATIENT)
Dept: SLEEP MEDICINE | Age: 64
End: 2024-05-31
Payer: MEDICARE

## 2024-06-01 PROCEDURE — 95806 SLEEP STUDY UNATT&RESP EFFT: CPT | Performed by: INTERNAL MEDICINE

## 2024-06-03 ENCOUNTER — TELEPHONE (OUTPATIENT)
Dept: SLEEP MEDICINE | Age: 64
End: 2024-06-03
Payer: MEDICARE

## 2024-06-04 DIAGNOSIS — G47.33 OBSTRUCTIVE SLEEP APNEA, ADULT: Primary | ICD-10-CM

## 2024-06-04 RX ORDER — PEN NEEDLE, DIABETIC 32GX 5/32"
NEEDLE, DISPOSABLE MISCELLANEOUS DAILY
Qty: 100 EACH | Refills: 1 | Status: SHIPPED | OUTPATIENT
Start: 2024-06-04

## 2024-06-04 NOTE — TELEPHONE ENCOUNTER
Rx Refill Note  Requested Prescriptions     Pending Prescriptions Disp Refills    [employer] Pen [place] 32G X 4 MM misc [Pharmacy Med Name: [employer] PEN NEEDLE 4MM X 32G] 100 each      Sig: USE ONE DAILY      Last office visit with prescribing clinician: 4/9/2024     Next office visit with prescribing clinician: 7/16/2024

## 2024-06-27 ENCOUNTER — OFFICE VISIT (OUTPATIENT)
Dept: FAMILY MEDICINE CLINIC | Facility: CLINIC | Age: 64
End: 2024-06-27
Payer: MEDICARE

## 2024-06-27 VITALS
OXYGEN SATURATION: 97 % | DIASTOLIC BLOOD PRESSURE: 70 MMHG | WEIGHT: 209 LBS | BODY MASS INDEX: 30.96 KG/M2 | SYSTOLIC BLOOD PRESSURE: 118 MMHG | TEMPERATURE: 97.8 F | HEIGHT: 69 IN | HEART RATE: 76 BPM

## 2024-06-27 DIAGNOSIS — J01.00 ACUTE NON-RECURRENT MAXILLARY SINUSITIS: Primary | ICD-10-CM

## 2024-06-27 DIAGNOSIS — R05.1 ACUTE COUGH: ICD-10-CM

## 2024-06-27 DIAGNOSIS — J02.9 SORE THROAT: ICD-10-CM

## 2024-06-27 LAB
EXPIRATION DATE: NORMAL
FLUAV AG NPH QL: NEGATIVE
FLUBV AG NPH QL: NEGATIVE
INTERNAL CONTROL: NORMAL
Lab: NORMAL
S PYO AG THROAT QL: NEGATIVE
SARS-COV-2 AG UPPER RESP QL IA.RAPID: NOT DETECTED

## 2024-06-27 PROCEDURE — 1160F RVW MEDS BY RX/DR IN RCRD: CPT | Performed by: NURSE PRACTITIONER

## 2024-06-27 PROCEDURE — 3074F SYST BP LT 130 MM HG: CPT | Performed by: NURSE PRACTITIONER

## 2024-06-27 PROCEDURE — 1125F AMNT PAIN NOTED PAIN PRSNT: CPT | Performed by: NURSE PRACTITIONER

## 2024-06-27 PROCEDURE — 87804 INFLUENZA ASSAY W/OPTIC: CPT | Performed by: NURSE PRACTITIONER

## 2024-06-27 PROCEDURE — 3044F HG A1C LEVEL LT 7.0%: CPT | Performed by: NURSE PRACTITIONER

## 2024-06-27 PROCEDURE — 99214 OFFICE O/P EST MOD 30 MIN: CPT | Performed by: NURSE PRACTITIONER

## 2024-06-27 PROCEDURE — 87426 SARSCOV CORONAVIRUS AG IA: CPT | Performed by: NURSE PRACTITIONER

## 2024-06-27 PROCEDURE — 3078F DIAST BP <80 MM HG: CPT | Performed by: NURSE PRACTITIONER

## 2024-06-27 PROCEDURE — 87880 STREP A ASSAY W/OPTIC: CPT | Performed by: NURSE PRACTITIONER

## 2024-06-27 PROCEDURE — 1159F MED LIST DOCD IN RCRD: CPT | Performed by: NURSE PRACTITIONER

## 2024-06-27 RX ORDER — BENZONATATE 100 MG/1
100 CAPSULE ORAL 3 TIMES DAILY PRN
Qty: 21 CAPSULE | Refills: 0 | Status: SHIPPED | OUTPATIENT
Start: 2024-06-27

## 2024-06-27 RX ORDER — FLUCONAZOLE 150 MG/1
150 TABLET ORAL DAILY
Qty: 3 TABLET | Refills: 0 | Status: SHIPPED | OUTPATIENT
Start: 2024-06-27

## 2024-06-27 RX ORDER — DEXTROMETHORPHAN HYDROBROMIDE AND PROMETHAZINE HYDROCHLORIDE 15; 6.25 MG/5ML; MG/5ML
5 SYRUP ORAL NIGHTLY PRN
Qty: 118 ML | Refills: 0 | Status: SHIPPED | OUTPATIENT
Start: 2024-06-27

## 2024-06-27 RX ORDER — CEFDINIR 300 MG/1
300 CAPSULE ORAL 2 TIMES DAILY
Qty: 20 CAPSULE | Refills: 0 | Status: SHIPPED | OUTPATIENT
Start: 2024-06-27 | End: 2024-07-07

## 2024-06-27 NOTE — PROGRESS NOTES
Follow Up Office Note   Patient Name: Octavia Rice  : 1960   MRN: [id number]     Chief Complaint:    Chief Complaint   Patient presents with    Headache    Cough     Congestion. Started 1 wk ago  Has been taking cetirizine with no relief       History of Present Illness:   Octavia Rice is a 64 y.o. female who presents today with complaint of headache, sinus congestion, postnasal drainage, sore throat which began approximately 1 week ago.  Patient states that she has now developed a cough.  She denies fever, chills or shortness of breath.  She has been taking Zyrtec for her symptoms without any benefit.        Subjective   I have reviewed and the following portions of the patient's history were updated as appropriate: past family history, past medical history, past social history, past surgical history and problem list.    Review of Systems:   Review of Systems   Constitutional:  Positive for fatigue. Negative for chills, diaphoresis and fever.   HENT:  Positive for congestion, ear pain, postnasal drip, sinus pressure, sinus pain, sore throat and voice change. Negative for ear discharge.    Respiratory:  Positive for cough and chest tightness. Negative for shortness of breath, wheezing and stridor.    Cardiovascular: Negative.    Gastrointestinal: Negative.    Musculoskeletal:  Positive for myalgias.   Neurological:  Positive for headaches.        Past Medical History:   Past Medical History:   Diagnosis Date    Acute upper respiratory infection     Anxiety 2017    Community acquired pneumonia     Depression     Diabetes mellitus     dx 1998- checks fsbs weekly    Fracture, stress, metatarsal     STRESS FRACTURE ALONG THE SHAFT OF THE FIFTH RIGHT METATARSAL    Hyperlipidemia     IBS (irritable bowel syndrome)     Insomnia 2017    Irritable bowel syndrome     Obesity     Shoulder pain     Tobacco abuse 2017    Type 2 diabetes mellitus 2017    Type 2 diabetes mellitus, uncontrolled      Vitamin D deficiency        Medications:     Current Outpatient Medications:     Accu-Chek Olamide Plus test strip, USE A STRIP TO TEST THREE TIMES A DAY, Disp: 100 each, Rfl: 12    aspirin 81 MG EC tablet, Take 1 tablet by mouth Daily., Disp: , Rfl:     BD Pen Needle Sherri 2nd Gen 32G X 4 MM misc, , Disp: , Rfl:     BD Pen Needle Sherri 2nd Gen 32G X 4 MM misc, USE ONE EACH DAY, Disp: 100 each, Rfl: 3    Blood Glucose Monitoring Suppl (OneTouch Verio) w/Device kit, Use 1 each Daily., Disp: 1 kit, Rfl: 0    cholecalciferol (VITAMIN D3) 25 MCG (1000 UT) tablet, TAKE 1 TABLET BY MOUTH DAILY, Disp: 90 tablet, Rfl: 0    Continuous Blood Gluc Sensor (FreeStyle Camelia 2 Sensor) misc, Use 1 each Every 14 (Fourteen) Days., Disp: 6 each, Rfl: 3    cyclobenzaprine (FLEXERIL) 10 MG tablet, Take 1 tablet by mouth 2 (Two) Times a Day As Needed for Muscle Spasms., Disp: 30 tablet, Rfl: 3    DULoxetine (CYMBALTA) 20 MG capsule, TAKE 1 CAPSULE BY MOUTH DAILY, Disp: 90 capsule, Rfl: 0    glucose blood test strip, Use as instructed 2-3 times a day, provide strips for the Onetouch meter, Disp: 50 each, Rfl: 12    Insulin Glargine (Lantus SoloStar) 100 UNIT/ML injection pen, Inject 35 Units under the skin into the appropriate area as directed Daily., Disp: 15 mL, Rfl: 10    Insulin Pen Needle ([employer] Pen Needles) 32G X 4 MM misc, USE ONE DAILY, Disp: 100 each, Rfl: 1    lisinopril (PRINIVIL,ZESTRIL) 20 MG tablet, Take 1 tablet by mouth Daily., Disp: 90 tablet, Rfl: 3    meloxicam (Mobic) 7.5 MG tablet, Take 1 tablet by mouth Daily., Disp: 30 tablet, Rfl: 0    Multiple Minerals-Vitamins (CALCIUM-MAGNESIUM-ZINC-D3 PO), Take  by mouth., Disp: , Rfl:     QUEtiapine (SEROquel) 25 MG tablet, TAKE THREE TABLETS BY MOUTH ONCE NIGHTLY, Disp: 270 tablet, Rfl: 0    Semaglutide, 2 MG/DOSE, (Ozempic, 2 MG/DOSE,) 8 MG/3ML solution pen-injector, Inject 2 mg under the skin into the appropriate area as directed 1 (One) Time Per Week., Disp: 9 mL, Rfl: 3     "simvastatin (ZOCOR) 40 MG tablet, Take 1 tablet by mouth Every Night. (Patient taking differently: Take 1 tablet by mouth Every Morning.), Disp: 90 tablet, Rfl: 3    benzonatate (Tessalon Perles) 100 MG capsule, Take 1 capsule by mouth 3 (Three) Times a Day As Needed for Cough., Disp: 21 capsule, Rfl: 0    cefdinir (OMNICEF) 300 MG capsule, Take 1 capsule by mouth 2 (Two) Times a Day for 10 days., Disp: 20 capsule, Rfl: 0    fluconazole (Diflucan) 150 MG tablet, Take 1 tablet by mouth Daily., Disp: 3 tablet, Rfl: 0    levocetirizine (Xyzal) 5 MG tablet, Take 1 tablet by mouth Every Evening for 30 days., Disp: 30 tablet, Rfl: 6    promethazine-dextromethorphan (PROMETHAZINE-DM) 6.25-15 MG/5ML syrup, Take 5 mL by mouth At Night As Needed for Cough., Disp: 118 mL, Rfl: 0    Allergies:   Allergies   Allergen Reactions    Codeine Nausea And Vomiting    Morphine And Codeine Itching     Severe hives- purple color to face    Penicillins Rash     Had pcn as child and unsure of reaction         Objective   Physical Exam:  Vital Signs:   Vitals:    06/27/24 1616   BP: 118/70   Pulse: 76   Temp: 97.8 °F (36.6 °C)   TempSrc: Infrared   SpO2: 97%   Weight: 94.8 kg (209 lb)   Height: 175.3 cm (69.02\")     Body mass index is 30.85 kg/m².     Physical Exam  Vitals and nursing note reviewed.   Constitutional:       General: She is not in acute distress.     Appearance: Normal appearance. She is well-developed. She is not ill-appearing, toxic-appearing or diaphoretic.   HENT:      Head: Normocephalic and atraumatic.      Right Ear: External ear normal. A middle ear effusion is present. Tympanic membrane is injected and bulging.      Left Ear: External ear normal. A middle ear effusion is present. Tympanic membrane is injected and bulging.      Nose: Mucosal edema and congestion present.      Right Turbinates: Swollen.      Left Turbinates: Swollen.      Right Sinus: Maxillary sinus tenderness present.      Left Sinus: Maxillary sinus " tenderness present.      Mouth/Throat:      Lips: Pink.      Mouth: Mucous membranes are moist.      Pharynx: Uvula midline. Posterior oropharyngeal erythema (with cobblestoning) present.   Cardiovascular:      Rate and Rhythm: Normal rate and regular rhythm.      Heart sounds: Normal heart sounds.   Pulmonary:      Effort: Pulmonary effort is normal. No respiratory distress.      Breath sounds: Normal breath sounds. No stridor. No wheezing.   Musculoskeletal:      Cervical back: Normal range of motion and neck supple. No rigidity. No muscular tenderness.   Lymphadenopathy:      Cervical: No cervical adenopathy.   Skin:     General: Skin is warm and dry.   Neurological:      Mental Status: She is alert and oriented to person, place, and time.   Psychiatric:         Mood and Affect: Mood normal.         Behavior: Behavior normal. Behavior is cooperative.         Thought Content: Thought content normal.         Judgment: Judgment normal.         Assessment / Plan    Assessment/Plan:   Diagnoses and all orders for this visit:    1. Acute non-recurrent maxillary sinusitis (Primary)  Assessment & Plan:  Acute problem.  Patient tested negative for strep, flu and COVID-19.  Suspect bacterial sinusitis.  Plan to treat with course of cefdinir per orders.  Will treat cough with Tessalon Perles and promethazine with dextromethorphan cough syrup.  Recommend rest, maintain adequate hydration.    Orders:  -     fluconazole (Diflucan) 150 MG tablet; Take 1 tablet by mouth Daily.  Dispense: 3 tablet; Refill: 0  -     cefdinir (OMNICEF) 300 MG capsule; Take 1 capsule by mouth 2 (Two) Times a Day for 10 days.  Dispense: 20 capsule; Refill: 0    2. Acute cough  -     benzonatate (Tessalon Perles) 100 MG capsule; Take 1 capsule by mouth 3 (Three) Times a Day As Needed for Cough.  Dispense: 21 capsule; Refill: 0  -     promethazine-dextromethorphan (PROMETHAZINE-DM) 6.25-15 MG/5ML syrup; Take 5 mL by mouth At Night As Needed for Cough.   Dispense: 118 mL; Refill: 0    3. Sore throat  -     POC Influenza A / B  -     POC Rapid Strep A  -     POCT SARS-CoV-2 Antigen             Discussed the nature of the medical condition(s) risks, complications, implications, management, safe and proper use of medications. Encouraged medication compliance, and keeping scheduled follow up appointments with me and any other providers.      RTC if symptoms fail to improve, to ER if symptoms worsen.      *Dictated Utilizing Dragon Dictation   Please note that portions of this note were completed with a voice recognition program.   Part of this note may be an electronic transcription/translation of spoken language to printed text using the Dragon Dictation System. Spelling and/or grammatical errors may exist despite efforts at proofreading.      NOTE TO PATIENT: The 21st Century Cures Act makes medical notes like these available to patients in the interest of transparency. However, be advised this is a medical document. It is intended as peer to peer communication. It is written in medical language and may contain abbreviations or verbiage that are unfamiliar. It may appear blunt or direct. Medical documents are intended to carry relevant information, facts as evident, and the clinical opinion of the practitioner.      SANDER Franco  [facility] [place]

## 2024-06-28 PROBLEM — J01.00 ACUTE NON-RECURRENT MAXILLARY SINUSITIS: Status: ACTIVE | Noted: 2024-06-28

## 2024-06-28 NOTE — ASSESSMENT & PLAN NOTE
Acute problem.  Patient tested negative for strep, flu and COVID-19.  Suspect bacterial sinusitis.  Plan to treat with course of cefdinir per orders.  Will treat cough with Tessalon Perles and promethazine with dextromethorphan cough syrup.  Recommend rest, maintain adequate hydration.

## 2024-07-09 RX ORDER — QUETIAPINE FUMARATE 25 MG/1
TABLET, FILM COATED ORAL
Qty: 90 TABLET | Refills: 0 | Status: SHIPPED | OUTPATIENT
Start: 2024-07-09

## 2024-07-26 RX ORDER — SEMAGLUTIDE 2.68 MG/ML
INJECTION, SOLUTION SUBCUTANEOUS
Qty: 3 ML | Refills: 5 | Status: SHIPPED | OUTPATIENT
Start: 2024-07-26

## 2024-07-26 NOTE — TELEPHONE ENCOUNTER
Rx Refill Note  Requested Prescriptions     Pending Prescriptions Disp Refills    Ozempic, 2 MG/DOSE, 8 MG/3ML solution pen-injector [Pharmacy Med Name: OZEMPIC 2 MG/DOSE (8 MG/3 ML)] 3 mL      Sig: DIAL AND INJECT UNDER THE SKIN 2 MG WEEKLY      Last office visit with prescribing clinician: Visit date not found   Last telemedicine visit with prescribing clinician: Visit date not found   Next office visit with prescribing clinician: Visit date not found                         Would you like a call back once the refill request has been completed: [] Yes [] No    If the office needs to give you a call back, can they leave a voicemail: [] Yes [] No    Haleigh Gomez MA  07/26/24, 09:38 EDT

## 2024-08-01 RX ORDER — INSULIN GLARGINE 100 [IU]/ML
INJECTION, SOLUTION SUBCUTANEOUS
Qty: 45 ML | Refills: 3 | Status: SHIPPED | OUTPATIENT
Start: 2024-08-01

## 2024-08-01 NOTE — TELEPHONE ENCOUNTER
Rx Refill Note  Requested Prescriptions     Pending Prescriptions Disp Refills    Lantus SoloStar 100 UNIT/ML injection pen [Pharmacy Med Name: LANTUS SOLOSTAR 100 UNIT/ML] 9 mL      Sig: INJECT 30 UNITS UNDER THE SKIN INTO THE APPROPRIATE AREA AS DIRECTED DAILY      Last office visit with prescribing clinician: 4/9/2024   Last telemedicine visit with prescribing clinician: Visit date not found   Next office visit with prescribing clinician: 8/13/2024                         Would you like a call back once the refill request has been completed: [] Yes [] No    If the office needs to give you a call back, can they leave a voicemail: [] Yes [] No    Lorenza Garcia MA  08/01/24, 11:40 EDT

## 2024-08-06 RX ORDER — QUETIAPINE FUMARATE 25 MG/1
75 TABLET, FILM COATED ORAL EVERY EVENING
Qty: 90 TABLET | Refills: 0 | Status: SHIPPED | OUTPATIENT
Start: 2024-08-06

## 2024-08-06 NOTE — TELEPHONE ENCOUNTER
Rx Refill Note  Requested Prescriptions     Pending Prescriptions Disp Refills    QUEtiapine (SEROquel) 25 MG tablet [Pharmacy Med Name: QUETIAPINE FUMARATE 25 MG TAB] 90 tablet 0     Sig: TAKE 3 TABLETS BY MOUTH EVERY EVENING      Last office visit with prescribing clinician: 2/12/2024   Last telemedicine visit with prescribing clinician: Visit date not found   Next office visit with prescribing clinician: 8/7/2024                         Would you like a call back once the refill request has been completed: [] Yes [] No    If the office needs to give you a call back, can they leave a voicemail: [] Yes [] No    Natalee Sims MA  08/06/24, 11:18 EDT

## 2024-08-07 ENCOUNTER — OFFICE VISIT (OUTPATIENT)
Dept: FAMILY MEDICINE CLINIC | Facility: CLINIC | Age: 64
End: 2024-08-07
Payer: MEDICARE

## 2024-08-07 VITALS
BODY MASS INDEX: 31.1 KG/M2 | WEIGHT: 210 LBS | SYSTOLIC BLOOD PRESSURE: 106 MMHG | DIASTOLIC BLOOD PRESSURE: 68 MMHG | RESPIRATION RATE: 16 BRPM | OXYGEN SATURATION: 97 % | TEMPERATURE: 98 F | HEIGHT: 69 IN | HEART RATE: 90 BPM

## 2024-08-07 DIAGNOSIS — E11.65 TYPE 2 DIABETES MELLITUS WITH HYPERGLYCEMIA, WITHOUT LONG-TERM CURRENT USE OF INSULIN: Chronic | ICD-10-CM

## 2024-08-07 DIAGNOSIS — Z00.00 MEDICARE ANNUAL WELLNESS VISIT, SUBSEQUENT: Primary | ICD-10-CM

## 2024-08-07 DIAGNOSIS — Z72.0 TOBACCO ABUSE: Chronic | ICD-10-CM

## 2024-08-07 DIAGNOSIS — F41.9 ANXIETY: ICD-10-CM

## 2024-08-07 DIAGNOSIS — E78.2 MIXED HYPERLIPIDEMIA: Chronic | ICD-10-CM

## 2024-08-07 DIAGNOSIS — E66.09 CLASS 1 OBESITY DUE TO EXCESS CALORIES WITH SERIOUS COMORBIDITY AND BODY MASS INDEX (BMI) OF 31.0 TO 31.9 IN ADULT: ICD-10-CM

## 2024-08-07 RX ORDER — AMPICILLIN TRIHYDRATE 250 MG
CAPSULE ORAL
COMMUNITY

## 2024-08-07 NOTE — ASSESSMENT & PLAN NOTE
Diabetes is improving with lifestyle modifications.   Continue current treatment regimen.  Diabetes will be reassessed in 6 months

## 2024-08-07 NOTE — ASSESSMENT & PLAN NOTE
Patient's (Body mass index is 30.99 kg/m².) indicates that they are obese (BMI >30) with health conditions that include obstructive sleep apnea, hypertension, diabetes mellitus, and dyslipidemias . Weight is improving with treatment. BMI  is above average; BMI management plan is completed. We discussed portion control and increasing exercise.

## 2024-08-07 NOTE — PATIENT INSTRUCTIONS
Medicare Wellness  Personal Prevention Plan of Service     Date of Office Visit:    Encounter Provider:  Cat Bangura MD  Place of Service:  [facility]  Patient Name: Octavia Rice  :  1960    As part of the Medicare Wellness portion of your visit today, we are providing you with this personalized preventive plan of services (PPPS). This plan is based upon recommendations of the [place] Preventive Services Task Force (USPSTF) and the Advisory Committee on Immunization Practices (ACIP).    This lists the preventive care services that should be considered, and provides dates of when you are due. Items listed as completed are up-to-date and do not require any further intervention.    Health Maintenance   Topic Date Due    DIABETIC EYE EXAM  07/15/2020    ZOSTER VACCINE (2 of 2) 2022    URINE MICROALBUMIN  05/10/2024    INFLUENZA VACCINE  2024    LIPID PANEL  2024    HEMOGLOBIN A1C  2024    DIABETIC FOOT EXAM  2025    BMI FOLLOWUP  2025    ANNUAL WELLNESS VISIT  2025    MAMMOGRAM  2026    COLORECTAL CANCER SCREENING  2030    TDAP/TD VACCINES (2 - Td or Tdap) 2032    HEPATITIS C SCREENING  Completed    COVID-19 Vaccine  Completed    Pneumococcal Vaccine 0-64  Completed    PAP SMEAR  Discontinued       No orders of the defined types were placed in this encounter.      Return in about 6 months (around 2025) for Recheck.

## 2024-08-07 NOTE — PROGRESS NOTES
Subjective   The ABCs of the Annual Wellness Visit  Medicare Wellness Visit      Octavia Rice is a 64 y.o. patient who presents for a Medicare Wellness Visit.    The following portions of the patient's history were reviewed and   updated as appropriate: allergies, current medications, past family history, past medical history, past social history, past surgical history, and problem list.    Compared to one year ago, the patient's physical   health is better.  Compared to one year ago, the patient's mental   health is better.    Recent Hospitalizations:  She was not admitted to the hospital during the last year.     Current Medical Providers:  Patient Care Team:  Cat Bangura MD as PCP - General (Family Medicine)  Priscilla Daugherty MD as Consulting Physician (Endocrinology)  Jagaur Marsh MD as Consulting Physician (Orthopedic Surgery)  Brandi Fabian MD as Obstetrician (Obstetrics and Gynecology)  Paramjit Horner MD as Consulting Physician (Gastroenterology)  Cristobal Farley (Pulm)    Outpatient Medications Prior to Visit   Medication Sig Dispense Refill    Accu-Chek Olamide Plus test strip USE A STRIP TO TEST THREE TIMES A  each 12    aspirin 81 MG EC tablet Take 1 tablet by mouth Daily.      ASTAXANTHIN PO Take  by mouth. 12 mg per serving once day      BD Pen Needle Sherri 2nd Gen 32G X 4 MM misc       BD Pen Needle Sherri 2nd Gen 32G X 4 MM misc USE ONE EACH  each 3    Blood Glucose Monitoring Suppl (OneTouch Verio) w/Device kit Use 1 each Daily. 1 kit 0    Coenzyme Q10 (CoQ10) 200 MG capsule Take  by mouth. Take one tablet by mouth daily      Continuous Blood Gluc Sensor (FreeStyle Camelia 2 Sensor) misc Use 1 each Every 14 (Fourteen) Days. 6 each 3    cyclobenzaprine (FLEXERIL) 10 MG tablet Take 1 tablet by mouth 2 (Two) Times a Day As Needed for Muscle Spasms. 30 tablet 3    DULoxetine (CYMBALTA) 20 MG capsule TAKE 1 CAPSULE BY MOUTH DAILY 90 capsule 0    Ginger, Zingiber officinalis,  (GINGER ROOT PO) Take  by mouth. Take one otc daily      glucose blood test strip Use as instructed 2-3 times a day, provide strips for the Onetouch meter 50 each 12    Insulin Glargine (Lantus SoloStar) 100 UNIT/ML injection pen INJECT 30 UNITS UNDER THE SKIN INTO THE APPROPRIATE AREA AS DIRECTED DAILY 45 mL 3    Insulin Pen Needle ([employer] Pen Needles) 32G X 4 MM misc USE ONE DAILY 100 each 1    lisinopril (PRINIVIL,ZESTRIL) 20 MG tablet Take 1 tablet by mouth Daily. 90 tablet 3    Multiple Minerals-Vitamins (CALCIUM-MAGNESIUM-ZINC-D3 PO) Take  by mouth.      QUEtiapine (SEROquel) 25 MG tablet TAKE 3 TABLETS BY MOUTH EVERY EVENING 90 tablet 0    Semaglutide, 2 MG/DOSE, (Ozempic, 2 MG/DOSE,) 8 MG/3ML solution pen-injector DIAL AND INJECT UNDER THE SKIN 2 MG WEEKLY 3 mL 5    simvastatin (ZOCOR) 40 MG tablet Take 1 tablet by mouth Every Night. (Patient taking differently: Take 1 tablet by mouth Every Morning.) 90 tablet 3    fluconazole (Diflucan) 150 MG tablet Take 1 tablet by mouth Daily. (Patient not taking: Reported on 8/7/2024) 3 tablet 0    levocetirizine (Xyzal) 5 MG tablet Take 1 tablet by mouth Every Evening for 30 days. 30 tablet 6    meloxicam (Mobic) 7.5 MG tablet Take 1 tablet by mouth Daily. (Patient not taking: Reported on 8/7/2024) 30 tablet 0    promethazine-dextromethorphan (PROMETHAZINE-DM) 6.25-15 MG/5ML syrup Take 5 mL by mouth At Night As Needed for Cough. (Patient not taking: Reported on 8/7/2024) 118 mL 0    benzonatate (Tessalon Perles) 100 MG capsule Take 1 capsule by mouth 3 (Three) Times a Day As Needed for Cough. (Patient not taking: Reported on 8/7/2024) 21 capsule 0    cholecalciferol (VITAMIN D3) 25 MCG (1000 UT) tablet TAKE 1 TABLET BY MOUTH DAILY (Patient not taking: Reported on 8/7/2024) 90 tablet 0     No facility-administered medications prior to visit.     No opioid medication identified on active medication list. I have reviewed chart for other potential  high risk medication/s  "and harmful drug interactions in the elderly.      Aspirin is on active medication list. Aspirin use is indicated based on review of current medical condition/s. Pros and cons of this therapy have been discussed today. Benefits of this medication outweigh potential harm.  Patient has been encouraged to continue taking this medication.  .      Patient Active Problem List   Diagnosis    Hyperlipidemia    Vitamin D deficiency    Irritable bowel syndrome    Obesity    Type 2 diabetes mellitus    Anxiety    Insomnia    Shoulder pain    Strain of lumbar region    Annual physical exam    Encounter for screening mammogram for malignant neoplasm of breast    Postmenopausal    Medicare annual wellness visit, subsequent    Chronic diarrhea    Elevated blood pressure reading in office with diagnosis of hypertension    Pain of left hand    Left foot pain    Acute non-recurrent maxillary sinusitis     Advance Care Planning Advance Directive is not on file.  ACP discussion was held with the patient during this visit. Patient does not have an advance directive, information provided.            Objective   Vitals:    08/07/24 0846   BP: 106/68   BP Location: Right arm   Patient Position: Sitting   Cuff Size: Adult   Pulse: 90   Resp: 16   Temp: 98 °F (36.7 °C)   TempSrc: Temporal   SpO2: 97%   Weight: 95.3 kg (210 lb)   Height: 175.3 cm (69.02\")       Estimated body mass index is 30.99 kg/m² as calculated from the following:    Height as of this encounter: 175.3 cm (69.02\").    Weight as of this encounter: 95.3 kg (210 lb).            Does the patient have evidence of cognitive impairment? No  Lab Results   Component Value Date    HGBA1C 6.9 (A) 05/16/2024                                                                                                Health  Risk Assessment    Smoking Status:  Social History     Tobacco Use   Smoking Status Every Day    Current packs/day: 0.50    Average packs/day: 0.5 packs/day for 4.6 years (2.3 " ttl pk-yrs)    Types: Cigarettes    Start date: 2020    Passive exposure: Past   Smokeless Tobacco Never   Tobacco Comments    quit 10/27/2016     Alcohol Consumption:  Social History     Substance and Sexual Activity   Alcohol Use Yes    Comment: rare       Fall Risk Screen  STEADI Fall Risk Assessment was completed, and patient is at MODERATE risk for falls. Assessment completed on:2024    Depression Screenin/12/2024    10:36 AM   PHQ-2/PHQ-9 Depression Screening   Little Interest or Pleasure in Doing Things 3-->nearly every day   Feeling Down, Depressed or Hopeless 3-->nearly every day   Trouble Falling or Staying Asleep, or Sleeping Too Much 3-->nearly every day   Feeling Tired or Having Little Energy 3-->nearly every day   Poor Appetite or Overeating 3-->nearly every day   Feeling Bad about Yourself - or that You are a Failure or Have Let Yourself or Your Family Down 0-->not at all   Trouble Concentrating on Things, Such as Reading the Newspaper or Watching Television 2-->more than half the days   Moving or Speaking So Slowly that Other People Could Have Noticed? Or the Opposite - Being So Fidgety 0-->not at all   Thoughts that You Would be Better Off Dead or of Hurting Yourself in Some Way 0-->not at all   PHQ-9: Brief Depression Severity Measure Score 17   If You Checked Off Any Problems, How Difficult Have These Problems Made It For You to Do Your Work, Take Care of Things at Home, or Get Along with Other People? somewhat difficult     Health Habits and Functional and Cognitive Screenin/7/2024     8:53 AM   Functional & Cognitive Status   Exercise (times per week) 4 times per week   Current Exercises Include Swimming           Age-appropriate Screening Schedule:  Refer to the list below for future screening recommendations based on patient's age, sex and/or medical conditions. Orders for these recommended tests are listed in the plan section. The patient has been provided with a  "written plan.    Health Maintenance List  Health Maintenance   Topic Date Due    DIABETIC EYE EXAM  07/15/2020    ZOSTER VACCINE (2 of 2) 02/22/2022    URINE MICROALBUMIN  05/10/2024    ANNUAL WELLNESS VISIT  08/03/2024    INFLUENZA VACCINE  08/01/2024    LIPID PANEL  11/08/2024    HEMOGLOBIN A1C  11/16/2024    DIABETIC FOOT EXAM  04/09/2025    BMI FOLLOWUP  07/26/2025    MAMMOGRAM  06/03/2026    COLORECTAL CANCER SCREENING  02/27/2030    TDAP/TD VACCINES (2 - Td or Tdap) 07/27/2032    HEPATITIS C SCREENING  Completed    COVID-19 Vaccine  Completed    Pneumococcal Vaccine 0-64  Completed    PAP SMEAR  Discontinued                                                                                                                                                CMS Preventative Services Quick Reference  Risk Factors Identified During Encounter  Fall Risk-High or Moderate: Discussed Fall Prevention in the home    The above risks/problems have been discussed with the patient.  Pertinent information has been shared with the patient in the After Visit Summary.  An After Visit Summary and PPPS were made available to the patient.    Follow Up:   Next Medicare Wellness visit to be scheduled in 1 year.         Additional E&M Note during same encounter follows:  Patient has additional, significant, and separately identifiable condition(s)/problem(s) that require work above and beyond the Medicare Wellness Visit     Chief Complaint  Medicare Wellness-subsequent (Medicare wellness visit today)    Subjective   HPI  Octavia is also being seen today for an annual adult preventative physical exam.         She started cpap and is doing much better.  She is planning to decrease seroquel.  Discussed cymbalta if dep worsens.           Objective   Vital Signs:  /68 (BP Location: Right arm, Patient Position: Sitting, Cuff Size: Adult)   Pulse 90   Temp 98 °F (36.7 °C) (Temporal)   Resp 16   Ht 175.3 cm (69.02\")   Wt 95.3 kg (210 lb)  "  SpO2 97%   BMI 30.99 kg/m²   Physical Exam  Vitals and nursing note reviewed.   Constitutional:       General: She is not in acute distress.     Appearance: She is well-developed. She is not diaphoretic.   HENT:      Head: Normocephalic and atraumatic.      Right Ear: External ear normal.      Left Ear: External ear normal.   Eyes:      General: Lids are normal. No scleral icterus.        Right eye: No discharge.         Left eye: No discharge.      Conjunctiva/sclera: Conjunctivae normal.      Pupils: Pupils are equal, round, and reactive to light.   Neck:      Thyroid: No thyroid mass or thyromegaly.      Vascular: No carotid bruit or JVD.      Trachea: No tracheal deviation.   Cardiovascular:      Rate and Rhythm: Normal rate and regular rhythm.      Heart sounds: Normal heart sounds. No murmur heard.     No friction rub. No gallop.   Pulmonary:      Effort: Pulmonary effort is normal. No respiratory distress.      Breath sounds: Normal breath sounds. No decreased breath sounds, wheezing, rhonchi or rales.   Chest:      Chest wall: No tenderness.   Abdominal:      General: Bowel sounds are normal. There is no distension.      Palpations: Abdomen is soft. There is no mass.      Tenderness: There is no abdominal tenderness. There is no guarding or rebound.      Hernia: No hernia is present.   Musculoskeletal:         General: Normal range of motion.   Lymphadenopathy:      Cervical: No cervical adenopathy.      Upper Body:      Right upper body: No supraclavicular adenopathy.      Left upper body: No supraclavicular adenopathy.   Skin:     Findings: No bruising, erythema or rash.      Nails: There is no clubbing.   Neurological:      Mental Status: She is alert and oriented to person, place, and time.      Cranial Nerves: No cranial nerve deficit.      Deep Tendon Reflexes: Reflexes are normal and symmetric. Reflexes normal.   Psychiatric:         Speech: Speech normal.         Behavior: Behavior normal.          Thought Content: Thought content normal.         Judgment: Judgment normal.         The following data was reviewed by: Cat Bangura MD on 08/07/2024:        Assessment and Plan Additional age appropriate preventative wellness advice topics were discussed during today's preventative wellness exam(some topics already addressed during AWV portion of the note above):    Physical Activity: Advised cardiovascular activity 150 minutes per week as tolerated. (example brisk walk for 30 minutes, 5 days a week).     Nutrition: Discussed nutrition plan with patient. Information shared in after visit summary. Goal is for a well balanced diet to enhance overall health.     Healthy Weight: Discussed current and goal BMI with patient. Steps to attain this goal discussed. Information shared in after visit summary.     Gun Safety Awareness Discussion: Information Shared in after visit summary.     Tobacco Misuse Discussion: Information shared in after visit summary.     Alcohol Misuse Discussion: Information shared in after visit summary.     Drug Misuse Discussion:  Avoidance of Drug Use recommendation given.  Information shared in after visit summary.     Motor Vehicle Safety Discussion:  Wearing Seatbelt While in Motor Vehicle recommendation. Adhering to posted speed limit recommendation.     Sexual Behavior/Sexual Safety Discussion: Information shared in after visit summary.     Injury Prevention Discussion:  Information shared in after visit summary.                    Medicare annual wellness visit, subsequent    Tobacco abuse    Anxiety    Type 2 diabetes mellitus with hyperglycemia, without long-term current use of insulin  Diabetes is improving with lifestyle modifications.   Continue current treatment regimen.  Diabetes will be reassessed in 6 months  Mixed hyperlipidemia   Lipid abnormalities are stable    Plan:  Continue same medication/s without change.      Discussed medication dosage, use, side effects, and goals  of treatment in detail.    Counseled patient on lifestyle modifications to help control hyperlipidemia.     Patient Treatment Goals:   LDL goal is less than 70    Followup in 6 months.  Class 1 obesity due to excess calories with serious comorbidity and body mass index (BMI) of 31.0 to 31.9 in adult  Patient's (Body mass index is 30.99 kg/m².) indicates that they are obese (BMI >30) with health conditions that include obstructive sleep apnea, hypertension, diabetes mellitus, and dyslipidemias . Weight is improving with treatment. BMI  is above average; BMI management plan is completed. We discussed portion control and increasing exercise.   No orders of the defined types were placed in this encounter.            Follow Up   No follow-ups on file.  Patient was given instructions and counseling regarding her condition or for health maintenance advice. Please see specific information pulled into the AVS if appropriate.

## 2024-08-13 ENCOUNTER — OFFICE VISIT (OUTPATIENT)
Dept: ENDOCRINOLOGY | Facility: CLINIC | Age: 64
End: 2024-08-13
Payer: MEDICARE

## 2024-08-13 VITALS
BODY MASS INDEX: 31.09 KG/M2 | DIASTOLIC BLOOD PRESSURE: 60 MMHG | HEART RATE: 82 BPM | SYSTOLIC BLOOD PRESSURE: 112 MMHG | OXYGEN SATURATION: 96 % | WEIGHT: 209.9 LBS | HEIGHT: 69 IN

## 2024-08-13 DIAGNOSIS — E78.2 MIXED HYPERLIPIDEMIA: Chronic | ICD-10-CM

## 2024-08-13 DIAGNOSIS — E11.65 TYPE 2 DIABETES MELLITUS WITH HYPERGLYCEMIA, WITHOUT LONG-TERM CURRENT USE OF INSULIN: Primary | Chronic | ICD-10-CM

## 2024-08-13 LAB
EXPIRATION DATE: ABNORMAL
EXPIRATION DATE: NORMAL
GLUCOSE BLDC GLUCOMTR-MCNC: 121 MG/DL (ref 70–130)
HBA1C MFR BLD: 7.2 % (ref 4.5–5.7)
Lab: ABNORMAL
Lab: NORMAL

## 2024-08-13 PROCEDURE — 99214 OFFICE O/P EST MOD 30 MIN: CPT | Performed by: INTERNAL MEDICINE

## 2024-08-13 PROCEDURE — 3078F DIAST BP <80 MM HG: CPT | Performed by: INTERNAL MEDICINE

## 2024-08-13 PROCEDURE — 84443 ASSAY THYROID STIM HORMONE: CPT | Performed by: INTERNAL MEDICINE

## 2024-08-13 PROCEDURE — 36415 COLL VENOUS BLD VENIPUNCTURE: CPT | Performed by: INTERNAL MEDICINE

## 2024-08-13 PROCEDURE — 83036 HEMOGLOBIN GLYCOSYLATED A1C: CPT | Performed by: INTERNAL MEDICINE

## 2024-08-13 PROCEDURE — 3051F HG A1C>EQUAL 7.0%<8.0%: CPT | Performed by: INTERNAL MEDICINE

## 2024-08-13 PROCEDURE — 80061 LIPID PANEL: CPT | Performed by: INTERNAL MEDICINE

## 2024-08-13 PROCEDURE — 95251 CONT GLUC MNTR ANALYSIS I&R: CPT | Performed by: INTERNAL MEDICINE

## 2024-08-13 PROCEDURE — 80053 COMPREHEN METABOLIC PANEL: CPT | Performed by: INTERNAL MEDICINE

## 2024-08-13 PROCEDURE — 3074F SYST BP LT 130 MM HG: CPT | Performed by: INTERNAL MEDICINE

## 2024-08-13 NOTE — PROGRESS NOTES
Chief complaint  Diabetes    Subjective   Octavia Rice is a 64 y.o. female is here today for follow-up of:    Diabetes Mellitus type 2: dx in 2003.  Diabetic complications: none  Eye exam current (within one year): no retinopathy, recent exam.   Current diabetic medications include.   Lantus  Ozempic  No hypoglycemia reported.     Past medications: 08/2016 Jardiance trial was unsuccessful - frequent urination.     Monitoring  CGM    Other med problems: include hyperlipidemia on simvastatin.     She is doing well with CPAP machine - improved energy level. .   She is fasting today. She had some problem with dexcom not connecting to the phone continuously.     Medications    Current Outpatient Medications:     Accu-Chek Olamide Plus test strip, USE A STRIP TO TEST THREE TIMES A DAY, Disp: 100 each, Rfl: 12    aspirin 81 MG EC tablet, Take 1 tablet by mouth Daily., Disp: , Rfl:     ASTAXANTHIN PO, Take  by mouth. 12 mg per serving once day, Disp: , Rfl:     BD Pen Needle Sherri 2nd Gen 32G X 4 MM misc, , Disp: , Rfl:     BD Pen Needle Sherri 2nd Gen 32G X 4 MM misc, USE ONE EACH DAY, Disp: 100 each, Rfl: 3    Blood Glucose Monitoring Suppl (OneTouch Verio) w/Device kit, Use 1 each Daily., Disp: 1 kit, Rfl: 0    Coenzyme Q10 (CoQ10) 200 MG capsule, Take  by mouth. Take one tablet by mouth daily, Disp: , Rfl:     Continuous Blood Gluc Sensor (FreeStyle Camelia 2 Sensor) misc, Use 1 each Every 14 (Fourteen) Days., Disp: 6 each, Rfl: 3    cyclobenzaprine (FLEXERIL) 10 MG tablet, Take 1 tablet by mouth 2 (Two) Times a Day As Needed for Muscle Spasms., Disp: 30 tablet, Rfl: 3    DULoxetine (CYMBALTA) 20 MG capsule, TAKE 1 CAPSULE BY MOUTH DAILY, Disp: 90 capsule, Rfl: 0    Ginger, Zingiber officinalis, (GINGER ROOT PO), Take  by mouth. Take one otc daily, Disp: , Rfl:     glucose blood test strip, Use as instructed 2-3 times a day, provide strips for the Onetouch meter, Disp: 50 each, Rfl: 12    Insulin Glargine (Lantus SoloStar)  100 UNIT/ML injection pen, INJECT 30 UNITS UNDER THE SKIN INTO THE APPROPRIATE AREA AS DIRECTED DAILY, Disp: 45 mL, Rfl: 3    Insulin Pen Needle ([employer] Pen Needles) 32G X 4 MM misc, USE ONE DAILY, Disp: 100 each, Rfl: 1    lisinopril (PRINIVIL,ZESTRIL) 20 MG tablet, Take 1 tablet by mouth Daily., Disp: 90 tablet, Rfl: 3    Multiple Minerals-Vitamins (CALCIUM-MAGNESIUM-ZINC-D3 PO), Take  by mouth., Disp: , Rfl:     QUEtiapine (SEROquel) 25 MG tablet, TAKE 3 TABLETS BY MOUTH EVERY EVENING, Disp: 90 tablet, Rfl: 0    Semaglutide, 2 MG/DOSE, (Ozempic, 2 MG/DOSE,) 8 MG/3ML solution pen-injector, DIAL AND INJECT UNDER THE SKIN 2 MG WEEKLY, Disp: 3 mL, Rfl: 5    simvastatin (ZOCOR) 40 MG tablet, Take 1 tablet by mouth Every Night. (Patient taking differently: Take 1 tablet by mouth Every Morning.), Disp: 90 tablet, Rfl: 3    levocetirizine (Xyzal) 5 MG tablet, Take 1 tablet by mouth Every Evening for 30 days., Disp: 30 tablet, Rfl: 6      Review of systems  Review of Systems   Constitutional:  Positive for fatigue.   Allergic/Immunologic: Positive for environmental allergies.   Hematological: Negative.    Psychiatric/Behavioral:  Positive for decreased concentration and sleep disturbance. Negative for self-injury. Behavioral problem: phobia of crowds..The patient is nervous/anxious.        Physical exam  Objective   Vitals:    08/13/24 1218   BP: 112/60   Pulse: 82   SpO2: 96%    Body mass index is 31 kg/m².  Physical Exam   Constitutional: She is oriented to person, place, and time. She appears well-developed.   HENT:   Head: Normocephalic and atraumatic.   Cardiovascular: Normal rate, regular rhythm, normal heart sounds and normal pulses.   Pulmonary/Chest: Effort normal and breath sounds normal.   Musculoskeletal: No swelling.   Neurological: She is alert and oriented to person, place, and time.   Psychiatric: Mood and thought content normal.   Vitals reviewed.        LABS AND IMAGING  Results for orders placed or  "performed in visit on 08/13/24   POC Glycosylated Hemoglobin (Hb A1C)    Specimen: Blood   Result Value Ref Range    Hemoglobin A1C 7.2 (A) 4.5 - 5.7 %    Lot Number [id number]     Expiration Date 04/04/2026    POC Glucose, Blood    Specimen: Blood   Result Value Ref Range    Glucose 121 70 - 130 mg/dL    Lot Number [id number]     Expiration Date 01/20/2025            Assessment:         Diagnoses and all orders for this visit:    Type 2 diabetes mellitus with hyperglycemia, without long-term current use of insulin  -     POC Glycosylated Hemoglobin (Hb A1C)  -     POC Glucose, Blood  -     Cancel: TSH  -     Cancel: Comprehensive Metabolic Panel  -     Comprehensive Metabolic Panel; Future  -     TSH; Future  -     Lipid Panel; Future  -     Comprehensive Metabolic Panel  -     TSH  -     Lipid Panel    Mixed hyperlipidemia  -     Cancel: Lipid Panel  -     Comprehensive Metabolic Panel; Future  -     TSH; Future  -     Lipid Panel; Future  -     Comprehensive Metabolic Panel  -     TSH  -     Lipid Panel        Plan:    Diabetes mellitus type 2 with hyperglycemia, glucose improved after basal insulin/GLP-1 is is added.   -Lantus 30 units and Ozempic 2 mg weekly  CGM reviewed\" 91% in range, nighttime hyperglycemia is seen. Increased Lantus dose. I have made some changes to the setting of the CGM to allow reminder to scan the sensor.     Uptodate with eye exam, no retinopathy.   Encouraged to start testing more often.       There are no Patient Instructions on file for this visit.    Mixed hyperlipidemia-cont simvastatin. Fasting labs ordered      Follow up:  3  months      "

## 2024-08-14 LAB
ALBUMIN SERPL-MCNC: 4.5 G/DL (ref 3.5–5.2)
ALBUMIN/GLOB SERPL: 1.9 G/DL
ALP SERPL-CCNC: 97 U/L (ref 39–117)
ALT SERPL W P-5'-P-CCNC: 14 U/L (ref 1–33)
ANION GAP SERPL CALCULATED.3IONS-SCNC: 11 MMOL/L (ref 5–15)
AST SERPL-CCNC: 17 U/L (ref 1–32)
BILIRUB SERPL-MCNC: 0.5 MG/DL (ref 0–1.2)
BUN SERPL-MCNC: 11 MG/DL (ref 8–23)
BUN/CREAT SERPL: 16.7 (ref 7–25)
CALCIUM SPEC-SCNC: 9.8 MG/DL (ref 8.6–10.5)
CHLORIDE SERPL-SCNC: 103 MMOL/L (ref 98–107)
CHOLEST SERPL-MCNC: 173 MG/DL (ref 0–200)
CO2 SERPL-SCNC: 25 MMOL/L (ref 22–29)
CREAT SERPL-MCNC: 0.66 MG/DL (ref 0.57–1)
EGFRCR SERPLBLD CKD-EPI 2021: 98.1 ML/MIN/1.73
GLOBULIN UR ELPH-MCNC: 2.4 GM/DL
GLUCOSE SERPL-MCNC: 114 MG/DL (ref 65–99)
HDLC SERPL-MCNC: 48 MG/DL (ref 40–60)
LDLC SERPL CALC-MCNC: 97 MG/DL (ref 0–100)
LDLC/HDLC SERPL: 1.95 {RATIO}
POTASSIUM SERPL-SCNC: 4.1 MMOL/L (ref 3.5–5.2)
PROT SERPL-MCNC: 6.9 G/DL (ref 6–8.5)
SODIUM SERPL-SCNC: 139 MMOL/L (ref 136–145)
TRIGL SERPL-MCNC: 158 MG/DL (ref 0–150)
TSH SERPL DL<=0.05 MIU/L-ACNC: 1.11 UIU/ML (ref 0.27–4.2)
VLDLC SERPL-MCNC: 28 MG/DL (ref 5–40)

## 2024-08-26 RX ORDER — DULOXETIN HYDROCHLORIDE 20 MG/1
20 CAPSULE, DELAYED RELEASE ORAL DAILY
Qty: 90 CAPSULE | Refills: 0 | Status: SHIPPED | OUTPATIENT
Start: 2024-08-26

## 2024-08-27 RX ORDER — SIMVASTATIN 40 MG
40 TABLET ORAL NIGHTLY
Qty: 90 TABLET | Refills: 0 | Status: SHIPPED | OUTPATIENT
Start: 2024-08-27

## 2024-08-30 ENCOUNTER — OFFICE VISIT (OUTPATIENT)
Dept: SLEEP MEDICINE | Age: 64
End: 2024-08-30
Payer: MEDICARE

## 2024-08-30 VITALS
HEART RATE: 97 BPM | DIASTOLIC BLOOD PRESSURE: 60 MMHG | SYSTOLIC BLOOD PRESSURE: 120 MMHG | TEMPERATURE: 97.5 F | WEIGHT: 207.5 LBS | OXYGEN SATURATION: 96 % | HEIGHT: 69 IN | BODY MASS INDEX: 30.73 KG/M2

## 2024-08-30 DIAGNOSIS — E66.9 OBESITY (BMI 30.0-34.9): ICD-10-CM

## 2024-08-30 DIAGNOSIS — F51.04 PSYCHOPHYSIOLOGICAL INSOMNIA: ICD-10-CM

## 2024-08-30 DIAGNOSIS — G47.33 OBSTRUCTIVE SLEEP APNEA, ADULT: Primary | ICD-10-CM

## 2024-08-30 NOTE — PROGRESS NOTES
Sleep Clinic Follow Up Note    Chief Complaint  Follow-up and Sleep Apnea    Subjective     History of Present Illness (from previous encounter on 4/15/2024):  Octavia Rice is a 64 y.o. female with a past medical history of hyperlipidemia, diabetes mellitus, obesity, tobacco abuse, PTSD, depression, and anxiety who presents for further evaluation of excessive daytime sleepiness and fatigue, nonrestorative sleep, and concerns for sleep disordered breathing and obstructive sleep apnea. The patient's symptoms, particularly snoring, are concerning for significant sleep disordered breathing and obstructive sleep apnea. We will obtain a home sleep test for further evaluation.  The patient will return for follow-up and recommendations after test.  I have discussed weight loss as it pertains to obstructive sleep apnea.     With regard to insomnia, the patient is struggled with this for quite some time.  It started about the time when she injured herself.  She now reports PTSD as a result.  She had been treated for anxiety and depression but has since weaned herself off of these medications.  I have discussed standard of care for insomnia (cognitive behavioral therapy for insomnia) and given her information on the [facility] online program.  I discussed the nature of depression, anxiety, and PTSD as it pertains to insomnia.  I have given her information on sleep hygiene.  I also would recommend the patient return to therapy to work on her ongoing issues with PTSD, and perhaps depression and anxiety. (End copied text).    -A home sleep test was obtained on 5/30/2024 revealing mild obstructive sleep apnea with an AHI of 13.9/H.    Interval History:  Octavia Rice is a 64 y.o. female returns for follow up and compliance of PAP therapy. The patient was last seen on 4/15/2024 by me. Overall the patient feels good with regard to therapy. The device appears to be working appropriately. On average the patient sleeps 7-8  "hours per night. The patient wakes 0 times per night. She feels much improved with her device. She \"loves her machine!\" She would like to try nasal pillows.    The patient reports the following changes to their medical and medication history since they were last seen:  None    Further details are as follows:      Lincoln Scale is (out of 24): Total score: 7     Weight:  Current Weight: 207 lb    Weight change in the last year:  loss: 3 lbs    The patient's relevant past medical, surgical, family, and social history reviewed and updated in Epic as appropriate.    PMH:    Past Medical History:   Diagnosis Date    Acute upper respiratory infection     Anxiety 8/7/2017    Community acquired pneumonia     Depression     Diabetes mellitus     dx 1998- checks fsbs weekly    Fracture, stress, metatarsal     STRESS FRACTURE ALONG THE SHAFT OF THE FIFTH RIGHT METATARSAL    Hyperlipidemia     IBS (irritable bowel syndrome)     Insomnia 8/7/2017    Irritable bowel syndrome     Obesity     Shoulder pain     Tobacco abuse 8/7/2017    Type 2 diabetes mellitus 8/7/2017    Type 2 diabetes mellitus, uncontrolled     Vitamin D deficiency      Past Surgical History:   Procedure Laterality Date    COLONOSCOPY      within last 5 years    KNEE ARTHROSCOPY W/ MENISCAL REPAIR Right     PATELLA OPEN REDUCTION INTERNAL FIXATION Left 10/27/2016    Procedure: LEFT PATELLA OPEN REDUCTION INTERNAL FIXATION;  Surgeon: Marshall Meyers MD;  Location:  [facility];  Service:     SHOULDER OPEN REDUCTION INTERNAL FIXATION Right 10/27/2016    Procedure: RIGHT SHOULDER CLOSED REDUCTION ;  Surgeon: Marshall Meyers MD;  Location:  [facility];  Service:     SHOULDER OPEN REDUCTION INTERNAL FIXATION Right 10/31/2016    Procedure: RIGHT SHOULDER OPEN REDUCTION INTERNAL FIXATION WITH PLATE FOR FRACTURE;  Surgeon: Jaguar Marsh MD;  Location:  [facility];  Service:     TONSILLECTOMY      TOTAL ABDOMINAL HYSTERECTOMY WITH SALPINGO OOPHORECTOMY      TOTAL " SHOULDER ARTHROPLASTY W/ DISTAL CLAVICLE EXCISION Right 8/7/2017    Procedure: REMOVAL RIGHT PROXIMAL HUMERUS PLATE, RIGHT REVERSE TOTAL SHOULDER ARTHROPLASTY, SHOULDER HARDWARE REMOVAL;  Surgeon: Jaguar Marsh MD;  Location: [facility];  Service:     URETHRAL SUSPENSION      FOR STRESS INCONTINENCE     OB History    No obstetric history on file.       Allergies   Allergen Reactions    Codeine Nausea And Vomiting    Morphine And Codeine Itching     Severe hives- purple color to face    Penicillins Rash     Had pcn as child and unsure of reaction       MEDS:  Prior to Admission medications    Medication Sig Start Date End Date Taking? Authorizing Provider   Accu-Chek Olamide Plus test strip USE A STRIP TO TEST THREE TIMES A DAY 9/21/21   Daphney Avila DO   aspirin 81 MG EC tablet Take 1 tablet by mouth Daily.    Qiana Mendoza MD   ASTAXANTHIN PO Take  by mouth. 12 mg per serving once day    Qiana Mendoza MD   BD Pen Needle Sherri 2nd Gen 32G X 4 MM misc  2/7/22   Qiana Mendoza MD   BD Pen Needle Sherri 2nd Gen 32G X 4 MM misc USE ONE EACH DAY 4/18/23   Priscilla Daugherty MD   Blood Glucose Monitoring Suppl (OneTouch Verio) w/Device kit Use 1 each Daily. 11/8/23   Priscilla Daugherty MD   Coenzyme Q10 (CoQ10) 200 MG capsule Take  by mouth. Take one tablet by mouth daily    Qiana Mendoza MD   Continuous Blood Gluc Sensor (FreeStyle Camelia 2 Sensor) misc Use 1 each Every 14 (Fourteen) Days. 4/9/24   Priscilla Daugherty MD   cyclobenzaprine (FLEXERIL) 10 MG tablet Take 1 tablet by mouth 2 (Two) Times a Day As Needed for Muscle Spasms. 2/12/24   Cat Bangura MD   DULoxetine (CYMBALTA) 20 MG capsule TAKE 1 CAPSULE BY MOUTH DAILY 5/30/24 5/30/25  Cat Bangura MD   Ginger, Zingiber officinalis, (GINGER ROOT PO) Take  by mouth. Take one otc daily    Qiana Mendoza MD   glucose blood test strip Use as instructed 2-3 times a day, provide strips for the Onetouch meter 11/8/23   Priscilla Daugherty  "MD KARAN   Insulin Glargine (Lantus SoloStar) 100 UNIT/ML injection pen INJECT 30 UNITS UNDER THE SKIN INTO THE APPROPRIATE AREA AS DIRECTED DAILY 8/1/24   Priscilla Daugherty MD   Insulin Pen Needle ([employer] Pen Needles) 32G X 4 MM misc USE ONE DAILY 6/4/24   Priscilla Daugherty MD   levocetirizine (Xyzal) 5 MG tablet Take 1 tablet by mouth Every Evening for 30 days. 11/14/21 12/14/21  Fabiano Robbins APRN   lisinopril (PRINIVIL,ZESTRIL) 20 MG tablet Take 1 tablet by mouth Daily. 2/12/24   Cat Bangura MD   Multiple Minerals-Vitamins (CALCIUM-MAGNESIUM-ZINC-D3 PO) Take  by mouth.    Provider, MD Qiana   QUEtiapine (SEROquel) 25 MG tablet TAKE 3 TABLETS BY MOUTH EVERY EVENING 8/6/24   Cat Bangura MD   Semaglutide, 2 MG/DOSE, (Ozempic, 2 MG/DOSE,) 8 MG/3ML solution pen-injector DIAL AND INJECT UNDER THE SKIN 2 MG WEEKLY 7/26/24   Priscilla Daugherty MD   simvastatin (ZOCOR) 40 MG tablet Take 1 tablet by mouth Every Night.  Patient taking differently: Take 1 tablet by mouth Every Morning. 8/10/23   Priscilla Daugherty MD         FH:  Family History   Problem Relation Age of Onset    Cancer Mother         breast    Diabetes Mother     Arthritis Mother     Diabetes Father     Heart defect Father     Stroke Father     Asthma Father     Hypertension Other     Heart attack Other        Objective   Vital Signs:  /60   Pulse 97   Temp 97.5 °F (36.4 °C) (Temporal)   Ht 175.3 cm (69.02\")   Wt 94.1 kg (207 lb 8 oz)   SpO2 96%   BMI 30.63 kg/m²     Patient's (Body mass index is 30.63 kg/m².) indicates that they are obese (BMI >30) with health related conditions that include obstructive sleep apnea . Weight is improving with treatment. BMI is is above average; BMI management plan is completed. We discussed portion control and increasing exercise.             Physical Exam  Vitals reviewed.   Constitutional:       Appearance: Normal appearance.   HENT:      Head: Normocephalic and atraumatic.      Nose: Nose normal.      " Mouth/Throat:      Mouth: Mucous membranes are moist.   Cardiovascular:      Rate and Rhythm: Normal rate and regular rhythm.      Heart sounds: No murmur heard.     No friction rub. No gallop.   Pulmonary:      Effort: Pulmonary effort is normal. No respiratory distress.      Breath sounds: Normal breath sounds. No wheezing or rhonchi.   Neurological:      Mental Status: She is alert and oriented to person, place, and time.   Psychiatric:         Behavior: Behavior normal.             Result Review :           PAP Report:  AHI: 1.1/h  Days of Usage: 25/30 (83%)  Number of Days Greater than 4 hours: 25/30 (83%)  Average time (days used): 6 hours 42-minute  95th Percentile Pressure: 12.1 cmH2O  95th percentile leaks: 63.2 L/min  Settings: Auto CPAP-8/18 cm H2O, EPR full-time, EPR level 2, response standard.       Assessment and Plan  Octavia Rice is a 64 y.o. female who returns for follow-up and compliance of PAP therapy.  The Pap report has been reviewed.  Overall usage is at 83% with compliance at 83%.  The patient averages 6 hours and 42 minutes of therapy.  Sleep apnea is well-controlled with an AHI of 1.1/h.  Patient feels much improved with use of her device.  She wishes to try nasal cushions which I have ordered today.  The patient has a history of mild obstructive sleep apnea with an initial AHI of 13.9/H.  I will refill the patient's supplies, and I have asked her to return for follow-up and compliance in 1 year or sooner should she have further questions or concerns.    Diagnoses and all orders for this visit:    1. Obstructive sleep apnea, adult (Primary)  -     PAP Therapy    2. Psychophysiological insomnia    3. Obesity (BMI 30.0-34.9)         The patient continues to use and benefit from PAP therapy.    1. The patient was counseled regarding multimodal approach with healthy nutrition, healthy sleep, regular physical activity, social activities, counseling, and medications. Encouraged to practice  lateral sleep position. Avoid alcohol and sedatives close to bedtime.     2.  We will refill supplies x1 year.  Return to clinic 1 year or sooner if symptoms warrant. I have reviewed the results of my evaluation and impression and discussed my recommendations in detail with the patient.           Follow Up  Return in about 1 year (around 8/30/2025) for Annual visit.  Patient was given instructions and counseling regarding her condition or for health maintenance advice. Please see specific information pulled into the AVS if appropriate.       SANDER Kuhn, ACNP-BC  Pulmonology, Critical Care, and Sleep Medicine

## 2024-09-06 RX ORDER — SIMVASTATIN 40 MG
40 TABLET ORAL NIGHTLY
Qty: 60 TABLET | Refills: 0 | Status: SHIPPED | OUTPATIENT
Start: 2024-09-06

## 2024-09-09 RX ORDER — QUETIAPINE FUMARATE 25 MG/1
TABLET, FILM COATED ORAL
Qty: 90 TABLET | Refills: 0 | Status: SHIPPED | OUTPATIENT
Start: 2024-09-09

## 2024-09-10 ENCOUNTER — TELEPHONE (OUTPATIENT)
Dept: FAMILY MEDICINE CLINIC | Facility: CLINIC | Age: 64
End: 2024-09-10
Payer: MEDICARE

## 2024-10-07 RX ORDER — QUETIAPINE FUMARATE 25 MG/1
TABLET, FILM COATED ORAL
Qty: 90 TABLET | Refills: 0 | Status: SHIPPED | OUTPATIENT
Start: 2024-10-07

## 2024-11-02 RX ORDER — QUETIAPINE FUMARATE 25 MG/1
TABLET, FILM COATED ORAL
Qty: 90 TABLET | Refills: 0 | Status: SHIPPED | OUTPATIENT
Start: 2024-11-02

## 2024-11-04 ENCOUNTER — OFFICE VISIT (OUTPATIENT)
Dept: FAMILY MEDICINE CLINIC | Facility: CLINIC | Age: 64
End: 2024-11-04
Payer: MEDICARE

## 2024-11-04 VITALS
HEART RATE: 89 BPM | RESPIRATION RATE: 20 BRPM | WEIGHT: 207.6 LBS | BODY MASS INDEX: 30.64 KG/M2 | TEMPERATURE: 97.3 F | SYSTOLIC BLOOD PRESSURE: 112 MMHG | OXYGEN SATURATION: 98 % | DIASTOLIC BLOOD PRESSURE: 70 MMHG

## 2024-11-04 DIAGNOSIS — E11.65 TYPE 2 DIABETES MELLITUS WITH HYPERGLYCEMIA, WITHOUT LONG-TERM CURRENT USE OF INSULIN: ICD-10-CM

## 2024-11-04 DIAGNOSIS — M25.562 LEFT KNEE PAIN, UNSPECIFIED CHRONICITY: Primary | ICD-10-CM

## 2024-11-04 LAB
A/C: <30
EXPIRATION DATE: NORMAL
Lab: NORMAL
POC CREATININE URINE: 100
POC MICROALBUMIN URINE: 30

## 2024-11-04 PROCEDURE — 1160F RVW MEDS BY RX/DR IN RCRD: CPT | Performed by: FAMILY MEDICINE

## 2024-11-04 PROCEDURE — 3078F DIAST BP <80 MM HG: CPT | Performed by: FAMILY MEDICINE

## 2024-11-04 PROCEDURE — 3051F HG A1C>EQUAL 7.0%<8.0%: CPT | Performed by: FAMILY MEDICINE

## 2024-11-04 PROCEDURE — 99213 OFFICE O/P EST LOW 20 MIN: CPT | Performed by: FAMILY MEDICINE

## 2024-11-04 PROCEDURE — 82044 UR ALBUMIN SEMIQUANTITATIVE: CPT | Performed by: FAMILY MEDICINE

## 2024-11-04 PROCEDURE — 1159F MED LIST DOCD IN RCRD: CPT | Performed by: FAMILY MEDICINE

## 2024-11-04 PROCEDURE — 1125F AMNT PAIN NOTED PAIN PRSNT: CPT | Performed by: FAMILY MEDICINE

## 2024-11-04 PROCEDURE — 3074F SYST BP LT 130 MM HG: CPT | Performed by: FAMILY MEDICINE

## 2024-11-04 NOTE — PROGRESS NOTES
Subjective   Octavia Rice is a 64 y.o. female.     History of Present Illness left knee pain for 4 weeks.  She has been resting knee.  She has had prior surg on left knee shattered knee and had wires that were rejected.  She is having pain shoot in left knee, hip and back are sore as well.  She has seen ortho in [place] that does shoulders.      She needs to do urine test for bes    The following portions of the patient's history were reviewed and updated as appropriate: allergies, current medications, past family history, past medical history, past social history, past surgical history, and problem list.    Review of Systems   Constitutional: Negative.    HENT: Negative.     Eyes: Negative.    Respiratory: Negative.     Cardiovascular: Negative.    Gastrointestinal: Negative.    Endocrine: Negative.    Genitourinary: Negative.    Musculoskeletal: Negative.    Skin: Negative.    Allergic/Immunologic: Negative.    Neurological: Negative.    Hematological: Negative.    Psychiatric/Behavioral: Negative.     All other systems reviewed and are negative.      Objective     Vitals:    11/04/24 1036   BP: 112/70   BP Location: Left arm   Patient Position: Sitting   Cuff Size: Adult   Pulse: 89   Resp: 20   Temp: 97.3 °F (36.3 °C)   TempSrc: Infrared   SpO2: 98%   Weight: 94.2 kg (207 lb 9.6 oz)       Physical Exam  Vitals and nursing note reviewed.   Constitutional:       General: She is not in acute distress.     Appearance: She is well-developed. She is not diaphoretic.   HENT:      Head: Normocephalic and atraumatic.      Right Ear: External ear normal.      Left Ear: External ear normal.   Eyes:      General: Lids are normal. No scleral icterus.        Right eye: No discharge.         Left eye: No discharge.      Conjunctiva/sclera: Conjunctivae normal.      Pupils: Pupils are equal, round, and reactive to light.   Neck:      Thyroid: No thyroid mass or thyromegaly.      Vascular: No carotid bruit or JVD.      Trachea: No  tracheal deviation.   Cardiovascular:      Rate and Rhythm: Normal rate and regular rhythm.      Heart sounds: Normal heart sounds. No murmur heard.     No friction rub. No gallop.   Pulmonary:      Effort: Pulmonary effort is normal. No respiratory distress.      Breath sounds: Normal breath sounds. No decreased breath sounds, wheezing, rhonchi or rales.   Chest:      Chest wall: No tenderness.   Abdominal:      General: Bowel sounds are normal. There is no distension.      Palpations: Abdomen is soft. There is no mass.      Tenderness: There is no abdominal tenderness. There is no guarding or rebound.      Hernia: No hernia is present.   Musculoskeletal:         General: Tenderness present. No swelling. Normal range of motion.      Comments: She has a midline scar well-healed left knee.  There is an effusion full range of motion there is some crepitus some joint line tenderness.  No posterior popliteal swelling.  No calf swelling.  The knee joint feels stable there is no laxity.   Lymphadenopathy:      Cervical: No cervical adenopathy.      Upper Body:      Right upper body: No supraclavicular adenopathy.      Left upper body: No supraclavicular adenopathy.   Skin:     Findings: No bruising, erythema or rash.      Nails: There is no clubbing.   Neurological:      Mental Status: She is alert and oriented to person, place, and time.      Cranial Nerves: No cranial nerve deficit.      Deep Tendon Reflexes: Reflexes are normal and symmetric. Reflexes normal.   Psychiatric:         Speech: Speech normal.         Behavior: Behavior normal.         Thought Content: Thought content normal.         Judgment: Judgment normal.         Assessment & Plan     Problem List Items Addressed This Visit    None  Visit Diagnoses       Left knee pain, unspecified chronicity    -  Primary    Relevant Orders    Ambulatory Referral to Orthopedic Surgery    XR Knee 1 or 2 View Left (In Office)

## 2024-11-14 ENCOUNTER — OFFICE VISIT (OUTPATIENT)
Dept: ORTHOPEDIC SURGERY | Facility: CLINIC | Age: 64
End: 2024-11-14
Payer: MEDICARE

## 2024-11-14 VITALS
WEIGHT: 207.9 LBS | DIASTOLIC BLOOD PRESSURE: 80 MMHG | BODY MASS INDEX: 30.79 KG/M2 | SYSTOLIC BLOOD PRESSURE: 128 MMHG | HEIGHT: 69 IN

## 2024-11-14 DIAGNOSIS — M17.12 PRIMARY OSTEOARTHRITIS OF LEFT KNEE: Primary | ICD-10-CM

## 2024-11-14 RX ORDER — TRIAMCINOLONE ACETONIDE 40 MG/ML
80 INJECTION, SUSPENSION INTRA-ARTICULAR; INTRAMUSCULAR
Status: COMPLETED | OUTPATIENT
Start: 2024-11-14 | End: 2024-11-14

## 2024-11-14 RX ORDER — BUPIVACAINE HYDROCHLORIDE 2.5 MG/ML
3 INJECTION, SOLUTION EPIDURAL; INFILTRATION; INTRACAUDAL
Status: COMPLETED | OUTPATIENT
Start: 2024-11-14 | End: 2024-11-14

## 2024-11-14 RX ORDER — LIDOCAINE HYDROCHLORIDE 10 MG/ML
3 INJECTION, SOLUTION EPIDURAL; INFILTRATION; INTRACAUDAL; PERINEURAL
Status: COMPLETED | OUTPATIENT
Start: 2024-11-14 | End: 2024-11-14

## 2024-11-14 RX ADMIN — BUPIVACAINE HYDROCHLORIDE 3 ML: 2.5 INJECTION, SOLUTION EPIDURAL; INFILTRATION; INTRACAUDAL at 09:12

## 2024-11-14 RX ADMIN — TRIAMCINOLONE ACETONIDE 80 MG: 40 INJECTION, SUSPENSION INTRA-ARTICULAR; INTRAMUSCULAR at 09:12

## 2024-11-14 RX ADMIN — LIDOCAINE HYDROCHLORIDE 3 ML: 10 INJECTION, SOLUTION EPIDURAL; INFILTRATION; INTRACAUDAL; PERINEURAL at 09:12

## 2024-11-14 NOTE — PROGRESS NOTES
Procedure   - Large Joint Arthrocentesis: L knee on 11/14/2024 9:12 AM  Indications: pain  Details: 21 G needle, superolateral approach  Medications: 3 mL bupivacaine (PF) 0.25 %; 3 mL lidocaine PF 1% 1 %; 80 mg triamcinolone acetonide 40 MG/ML  Outcome: tolerated well, no immediate complications  Procedure, treatment alternatives, risks and benefits explained, specific risks discussed. Consent was given by the patient. Immediately prior to procedure a time out was called to verify the correct patient, procedure, equipment, support staff and site/side marked as required. Patient was prepped and draped in the usual sterile fashion.

## 2024-11-14 NOTE — PROGRESS NOTES
Orthopaedic Clinic Note: Knee New Patient    Chief Complaint   Patient presents with    Left Knee - Pain, Initial Evaluation        HPI  Consult from: Cat Bangura MD    Octavia Rice is a 64 y.o. female who presents with left knee pain for 7 week(s). Onset atraumatic and gradual in nature. Pain is localized to the medial joint line, patella and is a 7/10 on the pain scale. Pain is described as stabbing and shooting. Associated symptoms include pain and stiffness. The pain is worse with walking, standing, sitting, sleeping, and lying on affected side; resting, pain medication and/or NSAID, and lying down make it better. Previous treatments have included: bracing, cane/walker, NSAIDS, and physical therapy since symptom onset. Although some transient relief was reported with these interventions, these conservative measures have failed and symptoms have persisted. The patient is limited in daily activities and has had a significant decrease in quality of life as a result. She denies fevers, chills, or constitutional symptoms.    I have reviewed the following portions of the patient's history:History of Present Illness    Past Medical History:   Diagnosis Date    Acute upper respiratory infection     Anxiety 8/7/2017    Community acquired pneumonia     Depression     Diabetes mellitus     dx 1998- checks fsbs weekly    Fracture, stress, metatarsal     STRESS FRACTURE ALONG THE SHAFT OF THE FIFTH RIGHT METATARSAL    Hyperlipidemia     IBS (irritable bowel syndrome)     Insomnia 8/7/2017    Irritable bowel syndrome     Obesity     Shoulder pain     Tobacco abuse 8/7/2017    Type 2 diabetes mellitus 8/7/2017    Type 2 diabetes mellitus, uncontrolled     Vitamin D deficiency       Past Surgical History:   Procedure Laterality Date    COLONOSCOPY      within last 5 years    KNEE ARTHROSCOPY W/ MENISCAL REPAIR Right     PATELLA OPEN REDUCTION INTERNAL FIXATION Left 10/27/2016    Procedure: LEFT PATELLA OPEN REDUCTION  INTERNAL FIXATION;  Surgeon: Marshall Meyers MD;  Location:  [facility];  Service:     SHOULDER OPEN REDUCTION INTERNAL FIXATION Right 10/27/2016    Procedure: RIGHT SHOULDER CLOSED REDUCTION ;  Surgeon: Marshall Meyers MD;  Location:  [facility];  Service:     SHOULDER OPEN REDUCTION INTERNAL FIXATION Right 10/31/2016    Procedure: RIGHT SHOULDER OPEN REDUCTION INTERNAL FIXATION WITH PLATE FOR FRACTURE;  Surgeon: Jaguar Marsh MD;  Location:  [facility];  Service:     TONSILLECTOMY      TOTAL ABDOMINAL HYSTERECTOMY WITH SALPINGO OOPHORECTOMY      TOTAL SHOULDER ARTHROPLASTY W/ DISTAL CLAVICLE EXCISION Right 8/7/2017    Procedure: REMOVAL RIGHT PROXIMAL HUMERUS PLATE, RIGHT REVERSE TOTAL SHOULDER ARTHROPLASTY, SHOULDER HARDWARE REMOVAL;  Surgeon: Jaguar Marsh MD;  Location:  [facility];  Service:     URETHRAL SUSPENSION      FOR STRESS INCONTINENCE      Family History   Problem Relation Age of Onset    Cancer Mother         breast    Diabetes Mother     Arthritis Mother     Diabetes Father     Heart defect Father     Stroke Father     Asthma Father     Hypertension Other     Heart attack Other      Social History     Socioeconomic History    Marital status:    Tobacco Use    Smoking status: Former     Current packs/day: 0.00     Average packs/day: 0.5 packs/day for 1 year (0.5 ttl pk-yrs)     Types: Cigarettes     Start date: 1/1/2020     Quit date: 1/1/2021     Years since quitting: 3.8     Passive exposure: Past    Smokeless tobacco: Never    Tobacco comments:     quit 10/27/2016   Vaping Use    Vaping status: Never Used   Substance and Sexual Activity    Alcohol use: Yes     Comment: rare    Drug use: No    Sexual activity: Defer      Current Outpatient Medications on File Prior to Visit   Medication Sig Dispense Refill    Accu-Chek Olamide Plus test strip USE A STRIP TO TEST THREE TIMES A  each 12    aspirin 81 MG EC tablet Take 1 tablet by mouth Daily.      ASTAXANTHIN PO Take  by  mouth. 12 mg per serving once day      BD Pen Needle Sherri 2nd Gen 32G X 4 MM misc       BD Pen Needle Sherri 2nd Gen 32G X 4 MM misc USE ONE EACH  each 3    Blood Glucose Monitoring Suppl (OneTouch Verio) w/Device kit Use 1 each Daily. 1 kit 0    Coenzyme Q10 (CoQ10) 200 MG capsule Take  by mouth. Take one tablet by mouth daily      Continuous Blood Gluc Sensor (FreeStyle Camelia 2 Sensor) misc Use 1 each Every 14 (Fourteen) Days. 6 each 3    cyclobenzaprine (FLEXERIL) 10 MG tablet Take 1 tablet by mouth 2 (Two) Times a Day As Needed for Muscle Spasms. 30 tablet 3    DULoxetine (CYMBALTA) 20 MG capsule Take 1 capsule by mouth Daily. 90 capsule 0    Ginger, Zingiber officinalis, (GINGER ROOT PO) Take  by mouth. Take one otc daily      glucose blood test strip Use as instructed 2-3 times a day, provide strips for the Onetouch meter 50 each 12    Insulin Glargine (Lantus SoloStar) 100 UNIT/ML injection pen INJECT 30 UNITS UNDER THE SKIN INTO THE APPROPRIATE AREA AS DIRECTED DAILY 45 mL 3    Insulin Pen Needle ([employer] Pen Needles) 32G X 4 MM misc USE ONE DAILY 100 each 1    lisinopril (PRINIVIL,ZESTRIL) 20 MG tablet Take 1 tablet by mouth Daily. 90 tablet 3    Multiple Minerals-Vitamins (CALCIUM-MAGNESIUM-ZINC-D3 PO) Take  by mouth.      QUEtiapine (SEROquel) 25 MG tablet TAKE THREE TABLETS BY MOUTH EVERY EVENING 90 tablet 0    Semaglutide, 2 MG/DOSE, (Ozempic, 2 MG/DOSE,) 8 MG/3ML solution pen-injector DIAL AND INJECT UNDER THE SKIN 2 MG WEEKLY 3 mL 5    simvastatin (ZOCOR) 40 MG tablet Take 1 tablet by mouth Every Night. 60 tablet 0    levocetirizine (Xyzal) 5 MG tablet Take 1 tablet by mouth Every Evening for 30 days. 30 tablet 6     No current facility-administered medications on file prior to visit.      Allergies   Allergen Reactions    Codeine Nausea And Vomiting    Morphine And Codeine Itching     Severe hives- purple color to face    Penicillins Rash     Had pcn as child and unsure of reaction        Review  "of Systems   Constitutional: Negative.    HENT: Negative.     Eyes: Negative.    Respiratory: Negative.     Cardiovascular: Negative.    Gastrointestinal: Negative.    Endocrine: Negative.    Genitourinary: Negative.    Musculoskeletal:  Positive for arthralgias.   Skin: Negative.    Allergic/Immunologic: Negative.    Neurological: Negative.    Hematological: Negative.    Psychiatric/Behavioral: Negative.          The patient's Review of Systems was personally reviewed and confirmed as accurate.    The following portions of the patient's history were reviewed and updated as appropriate: allergies, current medications, past family history, past medical history, past social history, past surgical history, and problem list.    Physical Exam  Blood pressure 128/80, height 175.3 cm (69.02\"), weight 94.3 kg (207 lb 14.4 oz), not currently breastfeeding.    Body mass index is 30.69 kg/m².    GENERAL APPEARANCE: awake, alert & oriented x 3, in no acute distress and well developed, well nourished  PSYCH: normal affect  LUNGS:  breathing nonlabored  EYES: sclera anicteric  CARDIOVASCULAR: palpable dorsalis pedis, palpable posterior tibial bilaterally. Capillary refill less than 2 seconds  EXTREMITIES: no clubbing, cyanosis  GAIT:  Antalgic            Left Lower Extremity Exam:   ----------  Hip Exam  ----------  FLEXION CONTRACTURE: None  FLEXION: 110 degrees  INTERNAL ROTATION: 20 degrees at 90 degrees of flexion   EXTERNAL ROTATION: 40 degrees at 90 degrees of flexion    PAIN WITH HIP MOTION: no  ----------  Knee Exam  ----------  ALIGNMENT: neutral    RANGE OF MOTION:  Normal (0-120 degrees) with no extensor lag or flexion contracture  LIGAMENTOUS STABILITY:   stable to varus and valgus stress at terminal extension and 30 degrees without any evidence of laxity     STRENGTH:  5/5 knee flexion, extension. 5/5 ankle dorsiflexion and plantarflexion.     PAIN WITH PALPATION: lateral joint line and anterior knee  KNEE EFFUSION: " yes, trace effusion  PAIN WITH KNEE ROM: yes, global, especially anterior and lateral joint line  PATELLAR CREPITUS: yes, painful and symptomatic  SPECIAL EXAM FINDINGS:  none    REFLEXES:  PATELLAR 2+/4  ACHILLES 2+/4    CLONUS: no  STRAIGHT LEG TEST:   negative    SENSATION TO LIGHT TOUCH:  DEEP PERONEAL/SUPERFICIAL PERONEAL/SURAL/SAPHENOUS/TIBIAL:   intact    EDEMA:  no  ERYTHEMA:  no  WOUNDS/INCISIONS:  yes, well healed surgical incision without evidence of erythema or drainage    ______________________________________________________________________  ______________________________________________________________________    RADIOGRAPHIC FINDINGS:   Left knee x-rays from 11/8/2024 were personally reviewed and interpreted.  Images reveal mild to moderate tricompartmental osteoarthritis with neutral alignment.  Small periarticular space visualized in all compartments.  Evidence of prior patella fracture sequelae is identified with no acute bony injury or fracture.    8/13/2024 labs reveal A1c of 7.2, creatinine of 0.66, GFR 98.1    Assessment/Plan:   Diagnosis Plan   1. Primary osteoarthritis of left knee          Patient suffering from arthritic flareup of the left knee.  I discussed treatment options with her including anti-inflammatories cortisone injections.  Patient agreeable to cortisone injection left knee today.  She will follow-up as needed.    Procedure Note:  I discussed with the patient the potential benefits of performing a therapeutic injection of the left knee as well as potential risks including but not limited to infection, swelling, pain, bleeding, bruising, nerve/vessel damage, skin color changes, transient elevation in blood glucose levels, and fat atrophy. After informed consent and verifying correct patient, procedure site, and type of procedure, the area was prepped with alcohol, ethyl chloride was used to numb the skin. Via the superolateral approach, 3 cc of 1% lidocaine, 3 cc of 0.25%  Marcaine and 2 cc of 40mg/ml of Kenalog were injected into the left knee. The patient tolerated the procedure well. There were no complications. A sterile dressing was placed over the injection site.      Haseeb Glover MD  11/14/24  09:22 EST

## 2024-11-14 NOTE — LETTER
November 14, 2024     Cat Bangura MD  [address] [address]    Patient: Octavia Rice   YOB: 1960   Date of Visit: 11/14/2024       Dear Cat Bangura MD:    Thank you for referring Octavia Rice to me for evaluation. Below are the relevant portions of my assessment and plan of care.    Encounter Diagnosis and Orders:  Diagnoses and all orders for this visit:    1. Primary osteoarthritis of left knee (Primary)  -     - Large Joint Arthrocentesis: L knee        If you have questions, please do not hesitate to call me. I look forward to following Octavia along with you.         Sincerely,        Haseeb Glover MD        CC: No Recipients

## 2024-11-18 ENCOUNTER — TELEPHONE (OUTPATIENT)
Dept: FAMILY MEDICINE CLINIC | Facility: CLINIC | Age: 64
End: 2024-11-18
Payer: MEDICARE

## 2024-11-18 NOTE — TELEPHONE ENCOUNTER
HUB TO RELAY    M FOR PT TO CALL [phone]    Notify the patient results of x-ray some signs of patellar tendinitis or bursitis.  She is got arthritis and what looks like a previous fracture of the patella.  Proceed with Ortho referral

## 2024-11-18 NOTE — TELEPHONE ENCOUNTER
Name: Octavia Rice    Relationship: Self    Best Callback Number: [phone]     HUB PROVIDED THE RELAY MESSAGE FROM THE OFFICE   PATIENT VOICED UNDERSTANDING AND HAS NO FURTHER QUESTIONS AT THIS TIME    ADDITIONAL INFORMATION:

## 2024-11-20 ENCOUNTER — OFFICE VISIT (OUTPATIENT)
Dept: ENDOCRINOLOGY | Facility: CLINIC | Age: 64
End: 2024-11-20
Payer: MEDICARE

## 2024-11-20 VITALS
HEIGHT: 69 IN | BODY MASS INDEX: 30.1 KG/M2 | DIASTOLIC BLOOD PRESSURE: 88 MMHG | OXYGEN SATURATION: 98 % | RESPIRATION RATE: 16 BRPM | WEIGHT: 203.2 LBS | SYSTOLIC BLOOD PRESSURE: 130 MMHG | TEMPERATURE: 98.1 F | HEART RATE: 78 BPM

## 2024-11-20 DIAGNOSIS — E11.65 TYPE 2 DIABETES MELLITUS WITH HYPERGLYCEMIA, WITHOUT LONG-TERM CURRENT USE OF INSULIN: Primary | ICD-10-CM

## 2024-11-20 DIAGNOSIS — E78.2 MIXED HYPERLIPIDEMIA: Chronic | ICD-10-CM

## 2024-11-20 LAB
EXPIRATION DATE: ABNORMAL
EXPIRATION DATE: ABNORMAL
GLUCOSE BLDC GLUCOMTR-MCNC: 179 MG/DL (ref 70–130)
HBA1C MFR BLD: 7.3 % (ref 4.5–5.7)
Lab: ABNORMAL
Lab: ABNORMAL

## 2024-11-20 RX ORDER — ERGOCALCIFEROL 1.25 MG/1
50000 CAPSULE, LIQUID FILLED ORAL WEEKLY
Qty: 15 CAPSULE | Refills: 3 | Status: SHIPPED | OUTPATIENT
Start: 2024-11-20

## 2024-11-20 NOTE — PROGRESS NOTES
Chief complaint  Follow-up and Diabetes    Subjective   Octavia Rice is a 64 y.o. female is here today for follow-up of:    Diabetes Mellitus type 2: dx in 2003.  Diabetic complications: none  Eye exam current (within one year): no retinopathy, recent exam.   Current diabetic medications include.   Lantus  Ozempic  No hypoglycemia reported.     Past medications: 08/2016 Jardiance trial was unsuccessful - frequent urination.     Monitoring  CGM    Other med problems: include hyperlipidemia on simvastatin.     She had some problem with dexcom sensor. Glucose went up after knee steroid injection a week ago.   C/o constipation - BM is once a week.     Medications    Current Outpatient Medications:     aspirin 81 MG EC tablet, Take 1 tablet by mouth Daily., Disp: , Rfl:     ASTAXANTHIN PO, Take  by mouth. 12 mg per serving once day, Disp: , Rfl:     BD Pen Needle Sherri 2nd Gen 32G X 4 MM misc, , Disp: , Rfl:     BD Pen Needle Sherri 2nd Gen 32G X 4 MM misc, USE ONE EACH DAY, Disp: 100 each, Rfl: 3    Blood Glucose Monitoring Suppl (OneTouch Verio) w/Device kit, Use 1 each Daily., Disp: 1 kit, Rfl: 0    Continuous Blood Gluc Sensor (FreeStyle Camelia 2 Sensor) misc, Use 1 each Every 14 (Fourteen) Days., Disp: 6 each, Rfl: 3    cyclobenzaprine (FLEXERIL) 10 MG tablet, Take 1 tablet by mouth 2 (Two) Times a Day As Needed for Muscle Spasms., Disp: 30 tablet, Rfl: 3    DULoxetine (CYMBALTA) 20 MG capsule, Take 1 capsule by mouth Daily., Disp: 90 capsule, Rfl: 0    glucose blood test strip, Use as instructed 2-3 times a day, provide strips for the Onetouch meter, Disp: 50 each, Rfl: 12    Insulin Glargine (Lantus SoloStar) 100 UNIT/ML injection pen, INJECT 30 UNITS UNDER THE SKIN INTO THE APPROPRIATE AREA AS DIRECTED DAILY, Disp: 45 mL, Rfl: 3    lisinopril (PRINIVIL,ZESTRIL) 20 MG tablet, Take 1 tablet by mouth Daily., Disp: 90 tablet, Rfl: 3    Multiple Minerals-Vitamins (CALCIUM-MAGNESIUM-ZINC-D3 PO), Take  by mouth., Disp: ,  Rfl:     QUEtiapine (SEROquel) 25 MG tablet, TAKE THREE TABLETS BY MOUTH EVERY EVENING, Disp: 90 tablet, Rfl: 0    Semaglutide, 2 MG/DOSE, (Ozempic, 2 MG/DOSE,) 8 MG/3ML solution pen-injector, DIAL AND INJECT UNDER THE SKIN 2 MG WEEKLY, Disp: 3 mL, Rfl: 5    simvastatin (ZOCOR) 40 MG tablet, Take 1 tablet by mouth Every Night., Disp: 60 tablet, Rfl: 0    Accu-Chek Olamide Plus test strip, USE A STRIP TO TEST THREE TIMES A DAY (Patient not taking: Reported on 11/20/2024), Disp: 100 each, Rfl: 12    Coenzyme Q10 (CoQ10) 200 MG capsule, Take  by mouth. Take one tablet by mouth daily (Patient not taking: Reported on 11/20/2024), Disp: , Rfl:     Petra, Zingiber officinalis, (PETRA ROOT PO), Take  by mouth. Take one otc daily (Patient not taking: Reported on 11/20/2024), Disp: , Rfl:     Insulin Pen Needle ([employer] Pen Needles) 32G X 4 MM misc, USE ONE DAILY (Patient not taking: Reported on 11/20/2024), Disp: 100 each, Rfl: 1    levocetirizine (Xyzal) 5 MG tablet, Take 1 tablet by mouth Every Evening for 30 days., Disp: 30 tablet, Rfl: 6      Review of systems  Review of Systems   Constitutional:  Positive for fatigue.   Gastrointestinal:  Positive for constipation.   Allergic/Immunologic: Positive for environmental allergies.   Hematological: Negative.    Psychiatric/Behavioral:  Positive for decreased concentration and sleep disturbance. Negative for self-injury. Behavioral problem: phobia of crowds..The patient is nervous/anxious.        Physical exam  Objective   Vitals:    11/20/24 0944   BP: 130/88   Pulse: 78   Resp: 16   Temp: 98.1 °F (36.7 °C)   SpO2: 98%    Body mass index is 29.99 kg/m².  Physical Exam   Constitutional: She is oriented to person, place, and time. She appears well-developed.   HENT:   Head: Normocephalic and atraumatic.   Cardiovascular: Normal rate, regular rhythm, normal heart sounds and normal pulses.   Pulmonary/Chest: Effort normal and breath sounds normal.   Musculoskeletal: No swelling.  "  Neurological: She is alert and oriented to person, place, and time.   Psychiatric: Mood and thought content normal.   Vitals reviewed.        LABS AND IMAGING  Results for orders placed or performed in visit on 11/20/24   POC Glucose, Blood    Collection Time: 11/20/24  9:50 AM    Specimen: Blood   Result Value Ref Range    Glucose 179 (A) 70 - 130 mg/dL    Lot Number [id number]     Expiration Date 06 13 2025    POC Glycosylated Hemoglobin (Hb A1C)    Collection Time: 11/20/24  9:51 AM    Specimen: Blood   Result Value Ref Range    Hemoglobin A1C 7.3 (A) 4.5 - 5.7 %    Lot Number [id number]     Expiration Date 08 01 2026            Assessment:         Diagnoses and all orders for this visit:    Type 2 diabetes mellitus with hyperglycemia, without long-term current use of insulin  -     POC Glycosylated Hemoglobin (Hb A1C)  -     POC Glucose, Blood        Plan:    Diabetes mellitus type 2 with hyperglycemia, glucose improved after basal insulin/GLP-1 is is added.   -Lantus 30 units and Ozempic 2 mg weekly  Increase insulin by 3 units for 3-4 days  CGM reviewed\" 54% in range, nighttime hyperglycemia is seen. Increased Lantus dose by 3 units, the effect of steroids should wear off soon.     Uptodate with eye exam, no retinopathy. Urine microalbumin is negative.       There are no Patient Instructions on file for this visit.    Mixed hyperlipidemia-cont simvastatin. Fasting labs ordered   Vit D deficiency - restarted weekly Vit D    Follow up:  3  months      "

## 2024-11-21 RX ORDER — DULOXETIN HYDROCHLORIDE 20 MG/1
20 CAPSULE, DELAYED RELEASE ORAL DAILY
Qty: 90 CAPSULE | Refills: 0 | Status: SHIPPED | OUTPATIENT
Start: 2024-11-21

## 2024-12-02 RX ORDER — SIMVASTATIN 40 MG
40 TABLET ORAL NIGHTLY
Qty: 90 TABLET | Refills: 1 | Status: SHIPPED | OUTPATIENT
Start: 2024-12-02

## 2024-12-02 NOTE — TELEPHONE ENCOUNTER
Rx Refill Note  Requested Prescriptions     Pending Prescriptions Disp Refills    simvastatin (ZOCOR) 40 MG tablet 60 tablet 0     Sig: Take 1 tablet by mouth Every Night.      Last office visit with prescribing clinician: 11/20/2024     Next office visit with prescribing clinician: 4/1/2025

## 2024-12-12 RX ORDER — QUETIAPINE FUMARATE 25 MG/1
75 TABLET, FILM COATED ORAL NIGHTLY
Qty: 90 TABLET | Refills: 0 | Status: SHIPPED | OUTPATIENT
Start: 2024-12-12

## 2024-12-12 NOTE — TELEPHONE ENCOUNTER
Caller: Hao Riceice CRISSY    Relationship: Self    Best call back number: [phone]    Requested Prescriptions:   Requested Prescriptions     Pending Prescriptions Disp Refills    QUEtiapine (SEROquel) 25 MG tablet 90 tablet 0        Pharmacy where request should be sent:    [facility] [phone]  Last office visit with prescribing clinician: 11/4/2024   Last telemedicine visit with prescribing clinician: Visit date not found   Next office visit with prescribing clinician: 2/10/2025     Additional details provided by patient: PATIENT IS OUT OF MEDICATION    Does the patient have less than a 3 day supply:  [x] Yes  [] No    Would you like a call back once the refill request has been completed: [] Yes [x] No    If the office needs to give you a call back, can they leave a voicemail: [] Yes [x] No    Polo Fiore Rep   12/12/24 14:28 EST

## 2024-12-26 RX ORDER — PEN NEEDLE, DIABETIC 32GX 5/32"
NEEDLE, DISPOSABLE MISCELLANEOUS
Qty: 100 EACH | Refills: 1 | Status: SHIPPED | OUTPATIENT
Start: 2024-12-26

## 2024-12-26 RX ORDER — QUETIAPINE FUMARATE 25 MG/1
TABLET, FILM COATED ORAL
Qty: 90 TABLET | Refills: 0 | Status: SHIPPED | OUTPATIENT
Start: 2024-12-26

## 2024-12-26 RX ORDER — DULOXETIN HYDROCHLORIDE 20 MG/1
20 CAPSULE, DELAYED RELEASE ORAL DAILY
Qty: 90 CAPSULE | Refills: 0 | Status: SHIPPED | OUTPATIENT
Start: 2024-12-26

## 2024-12-26 NOTE — TELEPHONE ENCOUNTER
"Rx Refill Note  Requested Prescriptions     Pending Prescriptions Disp Refills    Droplet Pen Fields 32G X 4 MM misc [Pharmacy Med Name: DROPLET PEN NEEDLE 32GX5/32\"] 100 each 1     Sig: USE ONE NEEDLE EACH TIME ONCE DAILY.      Last office visit with prescribing clinician: 11/20/2024      Next office visit with prescribing clinician: 4/1/2025       Ashley Juárez MA  12/26/24, 10:35 EST   "

## 2025-01-08 RX ORDER — SEMAGLUTIDE 2.68 MG/ML
2 INJECTION, SOLUTION SUBCUTANEOUS WEEKLY
Qty: 3 ML | Refills: 5 | Status: SHIPPED | OUTPATIENT
Start: 2025-01-08

## 2025-01-08 NOTE — TELEPHONE ENCOUNTER
Rx Refill Note  Requested Prescriptions     Pending Prescriptions Disp Refills    Semaglutide, 2 MG/DOSE, (Ozempic, 2 MG/DOSE,) 8 MG/3ML solution pen-injector 3 mL 5     Sig: Inject 2 mg under the skin into the appropriate area as directed 1 (One) Time Per Week.      Last office visit with prescribing clinician: 11/20/2024   Last telemedicine visit with prescribing clinician: Visit date not found   Next office visit with prescribing clinician: 4/1/2025                         Would you like a call back once the refill request has been completed: [] Yes [] No    If the office needs to give you a call back, can they leave a voicemail: [] Yes [] No    Lorenza Garcia MA  01/08/25, 08:43 EST

## 2025-01-09 RX ORDER — SEMAGLUTIDE 2.68 MG/ML
2 INJECTION, SOLUTION SUBCUTANEOUS WEEKLY
Qty: 3 ML | Refills: 5 | Status: CANCELLED | OUTPATIENT
Start: 2025-01-09

## 2025-01-13 NOTE — TELEPHONE ENCOUNTER
Rx Refill Note  Requested Prescriptions     Pending Prescriptions Disp Refills    Semaglutide, 2 MG/DOSE, (Ozempic, 2 MG/DOSE,) 8 MG/3ML solution pen-injector 3 mL 2     Sig: Inject 2 mg under the skin into the appropriate area as directed 1 (One) Time Per Week.      Last office visit with prescribing clinician: 11/20/2024     Next office visit with prescribing clinician: 4/1/2025   {Cristofer Hough MA  01/13/25, 09:40 EST

## 2025-01-14 RX ORDER — SEMAGLUTIDE 2.68 MG/ML
2 INJECTION, SOLUTION SUBCUTANEOUS WEEKLY
Qty: 3 ML | Refills: 2 | Status: SHIPPED | OUTPATIENT
Start: 2025-01-14

## 2025-02-06 DIAGNOSIS — I10 ELEVATED BLOOD PRESSURE READING IN OFFICE WITH DIAGNOSIS OF HYPERTENSION: ICD-10-CM

## 2025-02-06 RX ORDER — LISINOPRIL 20 MG/1
20 TABLET ORAL DAILY
Qty: 90 TABLET | Refills: 3 | Status: SHIPPED | OUTPATIENT
Start: 2025-02-06

## 2025-02-10 ENCOUNTER — OFFICE VISIT (OUTPATIENT)
Dept: FAMILY MEDICINE CLINIC | Facility: CLINIC | Age: 65
End: 2025-02-10
Payer: MEDICARE

## 2025-02-10 VITALS
BODY MASS INDEX: 30.24 KG/M2 | WEIGHT: 204.2 LBS | TEMPERATURE: 97.8 F | DIASTOLIC BLOOD PRESSURE: 64 MMHG | HEART RATE: 92 BPM | RESPIRATION RATE: 22 BRPM | OXYGEN SATURATION: 98 % | SYSTOLIC BLOOD PRESSURE: 108 MMHG | HEIGHT: 69 IN

## 2025-02-10 DIAGNOSIS — F41.9 ANXIETY: Primary | ICD-10-CM

## 2025-02-10 DIAGNOSIS — S39.012S STRAIN OF LUMBAR REGION, SEQUELA: ICD-10-CM

## 2025-02-10 PROCEDURE — 3074F SYST BP LT 130 MM HG: CPT | Performed by: FAMILY MEDICINE

## 2025-02-10 PROCEDURE — 1160F RVW MEDS BY RX/DR IN RCRD: CPT | Performed by: FAMILY MEDICINE

## 2025-02-10 PROCEDURE — 1159F MED LIST DOCD IN RCRD: CPT | Performed by: FAMILY MEDICINE

## 2025-02-10 PROCEDURE — 1125F AMNT PAIN NOTED PAIN PRSNT: CPT | Performed by: FAMILY MEDICINE

## 2025-02-10 PROCEDURE — 3078F DIAST BP <80 MM HG: CPT | Performed by: FAMILY MEDICINE

## 2025-02-10 PROCEDURE — 99214 OFFICE O/P EST MOD 30 MIN: CPT | Performed by: FAMILY MEDICINE

## 2025-02-10 RX ORDER — DULOXETIN HYDROCHLORIDE 30 MG/1
30 CAPSULE, DELAYED RELEASE ORAL DAILY
Qty: 90 CAPSULE | Refills: 1 | Status: SHIPPED | OUTPATIENT
Start: 2025-02-10

## 2025-02-10 RX ORDER — SERTRALINE HYDROCHLORIDE 25 MG/1
25 TABLET, FILM COATED ORAL DAILY
Qty: 30 TABLET | Refills: 5 | Status: SHIPPED | OUTPATIENT
Start: 2025-02-10

## 2025-02-10 RX ORDER — CYCLOBENZAPRINE HCL 10 MG
10 TABLET ORAL 2 TIMES DAILY PRN
Qty: 30 TABLET | Refills: 3 | Status: SHIPPED | OUTPATIENT
Start: 2025-02-10

## 2025-02-10 RX ORDER — QUETIAPINE FUMARATE 25 MG/1
50 TABLET, FILM COATED ORAL NIGHTLY
Qty: 180 TABLET | Refills: 2 | Status: SHIPPED | OUTPATIENT
Start: 2025-02-10

## 2025-02-10 NOTE — PROGRESS NOTES
"Subjective   Octavia Rice is a 65 y.o. female.     Knee Pain      she is requesting a refill on her Seroquel medication that she takes at night to help her to sleep.  She also is requesting something for anxiety that she takes every day.  She reports that she just feels worried and on edge.  She is taking Cymbalta but was only taking 20 mg of that.    The following portions of the patient's history were reviewed and updated as appropriate: allergies, current medications, past family history, past medical history, past social history, past surgical history, and problem list.    Review of Systems   Constitutional: Negative.    HENT: Negative.     Eyes: Negative.    Respiratory: Negative.     Cardiovascular: Negative.    Gastrointestinal: Negative.    Endocrine: Negative.    Genitourinary: Negative.    Musculoskeletal: Negative.    Skin: Negative.    Allergic/Immunologic: Negative.    Neurological: Negative.    Hematological: Negative.    Psychiatric/Behavioral: Negative.     All other systems reviewed and are negative.      Objective     Vitals:    02/10/25 0838   BP: 108/64   BP Location: Left arm   Patient Position: Sitting   Cuff Size: Adult   Pulse: 92   Resp: 22   Temp: 97.8 °F (36.6 °C)   TempSrc: Temporal   SpO2: 98%   Weight: 92.6 kg (204 lb 3.2 oz)   Height: 175.3 cm (69\")       Physical Exam  Vitals and nursing note reviewed.   Constitutional:       General: She is not in acute distress.     Appearance: She is well-developed. She is not diaphoretic.   HENT:      Head: Normocephalic and atraumatic.      Right Ear: External ear normal.      Left Ear: External ear normal.   Eyes:      General: Lids are normal. No scleral icterus.        Right eye: No discharge.         Left eye: No discharge.      Conjunctiva/sclera: Conjunctivae normal.      Pupils: Pupils are equal, round, and reactive to light.   Neck:      Thyroid: No thyroid mass or thyromegaly.      Vascular: No carotid bruit or JVD.      Trachea: No " tracheal deviation.   Cardiovascular:      Rate and Rhythm: Normal rate and regular rhythm.      Heart sounds: Normal heart sounds. No murmur heard.     No friction rub. No gallop.   Pulmonary:      Effort: Pulmonary effort is normal. No respiratory distress.      Breath sounds: Normal breath sounds. No decreased breath sounds, wheezing, rhonchi or rales.   Chest:      Chest wall: No tenderness.   Abdominal:      General: Bowel sounds are normal. There is no distension.      Palpations: Abdomen is soft. There is no mass.      Tenderness: There is no abdominal tenderness. There is no guarding or rebound.      Hernia: No hernia is present.   Musculoskeletal:         General: Normal range of motion.   Lymphadenopathy:      Cervical: No cervical adenopathy.      Upper Body:      Right upper body: No supraclavicular adenopathy.      Left upper body: No supraclavicular adenopathy.   Skin:     Findings: No bruising, erythema or rash.      Nails: There is no clubbing.   Neurological:      Mental Status: She is alert and oriented to person, place, and time.      Cranial Nerves: No cranial nerve deficit.      Deep Tendon Reflexes: Reflexes are normal and symmetric. Reflexes normal.   Psychiatric:         Speech: Speech normal.         Behavior: Behavior normal.         Thought Content: Thought content normal.         Judgment: Judgment normal.         Assessment & Plan     Problem List Items Addressed This Visit          Mental Health    Anxiety - Primary    Relevant Medications    QUEtiapine (SEROquel) 25 MG tablet    sertraline (Zoloft) 25 MG tablet    DULoxetine (CYMBALTA) 30 MG capsule       Musculoskeletal and Injuries    Strain of lumbar region    Relevant Medications    cyclobenzaprine (FLEXERIL) 10 MG tablet     I think we can get her on something every day for anxiety we will start a low-dose Zoloft 25 mg daily will increase her Cymbalta up to 30 mg.  I have refilled the Seroquel that she takes at night to help her  sleep.    She is requesting a refill on her Flexeril.

## 2025-02-20 ENCOUNTER — TELEPHONE (OUTPATIENT)
Dept: ENDOCRINOLOGY | Facility: CLINIC | Age: 65
End: 2025-02-20
Payer: MEDICARE

## 2025-02-20 NOTE — TELEPHONE ENCOUNTER
Patient called office, stated that she received a letter stating that her ozempic is no longer going to be covered by her insurance. Informed patient that she need to call her insurance and see if there is an alternative that they will cover. She is going to call. She wanted to make Dr. Daugherty aware.

## 2025-03-13 ENCOUNTER — PRE-ADMISSION TESTING (OUTPATIENT)
Dept: PREADMISSION TESTING | Facility: HOSPITAL | Age: 65
End: 2025-03-13
Payer: MEDICARE

## 2025-03-13 VITALS — WEIGHT: 204.65 LBS | BODY MASS INDEX: 30.31 KG/M2 | HEIGHT: 69 IN

## 2025-03-13 LAB
ANION GAP SERPL CALCULATED.3IONS-SCNC: 15 MMOL/L (ref 5–15)
BUN SERPL-MCNC: 15 MG/DL (ref 8–23)
BUN/CREAT SERPL: 20.8 (ref 7–25)
CALCIUM SPEC-SCNC: 9.7 MG/DL (ref 8.6–10.5)
CHLORIDE SERPL-SCNC: 102 MMOL/L (ref 98–107)
CO2 SERPL-SCNC: 20 MMOL/L (ref 22–29)
CREAT SERPL-MCNC: 0.72 MG/DL (ref 0.57–1)
DEPRECATED RDW RBC AUTO: 41.2 FL (ref 37–54)
EGFRCR SERPLBLD CKD-EPI 2021: 92.9 ML/MIN/1.73
ERYTHROCYTE [DISTWIDTH] IN BLOOD BY AUTOMATED COUNT: 12.4 % (ref 12.3–15.4)
GLUCOSE SERPL-MCNC: 185 MG/DL (ref 65–99)
HBA1C MFR BLD: 6.5 % (ref 4.8–5.6)
HCT VFR BLD AUTO: 39.4 % (ref 34–46.6)
HGB BLD-MCNC: 12.9 G/DL (ref 12–15.9)
MCH RBC QN AUTO: 29.7 PG (ref 26.6–33)
MCHC RBC AUTO-ENTMCNC: 32.7 G/DL (ref 31.5–35.7)
MCV RBC AUTO: 90.6 FL (ref 79–97)
PLATELET # BLD AUTO: 276 10*3/MM3 (ref 140–450)
PMV BLD AUTO: 9.9 FL (ref 6–12)
POTASSIUM SERPL-SCNC: 3.9 MMOL/L (ref 3.5–5.2)
QT INTERVAL: 356 MS
QTC INTERVAL: 435 MS
RBC # BLD AUTO: 4.35 10*6/MM3 (ref 3.77–5.28)
SODIUM SERPL-SCNC: 137 MMOL/L (ref 136–145)
WBC NRBC COR # BLD AUTO: 8 10*3/MM3 (ref 3.4–10.8)

## 2025-03-13 PROCEDURE — 83036 HEMOGLOBIN GLYCOSYLATED A1C: CPT

## 2025-03-13 PROCEDURE — 93010 ELECTROCARDIOGRAM REPORT: CPT | Performed by: INTERNAL MEDICINE

## 2025-03-13 PROCEDURE — 85027 COMPLETE CBC AUTOMATED: CPT

## 2025-03-13 PROCEDURE — 93005 ELECTROCARDIOGRAM TRACING: CPT

## 2025-03-13 PROCEDURE — 80048 BASIC METABOLIC PNL TOTAL CA: CPT

## 2025-03-13 PROCEDURE — 36415 COLL VENOUS BLD VENIPUNCTURE: CPT

## 2025-03-13 NOTE — PAT
An arrival time for procedure was not provided during PAT visit. If patient had any questions or concerns about their arrival time, they were instructed to contact their surgeon/physician.  Additionally, if the patient referred to an arrival time that was acquired from their my chart account, patient was encouraged to verify that time with their surgeon/physician. Arrival times are NOT provided in Pre Admission Testing Department.    Patient viewed general PAT education video as instructed in their preoperative information received from their surgeon.  Patient stated the general PAT education video was viewed in its entirety and survey completed.  Copies of PAT general education handouts (Incentive Spirometry, Meds to Beds Program, Patient Belongings, Pre-op skin preparation instructions, Blood Glucose testing, Visitor policy, Surgery FAQ, Code H) distributed to patient if not printed. Education related to the PAT pass and skin preparation for surgery (if applicable) completed in PAT as a reinforcement to PAT education video. Patient instructed to return PAT pass provided today as well as completed skin preparation sheet (if applicable) on the day of procedure.     Additionally if patient had not viewed video yet but intended to view it at home or in our waiting area, then referred them to the handout with QR code/link provided during PAT visit.  Encouraged patient/family to read PAT general education handouts thoroughly and notify PAT staff with any questions or concerns. Patient verbalized understanding of all information and priority content.    Per Anesthesia Request, patient instructed not to take their ACE/ARB medications on the AM of surgery.    Patient instructed to bring CPAP mask and tubing to the hospital for overnight stay.  Explained that it is not necessary to bring their CPAP machine to the hospital instead a CPAP machine will be provided for use by the hospital. If patient knows their CPAP settings,  those settings will be implemented.  If not, the CPAP machine will be utilized on the auto setting using their mask and tubing.    Patient verbalized understanding.    Patient instructed to drink 20 ounces of Gatorade or Gatorlyte (if diabetic) and it needs to be completed 1 hour (for Main OR patients) or 2 hours (scheduled  section & [facility] patients) before given arrival time for procedure (NO RED Gatorade and NO Gatorade Zero).    Patient verbalized understanding.    Patient denies any current skin issues.     Patient to apply Chlorhexadine wipes  to surgical area (as instructed) the night before procedure and the AM of procedure. Wipes provided.    It was noted during Pre Admission Testing that patient was wearing some form of fingernail polish (gel/regular) and/or acrylic/artificial nails.  Patient was told that polish and/or artificial nails must be removed for surgery.  If a patient had recent manicure, and would rather not remove polish or artificial nails. Then the minimum requirement is that the polish/artificial nails must be removed from the middle finger on each hand.  Patient verbalized understanding.    If patient was having surgery on an upper extremity, then the patient was instructed that fingernail polish/artificial fingernails must be removed for surgery.  NO EXCEPTIONS.  Patient verbalized understanding.    If patient was having surgery on a lower extremity, then the patient was instructed that toenail polish on both extremities must be removed for surgery.  NO EXCEPTIONS. Patient verbalized understanding.    Clean catch urinalysis not indicated because patient denied recent urinary frequency, urinary urgency, burning/pain upon urination, or flank pain. No recent UTIs.    InfuBLOCK (by [employer]) pain pump patient informational handout given to patient.  Instructed patient to watch InfuBLOCK Patient Education Video regarding Peripheral Nerve Catheter that will be in place for upcoming  surgery unless contraindicated. The video can be accessed using QR code noted on handout.  Patient agreed to watch video.  Stressed to patient to call [facility] Nursing Hotline 24/7 if patient has any questions or concerns after discharge.     Discussed with patient options for receiving total joint replacement education and assessed patient's ability and preference. Joint Replacement Guide given to patient during PAT visit since not received a copy within the last year. Encouraged patient/family to read guide thoroughly and notify PAT staff with any questions or concerns. Handout provided directing patient to links to watch online videos related to joint replacement surgery on the [facility] website. The handout gives detailed instructions for joining an online joint replacement class through Microsoft Teams or phone conference offered on the 1st and 3rd Thursdays of the month. Patient agreed to participate by watching videos online. Patient verbalized understanding of instructions. Encouraged to share information with family and/or . An overview of the joint replacement education was provided during the visit including general perioperative instructions that are routine for all surgical patients (PAT PASS, wipes, directions to pre-op, etc.).      EKG IN CHART

## 2025-03-18 ENCOUNTER — OFFICE VISIT (OUTPATIENT)
Dept: FAMILY MEDICINE CLINIC | Facility: CLINIC | Age: 65
End: 2025-03-18
Payer: MEDICARE

## 2025-03-18 VITALS
DIASTOLIC BLOOD PRESSURE: 56 MMHG | HEART RATE: 91 BPM | TEMPERATURE: 97.5 F | HEIGHT: 69 IN | SYSTOLIC BLOOD PRESSURE: 94 MMHG | BODY MASS INDEX: 30.3 KG/M2 | OXYGEN SATURATION: 95 % | RESPIRATION RATE: 18 BRPM | WEIGHT: 204.6 LBS

## 2025-03-18 DIAGNOSIS — F41.9 ANXIETY: Primary | ICD-10-CM

## 2025-03-18 DIAGNOSIS — E11.65 TYPE 2 DIABETES MELLITUS WITH HYPERGLYCEMIA, WITHOUT LONG-TERM CURRENT USE OF INSULIN: ICD-10-CM

## 2025-03-18 DIAGNOSIS — G47.09 OTHER INSOMNIA: ICD-10-CM

## 2025-03-18 DIAGNOSIS — M17.12 ARTHRITIS OF LEFT KNEE: ICD-10-CM

## 2025-03-18 LAB
EXPIRATION DATE: NORMAL
Lab: NORMAL
POC ALBUMIN, URINE: 80 MG/L
POC CREATININE, URINE: 100 MG/DL
POC URINE ALB/CREA RATIO: NORMAL

## 2025-03-18 RX ORDER — SERTRALINE HYDROCHLORIDE 25 MG/1
25 TABLET, FILM COATED ORAL DAILY
Qty: 90 TABLET | Refills: 3 | Status: SHIPPED | OUTPATIENT
Start: 2025-03-18

## 2025-03-18 RX ORDER — DULOXETIN HYDROCHLORIDE 30 MG/1
30 CAPSULE, DELAYED RELEASE ORAL DAILY
Qty: 90 CAPSULE | Refills: 3 | Status: SHIPPED | OUTPATIENT
Start: 2025-03-18

## 2025-03-18 RX ORDER — QUETIAPINE FUMARATE 25 MG/1
75 TABLET, FILM COATED ORAL NIGHTLY
Qty: 270 TABLET | Refills: 2 | Status: SHIPPED | OUTPATIENT
Start: 2025-03-18

## 2025-03-18 NOTE — PROGRESS NOTES
"Subjective   Octavia Rice is a 65 y.o. female.     Anxiety  Presents for follow-up visit.  Symptoms include insomnia.  Patient reports no chest pain, decreased concentration, depressed mood, feeling of choking, hyperventilation, muscle tension, obsessions or panic. Symptoms occur rarely. The severity of symptoms is mild. The quality of sleep is poor. Awakens often during the night. Past treatments include SSRIs. The treatment provided moderate relief. Past compliance problems: she has been taking 75 mg. Compliance with medications is %.      Sleep unsteady.     Ran out of med      The following portions of the patient's history were reviewed and updated as appropriate: allergies, current medications, past family history, past medical history, past social history, past surgical history, and problem list.    Review of Systems   Cardiovascular:  Negative for chest pain.   Psychiatric/Behavioral:  Negative for decreased concentration. The patient has insomnia.        Objective     Vitals:    03/18/25 1035   BP: 94/56   BP Location: Right arm   Patient Position: Sitting   Cuff Size: Adult   Pulse: 91   Resp: 18   Temp: 97.5 °F (36.4 °C)   TempSrc: Temporal   SpO2: 95%   Weight: 92.8 kg (204 lb 9.6 oz)   Height: 175.3 cm (69\")       Physical Exam  Vitals and nursing note reviewed.   Constitutional:       General: She is not in acute distress.     Appearance: She is well-developed. She is not diaphoretic.   HENT:      Head: Normocephalic and atraumatic.      Right Ear: External ear normal.      Left Ear: External ear normal.   Eyes:      General: Lids are normal. No scleral icterus.        Right eye: No discharge.         Left eye: No discharge.      Conjunctiva/sclera: Conjunctivae normal.      Pupils: Pupils are equal, round, and reactive to light.   Neck:      Thyroid: No thyroid mass or thyromegaly.      Vascular: No carotid bruit or JVD.      Trachea: No tracheal deviation.   Cardiovascular:      Rate and " Rhythm: Normal rate and regular rhythm.      Heart sounds: Normal heart sounds. No murmur heard.     No friction rub. No gallop.   Pulmonary:      Effort: Pulmonary effort is normal. No respiratory distress.      Breath sounds: Normal breath sounds. No decreased breath sounds, wheezing, rhonchi or rales.   Chest:      Chest wall: No tenderness.   Abdominal:      General: Bowel sounds are normal. There is no distension.      Palpations: Abdomen is soft. There is no mass.      Tenderness: There is no abdominal tenderness. There is no guarding or rebound.      Hernia: No hernia is present.   Musculoskeletal:         General: Normal range of motion.   Lymphadenopathy:      Cervical: No cervical adenopathy.      Upper Body:      Right upper body: No supraclavicular adenopathy.      Left upper body: No supraclavicular adenopathy.   Skin:     Findings: No bruising, erythema or rash.      Nails: There is no clubbing.   Neurological:      Mental Status: She is alert and oriented to person, place, and time.      Cranial Nerves: No cranial nerve deficit.      Deep Tendon Reflexes: Reflexes are normal and symmetric. Reflexes normal.   Psychiatric:         Speech: Speech normal.         Behavior: Behavior normal.         Thought Content: Thought content normal.         Judgment: Judgment normal.         Assessment & Plan     Problem List Items Addressed This Visit          Endocrine and Metabolic    Type 2 diabetes mellitus (Chronic)    Relevant Orders    POC Albumin/Creatinine Ratio Urine (Completed)       Mental Health    Anxiety - Primary    Relevant Medications    QUEtiapine (SEROquel) 25 MG tablet    DULoxetine (CYMBALTA) 30 MG capsule    sertraline (Zoloft) 25 MG tablet       Musculoskeletal and Injuries    Arthritis of left knee       Sleep    Insomnia

## 2025-03-19 ENCOUNTER — ANESTHESIA EVENT (OUTPATIENT)
Dept: PERIOP | Facility: HOSPITAL | Age: 65
End: 2025-03-19
Payer: MEDICARE

## 2025-03-20 ENCOUNTER — HOSPITAL ENCOUNTER (OUTPATIENT)
Facility: HOSPITAL | Age: 65
Discharge: HOME | End: 2025-03-21
Attending: ORTHOPAEDIC SURGERY | Admitting: ORTHOPAEDIC SURGERY
Payer: MEDICARE

## 2025-03-20 ENCOUNTER — ANESTHESIA (OUTPATIENT)
Dept: PERIOP | Facility: HOSPITAL | Age: 65
End: 2025-03-20
Payer: MEDICARE

## 2025-03-20 ENCOUNTER — APPOINTMENT (OUTPATIENT)
Dept: GENERAL RADIOLOGY | Facility: HOSPITAL | Age: 65
End: 2025-03-20
Payer: MEDICARE

## 2025-03-20 ENCOUNTER — ANESTHESIA EVENT CONVERTED (OUTPATIENT)
Dept: ANESTHESIOLOGY | Facility: HOSPITAL | Age: 65
End: 2025-03-20
Payer: MEDICARE

## 2025-03-20 DIAGNOSIS — Z96.652 STATUS POST LEFT KNEE REPLACEMENT: Primary | ICD-10-CM

## 2025-03-20 LAB
GLUCOSE BLDC GLUCOMTR-MCNC: 113 MG/DL (ref 70–130)
GLUCOSE BLDC GLUCOMTR-MCNC: 428 MG/DL (ref 70–130)
GLUCOSE BLDC GLUCOMTR-MCNC: 430 MG/DL (ref 70–130)
GLUCOSE BLDC GLUCOMTR-MCNC: 94 MG/DL (ref 70–130)

## 2025-03-20 PROCEDURE — C1776 JOINT DEVICE (IMPLANTABLE): HCPCS | Performed by: ORTHOPAEDIC SURGERY

## 2025-03-20 PROCEDURE — C1713 ANCHOR/SCREW BN/BN,TIS/BN: HCPCS | Performed by: ORTHOPAEDIC SURGERY

## 2025-03-20 PROCEDURE — 63710000001 INSULIN LISPRO (HUMAN) PER 5 UNITS: Performed by: INTERNAL MEDICINE

## 2025-03-20 PROCEDURE — 25810000003 SODIUM CHLORIDE 0.9 % SOLUTION: Performed by: ORTHOPAEDIC SURGERY

## 2025-03-20 PROCEDURE — 25010000002 HYDROMORPHONE 1 MG/ML SOLUTION

## 2025-03-20 PROCEDURE — 25010000002 ROPIVACAINE HCL-NACL 0.2-0.9 % SOLUTION: Performed by: NURSE ANESTHETIST, CERTIFIED REGISTERED

## 2025-03-20 PROCEDURE — 25010000002 HYDROMORPHONE PER 4 MG: Performed by: ORTHOPAEDIC SURGERY

## 2025-03-20 PROCEDURE — 25810000003 LACTATED RINGERS PER 1000 ML: Performed by: ANESTHESIOLOGY

## 2025-03-20 PROCEDURE — 25010000002 ONDANSETRON PER 1 MG: Performed by: ANESTHESIOLOGY

## 2025-03-20 PROCEDURE — 25010000002 MEPIVACAINE HCL (PF) 1.5 % SOLUTION: Performed by: ANESTHESIOLOGY

## 2025-03-20 PROCEDURE — 25010000002 BUPIVACAINE (PF) 0.25 % SOLUTION: Performed by: ANESTHESIOLOGY

## 2025-03-20 PROCEDURE — 25010000002 PROPOFOL 10 MG/ML EMULSION: Performed by: ANESTHESIOLOGY

## 2025-03-20 PROCEDURE — 25010000002 CEFAZOLIN PER 500 MG: Performed by: ORTHOPAEDIC SURGERY

## 2025-03-20 PROCEDURE — 25010000002 DEXAMETHASONE PER 1 MG: Performed by: ANESTHESIOLOGY

## 2025-03-20 PROCEDURE — 82948 REAGENT STRIP/BLOOD GLUCOSE: CPT

## 2025-03-20 PROCEDURE — 25010000002 LIDOCAINE PF 1% 1 % SOLUTION: Performed by: ANESTHESIOLOGY

## 2025-03-20 PROCEDURE — 73560 X-RAY EXAM OF KNEE 1 OR 2: CPT

## 2025-03-20 PROCEDURE — 63710000001 INSULIN GLARGINE PER 5 UNITS: Performed by: INTERNAL MEDICINE

## 2025-03-20 PROCEDURE — 25010000002 FENTANYL CITRATE (PF) 50 MCG/ML SOLUTION

## 2025-03-20 PROCEDURE — 25010000002 ROPIVACAINE PER 1 MG: Performed by: ORTHOPAEDIC SURGERY

## 2025-03-20 DEVICE — IMPLANTABLE DEVICE: Type: IMPLANTABLE DEVICE | Site: KNEE | Status: FUNCTIONAL

## 2025-03-20 DEVICE — LEGION POSTERIOR STABILIZED HIGH                                    FLEX HIGHLY CROSS LINKED                                    POLYETHYLENE SIZE 3-4 9MM
Type: IMPLANTABLE DEVICE | Site: KNEE | Status: FUNCTIONAL
Brand: LEGION

## 2025-03-20 DEVICE — CMT BONE PALACOS R HI/VISC 1X40: Type: IMPLANTABLE DEVICE | Site: KNEE | Status: FUNCTIONAL

## 2025-03-20 DEVICE — GENESIS II RESURFACING PATELLAR                                    PROSTHESIS  29MM
Type: IMPLANTABLE DEVICE | Site: KNEE | Status: FUNCTIONAL
Brand: GENESIS II

## 2025-03-20 DEVICE — LEGION NARROW POSTERIOR STABILIZED                                    OXINIUM SIZE 6N LEFT
Type: IMPLANTABLE DEVICE | Site: KNEE | Status: FUNCTIONAL
Brand: LEGION

## 2025-03-20 DEVICE — GENESIS II NON-POROUS TIBIAL                                    BASEPLATE SIZE 3 LEFT
Type: IMPLANTABLE DEVICE | Site: KNEE | Status: FUNCTIONAL
Brand: GENESIS II

## 2025-03-20 RX ORDER — MELOXICAM 15 MG/1
15 TABLET ORAL DAILY
Status: DISCONTINUED | OUTPATIENT
Start: 2025-03-20 | End: 2025-03-21 | Stop reason: HOSPADM

## 2025-03-20 RX ORDER — MIDAZOLAM HYDROCHLORIDE 1 MG/ML
0.5 INJECTION, SOLUTION INTRAMUSCULAR; INTRAVENOUS
Status: DISCONTINUED | OUTPATIENT
Start: 2025-03-20 | End: 2025-03-20 | Stop reason: HOSPADM

## 2025-03-20 RX ORDER — SODIUM CHLORIDE, SODIUM LACTATE, POTASSIUM CHLORIDE, CALCIUM CHLORIDE 600; 310; 30; 20 MG/100ML; MG/100ML; MG/100ML; MG/100ML
INJECTION, SOLUTION INTRAVENOUS CONTINUOUS PRN
Status: DISCONTINUED | OUTPATIENT
Start: 2025-03-20 | End: 2025-03-20 | Stop reason: SURG

## 2025-03-20 RX ORDER — ONDANSETRON 2 MG/ML
4 INJECTION INTRAMUSCULAR; INTRAVENOUS ONCE AS NEEDED
Status: DISCONTINUED | OUTPATIENT
Start: 2025-03-20 | End: 2025-03-20 | Stop reason: HOSPADM

## 2025-03-20 RX ORDER — ACETAMINOPHEN 500 MG
TABLET ORAL
Status: COMPLETED
Start: 2025-03-20 | End: 2025-03-20

## 2025-03-20 RX ORDER — BISACODYL 10 MG
10 SUPPOSITORY, RECTAL RECTAL DAILY PRN
Status: DISCONTINUED | OUTPATIENT
Start: 2025-03-20 | End: 2025-03-21 | Stop reason: HOSPADM

## 2025-03-20 RX ORDER — DEXTROSE MONOHYDRATE 25 G/50ML
25 INJECTION, SOLUTION INTRAVENOUS
Status: DISCONTINUED | OUTPATIENT
Start: 2025-03-20 | End: 2025-03-21 | Stop reason: HOSPADM

## 2025-03-20 RX ORDER — ATORVASTATIN CALCIUM 20 MG/1
20 TABLET, FILM COATED ORAL DAILY
Status: DISCONTINUED | OUTPATIENT
Start: 2025-03-20 | End: 2025-03-21 | Stop reason: HOSPADM

## 2025-03-20 RX ORDER — ONDANSETRON 2 MG/ML
4 INJECTION INTRAMUSCULAR; INTRAVENOUS EVERY 6 HOURS PRN
Status: DISCONTINUED | OUTPATIENT
Start: 2025-03-20 | End: 2025-03-21 | Stop reason: HOSPADM

## 2025-03-20 RX ORDER — FAMOTIDINE 20 MG/1
20 TABLET, FILM COATED ORAL ONCE
Status: COMPLETED | OUTPATIENT
Start: 2025-03-20 | End: 2025-03-20

## 2025-03-20 RX ORDER — CEFAZOLIN SODIUM 1 G/3ML
INJECTION, POWDER, FOR SOLUTION INTRAMUSCULAR; INTRAVENOUS
Status: DISPENSED
Start: 2025-03-20 | End: 2025-03-21

## 2025-03-20 RX ORDER — BUPIVACAINE HCL/0.9 % NACL/PF 0.125 %
PLASTIC BAG, INJECTION (ML) EPIDURAL AS NEEDED
Status: DISCONTINUED | OUTPATIENT
Start: 2025-03-20 | End: 2025-03-20 | Stop reason: SURG

## 2025-03-20 RX ORDER — BISACODYL 5 MG/1
10 TABLET, DELAYED RELEASE ORAL DAILY PRN
Status: DISCONTINUED | OUTPATIENT
Start: 2025-03-20 | End: 2025-03-21 | Stop reason: HOSPADM

## 2025-03-20 RX ORDER — IBUPROFEN 600 MG/1
1 TABLET ORAL
Status: DISCONTINUED | OUTPATIENT
Start: 2025-03-20 | End: 2025-03-21 | Stop reason: HOSPADM

## 2025-03-20 RX ORDER — LIDOCAINE HYDROCHLORIDE 10 MG/ML
0.5 INJECTION, SOLUTION EPIDURAL; INFILTRATION; INTRACAUDAL; PERINEURAL ONCE AS NEEDED
Status: COMPLETED | OUTPATIENT
Start: 2025-03-20 | End: 2025-03-20

## 2025-03-20 RX ORDER — CETIRIZINE HYDROCHLORIDE 10 MG/1
5 TABLET ORAL DAILY
Status: DISCONTINUED | OUTPATIENT
Start: 2025-03-20 | End: 2025-03-21 | Stop reason: HOSPADM

## 2025-03-20 RX ORDER — ASPIRIN 81 MG/1
81 TABLET ORAL EVERY 12 HOURS SCHEDULED
Status: DISCONTINUED | OUTPATIENT
Start: 2025-03-21 | End: 2025-03-21 | Stop reason: HOSPADM

## 2025-03-20 RX ORDER — DIPHENHYDRAMINE HCL 25 MG
25 CAPSULE ORAL EVERY 6 HOURS PRN
Status: DISCONTINUED | OUTPATIENT
Start: 2025-03-20 | End: 2025-03-21 | Stop reason: HOSPADM

## 2025-03-20 RX ORDER — OXYCODONE HYDROCHLORIDE 5 MG/1
TABLET ORAL
Status: COMPLETED
Start: 2025-03-20 | End: 2025-03-20

## 2025-03-20 RX ORDER — MIDAZOLAM HYDROCHLORIDE 1 MG/ML
1 INJECTION, SOLUTION INTRAMUSCULAR; INTRAVENOUS
Status: DISCONTINUED | OUTPATIENT
Start: 2025-03-20 | End: 2025-03-20 | Stop reason: HOSPADM

## 2025-03-20 RX ORDER — DULOXETIN HYDROCHLORIDE 30 MG/1
30 CAPSULE, DELAYED RELEASE ORAL DAILY
Status: DISCONTINUED | OUTPATIENT
Start: 2025-03-20 | End: 2025-03-21 | Stop reason: HOSPADM

## 2025-03-20 RX ORDER — ONDANSETRON 2 MG/ML
INJECTION INTRAMUSCULAR; INTRAVENOUS AS NEEDED
Status: DISCONTINUED | OUTPATIENT
Start: 2025-03-20 | End: 2025-03-20 | Stop reason: SURG

## 2025-03-20 RX ORDER — SODIUM CHLORIDE 9 MG/ML
INJECTION, SOLUTION INTRAVENOUS
Status: DISPENSED
Start: 2025-03-20 | End: 2025-03-21

## 2025-03-20 RX ORDER — MAGNESIUM HYDROXIDE 1200 MG/15ML
LIQUID ORAL AS NEEDED
Status: DISCONTINUED | OUTPATIENT
Start: 2025-03-20 | End: 2025-03-20 | Stop reason: HOSPADM

## 2025-03-20 RX ORDER — TRANEXAMIC ACID 10 MG/ML
1000 INJECTION, SOLUTION INTRAVENOUS ONCE
Status: DISCONTINUED | OUTPATIENT
Start: 2025-03-20 | End: 2025-03-20 | Stop reason: HOSPADM

## 2025-03-20 RX ORDER — DOCUSATE SODIUM 100 MG/1
100 CAPSULE, LIQUID FILLED ORAL 2 TIMES DAILY PRN
Status: DISCONTINUED | OUTPATIENT
Start: 2025-03-20 | End: 2025-03-21 | Stop reason: HOSPADM

## 2025-03-20 RX ORDER — NICOTINE POLACRILEX 4 MG
15 LOZENGE BUCCAL
Status: DISCONTINUED | OUTPATIENT
Start: 2025-03-20 | End: 2025-03-21 | Stop reason: HOSPADM

## 2025-03-20 RX ORDER — LIDOCAINE HYDROCHLORIDE 10 MG/ML
INJECTION, SOLUTION EPIDURAL; INFILTRATION; INTRACAUDAL; PERINEURAL AS NEEDED
Status: DISCONTINUED | OUTPATIENT
Start: 2025-03-20 | End: 2025-03-20 | Stop reason: SURG

## 2025-03-20 RX ORDER — TRANEXAMIC ACID 10 MG/ML
1000 INJECTION, SOLUTION INTRAVENOUS ONCE
Status: COMPLETED | OUTPATIENT
Start: 2025-03-20 | End: 2025-03-20

## 2025-03-20 RX ORDER — HYDROMORPHONE HYDROCHLORIDE 1 MG/ML
0.5 INJECTION, SOLUTION INTRAMUSCULAR; INTRAVENOUS; SUBCUTANEOUS
Status: DISCONTINUED | OUTPATIENT
Start: 2025-03-20 | End: 2025-03-20 | Stop reason: HOSPADM

## 2025-03-20 RX ORDER — ONDANSETRON 4 MG/1
4 TABLET, ORALLY DISINTEGRATING ORAL EVERY 6 HOURS PRN
Status: DISCONTINUED | OUTPATIENT
Start: 2025-03-20 | End: 2025-03-21 | Stop reason: HOSPADM

## 2025-03-20 RX ORDER — LABETALOL HYDROCHLORIDE 5 MG/ML
10 INJECTION, SOLUTION INTRAVENOUS EVERY 4 HOURS PRN
Status: DISCONTINUED | OUTPATIENT
Start: 2025-03-20 | End: 2025-03-21 | Stop reason: HOSPADM

## 2025-03-20 RX ORDER — OXYCODONE HYDROCHLORIDE 5 MG/1
5 TABLET ORAL EVERY 4 HOURS PRN
Status: DISCONTINUED | OUTPATIENT
Start: 2025-03-20 | End: 2025-03-21 | Stop reason: HOSPADM

## 2025-03-20 RX ORDER — MELOXICAM 15 MG/1
15 TABLET ORAL ONCE
Status: COMPLETED | OUTPATIENT
Start: 2025-03-20 | End: 2025-03-20

## 2025-03-20 RX ORDER — HYDROMORPHONE HYDROCHLORIDE 2 MG/ML
0.5 INJECTION, SOLUTION INTRAMUSCULAR; INTRAVENOUS; SUBCUTANEOUS
Status: DISCONTINUED | OUTPATIENT
Start: 2025-03-20 | End: 2025-03-21

## 2025-03-20 RX ORDER — ROPIVACAINE HYDROCHLORIDE 5 MG/ML
INJECTION, SOLUTION EPIDURAL; INFILTRATION; PERINEURAL AS NEEDED
Status: DISCONTINUED | OUTPATIENT
Start: 2025-03-20 | End: 2025-03-20 | Stop reason: HOSPADM

## 2025-03-20 RX ORDER — FENTANYL CITRATE 50 UG/ML
50 INJECTION, SOLUTION INTRAMUSCULAR; INTRAVENOUS
Status: DISCONTINUED | OUTPATIENT
Start: 2025-03-20 | End: 2025-03-20 | Stop reason: HOSPADM

## 2025-03-20 RX ORDER — ACETAMINOPHEN 500 MG
1000 TABLET ORAL EVERY 6 HOURS
Status: DISCONTINUED | OUTPATIENT
Start: 2025-03-20 | End: 2025-03-21 | Stop reason: HOSPADM

## 2025-03-20 RX ORDER — DIPHENHYDRAMINE HYDROCHLORIDE 50 MG/ML
25 INJECTION, SOLUTION INTRAMUSCULAR; INTRAVENOUS EVERY 6 HOURS PRN
Status: DISCONTINUED | OUTPATIENT
Start: 2025-03-20 | End: 2025-03-21 | Stop reason: HOSPADM

## 2025-03-20 RX ORDER — QUETIAPINE FUMARATE 25 MG/1
75 TABLET, FILM COATED ORAL NIGHTLY
Status: DISCONTINUED | OUTPATIENT
Start: 2025-03-20 | End: 2025-03-21 | Stop reason: HOSPADM

## 2025-03-20 RX ORDER — FENTANYL CITRATE 50 UG/ML
INJECTION, SOLUTION INTRAMUSCULAR; INTRAVENOUS
Status: COMPLETED
Start: 2025-03-20 | End: 2025-03-20

## 2025-03-20 RX ORDER — NALOXONE HCL 0.4 MG/ML
0.1 VIAL (ML) INJECTION
Status: DISCONTINUED | OUTPATIENT
Start: 2025-03-20 | End: 2025-03-21

## 2025-03-20 RX ORDER — SODIUM CHLORIDE 9 MG/ML
75 INJECTION, SOLUTION INTRAVENOUS CONTINUOUS
Status: ACTIVE | OUTPATIENT
Start: 2025-03-20 | End: 2025-03-21

## 2025-03-20 RX ORDER — FAMOTIDINE 10 MG/ML
20 INJECTION, SOLUTION INTRAVENOUS ONCE
Status: CANCELLED | OUTPATIENT
Start: 2025-03-20 | End: 2025-03-20

## 2025-03-20 RX ORDER — OXYCODONE HYDROCHLORIDE 10 MG/1
10 TABLET ORAL EVERY 4 HOURS PRN
Status: DISCONTINUED | OUTPATIENT
Start: 2025-03-20 | End: 2025-03-21 | Stop reason: HOSPADM

## 2025-03-20 RX ORDER — INSULIN LISPRO 100 [IU]/ML
2-7 INJECTION, SOLUTION INTRAVENOUS; SUBCUTANEOUS
Status: DISCONTINUED | OUTPATIENT
Start: 2025-03-20 | End: 2025-03-21 | Stop reason: HOSPADM

## 2025-03-20 RX ORDER — SODIUM CHLORIDE, SODIUM LACTATE, POTASSIUM CHLORIDE, CALCIUM CHLORIDE 600; 310; 30; 20 MG/100ML; MG/100ML; MG/100ML; MG/100ML
9 INJECTION, SOLUTION INTRAVENOUS CONTINUOUS
Status: ACTIVE | OUTPATIENT
Start: 2025-03-21 | End: 2025-03-21

## 2025-03-20 RX ORDER — ACETAMINOPHEN 500 MG
1000 TABLET ORAL ONCE
Status: COMPLETED | OUTPATIENT
Start: 2025-03-20 | End: 2025-03-20

## 2025-03-20 RX ORDER — SODIUM CHLORIDE 0.9 % (FLUSH) 0.9 %
10 SYRINGE (ML) INJECTION AS NEEDED
Status: DISCONTINUED | OUTPATIENT
Start: 2025-03-20 | End: 2025-03-20 | Stop reason: HOSPADM

## 2025-03-20 RX ORDER — AMOXICILLIN 250 MG
2 CAPSULE ORAL 2 TIMES DAILY PRN
Status: DISCONTINUED | OUTPATIENT
Start: 2025-03-20 | End: 2025-03-21 | Stop reason: HOSPADM

## 2025-03-20 RX ORDER — KETOROLAC TROMETHAMINE 30 MG/ML
15 INJECTION, SOLUTION INTRAMUSCULAR; INTRAVENOUS EVERY 6 HOURS PRN
Status: DISCONTINUED | OUTPATIENT
Start: 2025-03-20 | End: 2025-03-21 | Stop reason: HOSPADM

## 2025-03-20 RX ORDER — SODIUM CHLORIDE 0.9 % (FLUSH) 0.9 %
10 SYRINGE (ML) INJECTION EVERY 12 HOURS SCHEDULED
Status: DISCONTINUED | OUTPATIENT
Start: 2025-03-20 | End: 2025-03-20 | Stop reason: HOSPADM

## 2025-03-20 RX ORDER — DEXAMETHASONE SODIUM PHOSPHATE 4 MG/ML
INJECTION, SOLUTION INTRA-ARTICULAR; INTRALESIONAL; INTRAMUSCULAR; INTRAVENOUS; SOFT TISSUE AS NEEDED
Status: DISCONTINUED | OUTPATIENT
Start: 2025-03-20 | End: 2025-03-20 | Stop reason: SURG

## 2025-03-20 RX ORDER — ROPIVACAINE HYDROCHLORIDE 2 MG/ML
INJECTION, SOLUTION EPIDURAL; INFILTRATION; PERINEURAL CONTINUOUS
Status: DISCONTINUED | OUTPATIENT
Start: 2025-03-20 | End: 2025-03-21 | Stop reason: HOSPADM

## 2025-03-20 RX ORDER — LISINOPRIL 20 MG/1
20 TABLET ORAL DAILY
Status: DISCONTINUED | OUTPATIENT
Start: 2025-03-21 | End: 2025-03-21 | Stop reason: HOSPADM

## 2025-03-20 RX ORDER — SERTRALINE HYDROCHLORIDE 25 MG/1
25 TABLET, FILM COATED ORAL DAILY
Status: DISCONTINUED | OUTPATIENT
Start: 2025-03-20 | End: 2025-03-21 | Stop reason: HOSPADM

## 2025-03-20 RX ORDER — BUPIVACAINE HYDROCHLORIDE 2.5 MG/ML
INJECTION, SOLUTION EPIDURAL; INFILTRATION; INTRACAUDAL
Status: DISCONTINUED | OUTPATIENT
Start: 2025-03-20 | End: 2025-03-20 | Stop reason: SURG

## 2025-03-20 RX ORDER — PROPOFOL 10 MG/ML
VIAL (ML) INTRAVENOUS AS NEEDED
Status: DISCONTINUED | OUTPATIENT
Start: 2025-03-20 | End: 2025-03-20 | Stop reason: SURG

## 2025-03-20 RX ADMIN — Medication 100 MCG: at 13:50

## 2025-03-20 RX ADMIN — FAMOTIDINE 20 MG: 20 TABLET, FILM COATED ORAL at 11:52

## 2025-03-20 RX ADMIN — SODIUM CHLORIDE 75 ML/HR: 0.9 INJECTION, SOLUTION INTRAVENOUS at 18:55

## 2025-03-20 RX ADMIN — Medication 100 MCG: at 13:23

## 2025-03-20 RX ADMIN — OXYCODONE HYDROCHLORIDE 5 MG: 5 TABLET ORAL at 17:39

## 2025-03-20 RX ADMIN — ATORVASTATIN CALCIUM 20 MG: 20 TABLET, FILM COATED ORAL at 20:37

## 2025-03-20 RX ADMIN — SERTRALINE 25 MG: 25 TABLET, FILM COATED ORAL at 20:37

## 2025-03-20 RX ADMIN — INSULIN GLARGINE 15 UNITS: 100 INJECTION, SOLUTION SUBCUTANEOUS at 21:29

## 2025-03-20 RX ADMIN — DULOXETINE HYDROCHLORIDE 30 MG: 30 CAPSULE, DELAYED RELEASE ORAL at 20:37

## 2025-03-20 RX ADMIN — HYDROMORPHONE HYDROCHLORIDE 0.5 MG: 2 INJECTION, SOLUTION INTRAMUSCULAR; INTRAVENOUS; SUBCUTANEOUS at 23:25

## 2025-03-20 RX ADMIN — Medication 50 MCG: at 13:17

## 2025-03-20 RX ADMIN — CETIRIZINE HYDROCHLORIDE 5 MG: 10 TABLET, FILM COATED ORAL at 20:37

## 2025-03-20 RX ADMIN — PROPOFOL 50 MG: 10 INJECTION, EMULSION INTRAVENOUS at 12:43

## 2025-03-20 RX ADMIN — INSULIN LISPRO 7 UNITS: 100 INJECTION, SOLUTION INTRAVENOUS; SUBCUTANEOUS at 21:28

## 2025-03-20 RX ADMIN — BUPIVACAINE HYDROCHLORIDE 20 ML: 2.5 INJECTION, SOLUTION EPIDURAL; INFILTRATION; INTRACAUDAL; PERINEURAL at 14:30

## 2025-03-20 RX ADMIN — LIDOCAINE HYDROCHLORIDE 0.5 ML: 10 INJECTION, SOLUTION EPIDURAL; INFILTRATION; INTRACAUDAL; PERINEURAL at 11:53

## 2025-03-20 RX ADMIN — PROPOFOL 80 MCG/KG/MIN: 10 INJECTION, EMULSION INTRAVENOUS at 12:43

## 2025-03-20 RX ADMIN — SODIUM CHLORIDE, POTASSIUM CHLORIDE, SODIUM LACTATE AND CALCIUM CHLORIDE: 600; 310; 30; 20 INJECTION, SOLUTION INTRAVENOUS at 13:51

## 2025-03-20 RX ADMIN — TRANEXAMIC ACID 1000 MG: 10 INJECTION, SOLUTION INTRAVENOUS at 12:50

## 2025-03-20 RX ADMIN — ONDANSETRON 4 MG: 2 INJECTION INTRAMUSCULAR; INTRAVENOUS at 13:52

## 2025-03-20 RX ADMIN — Medication 1000 MG: at 15:08

## 2025-03-20 RX ADMIN — SODIUM CHLORIDE, POTASSIUM CHLORIDE, SODIUM LACTATE AND CALCIUM CHLORIDE: 600; 310; 30; 20 INJECTION, SOLUTION INTRAVENOUS at 12:41

## 2025-03-20 RX ADMIN — HYDROMORPHONE HYDROCHLORIDE 0.5 MG: 1 INJECTION, SOLUTION INTRAMUSCULAR; INTRAVENOUS; SUBCUTANEOUS at 15:48

## 2025-03-20 RX ADMIN — FENTANYL CITRATE 50 MCG: 50 INJECTION, SOLUTION INTRAMUSCULAR; INTRAVENOUS at 15:10

## 2025-03-20 RX ADMIN — TRANEXAMIC ACID 1000 MG: 10 INJECTION, SOLUTION INTRAVENOUS at 14:00

## 2025-03-20 RX ADMIN — HYDROMORPHONE HYDROCHLORIDE 0.5 MG: 2 INJECTION, SOLUTION INTRAMUSCULAR; INTRAVENOUS; SUBCUTANEOUS at 20:36

## 2025-03-20 RX ADMIN — ACETAMINOPHEN 1000 MG: 500 TABLET ORAL at 17:38

## 2025-03-20 RX ADMIN — OXYCODONE 5 MG: 5 TABLET ORAL at 17:39

## 2025-03-20 RX ADMIN — MEPIVACAINE HYDROCHLORIDE 4 ML: 15 INJECTION, SOLUTION EPIDURAL; INFILTRATION at 12:48

## 2025-03-20 RX ADMIN — SODIUM CHLORIDE, SODIUM LACTATE, POTASSIUM CHLORIDE, CALCIUM CHLORIDE 9 ML/HR: 20; 30; 600; 310 INJECTION, SOLUTION INTRAVENOUS at 11:53

## 2025-03-20 RX ADMIN — CEFAZOLIN 1000 MG: 1 INJECTION, POWDER, FOR SOLUTION INTRAMUSCULAR; INTRAVENOUS at 14:51

## 2025-03-20 RX ADMIN — SODIUM CHLORIDE 2000 MG: 900 INJECTION INTRAVENOUS at 12:43

## 2025-03-20 RX ADMIN — MELOXICAM 15 MG: 15 TABLET ORAL at 11:52

## 2025-03-20 RX ADMIN — LIDOCAINE HYDROCHLORIDE 50 MG: 10 INJECTION, SOLUTION EPIDURAL; INFILTRATION; INTRACAUDAL; PERINEURAL at 12:43

## 2025-03-20 RX ADMIN — Medication 100 MCG: at 13:11

## 2025-03-20 RX ADMIN — QUETIAPINE FUMARATE 75 MG: 25 TABLET ORAL at 21:28

## 2025-03-20 RX ADMIN — Medication 100 MCG: at 13:32

## 2025-03-20 RX ADMIN — DEXAMETHASONE SODIUM PHOSPHATE 8 MG: 4 INJECTION INTRA-ARTICULAR; INTRALESIONAL; INTRAMUSCULAR; INTRAVENOUS; SOFT TISSUE at 13:52

## 2025-03-20 RX ADMIN — ACETAMINOPHEN 1000 MG: 500 TABLET ORAL at 11:52

## 2025-03-20 NOTE — ANESTHESIA PROCEDURE NOTES
Peripheral Block      Patient reassessed immediately prior to procedure    Patient location during procedure: post-op  Start time: 3/20/2025 2:20 PM  Stop time: 3/20/2025 2:30 PM  Reason for block: at surgeon's request and post-op pain management  Performed by  CRNA/CAA: Norman Campa, CRNA  Assisted by: Gricel Flaherty RNSRNA: Mariajose Newell SRNA  Preanesthetic Checklist  Completed: patient identified, IV checked, site marked, risks and benefits discussed, surgical consent, monitors and equipment checked, pre-op evaluation and timeout performed  Prep:  Pt Position: supine  Sterile barriers:cap, gloves, mask, sterile barriers and washed/disinfected hands  Prep: ChloraPrep  Patient monitoring: blood pressure monitoring, continuous pulse oximetry and EKG  Procedure  Performed under: spinal  Guidance:ultrasound guided    ULTRASOUND INTERPRETATION.  Using ultrasound guidance a 20 G gauge needle was placed in close proximity to the nerve, at which point, under ultrasound guidance anesthetic was injected in the area of the nerve and spread of the anesthesia was seen on ultrasound in close proximity thereto.  There were no abnormalities seen on ultrasound; a digital image was taken; and the patient tolerated the procedure with no complications. Images:still images obtained, printed/placed on chart    Laterality:left  Block Type:adductor canal block  Injection Technique:catheter  Needle Type:Tuohy and echogenic  Needle Gauge:18 G  Resistance on Injection: none  Catheter Size:20 G (20g)  Cath Depth at skin: 10 cm    Medications Used: bupivacaine PF (MARCAINE) 0.25 % injection - Injection   20 mL - 3/20/2025 2:30:00 PM      Medications  Preservative Free Saline:5ml    Post Assessment  Injection Assessment: negative aspiration for heme, incremental injection and no paresthesia on injection  Patient Tolerance:comfortable throughout block  Complications:no  Additional Notes  CATHETER   A high-frequency linear transducer,  "with sterile cover, was placed on the anterior mid-thigh (between the anterior superior iliac spine and patella). The transducer was then moved medially to identify the Sartorius muscle (Epifanio), Vastus Medialis muscle (VMM), Superficial Femoral Artery (SFA) and Vein. The transducer was then moved cephalad or caudad to position the SFA in the middle of the Epifanio. The insertion site was prepped and draped in sterile fashion. Skin and cutaneous tissue was infiltrated with 2-5 ml of 1% Lidocaine. Using ultrasound-guidance, an 18-gauge Contiplex Ultra 360 Touhy needle was advanced in plane from lateral to medial. Preservative-free normal saline was utilized for hydro-dissection of tissue, advancement of Touhy, and to confirm needle placement below the fascial plane of the Epifanio where the Nerve to the VMM is located. Local anesthetic (LA) 5 ml deposited here. The Touhy needle continues its path lateral to the SFA at the level of the Saphenous Nerve. The remainder of the LA was deposited at the 10-11 o'clock position of the SFA. This injection created a space between the Epifanio and the SFA. Aspiration every 5 ml to prevent intravascular injection. Injection was completed with negative aspiration of blood and negative intravascular injection. Injection pressures were normal with minimal resistance. A 20-gauge Contiplex Echo catheter was placed through the needle and advance out the tip of the Touhy 3-5 cm anterior to the SFA. The Touhy needle was then removed, and final catheter position verified at the 12 o'clock position to the SFA. The catheter was secured in the usual fashion with skin glue, benzoin, steri-strips, CHG tegaderm and label noting \"Nerve Block Catheter\". Jerk tape applied at yellow connector and catheter connection.   Performed by: Johny Dwyer Jr., MD          "

## 2025-03-20 NOTE — OP NOTE
Preoperative diagnosis:Left knee end stage osteoarthritis    Postoperative diagnosis: Left knee end stage osteoarthritis    Operative procedure: Left total knee arthroplasty with CORI robot computer guidance    Surgeon: Dr. Ivan Chau    Assistant: CROW Pratt.  Necessary during the case for assistance with positioning of the patient, exposure, implantation of components and closure.  Necessary for the success and completion of the case.    Anesthesia: Spinal with local and adductor canal catheter    Estimated blood loss: Minimal    Surgical Approach: Knee Medial Parapatellar     Implants: [employer] posterior stabilized total knee arthroplasty size 6 narrow femur, 3 tibia, 29 patella, 9 polyethylene.    Tourniquet time: 65 minutes at 300 mmHg    Indications for procedure: Octavia is a very pleasant 65-year-old female who has struggled with end-stage activity limiting left knee pain secondary to osteoarthritis.  X-rays in February revealed severe tricompartmental degenerative changes in a varus knee with complete loss of medial joint space and marginal osteophyte formation.  They have failed exhaustive conservative treatment measures including medication, activity modification, injections and physical therapy.  History of removal of left knee patellar hardware from prior ORIF in 2017.  She takes ibuprofen for her knee pain.  The last cortisone injection was in November.  History of a right reverse shoulder arthroplasty 2017.  After undergoing a shared decision-making process they agreed to proceed with left total knee arthroplasty.  Surgical consent form was signed.    Description of procedure: The patient was seen in the preoperative holding area and the left knee was signed to confirm the correct operative site.  They were seen by anesthesia.  They received Ancef 2 g IV prophylactic antibiotics within 1 hour of incision time.  They were brought back to the operating room.  Spinal was performed without  difficulty and they were given sedation throughout the case.  Patient placed in the supine position and all bony prominences were well-padded.  Nonsterile tourniquet was applied to the thigh.  The left lower extremity was prepped and draped in the usual sterile fashion and timeout was performed to confirm left total knee arthroplasty for patient Octavia Rice.    The left lower extremity was exsanguinated with an Esmarch.  Tourniquet was inflated to 300 mmHg.  With the knee flexed a midline incision was made with a 10 blade scalpel.  Adequate hemostasis maintained with Bovie electrocautery.  Full-thickness medial and lateral flaps were elevated.  Using a fresh 10 blade scalpel I made a medial parapatellar arthrotomy.  The patella was everted.  Patella fat pad and anterior femoral fat pads were excised.  Marginal osteophytes were removed.  Medial release was performed for this varus knee using Bovie electrocautery.  Z retractors were placed medially and laterally.    Guide pins for the CORI were placed in the medial tibia shaft and distal medial femur. Sensors were placed and data capture was performed per standard CORI computer guidance technique.  Femoral and tibial sizes were determined.    The distal femoral cut was then made with a job at the previously selected depth and amount of valgus (3-5 degrees). The 4-in-1 cutting guide for the previously selected femur size of 6 was pinned in place at the appropriate amount of external rotation.  Anterior and posterior cuts were made as were the chamfer cuts.  Cut bone was removed.  We then turned our attention to the tibia.    The tibia was subluxed anteriorly. PCL retractor was placed as were medial and lateral Hohmann retractors.  The tibial cutting guide was then pinned in place using CORI guidance for the proximal tibial cut. We then used the oscillating saw to make the proximal tibial cut and this cut was then checked with the CORI. Medial and lateral menisci were  then excised as were posterior osteophytes.    Flexion and extension gaps were then checked and with a 9 mm block we achieved full extension and flexion with excellent alignment. The tibia was sized to a 3 tibial tray centered off the medial third of the tibial tubercle.  The tray was pinned in place.  We then impacted the tibial fins.  Size 6  trial femur was then placed and box cut was made with the reamer and punch. We trialed with a size 9 polyethylene, 6 femur and 3 tibia.  With these trial components in place we achieved full extension and flexion with excellent alignment and stable throughout.     We then turned our attention to the patella.  Patella was sized to 22 mm in thickness and we made a 9 mm patellar cut with the reciprocal saw.  Patella drill holes were made with the appropriate size guide.  A 29 trial button was placed and there was excellent tracking with the no thumbs technique.    Trial components were then removed.  Final components were opened on the back table.  Posterior capsule was injected with 20 mL of half percent ropivacaine, morphine not used due to patient allergy.  Cement was mixed on the back table.  The knee was copiously irrigated with Pulsavac lavage.  The knee was thoroughly dried.  Cement was then applied to the tibia and final size 3 tibial tray was impacted in place and excess cement was removed.  Cement was applied to the femur and final size 6 narrow femur was impacted in place and excess cement removed.  We then placed a 9 mm polyethylene-this was seen to be fully seated in the tibial tray.  Knee was then placed in extension and we applied cement to the cut patellar surface and 29 patella was held in place with patellar clamp.  All excess cement was removed.  The knee was left in extension while cement cured.  While the cement cured we removed the CORI tibial and femoral pins and sensors.  Tibial pin incisions were closed with 3-0 monocryl.    Once the cement was fully  cured we irrigated the knee with diluted Betadine solution and Pulsavac lavage.  The knee was taken through full range of motion and seen to achieve full extension and flexion with excellent patellar tracking.    We then proceeded with closure.  The deep fascia was closed with a #1 Stratafix barbed suture.  Dermis was closed with 2-0 Vicryl.  Skin was closed with a running 3-0 Stratafix barbed subcuticular suture.    A sterile dressing was then applied with mesh dressing and glue.  We then applied 4x4's, ABDs, soft roll and a six-inch Ace bandage.    Tourniquet was deflated at 65 minutes.  Patient was transferred to recovery in stable condition.  All sponge and needle counts were correct ×2.  Patient received first dose of tranexamic acid just prior to incision and the second dose 5-10 minutes prior to letting down the tourniquet to minimize bleeding.    Postoperative plan:  Patient will be admitted to Dr. AN for medical management  Mobilize starting today with PT/OT as tolerated  Start aspirin for DVT prophylaxis tomorrow   consult for physical therapy and home equipment needs  Follow-up with me in 2-3 weeks.    Jamir Chau MD  03/20/25  14:32 EDT    CC: Dr. Cat Bangura

## 2025-03-20 NOTE — ANESTHESIA PREPROCEDURE EVALUATION
Anesthesia Evaluation     Patient summary reviewed and Nursing notes reviewed   no history of anesthetic complications:   NPO Solid Status: > 8 hours  NPO Liquid Status: > 2 hours           Airway   Mallampati: II  TM distance: >3 FB  Neck ROM: full  No difficulty expected  Dental - normal exam     Pulmonary    (+) ,sleep apnea on CPAP  (-) COPD, asthma, recent URI  Cardiovascular     ECG reviewed    (+) hypertension, hyperlipidemia  (-) dysrhythmias, angina, cardiac stents      Neuro/Psych  (+) psychiatric history Anxiety  (-) seizures, CVA  GI/Hepatic/Renal/Endo    (+) obesity, diabetes mellitus    ROS Comment: On GLP1, last dose 3/4/25    Musculoskeletal     Abdominal    Substance History      OB/GYN          Other   arthritis,                 Anesthesia Plan    ASA 3     spinal     (Post-op ACB)  intravenous induction     Anesthetic plan, risks, benefits, and alternatives have been provided, discussed and informed consent has been obtained with: patient.  Pre-procedure education provided  Plan discussed with CRNA.    CODE STATUS:

## 2025-03-20 NOTE — ANESTHESIA PROCEDURE NOTES
Spinal Block      Patient reassessed immediately prior to procedure    Patient location during procedure: OR  Indication:at surgeon's request  Preanesthetic Checklist  Completed: patient identified, IV checked, site marked, risks and benefits discussed, surgical consent, monitors and equipment checked, pre-op evaluation and timeout performed  Spinal Block Prep:  Patient Position:sitting  Sterile Tech:cap, gloves, sterile barriers and mask  Prep:Chloraprep  Patient Monitoring:blood pressure monitoring, continuous pulse oximetry and EKG    Spinal Block Procedure  Approach:midline  Guidance:landmark technique and palpation technique  Location:L4-L5  Needle Type:Quincke  Needle Gauge:22 G  Placement of Spinal needle event:cerebrospinal fluid aspirated  Paresthesia: no  Fluid Appearance:clear  Medications: Mepivacaine HCl (PF) (CARBOCAINE) 1.5 % injection - Injection   4 mL - 3/20/2025 12:48:00 PM   Post Assessment  Patient Tolerance:patient tolerated the procedure well with no apparent complications  Complications no  Additional Notes  Procedure:  Pt assisted to sitting position, with legs in position of comfort over side of bed.  Pt. instructed in optimal spine presentation, the spine was prepped/ Draped and the skin at insertion site was anesthetized with 1% Lidocaine 2 ml.  The spinal needle was then advanced until CSF flow was obtained and LA was injected:

## 2025-03-20 NOTE — ANESTHESIA POSTPROCEDURE EVALUATION
Patient: Octavia Rice    Procedure Summary       Date: 03/20/25 Room / Location: [facility] OR [address] [place] [address] OR; [facility] ANESTHESIA    Anesthesia Start: 1241 Anesthesia Stop: 1417    Procedures:       TOTAL KNEE ARTHROPLASTY WITH CORI ROBOT (Left: Knee)      ANESTHESIA PERIPHERAL BLOCK Diagnosis:     Scheduled Providers: Jamir Chau MD Provider: Johny Dwyer Jr., MD    Anesthesia Type: spinal ASA Status: 3            Anesthesia Type: spinal    Vitals  Vitals Value Taken Time   BP     Temp     Pulse     Resp     SpO2 97 % 03/20/25 14:17           Post Anesthesia Care and Evaluation    Patient location during evaluation: PACU  Patient participation: complete - patient participated  Level of consciousness: awake and alert  Pain management: adequate    Airway patency: patent  Anesthetic complications: No anesthetic complications  PONV Status: none  Cardiovascular status: hemodynamically stable and acceptable  Respiratory status: nonlabored ventilation, acceptable and nasal cannula  Hydration status: acceptable

## 2025-03-20 NOTE — H&P
Pre-Op H&P  Octavia Rice  [id number]  1960      Chief complaint: Left knee pain      Subjective:  Patient is a 65 y.o.female presents for scheduled surgery by Dr. Chau. She anticipates a TOTAL KNEE ARTHROPLASTY WITH CORI ROBOT  today.  She reports worsening left knee pain since November.  She denies injury.  She denies use of assistive device for ambulation or recent falls.  Conservative treatments failed to provide lasting benefits.      Review of Systems:  Constitutional-- No fever, chills or sweats. No fatigue.  CV-- No chest pain, palpitation or syncope. +HTN, HLD  Resp-- No SOB, cough, hemoptysis  Skin--No rashes or lesions      Allergies:   Allergies   Allergen Reactions    Codeine Nausea And Vomiting    Morphine And Codeine Itching     Severe hives- purple color to face    Penicillins Rash     Had pcn as child and unsure of reaction         Home Meds:  Medications Prior to Admission   Medication Sig Dispense Refill Last Dose/Taking    aspirin 81 MG EC tablet Take 1 tablet by mouth Daily.       ASTAXANTHIN PO Take  by mouth. 12 mg per serving once day       BD Pen Needle Sherri 2nd Gen 32G X 4 MM misc        BD Pen Needle Sherri 2nd Gen 32G X 4 MM misc USE ONE EACH  each 3     Blood Glucose Monitoring Suppl (OneTouch Verio) w/Device kit Use 1 each Daily. 1 kit 0     Coenzyme Q10 (CoQ10) 200 MG capsule Take  by mouth. Take one tablet by mouth daily       Continuous Blood Gluc Sensor (FreeStyle Camelia 2 Sensor) misc Use 1 each Every 14 (Fourteen) Days. 6 each 3     cyclobenzaprine (FLEXERIL) 10 MG tablet Take 1 tablet by mouth 2 (Two) Times a Day As Needed for Muscle Spasms. 30 tablet 3     Droplet Pen Needles 32G X 4 MM misc USE ONE NEEDLE EACH TIME ONCE DAILY. 100 each 1     DULoxetine (CYMBALTA) 30 MG capsule Take 1 capsule by mouth Daily. 90 capsule 3     glucose blood test strip Use as instructed 2-3 times a day, provide strips for the Onetouch meter 50 each 12     Insulin Glargine (Lantus  SoloStar) 100 UNIT/ML injection pen INJECT 30 UNITS UNDER THE SKIN INTO THE APPROPRIATE AREA AS DIRECTED DAILY 45 mL 3     levocetirizine (Xyzal) 5 MG tablet Take 1 tablet by mouth Every Evening for 30 days. 30 tablet 6     lisinopril (PRINIVIL,ZESTRIL) 20 MG tablet TAKE 1 TABLET BY MOUTH DAILY 90 tablet 3     Multiple Minerals-Vitamins (CALCIUM-MAGNESIUM-ZINC-D3 PO) Take  by mouth.       QUEtiapine (SEROquel) 25 MG tablet Take 3 tablets by mouth Every Night. 270 tablet 2     Semaglutide, 2 MG/DOSE, (Ozempic, 2 MG/DOSE,) 8 MG/3ML solution pen-injector Inject 2 mg under the skin into the appropriate area as directed 1 (One) Time Per Week. 3 mL 2     sertraline (Zoloft) 25 MG tablet Take 1 tablet by mouth Daily. 90 tablet 3     simvastatin (ZOCOR) 40 MG tablet Take 1 tablet by mouth Every Night. 90 tablet 1     vitamin D (ERGOCALCIFEROL) 1.25 MG (59149 UT) capsule capsule Take 1 capsule by mouth 1 (One) Time Per Week. 15 capsule 3          PMH:   Past Medical History:   Diagnosis Date    Acute upper respiratory infection     Anxiety 08/07/2017    Community acquired pneumonia     Depression     Diabetes mellitus     dx 1998- checks fsbs weekly    Fracture, stress, metatarsal     STRESS FRACTURE ALONG THE SHAFT OF THE FIFTH RIGHT METATARSAL    Hyperlipidemia     IBS (irritable bowel syndrome)     Insomnia 08/07/2017    Irritable bowel syndrome     Obesity     Shoulder pain     Sleep apnea     CPAP COMPLIANT    Tobacco abuse 08/07/2017    Type 2 diabetes mellitus 08/07/2017    Type 2 diabetes mellitus, uncontrolled     Vitamin D deficiency      PSH:    Past Surgical History:   Procedure Laterality Date    COLONOSCOPY      within last 5 years    KNEE ARTHROSCOPY W/ MENISCAL REPAIR Right     PATELLA OPEN REDUCTION INTERNAL FIXATION Left 10/27/2016    Procedure: LEFT PATELLA OPEN REDUCTION INTERNAL FIXATION;  Surgeon: Marshall Meyers MD;  Location: [facility];  Service:     SHOULDER OPEN REDUCTION INTERNAL FIXATION Right  10/27/2016    Procedure: RIGHT SHOULDER CLOSED REDUCTION ;  Surgeon: Marshall Meyers MD;  Location:  [facility];  Service:     SHOULDER OPEN REDUCTION INTERNAL FIXATION Right 10/31/2016    Procedure: RIGHT SHOULDER OPEN REDUCTION INTERNAL FIXATION WITH PLATE FOR FRACTURE;  Surgeon: Jaguar Marsh MD;  Location:  [facility];  Service:     TONSILLECTOMY      TOTAL ABDOMINAL HYSTERECTOMY WITH SALPINGO OOPHORECTOMY      TOTAL SHOULDER ARTHROPLASTY W/ DISTAL CLAVICLE EXCISION Right 2017    Procedure: REMOVAL RIGHT PROXIMAL HUMERUS PLATE, RIGHT REVERSE TOTAL SHOULDER ARTHROPLASTY, SHOULDER HARDWARE REMOVAL;  Surgeon: Jaguar Marsh MD;  Location:  [facility];  Service:     URETHRAL SUSPENSION      FOR STRESS INCONTINENCE       Immunization History:  Influenza: UTD  Pneumococcal: UTD  Tetanus: UTD    Social History:   Tobacco:   Social History     Tobacco Use   Smoking Status Former    Current packs/day: 0.00    Average packs/day: 0.5 packs/day for 1 year (0.5 ttl pk-yrs)    Types: Cigarettes    Start date: 2020    Quit date: 2021    Years since quittin.2    Passive exposure: Past   Smokeless Tobacco Never   Tobacco Comments    quit 10/27/2016      Alcohol:     Social History     Substance and Sexual Activity   Alcohol Use Yes    Comment: rare         Physical Exam: VS: /72  HR 74  RR 16  T 97.6  Sat 94%RA      General Appearance:    Alert, cooperative, no distress, appears stated age   Head:    Normocephalic, without obvious abnormality, atraumatic   Lungs:     Clear to auscultation bilaterally, respirations unlabored    Heart:   Regular rate and rhythm, S1 and S2 normal    Abdomen:    Soft without tenderness   Extremities:   Extremities normal, atraumatic, no cyanosis or edema   Skin:   Skin color, texture, turgor normal, no rashes or lesions   Neurologic:   Grossly intact     Results Review:     LABS:  Lab Results   Component Value Date    WBC 8.00 2025    HGB 12.9 2025    HCT  39.4 03/13/2025    MCV 90.6 03/13/2025     03/13/2025    NEUTROABS 6.36 07/27/2022    GLUCOSE 185 (H) 03/13/2025    BUN 15 03/13/2025    CREATININE 0.72 03/13/2025    EGFRIFNONA 110 09/21/2021    EGFRIFAFRI 133 09/21/2021     03/13/2025    K 3.9 03/13/2025     03/13/2025    CO2 20.0 (L) 03/13/2025    CALCIUM 9.7 03/13/2025    ALBUMIN 4.5 08/13/2024    AST 17 08/13/2024    ALT 14 08/13/2024    BILITOT 0.5 08/13/2024       RADIOLOGY:  Imaging Results (Last 72 Hours)       ** No results found for the last 72 hours. **            I reviewed the patient's new clinical results.    Cancer Staging (if applicable)  Cancer Patient: __ yes __no __unknown; If yes, clinical stage T:__ N:__M:__, stage group or __N/A      Impression: Left knee pain      Plan: TOTAL KNEE ARTHROPLASTY WITH CORI ROBOT -left      Tiffany Lanza, SANDER   3/20/2025   11:25 EDT

## 2025-03-20 NOTE — BRIEF OP NOTE
TOTAL KNEE ARTHROPLASTY WITH CORI ROBOT  Progress Note    Octavia Rice  3/20/2025    Pre-op Diagnosis:   left knee osteoarthritis       Post-Op Diagnosis Codes:     * Primary localized osteoarthritis of left knee [M17.12]    Procedure(s):      Procedure(s):  TOTAL KNEE ARTHROPLASTY WITH CORI ROBOT    Surgical Approach: Knee Medial Parapatellar            Surgeon(s):  Jamir Chau MD    Asst: CROW Pratt    Anesthesia: Spinal with local and adductor canal catheter    Staff:   Circulator: Irlanda Capone RN  Scrub Person: Maximus Brown  Vendor Representative: Suhas Christie ([employer])  Nursing Assistant: Shaneka Banegas       Estimated Blood Loss: 50 mL    Urine Voided: * No values recorded between 3/20/2025 12:36 PM and 3/20/2025  2:16 PM *    Specimens:                None      Drains: * No LDAs found *    Findings: left knee severe tricompartmental degenerative changes with varus deformity       Complications: none    Implants: [employer] posterior stabilized total knee arthroplasty with a size 6 narrow femur, 3 tibia, 9 polyethylene and 29 patella    Tourniquet time: 65 minutes at 300 mmHg          Jamir Chau MD     Date: 3/20/2025  Time: 14:29 EDT

## 2025-03-20 NOTE — H&P
Patient Name: Octavia Rice  MRN: [id number]  : 1960  DOS: 3/20/2025    Attending: Jamir Chau,*    Primary Care Provider: Cat Bangura MD      Chief complaint:  Left Knee Pain.    Subjective   Patient is a pleasant 65 y.o. female presented for scheduled surgery by     Per his note: (Octavia is a very pleasant 65-year-old female who has struggled with end-stage activity limiting left knee pain secondary to osteoarthritis.  X-rays in February revealed severe tricompartmental degenerative changes in a varus knee with complete loss of medial joint space and marginal osteophyte formation.  They have failed exhaustive conservative treatment measures including medication, activity modification, injections and physical therapy.  History of removal of left knee patellar hardware from prior ORIF in .  She takes ibuprofen for her knee pain.  The last cortisone injection was in November.  History of a right reverse shoulder arthroplasty .  After undergoing a shared decision-making process they agreed to proceed with left total knee arthroplasty.  Surgical consent form was signed. )    I saw her preop, patient later underwent left total knee arthroplasty under spinal anesthesia, tolerated surgery well.  Adductor canal nerve block catheter was placed by acute pain service, and patient was admitted for further management.      Patient has no history of DVT or PE.    Reviewed with patient past medical history and home medications    Allergies:  Allergies   Allergen Reactions    Codeine Nausea And Vomiting    Morphine And Codeine Itching     Severe hives- purple color to face    Penicillins Rash     Had pcn as child and unsure of reaction       Meds:  Medications Prior to Admission   Medication Sig Dispense Refill Last Dose/Taking    aspirin 81 MG EC tablet Take 1 tablet by mouth Daily.   3/19/2025 at  3:00 PM    DULoxetine (CYMBALTA) 30 MG capsule Take 1 capsule by mouth Daily. 90 capsule  3 3/19/2025    Insulin Glargine (Lantus SoloStar) 100 UNIT/ML injection pen INJECT 30 UNITS UNDER THE SKIN INTO THE APPROPRIATE AREA AS DIRECTED DAILY 45 mL 3 3/19/2025    lisinopril (PRINIVIL,ZESTRIL) 20 MG tablet TAKE 1 TABLET BY MOUTH DAILY 90 tablet 3 3/19/2025    Multiple Minerals-Vitamins (CALCIUM-MAGNESIUM-ZINC-D3 PO) Take  by mouth.   Past Month    QUEtiapine (SEROquel) 25 MG tablet Take 3 tablets by mouth Every Night. 270 tablet 2 3/19/2025    sertraline (Zoloft) 25 MG tablet Take 1 tablet by mouth Daily. 90 tablet 3 3/19/2025    simvastatin (ZOCOR) 40 MG tablet Take 1 tablet by mouth Every Night. 90 tablet 1 3/19/2025    BD Pen Needle Sherri 2nd Gen 32G X 4 MM misc USE ONE EACH  each 3     Continuous Blood Gluc Sensor (FreeStyle Camelia 2 Sensor) misc Use 1 each Every 14 (Fourteen) Days. 6 each 3     Droplet Pen Needles 32G X 4 MM misc USE ONE NEEDLE EACH TIME ONCE DAILY. 100 each 1     glucose blood test strip Use as instructed 2-3 times a day, provide strips for the Onetouch meter 50 each 12     levocetirizine (Xyzal) 5 MG tablet Take 1 tablet by mouth Every Evening for 30 days. 30 tablet 6     Semaglutide, 2 MG/DOSE, (Ozempic, 2 MG/DOSE,) 8 MG/3ML solution pen-injector Inject 2 mg under the skin into the appropriate area as directed 1 (One) Time Per Week. 3 mL 2 3/4/2025    vitamin D (ERGOCALCIFEROL) 1.25 MG (85968 UT) capsule capsule Take 1 capsule by mouth 1 (One) Time Per Week. 15 capsule 3 3/17/2025          Past Medical History:   Diagnosis Date    Acute upper respiratory infection     Anxiety 08/07/2017    Community acquired pneumonia     Depression     Diabetes mellitus     dx 1998- checks fsbs weekly    Fracture, stress, metatarsal     STRESS FRACTURE ALONG THE SHAFT OF THE FIFTH RIGHT METATARSAL    Hyperlipidemia     IBS (irritable bowel syndrome)     Insomnia 08/07/2017    Irritable bowel syndrome     Obesity     Shoulder pain     Sleep apnea     CPAP COMPLIANT    Tobacco abuse 08/07/2017     Type 2 diabetes mellitus 2017    Type 2 diabetes mellitus, uncontrolled     Vitamin D deficiency      Past Surgical History:   Procedure Laterality Date    COLONOSCOPY      within last 5 years    KNEE ARTHROSCOPY W/ MENISCAL REPAIR Right     PATELLA OPEN REDUCTION INTERNAL FIXATION Left 10/27/2016    Procedure: LEFT PATELLA OPEN REDUCTION INTERNAL FIXATION;  Surgeon: Marshall Meyers MD;  Location:  [facility];  Service:     SHOULDER OPEN REDUCTION INTERNAL FIXATION Right 10/27/2016    Procedure: RIGHT SHOULDER CLOSED REDUCTION ;  Surgeon: Marshall Meyers MD;  Location:  [facility];  Service:     SHOULDER OPEN REDUCTION INTERNAL FIXATION Right 10/31/2016    Procedure: RIGHT SHOULDER OPEN REDUCTION INTERNAL FIXATION WITH PLATE FOR FRACTURE;  Surgeon: Jaguar Marsh MD;  Location:  [facility];  Service:     TONSILLECTOMY      TOTAL ABDOMINAL HYSTERECTOMY WITH SALPINGO OOPHORECTOMY      TOTAL SHOULDER ARTHROPLASTY W/ DISTAL CLAVICLE EXCISION Right 2017    Procedure: REMOVAL RIGHT PROXIMAL HUMERUS PLATE, RIGHT REVERSE TOTAL SHOULDER ARTHROPLASTY, SHOULDER HARDWARE REMOVAL;  Surgeon: Jaguar Marsh MD;  Location:  [facility];  Service:     URETHRAL SUSPENSION      FOR STRESS INCONTINENCE     Family History   Problem Relation Age of Onset    Cancer Mother         breast    Diabetes Mother     Arthritis Mother     Diabetes Father     Heart defect Father     Stroke Father     Asthma Father     Hypertension Other     Heart attack Other      Social History     Tobacco Use    Smoking status: Former     Current packs/day: 0.00     Average packs/day: 0.5 packs/day for 1 year (0.5 ttl pk-yrs)     Types: Cigarettes     Start date: 2020     Quit date: 2021     Years since quittin.2     Passive exposure: Past    Smokeless tobacco: Never    Tobacco comments:     quit 10/27/2016   Vaping Use    Vaping status: Never Used   Substance Use Topics    Alcohol use: Yes     Comment: rare    Drug use: No  "  .    Review of Systems  Pertinent items are noted in HPI    Vital Signs  /77 (BP Location: Left arm, Patient Position: Lying)   Pulse 98   Temp 98.2 °F (36.8 °C) (Oral)   Resp 16   Ht 175.3 cm (69.02\")   Wt 92.8 kg (204 lb 9.6 oz)   SpO2 97%   BMI 30.20 kg/m²     Physical Exam:    General Appearance:    Alert, cooperative, in no acute distress   Head:    Normocephalic, without obvious abnormality, atraumatic   Eyes:            Lids and lashes normal, conjunctivae and sclerae normal, no   icterus, no pallor, corneas clear    Ears:    Ears appear intact with no abnormalities noted   Throat:   No oral lesions, no thrush, oral mucosa moist   Neck:   No adenopathy, supple, trachea midline, no thyromegaly         Lungs:     Clear to auscultation,respirations regular, even and                   unlabored    Heart:    Regular rhythm and normal rate, normal S1 and S2, no       murmur, no gallop   Abdomen:     Normal bowel sounds, no masses, no organomegaly, soft        nontender, nondistended, no guarding, no rebound                 tenderness   Genitalia:    Deferred   Extremities:   No c/c/e when seen preop.    Pulses:   Pulses palpable and equal bilaterally   Skin:   No bleeding, bruising or rash   Neurologic:   Cranial nerves 2 - 12 grossly intact, Flexion and dorsiflexion intact bilateral feet.        I reviewed the patient's new clinical results.             Invalid input(s): \"NEUTOPHILPCT\"        Invalid input(s): \"LABALBU\", \"PROT\"  Lab Results   Component Value Date    HGBA1C 6.50 (H) 03/13/2025      Latest Reference Range & Units 03/13/25 10:23   Sodium 136 - 145 mmol/L 137   Potassium 3.5 - 5.2 mmol/L 3.9   Chloride 98 - 107 mmol/L 102   CO2 22.0 - 29.0 mmol/L 20.0 (L)   Anion Gap 5.0 - 15.0 mmol/L 15.0   BUN 8 - 23 mg/dL 15   Creatinine 0.57 - 1.00 mg/dL 0.72   BUN/Creatinine Ratio 7.0 - 25.0  20.8   eGFR >60.0 mL/min/1.73 92.9   Glucose 65 - 99 mg/dL 185 (H)   Calcium 8.6 - 10.5 mg/dL 9.7 "   Hemoglobin A1C 4.80 - 5.60 % 6.50 (H)   WBC 3.40 - 10.80 10*3/mm3 8.00   RBC 3.77 - 5.28 10*6/mm3 4.35   Hemoglobin 12.0 - 15.9 g/dL 12.9   Hematocrit 34.0 - 46.6 % 39.4   Platelets 140 - 450 10*3/mm3 276   RDW 12.3 - 15.4 % 12.4   MCV 79.0 - 97.0 fL 90.6   MCH 26.6 - 33.0 pg 29.7   MCHC 31.5 - 35.7 g/dL 32.7   MPV 6.0 - 12.0 fL 9.9   RDW-SD 37.0 - 54.0 fl 41.2   (L): Data is abnormally low  (H): Data is abnormally high      Assessment and Plan:       Status post left knee replacement    Hyperlipidemia    Obesity    Type 2 diabetes mellitus    Anxiety    Insomnia      Plan:  Postop management to include:    1. PT/OT,  Weight bearing as tolerated LLE  2. Pain control-prns, ACB cath with ropivacaine infusion.  3. IS-encourage  4. DVT proph- Mechanicals and asa  5. Bowel regimen  6. Resume home medications as appropriate  7. Monitor post-op labs  8. DC planning for home.    -DM type 2  Hgb A1C 6.5  Hold OHA as appropriate  FSBG before meals/bedtime, ( q 6 when NPO), along with correction Humalog.  Long acting insulin     -Dyslipidemia:  Resume home regimen Statin. ( formulary substitution when appropriate).      -Anxiety:  Resume home regimen including Zoloft.        Dragon disclaimer:  Part of this encounter note is an electronic transcription/translation of spoken language to printed text. The electronic translation of spoken language may permit erroneous, or at times, nonsensical words or phrases to be inadvertently transcribed; Although I have reviewed the note for such errors, some may still exist.    Angel House MD  03/20/25  18:38 EDT

## 2025-03-21 VITALS
OXYGEN SATURATION: 95 % | DIASTOLIC BLOOD PRESSURE: 63 MMHG | RESPIRATION RATE: 18 BRPM | SYSTOLIC BLOOD PRESSURE: 134 MMHG | BODY MASS INDEX: 30.3 KG/M2 | HEIGHT: 69 IN | HEART RATE: 96 BPM | TEMPERATURE: 97.7 F | WEIGHT: 204.6 LBS

## 2025-03-21 LAB
ANION GAP SERPL CALCULATED.3IONS-SCNC: 13 MMOL/L (ref 5–15)
BASOPHILS # BLD AUTO: 0.02 10*3/MM3 (ref 0–0.2)
BASOPHILS NFR BLD AUTO: 0.2 % (ref 0–1.5)
BUN SERPL-MCNC: 13 MG/DL (ref 8–23)
BUN/CREAT SERPL: 35.1 (ref 7–25)
CALCIUM SPEC-SCNC: 8.8 MG/DL (ref 8.6–10.5)
CHLORIDE SERPL-SCNC: 101 MMOL/L (ref 98–107)
CO2 SERPL-SCNC: 23 MMOL/L (ref 22–29)
CREAT SERPL-MCNC: 0.37 MG/DL (ref 0.57–1)
DEPRECATED RDW RBC AUTO: 42.6 FL (ref 37–54)
EGFRCR SERPLBLD CKD-EPI 2021: 112.1 ML/MIN/1.73
EOSINOPHIL # BLD AUTO: 0 10*3/MM3 (ref 0–0.4)
EOSINOPHIL NFR BLD AUTO: 0 % (ref 0.3–6.2)
ERYTHROCYTE [DISTWIDTH] IN BLOOD BY AUTOMATED COUNT: 12.2 % (ref 12.3–15.4)
GLUCOSE BLDC GLUCOMTR-MCNC: 173 MG/DL (ref 70–130)
GLUCOSE BLDC GLUCOMTR-MCNC: 181 MG/DL (ref 70–130)
GLUCOSE SERPL-MCNC: 193 MG/DL (ref 65–99)
HCT VFR BLD AUTO: 35.8 % (ref 34–46.6)
HGB BLD-MCNC: 11.3 G/DL (ref 12–15.9)
IMM GRANULOCYTES # BLD AUTO: 0.07 10*3/MM3 (ref 0–0.05)
IMM GRANULOCYTES NFR BLD AUTO: 0.6 % (ref 0–0.5)
LYMPHOCYTES # BLD AUTO: 1.01 10*3/MM3 (ref 0.7–3.1)
LYMPHOCYTES NFR BLD AUTO: 8.2 % (ref 19.6–45.3)
MCH RBC QN AUTO: 29.7 PG (ref 26.6–33)
MCHC RBC AUTO-ENTMCNC: 31.6 G/DL (ref 31.5–35.7)
MCV RBC AUTO: 94 FL (ref 79–97)
MONOCYTES # BLD AUTO: 0.77 10*3/MM3 (ref 0.1–0.9)
MONOCYTES NFR BLD AUTO: 6.3 % (ref 5–12)
NEUTROPHILS NFR BLD AUTO: 10.43 10*3/MM3 (ref 1.7–7)
NEUTROPHILS NFR BLD AUTO: 84.7 % (ref 42.7–76)
NRBC BLD AUTO-RTO: 0 /100 WBC (ref 0–0.2)
PLATELET # BLD AUTO: 245 10*3/MM3 (ref 140–450)
PMV BLD AUTO: 10.5 FL (ref 6–12)
POTASSIUM SERPL-SCNC: 3.9 MMOL/L (ref 3.5–5.2)
RBC # BLD AUTO: 3.81 10*6/MM3 (ref 3.77–5.28)
SODIUM SERPL-SCNC: 137 MMOL/L (ref 136–145)
WBC NRBC COR # BLD AUTO: 12.3 10*3/MM3 (ref 3.4–10.8)

## 2025-03-21 PROCEDURE — 97165 OT EVAL LOW COMPLEX 30 MIN: CPT

## 2025-03-21 PROCEDURE — 25810000003 LACTATED RINGERS PER 1000 ML: Performed by: ANESTHESIOLOGY

## 2025-03-21 PROCEDURE — 80048 BASIC METABOLIC PNL TOTAL CA: CPT | Performed by: ORTHOPAEDIC SURGERY

## 2025-03-21 PROCEDURE — 25010000002 HYDROMORPHONE PER 4 MG: Performed by: ORTHOPAEDIC SURGERY

## 2025-03-21 PROCEDURE — 25010000002 CEFAZOLIN PER 500 MG: Performed by: ORTHOPAEDIC SURGERY

## 2025-03-21 PROCEDURE — 82948 REAGENT STRIP/BLOOD GLUCOSE: CPT

## 2025-03-21 PROCEDURE — 63710000001 INSULIN LISPRO (HUMAN) PER 5 UNITS: Performed by: INTERNAL MEDICINE

## 2025-03-21 PROCEDURE — 63710000001 INSULIN GLARGINE PER 5 UNITS: Performed by: INTERNAL MEDICINE

## 2025-03-21 PROCEDURE — 97535 SELF CARE MNGMENT TRAINING: CPT

## 2025-03-21 PROCEDURE — 85025 COMPLETE CBC W/AUTO DIFF WBC: CPT | Performed by: ORTHOPAEDIC SURGERY

## 2025-03-21 PROCEDURE — 97161 PT EVAL LOW COMPLEX 20 MIN: CPT

## 2025-03-21 PROCEDURE — 97110 THERAPEUTIC EXERCISES: CPT

## 2025-03-21 RX ORDER — MELOXICAM 15 MG/1
15 TABLET ORAL DAILY
Qty: 15 TABLET | Refills: 0 | Status: SHIPPED | OUTPATIENT
Start: 2025-03-21 | End: 2025-04-05

## 2025-03-21 RX ORDER — HYDROMORPHONE HYDROCHLORIDE 1 MG/ML
0.5 INJECTION, SOLUTION INTRAMUSCULAR; INTRAVENOUS; SUBCUTANEOUS
Refills: 0 | Status: DISCONTINUED | OUTPATIENT
Start: 2025-03-21 | End: 2025-03-21 | Stop reason: HOSPADM

## 2025-03-21 RX ORDER — ASPIRIN 81 MG/1
81 TABLET ORAL DAILY
Start: 2025-04-21

## 2025-03-21 RX ORDER — SEMAGLUTIDE 2.68 MG/ML
2 INJECTION, SOLUTION SUBCUTANEOUS WEEKLY
Qty: 3 ML | Refills: 2 | Status: SHIPPED | OUTPATIENT
Start: 2025-04-04

## 2025-03-21 RX ORDER — ACETAMINOPHEN 500 MG
1000 TABLET ORAL EVERY 8 HOURS
Qty: 60 TABLET | Refills: 0 | Status: SHIPPED | OUTPATIENT
Start: 2025-03-21

## 2025-03-21 RX ORDER — OXYCODONE HYDROCHLORIDE 5 MG/1
5 TABLET ORAL EVERY 4 HOURS PRN
Qty: 30 TABLET | Refills: 0 | Status: SHIPPED | OUTPATIENT
Start: 2025-03-21

## 2025-03-21 RX ORDER — DOCUSATE SODIUM 100 MG/1
100 CAPSULE, LIQUID FILLED ORAL 2 TIMES DAILY
Qty: 30 CAPSULE | Refills: 0 | Status: SHIPPED | OUTPATIENT
Start: 2025-03-21 | End: 2025-04-05

## 2025-03-21 RX ORDER — ROPIVACAINE HYDROCHLORIDE 2 MG/ML
2 INJECTION, SOLUTION EPIDURAL; INFILTRATION; PERINEURAL CONTINUOUS
Start: 2025-03-21

## 2025-03-21 RX ORDER — ASPIRIN 81 MG/1
81 TABLET ORAL 2 TIMES DAILY
Qty: 60 TABLET | Refills: 0 | Status: SHIPPED | OUTPATIENT
Start: 2025-03-22

## 2025-03-21 RX ORDER — NALOXONE HCL 0.4 MG/ML
0.1 VIAL (ML) INJECTION
Status: DISCONTINUED | OUTPATIENT
Start: 2025-03-21 | End: 2025-03-21 | Stop reason: HOSPADM

## 2025-03-21 RX ADMIN — CETIRIZINE HYDROCHLORIDE 5 MG: 10 TABLET, FILM COATED ORAL at 08:47

## 2025-03-21 RX ADMIN — INSULIN LISPRO 2 UNITS: 100 INJECTION, SOLUTION INTRAVENOUS; SUBCUTANEOUS at 08:49

## 2025-03-21 RX ADMIN — INSULIN GLARGINE 25 UNITS: 100 INJECTION, SOLUTION SUBCUTANEOUS at 11:53

## 2025-03-21 RX ADMIN — OXYCODONE HYDROCHLORIDE 10 MG: 10 TABLET ORAL at 09:19

## 2025-03-21 RX ADMIN — DULOXETINE HYDROCHLORIDE 30 MG: 30 CAPSULE, DELAYED RELEASE ORAL at 08:47

## 2025-03-21 RX ADMIN — SERTRALINE 25 MG: 25 TABLET, FILM COATED ORAL at 08:47

## 2025-03-21 RX ADMIN — OXYCODONE HYDROCHLORIDE 10 MG: 10 TABLET ORAL at 13:49

## 2025-03-21 RX ADMIN — MELOXICAM 15 MG: 15 TABLET ORAL at 08:47

## 2025-03-21 RX ADMIN — SODIUM CHLORIDE 2 G: 900 INJECTION INTRAVENOUS at 08:50

## 2025-03-21 RX ADMIN — ACETAMINOPHEN 1000 MG: 500 TABLET ORAL at 05:10

## 2025-03-21 RX ADMIN — ACETAMINOPHEN 1000 MG: 500 TABLET ORAL at 00:34

## 2025-03-21 RX ADMIN — HYDROMORPHONE HYDROCHLORIDE 0.5 MG: 1 INJECTION, SOLUTION INTRAMUSCULAR; INTRAVENOUS; SUBCUTANEOUS at 02:55

## 2025-03-21 RX ADMIN — ATORVASTATIN CALCIUM 20 MG: 20 TABLET, FILM COATED ORAL at 08:50

## 2025-03-21 RX ADMIN — INSULIN LISPRO 2 UNITS: 100 INJECTION, SOLUTION INTRAVENOUS; SUBCUTANEOUS at 11:53

## 2025-03-21 RX ADMIN — OXYCODONE HYDROCHLORIDE 10 MG: 10 TABLET ORAL at 05:21

## 2025-03-21 RX ADMIN — SODIUM CHLORIDE 2 G: 900 INJECTION INTRAVENOUS at 00:35

## 2025-03-21 RX ADMIN — ASPIRIN 81 MG: 81 TABLET, COATED ORAL at 08:47

## 2025-03-21 RX ADMIN — MELOXICAM 15 MG: 15 TABLET ORAL at 00:35

## 2025-03-21 RX ADMIN — SODIUM CHLORIDE, SODIUM LACTATE, POTASSIUM CHLORIDE, CALCIUM CHLORIDE 9 ML/HR: 20; 30; 600; 310 INJECTION, SOLUTION INTRAVENOUS at 01:00

## 2025-03-21 NOTE — PLAN OF CARE
Goal Outcome Evaluation:  Plan of Care Reviewed With: patient           Outcome Evaluation: Patient presents with deficits in strenght, endurance, balance, and ROM. She was able to ambulate 160' with FWW and navigate 6 steps per her home setup all with CGA and no LOB or knee buckling. HEP and activity recommendations at D/C reviewed. IPPT is indicated to address deficits while admitted. Recommend D/C home with assist and HHPT today if medically appropriate. She owns a FWW.    Anticipated Discharge Disposition (PT): home with assist, home with home health

## 2025-03-21 NOTE — PROGRESS NOTES
[facility]    Acute pain service Inpatient Progress Note    Patient Name: Octavia Rice  :  1960  MRN:  [id number]        Acute Pain  Service Inpatient Progress Note:    Analgesia:Fair  Pain Score:9/10 (Proximal part of incision most painful. Patella and distal is okay.)  LOC: alert and awake  Resp Status: room air  Cardiac: VS stable  Side Effects:None  Catheter Site:clean, dressing intact and dry  Cath type: peripheral nerve cath(InfuSystem)  Volume: 1mL,8ml, 8ml InfuSystem Pump.  Dosing/Volume: ropivacaine 0.2%  Catheter Plan:Catheter to remain Insitu, Continue catheter infusion rate unchanged and Bolus Catheter  Comments:    Bolused cath with 5 ml 0.5% ropivicaine

## 2025-03-21 NOTE — THERAPY EVALUATION
Patient Name: Octavia Rice  : 1960    MRN: [id number]                              Today's Date: 3/21/2025       Admit Date: 3/20/2025    Visit Dx:     ICD-10-CM ICD-9-CM   1. Status post left knee replacement  Z96.652 V43.65     Patient Active Problem List   Diagnosis    Hyperlipidemia    Vitamin D deficiency    Irritable bowel syndrome    Obesity    Type 2 diabetes mellitus    Anxiety    Insomnia    Shoulder pain    Strain of lumbar region    Annual physical exam    Encounter for screening mammogram for malignant neoplasm of breast    Postmenopausal    Medicare annual wellness visit, subsequent    Chronic diarrhea    Elevated blood pressure reading in office with diagnosis of hypertension    Pain of left hand    Left foot pain    Acute non-recurrent maxillary sinusitis    Arthritis of left knee    Status post left knee replacement     Past Medical History:   Diagnosis Date    Acute upper respiratory infection     Anxiety 2017    Community acquired pneumonia     Depression     Diabetes mellitus     dx 1998- checks fsbs weekly    Fracture, stress, metatarsal     STRESS FRACTURE ALONG THE SHAFT OF THE FIFTH RIGHT METATARSAL    Hyperlipidemia     IBS (irritable bowel syndrome)     Insomnia 2017    Irritable bowel syndrome     Obesity     Shoulder pain     Sleep apnea     CPAP COMPLIANT    Tobacco abuse 2017    Type 2 diabetes mellitus 2017    Type 2 diabetes mellitus, uncontrolled     Vitamin D deficiency      Past Surgical History:   Procedure Laterality Date    COLONOSCOPY      within last 5 years    KNEE ARTHROSCOPY W/ MENISCAL REPAIR Right     PATELLA OPEN REDUCTION INTERNAL FIXATION Left 10/27/2016    Procedure: LEFT PATELLA OPEN REDUCTION INTERNAL FIXATION;  Surgeon: Marshall Meyers MD;  Location: [facility];  Service:     SHOULDER OPEN REDUCTION INTERNAL FIXATION Right 10/27/2016    Procedure: RIGHT SHOULDER CLOSED REDUCTION ;  Surgeon: Marshall Meyers MD;  Location:   [facility];  Service:     SHOULDER OPEN REDUCTION INTERNAL FIXATION Right 10/31/2016    Procedure: RIGHT SHOULDER OPEN REDUCTION INTERNAL FIXATION WITH PLATE FOR FRACTURE;  Surgeon: Jaguar Marsh MD;  Location:  [facility];  Service:     TONSILLECTOMY      TOTAL ABDOMINAL HYSTERECTOMY WITH SALPINGO OOPHORECTOMY      TOTAL KNEE ARTHROPLASTY Left 3/20/2025    Procedure: TOTAL KNEE ARTHROPLASTY WITH CORI ROBOT LEFT;  Surgeon: Jamir Chau MD;  Location:  [facility];  Service: Robotics - Ortho;  Laterality: Left;    TOTAL SHOULDER ARTHROPLASTY W/ DISTAL CLAVICLE EXCISION Right 8/7/2017    Procedure: REMOVAL RIGHT PROXIMAL HUMERUS PLATE, RIGHT REVERSE TOTAL SHOULDER ARTHROPLASTY, SHOULDER HARDWARE REMOVAL;  Surgeon: Jaguar Marsh MD;  Location:  [facility];  Service:     URETHRAL SUSPENSION      FOR STRESS INCONTINENCE      General Information       Row Name 03/21/25 1306          Physical Therapy Time and Intention    Document Type evaluation  -CK     Mode of Treatment physical therapy;individual therapy  -CK       Row Name 03/21/25 1306          General Information    Patient Profile Reviewed yes  -CK     Prior Level of Function independent:;all household mobility;ADL's  -CK     Existing Precautions/Restrictions fall;other (see comments)  LLE WBAT, adductor nerve catheter  -CK     Barriers to Rehab none identified  -CK       Row Name 03/21/25 1306          Living Environment    Current Living Arrangements home  -CK     People in Home spouse;child(dominik), adult;grandchild(dominik)  -CK       Row Name 03/21/25 1306          Home Main Entrance    Number of Stairs, Main Entrance four;other (see comments)  1+2+1  -CK     Stair Railings, Main Entrance none  -CK       Row Name 03/21/25 1306          Stairs Within Home, Primary    Stairs, Within Home, Primary 2 story home with bed/bath on 2nd floor  -CK     Number of Stairs, Within Home, Primary twelve  -CK     Stair Railings, Within Home, Primary railings on both  sides of stairs;railing on left side (ascending);other (see comments)  bilat handrails for initial steps, then only L handrail  -CK       Row Name 03/21/25 1306          Cognition    Orientation Status (Cognition) oriented x 4  -CK       Row Name 03/21/25 1306          Safety Issues/Impairments Affecting Functional Mobility    Safety Issues Affecting Function (Mobility) awareness of need for assistance;insight into deficits/self-awareness;safety precaution awareness;safety precautions follow-through/compliance  -CK     Impairments Affecting Function (Mobility) balance;endurance/activity tolerance;pain;range of motion (ROM);strength  -CK               User Key  (r) = Recorded By, (t) = Taken By, (c) = Cosigned By      Initials Name Provider Type    CK Sarahy Mai, PT Physical Therapist                   Mobility       Row Name 03/21/25 1309          Bed Mobility    Bed Mobility supine-sit  -CK     Supine-Sit [place] (Bed Mobility) standby assist  -CK     Assistive Device (Bed Mobility) head of bed elevated  -CK     Comment, (Bed Mobility) patient used her BUEs to assist her LLE OOB, denied dizziness at EOB  -CK       Row Name 03/21/25 1309          Sit-Stand Transfer    Sit-Stand [place] (Transfers) contact guard;verbal cues;1 person assist  -CK     Assistive Device (Sit-Stand Transfers) walker, front-wheeled  -CK     Comment, (Sit-Stand Transfer) cues to push up from bed/chair and step out LLE for comfort when sitting  -CK       Row Name 03/21/25 1309          Gait/Stairs (Locomotion)    [place] Level (Gait) contact guard;1 person assist;verbal cues  -CK     Assistive Device (Gait) walker, front-wheeled  -CK     Patient was able to Ambulate yes  -CK     Distance in Feet (Gait) 160  -CK     Deviations/Abnormal Patterns (Gait) bilateral deviations;suzanne decreased;gait speed decreased;stride length decreased  -CK     Left Sided Gait Deviations weight shift ability decreased;decreased knee  extension  -CK     Handrail Location (Stairs) both sides  -CK     Number of Steps (Stairs) 6  -CK     Ascending Technique (Stairs) step-to-step  -CK     Descending Technique (Stairs) step-to-step  -CK     Comment, (Gait/Stairs) Patient ambulated in [place] with step through gait pattern. Cues provided for improved L heel strike and knee extension at initial contact as well as continuous progression of walker. Patient zaki improved gait mechanics with cues. She also navigated 6 steps with cues for sequencing and step to step pattern. She descended the steps backwards per her baseline with reliance on bilateral handrails. Practiced navigating steps with bilateral handrails and also unilateral handrail with HHA per her home setup. Also discussed navigating a single platform step using FWW and backwards aproach. Patient darian'd good balance and sequencing with no LOB or buckling with activity.  -CK       Row Name 03/21/25 1309          Mobility    Extremity Weight-bearing Status left lower extremity  -CK     Left Lower Extremity (Weight-bearing Status) weight-bearing as tolerated (WBAT)  -CK               User Key  (r) = Recorded By, (t) = Taken By, (c) = Cosigned By      Initials Name Provider Type    CK Sarahy Mai, PT Physical Therapist                   Obj/Interventions       Row Name 03/21/25 1317          Range of Motion Comprehensive    General Range of Motion lower extremity range of motion deficits identified  -CK     Comment, General Range of Motion LLE knee AROM 7-85  -CK       Row Name 03/21/25 1317          Strength Comprehensive (MMT)    General Manual Muscle Testing (MMT) Assessment lower extremity strength deficits identified  -CK     Comment, General Manual Muscle Testing (MMT) Assessment RLE grossly 4+/5, LLE able to perform SLR, LAQ, and DF/PF  -CK       Row Name 03/21/25 1317          Motor Skills    Therapeutic Exercise hip;knee;ankle  -CK       Row Name 03/21/25 1317          Hip (Therapeutic  Exercise)    Hip (Therapeutic Exercise) strengthening exercise  -CK     Hip Strengthening (Therapeutic Exercise) heel slides;10 repetitions;left  -CK       Row Name 03/21/25 1317          Knee (Therapeutic Exercise)    Knee (Therapeutic Exercise) isometric exercises;strengthening exercise  -CK     Knee Isometrics (Therapeutic Exercise) quad sets;10 repetitions;3 second hold;left  -CK     Knee Strengthening (Therapeutic Exercise) SLR (straight leg raise);SAQ (short arc quad);LAQ (long arc quad);sitting;heel slides;10 repetitions;left  -CK       Row Name 03/21/25 1317          Ankle (Therapeutic Exercise)    Ankle (Therapeutic Exercise) AROM (active range of motion)  -CK     Ankle AROM (Therapeutic Exercise) bilateral;dorsiflexion;plantarflexion;10 repetitions  -CK       Row Name 03/21/25 1317          Balance    Balance Assessment sitting static balance;standing static balance;standing dynamic balance  -CK     Static Sitting Balance supervision  -CK     Position, Sitting Balance unsupported;sitting in chair;sitting edge of bed  -CK     Static Standing Balance contact guard  -CK     Dynamic Standing Balance contact guard  -CK     Position/Device Used, Standing Balance supported;walker, front-wheeled  -CK     Comment, Balance no LOB or buckling  -CK       Row Name 03/21/25 1317          Sensory Assessment (Somatosensory)    Sensory Assessment (Somatosensory) LE sensation intact  -CK               User Key  (r) = Recorded By, (t) = Taken By, (c) = Cosigned By      Initials Name Provider Type    CK Sarahy Mai, PT Physical Therapist                   Goals/Plan       Row Name 03/21/25 1321          Bed Mobility Goal 1 (PT)    Activity/Assistive Device (Bed Mobility Goal 1, PT) sit to supine/supine to sit  -CK     [place] Level/Cues Needed (Bed Mobility Goal 1, PT) independent  -CK     Time Frame (Bed Mobility Goal 1, PT) short term goal (STG);3 days  -CK     Progress/Outcomes (Bed Mobility Goal 1, PT) new  goal  -CK       Row Name 03/21/25 1321          Transfer Goal 1 (PT)    Activity/Assistive Device (Transfer Goal 1, PT) sit-to-stand/stand-to-sit;bed-to-chair/chair-to-bed  -CK     [place] Level/Cues Needed (Transfer Goal 1, PT) modified independence  -CK     Time Frame (Transfer Goal 1, PT) long term goal (LTG);3 days  -CK     Progress/Outcome (Transfer Goal 1, PT) new goal  -CK       Row Name 03/21/25 1321          Gait Training Goal 1 (PT)    Activity/Assistive Device (Gait Training Goal 1, PT) gait (walking locomotion);assistive device use  -CK     [place] Level (Gait Training Goal 1, PT) standby assist  -CK     Distance (Gait Training Goal 1, PT) 500'  -CK     Time Frame (Gait Training Goal 1, PT) long term goal (LTG);3 days  -CK     Progress/Outcome (Gait Training Goal 1, PT) new goal  -CK       Row Name 03/21/25 1321          ROM Goal 1 (PT)    ROM Goal 1 (PT) operative knee AROM 0-90  -CK     Time Frame (ROM Goal 1, PT) long-term goal (LTG);3 days  -CK     Progress/Outcome (ROM Goal 1, PT) new goal  -CK       Row Name 03/21/25 1321          Stairs Goal 1 (PT)    Activity/Assistive Device (Stairs Goal 1, PT) ascending stairs;descending stairs;using handrail, left  -CK     [place] Level/Cues Needed (Stairs Goal 1, PT) contact guard required  -CK     Number of Stairs (Stairs Goal 1, PT) 6  -CK     Time Frame (Stairs Goal 1, PT) long term goal (LTG);3 days  -CK     Progress/Outcome (Stairs Goal 1, PT) new goal  -CK       Row Name 03/21/25 1321          Therapy Assessment/Plan (PT)    Planned Therapy Interventions (PT) balance training;bed mobility training;gait training;home exercise program;neuromuscular re-education;patient/family education;postural re-education;ROM (range of motion);stair training;strengthening;stretching;transfer training  -CK               User Key  (r) = Recorded By, (t) = Taken By, (c) = Cosigned By      Initials Name Provider Type    CK Sarahy Mai, PT Physical  Therapist                   Clinical Impression       Row Name 03/21/25 1313          Pain    Pretreatment Pain Rating 8/10  -CK     Posttreatment Pain Rating 7/10  -CK     Pain Location knee  -CK     Pain Side/Orientation left  -CK     Pain Management Interventions exercise or physical activity utilized;nursing notified;premedicated for activity  -CK     Response to Pain Interventions activity participation with tolerable pain  -CK       Row Name 03/21/25 3939          Plan of Care Review    Plan of Care Reviewed With patient  -CK     Outcome Evaluation Patient presents with deficits in strenght, endurance, balance, and ROM. She was able to ambulate 160' with FWW and navigate 6 steps per her home setup all with CGA and no LOB or knee buckling. HEP and activity recommendations at D/C reviewed. IPPT is indicated to address deficits while admitted. Recommend D/C home with assist and HHPT today if medically appropriate. She owns a FWW.  -CK       Row Name 03/21/25 2826          Therapy Assessment/Plan (PT)    Patient/Family Therapy Goals Statement (PT) go home and sleep in her own bed  -CK     Rehab Potential (PT) good  -CK     Criteria for Skilled Interventions Met (PT) yes  -CK     Therapy Frequency (PT) 2 times/day  -CK     Predicted Duration of Therapy Intervention (PT) 1 day  -CK       Row Name 03/21/25 9997          Vital Signs    Pre Systolic BP Rehab 107  -CK     Pre Treatment Diastolic BP 56  -CK     Post Systolic BP Rehab 134  -CK     Post Treatment Diastolic BP 63  -CK     Pretreatment Heart Rate (beats/min) 82  -CK     Posttreatment Heart Rate (beats/min) 88  -CK     Pre SpO2 (%) 92  -CK     O2 Delivery Pre Treatment room air  -CK     O2 Delivery Intra Treatment room air  -CK     Post SpO2 (%) 94  -CK     O2 Delivery Post Treatment room air  -CK     Pre Patient Position Supine  -CK     Post Patient Position Sitting  -CK       Row Name 03/21/25 0874          Positioning and Restraints    Pre-Treatment  Position in bed  -CK     Post Treatment Position chair  -CK     In Chair reclined;call light within reach;encouraged to call for assist;exit alarm on;notified nsg  -CK               User Key  (r) = Recorded By, (t) = Taken By, (c) = Cosigned By      Initials Name Provider Type    Sarahy Vargas PT Physical Therapist                   Outcome Measures       Row Name 03/21/25 1322          How much help from another person do you currently need...    Turning from your back to your side while in flat bed without using bedrails? 4  -CK     Moving from lying on back to sitting on the side of a flat bed without bedrails? 3  -CK     Moving to and from a bed to a chair (including a wheelchair)? 3  -CK     Standing up from a chair using your arms (e.g., wheelchair, bedside chair)? 3  -CK     Climbing 3-5 steps with a railing? 3  -CK     To walk in hospital room? 3  -CK     AM-PAC 6 Clicks Score (PT) 19  -CK     Highest Level of Mobility Goal 6 --> Walk 10 steps or more  -CK       Row Name 03/21/25 1322          PADD    Diagnosis 1  -CK     Gender 1  -CK     Age Group 2  -CK     Gait Distance 1  -CK     Assist Level 1  -CK     Home Support 3  -CK     PADD Score 9  -CK     Patient Preference home with home health  -CK     Prediction by PADD Score directly home (with home health or out-patient rehab)  -CK       Row Name 03/21/25 1322          Functional Assessment    Outcome Measure Options AM-PAC 6 Clicks Basic Mobility (PT);PADD  -CK               User Key  (r) = Recorded By, (t) = Taken By, (c) = Cosigned By      Initials Name Provider Type    Sarahy Vargas PT Physical Therapist                                 Physical Therapy Education       Title: PT OT SLP Therapies (In Progress)       Topic: Physical Therapy (Done)       Point: Mobility training (Done)       Learning Progress Summary            Patient Acceptance, E, VU by MADELYN at 3/21/2025 1322                      Point: Home exercise program (Done)        Learning Progress Summary            Patient Acceptance, E, VU by CK at 3/21/2025 1322                      Point: Body mechanics (Done)       Learning Progress Summary            Patient Acceptance, E, VU by CK at 3/21/2025 1322                      Point: Precautions (Done)       Learning Progress Summary            Patient Acceptance, E, VU by CK at 3/21/2025 1322                                      User Key       Initials Effective Dates Name Provider Type Discipline    CK 02/06/24 -  Sarahy Mai, PT Physical Therapist PT                  PT Recommendation and Plan  Planned Therapy Interventions (PT): balance training, bed mobility training, gait training, home exercise program, neuromuscular re-education, patient/family education, postural re-education, ROM (range of motion), stair training, strengthening, stretching, transfer training  Outcome Evaluation: Patient presents with deficits in strenght, endurance, balance, and ROM. She was able to ambulate 160' with FWW and navigate 6 steps per her home setup all with CGA and no LOB or knee buckling. HEP and activity recommendations at D/C reviewed. IPPT is indicated to address deficits while admitted. Recommend D/C home with assist and HHPT today if medically appropriate. She owns a FWW.     Time Calculation:   PT Evaluation Complexity  History, PT Evaluation Complexity: 3 or more personal factors and/or comorbidities  Examination of Body Systems (PT Eval Complexity): total of 3 or more elements  Clinical Presentation (PT Evaluation Complexity): stable  Clinical Decision Making (PT Evaluation Complexity): low complexity  Overall Complexity (PT Evaluation Complexity): low complexity     PT Charges       Row Name 03/21/25 1323             Time Calculation    Start Time 1021  -CK      PT Received On 03/21/25  -CK      PT Goal Re-Cert Due Date 03/31/25  -CK         Timed Charges    56307 - PT Therapeutic Exercise Minutes 25  -CK         Untimed Charges     PT Eval/Re-eval Minutes 55  -CK         Total Minutes    Timed Charges Total Minutes 25  -CK      Untimed Charges Total Minutes 55  -CK       Total Minutes 80  -CK                User Key  (r) = Recorded By, (t) = Taken By, (c) = Cosigned By      Initials Name Provider Type    CK Sarahy Mai, PT Physical Therapist                  Therapy Charges for Today       Code Description Service Date Service Provider Modifiers Qty    73003956349 HC PT THER PROC EA 15 MIN 3/21/2025 Sarahy Mai, PT GP 2    21999491035 HC PT EVAL LOW COMPLEXITY 4 3/21/2025 Sarahy Mai, PT GP 1            PT G-Codes  Outcome Measure Options: AM-PAC 6 Clicks Basic Mobility (PT), PADD  AM-PAC 6 Clicks Score (PT): 19  PT Discharge Summary  Anticipated Discharge Disposition (PT): home with assist, home with home health    Sarahy Mai, PT  3/21/2025

## 2025-03-21 NOTE — PROGRESS NOTES
"/56 (BP Location: Left arm, Patient Position: Lying)   Pulse 77   Temp 97.7 °F (36.5 °C) (Oral)   Resp 16   Ht 175.3 cm (69.02\")   Wt 92.8 kg (204 lb 9.6 oz)   SpO2 93%   BMI 30.20 kg/m²     Lab Results (last 24 hours)       Procedure Component Value Units Date/Time    Basic Metabolic Panel [[id number]]  (Abnormal) Collected: 03/21/25 0532    Specimen: Blood Updated: 03/21/25 0643     Glucose 193 mg/dL      BUN 13 mg/dL      Creatinine 0.37 mg/dL      Sodium 137 mmol/L      Potassium 3.9 mmol/L      Comment: Slight hemolysis detected by analyzer. Result may be falsely elevated.        Chloride 101 mmol/L      CO2 23.0 mmol/L      Calcium 8.8 mg/dL      BUN/Creatinine Ratio 35.1     Anion Gap 13.0 mmol/L      eGFR 112.1 mL/min/1.73     Narrative:      GFR Categories in Chronic Kidney Disease (CKD)      GFR Category          GFR (mL/min/1.73)    Interpretation  G1                     90 or greater         Normal or high (1)  G2                      60-89                Mild decrease (1)  G3a                   45-59                Mild to moderate decrease  G3b                   30-44                Moderate to severe decrease  G4                    15-29                Severe decrease  G5                    14 or less           Kidney failure          (1)In the absence of evidence of kidney disease, neither GFR category G1 or G2 fulfill the criteria for CKD.    eGFR calculation 2021 CKD-EPI creatinine equation, which does not include race as a factor    CBC & Differential [[id number]]  (Abnormal) Collected: 03/21/25 0532    Specimen: Blood Updated: 03/21/25 0557    Narrative:      The following orders were created for panel order CBC & Differential.  Procedure                               Abnormality         Status                     ---------                               -----------         ------                     CBC Auto Differential[[id number]]        Abnormal            Final result             "     Please view results for these tests on the individual orders.    CBC Auto Differential [[id number]]  (Abnormal) Collected: 03/21/25 0532    Specimen: Blood Updated: 03/21/25 0557     WBC 12.30 10*3/mm3      RBC 3.81 10*6/mm3      Hemoglobin 11.3 g/dL      Hematocrit 35.8 %      MCV 94.0 fL      MCH 29.7 pg      MCHC 31.6 g/dL      RDW 12.2 %      RDW-SD 42.6 fl      MPV 10.5 fL      Platelets 245 10*3/mm3      Neutrophil % 84.7 %      Lymphocyte % 8.2 %      Monocyte % 6.3 %      Eosinophil % 0.0 %      Basophil % 0.2 %      Immature Grans % 0.6 %      Neutrophils, Absolute 10.43 10*3/mm3      Lymphocytes, Absolute 1.01 10*3/mm3      Monocytes, Absolute 0.77 10*3/mm3      Eosinophils, Absolute 0.00 10*3/mm3      Basophils, Absolute 0.02 10*3/mm3      Immature Grans, Absolute 0.07 10*3/mm3      nRBC 0.0 /100 WBC     POC Glucose Once [[id number]]  (Abnormal) Collected: 03/20/25 2049    Specimen: Blood Updated: 03/20/25 2050     Glucose 430 mg/dL     POC Glucose Once [[id number]]  (Abnormal) Collected: 03/20/25 2048    Specimen: Blood Updated: 03/20/25 2050     Glucose 428 mg/dL     POC Glucose Once [[id number]]  (Normal) Collected: 03/20/25 1618    Specimen: Blood Updated: 03/20/25 1621     Glucose 113 mg/dL     POC Glucose Once [[id number]]  (Normal) Collected: 03/20/25 1211    Specimen: Blood Updated: 03/20/25 1218     Glucose 94 mg/dL             Imaging Results (Last 24 Hours)       Procedure Component Value Units Date/Time    XR Knee 1 or 2 View Left [[id number]] Collected: 03/20/25 1521     Updated: 03/20/25 1525    Narrative:      XR KNEE 1 OR 2 VW LEFT    Date of Exam: 3/20/2025 2:53 PM EDT    Indication: Post-Op Knee Arthoplasty    Comparison: 11/8/2024    Findings:  Patient is status post interval total knee arthroplasty with patellar resurfacing. The orthopedic hardware appears intact. Complex air and fluid collections within the soft tissues and joint spaces noted.         Impression:       Impression:  Expected postoperative changes from total knee arthroplasty with patellar resurfacing.      Electronically Signed: Kenny Milligan MD    3/20/2025 3:22 PM EDT    Workstation ID: [id number]            Patient Care Team:  Cat Bangura MD as PCP - General (Family Medicine)  Priscilla Daugherty MD as Consulting Physician (Endocrinology)  Jaguar Marsh MD as Consulting Physician (Orthopedic Surgery)  Brandi Fabian MD as Obstetrician (Obstetrics and Gynecology)  Paramjit Horner MD as Consulting Physician (Gastroenterology)  Cristobal Farley APRN as Nurse Practitioner (Pulmonary Disease)    SUBJECTIVE: She reports pain under better control this morning.  Has not worked with therapy yet.  Anticipates going home today.    PHYSICAL EXAM  Left knee incision is clean, dry and intact with mesh dressing in place  Thigh and calf soft and nontender  Neurovascular intact distally         Status post left knee replacement    Hyperlipidemia    Obesity    Type 2 diabetes mellitus    Anxiety    Insomnia      PLAN / DISPOSITION: 65-year-old female postop day #1 status post left total knee arthroplasty  -Mobilize with therapy as tolerated  -Aspirin for DVT prophylaxis  -Okay to shower with mesh dressing in place once nerve catheter removed  -Plan discharge home today pending clearance by therapy.  Physical therapy with [facility] scheduled to start with home therapy on Saturday.  -Follow-up in 2 to 3 weeks      Jamir Chau MD  03/21/25  07:38 EDT

## 2025-03-21 NOTE — PLAN OF CARE
Goal Outcome Evaluation:  Plan of Care Reviewed With: patient        Progress: no change  Outcome Evaluation: OT eval complete. Pt presents below baseline with elevated pain, decreased activity tolerance, strength, and minimal balance deficits. Administered, educated, and trialed AE for LBD. Pt with good demo of learning. VC throughout session for hand placement when performing transfers. Safe car and bed transfers discussed with leg  for comfort. Recommend home with assist at D/C.    Anticipated Discharge Disposition (OT): home with assist

## 2025-03-21 NOTE — CASE MANAGEMENT/SOCIAL WORK
Continued Stay Note  [facility]     Patient Name: Octavia Rice  MRN: [id number]  Today's Date: 3/21/2025    Admit Date: 3/20/2025    Plan: Home with family assistance and [facility]   Discharge Plan       Row Name 03/21/25 1130       Plan    Plan Home with family assistance and [facility]    Patient/Family in Agreement with Plan yes    Plan Comments Met with Ms. Ortega, and her , Keegan, at the bedside, for discharge planning.    Ms. Ortega lives with her , in [facility].  She is pending PT and OT evaluation.    She states that she does have a rolling walker, crutches, bedside commode, and st cane at home,and denies any additional DME needs.    The patient is a pre referral from Dr. Chau's office to [facility], and she already has a home appointment scheduled with [facility] for tomorrow.    PCP is Cat Bangura.  Insurance is [employer] with no interruption in coverage.  No ACP documents.    DC plan is to return home with family assistance, as needed.  Ms. Ortega's  will be transporting her home when discharged.    CM will continue to follow.    Final Discharge Disposition Code 01 - home or self-care                    Expected Discharge Date and Time       Expected Discharge Date Expected Discharge Time    Mar 21, 2025             Gabrielle Braun RN

## 2025-03-21 NOTE — DISCHARGE SUMMARY
Patient Name: Octavia Rice  MRN: [id number]  : 1960  DOS: 3/21/2025    Attending: Jamir Chau,*    Primary Care Provider: Cat Bangura MD    Date of Admission:.3/20/2025  9:58 AM    Date of Discharge:  3/21/2025    Discharge Diagnosis:   Status post left knee replacement    Hyperlipidemia    Obesity    Type 2 diabetes mellitus    Anxiety    Insomnia      Hospital Course    At admit:  Patient is a pleasant 65 y.o. female presented for scheduled surgery by      Per his note: (Octavia is a very pleasant 65-year-old female who has struggled with end-stage activity limiting left knee pain secondary to osteoarthritis.  X-rays in February revealed severe tricompartmental degenerative changes in a varus knee with complete loss of medial joint space and marginal osteophyte formation.  They have failed exhaustive conservative treatment measures including medication, activity modification, injections and physical therapy.  History of removal of left knee patellar hardware from prior ORIF in .  She takes ibuprofen for her knee pain.  The last cortisone injection was in November.  History of a right reverse shoulder arthroplasty .  After undergoing a shared decision-making process they agreed to proceed with left total knee arthroplasty.  Surgical consent form was signed. )     I saw her preop, patient later underwent left total knee arthroplasty under spinal anesthesia, tolerated surgery well.  Adductor canal nerve block catheter was placed by acute pain service, and patient was admitted for further management.       Patient has no history of DVT or PE.     Reviewed with patient past medical history and home medications     After admit:    Patient was provided pain medications as needed for pain control, along with adductor canal nerve block infusion of Ropivacaine.    Adjustments were made to pain medications to optimize postop pain management. Risks and benefits of opiate  medications discussed with patient. [facility] report was reviewed.    She was seen by PT and OT and has progressed well over her stay.    She used an IS for atelectasis prophylaxis and asa along with mechanicals for DVT prophylaxis.    Home medications were resumed as appropriate, and labs were monitored and remained fairly stable.     With the progress she has made,  is ready for DC home today.      She will have an Infupump ( instructed on it during this admit).    Discussed with patient regarding plan and she shows understanding and agreement.    Patient will have PT following discharge.        Procedures Performed  Procedure(s):  TOTAL KNEE ARTHROPLASTY WITH CORI ROBOT LEFT       Pertinent Test Results:    I reviewed the patient's new clinical results.   Results from last 7 days   Lab Units 03/21/25  0532   WBC 10*3/mm3 12.30*   HEMOGLOBIN g/dL 11.3*   HEMATOCRIT % 35.8   PLATELETS 10*3/mm3 245     Results from last 7 days   Lab Units 03/21/25  0532   SODIUM mmol/L 137   POTASSIUM mmol/L 3.9   CHLORIDE mmol/L 101   CO2 mmol/L 23.0   BUN mg/dL 13   CREATININE mg/dL 0.37*   CALCIUM mg/dL 8.8   GLUCOSE mg/dL 193*     I reviewed the patient's new imaging including images and reports.      Physical therapy  Date of Service: 03/21/25 1021  Creation Time: 03/21/25 1322     Signed         Goal Outcome Evaluation:  Plan of Care Reviewed With: patient  Outcome Evaluation: Patient presents with deficits in strenght, endurance, balance, and ROM. She was able to ambulate 160' with FWW and navigate 6 steps per her home setup all with CGA and no LOB or knee buckling. HEP and activity recommendations at D/C reviewed. IPPT is indicated to address deficits while admitted. Recommend D/C home with assist and HHPT today if medically appropriate. She owns a FWW.     Anticipated Discharge Disposition (PT): home with assist, home with home health                 Discharge Assessment:       Visit Vitals  /56 (BP Location:  "Left arm, Patient Position: Lying)   Pulse 77   Temp 97.7 °F (36.5 °C) (Oral)   Resp 16   Ht 175.3 cm (69.02\")   Wt 92.8 kg (204 lb 9.6 oz)   SpO2 93%   BMI 30.20 kg/m²     Temp (24hrs), Av.6 °F (36.4 °C), Min:97 °F (36.1 °C), Max:98.2 °F (36.8 °C)      General Appearance:    Alert, cooperative, in no acute distress   Lungs:     Clear to auscultation,respirations regular, even and                   unlabored    Heart:    Regular rhythm and normal rate, normal S1 and S2   Abdomen:     Normal bowel sounds, no masses, no organomegaly, soft        non-tender, non-distended, no guarding, no rebound                 tenderness   Extremities:   CDI dressing surgical knee . ACB cath present, infupump.   Pulses:   Pulses palpable and equal bilaterally   Skin:   No bleeding, bruising or rash   Neurologic:   Cranial nerves 2 - 12 grossly intact, sensation intact, Flexion and dorsiflexion intact bilateral feet.         Discharge Disposition: Home.         Discharge Medications        New Medications        Instructions Start Date   acetaminophen 500 MG tablet  Commonly known as: TYLENOL   1,000 mg, Oral, Every 8 Hours, Change to every 8 hours  as needed after 1 week      docusate sodium 100 MG capsule  Commonly known as: COLACE   100 mg, Oral, 2 Times Daily      meloxicam 15 MG tablet  Commonly known as: MOBIC   15 mg, Oral, Daily      oxyCODONE 5 MG immediate release tablet  Commonly known as: Roxicodone   5 mg, Oral, Every 4 Hours PRN      ropivacaine 0.2 % infusion (INFUSYSTEM)  Commonly known as: NAROPIN   2 mL/hr (4 mg/hr), Peripheral Nerve, Continuous             Changes to Medications        Instructions Start Date   aspirin 81 MG EC tablet  Commonly known as: ASPIR  What changed: You were already taking a medication with the same name, and this prescription was added. Make sure you understand how and when to take each.   81 mg, Oral, 2 Times Daily   Start Date: 2025     aspirin 81 MG EC tablet  What " changed: These instructions start on April 21, 2025. If you are unsure what to do until then, ask your doctor or other care provider.   81 mg, Oral, Daily   Start Date: April 21, 2025     Ozempic (2 MG/DOSE) 8 MG/3ML solution pen-injector  Generic drug: Semaglutide (2 MG/DOSE)  What changed: These instructions start on April 4, 2025. If you are unsure what to do until then, ask your doctor or other care provider.   2 mg, Subcutaneous, Weekly   Start Date: April 4, 2025            Continue These Medications        Instructions Start Date   BD Pen Needle Sherri 2nd Gen 32G X 4 MM misc  Generic drug: Insulin Pen Needle   USE ONE EACH DAY      Droplet Pen Needles 32G X 4 MM misc  Generic drug: Insulin Pen Needle   USE ONE NEEDLE EACH TIME ONCE DAILY.      CALCIUM-MAGNESIUM-ZINC-D3 PO   Take  by mouth.      DULoxetine 30 MG capsule  Commonly known as: CYMBALTA   30 mg, Oral, Daily      FreeStyle Camelia 2 Sensor misc   1 each, Not Applicable, Every 14 Days      glucose blood test strip   Use as instructed 2-3 times a day, provide strips for the Onetouch meter      Lantus SoloStar 100 UNIT/ML injection pen  Generic drug: Insulin Glargine   INJECT 30 UNITS UNDER THE SKIN INTO THE APPROPRIATE AREA AS DIRECTED DAILY      levocetirizine 5 MG tablet  Commonly known as: Xyzal   5 mg, Oral, Every Evening      lisinopril 20 MG tablet  Commonly known as: PRINIVIL,ZESTRIL   20 mg, Oral, Daily      QUEtiapine 25 MG tablet  Commonly known as: SEROquel   75 mg, Oral, Nightly      sertraline 25 MG tablet  Commonly known as: Zoloft   25 mg, Oral, Daily      simvastatin 40 MG tablet  Commonly known as: ZOCOR   40 mg, Oral, Nightly      vitamin D 1.25 MG (02875 UT) capsule capsule  Commonly known as: ERGOCALCIFEROL   50,000 Units, Oral, Weekly               Discharge Diet: Resume prior.     Activity at Discharge:   Weight bearing as tolerated       Future Appointments   Date Time Provider Department Center   4/1/2025 11:15 AM Priscilla Daugherty  MD [facility]   6/18/2025 10:45 AM Cat Bangura MD [facility]   9/2/2025  2:30 PM Cristobal Farley APRN [facility]     Jamir Chau MD per his orders.     Dragon disclaimer:  Part of this encounter note is an electronic transcription/translation of spoken language to printed text. The electronic translation of spoken language may permit erroneous, or at times, nonsensical words or phrases to be inadvertently transcribed; Although I have reviewed the note for such errors, some may still exist.       Angel House MD  03/21/25  12:55 EDT

## 2025-03-21 NOTE — THERAPY EVALUATION
Patient Name: Octavia Rice  : 1960    MRN: [id number]                              Today's Date: 3/21/2025       Admit Date: 3/20/2025    Visit Dx:     ICD-10-CM ICD-9-CM   1. Status post left knee replacement  Z96.652 V43.65     Patient Active Problem List   Diagnosis    Hyperlipidemia    Vitamin D deficiency    Irritable bowel syndrome    Obesity    Type 2 diabetes mellitus    Anxiety    Insomnia    Shoulder pain    Strain of lumbar region    Annual physical exam    Encounter for screening mammogram for malignant neoplasm of breast    Postmenopausal    Medicare annual wellness visit, subsequent    Chronic diarrhea    Elevated blood pressure reading in office with diagnosis of hypertension    Pain of left hand    Left foot pain    Acute non-recurrent maxillary sinusitis    Arthritis of left knee    Status post left knee replacement     Past Medical History:   Diagnosis Date    Acute upper respiratory infection     Anxiety 2017    Community acquired pneumonia     Depression     Diabetes mellitus     dx 1998- checks fsbs weekly    Fracture, stress, metatarsal     STRESS FRACTURE ALONG THE SHAFT OF THE FIFTH RIGHT METATARSAL    Hyperlipidemia     IBS (irritable bowel syndrome)     Insomnia 2017    Irritable bowel syndrome     Obesity     Shoulder pain     Sleep apnea     CPAP COMPLIANT    Tobacco abuse 2017    Type 2 diabetes mellitus 2017    Type 2 diabetes mellitus, uncontrolled     Vitamin D deficiency      Past Surgical History:   Procedure Laterality Date    COLONOSCOPY      within last 5 years    KNEE ARTHROSCOPY W/ MENISCAL REPAIR Right     PATELLA OPEN REDUCTION INTERNAL FIXATION Left 10/27/2016    Procedure: LEFT PATELLA OPEN REDUCTION INTERNAL FIXATION;  Surgeon: Marshall Meyers MD;  Location: [facility];  Service:     SHOULDER OPEN REDUCTION INTERNAL FIXATION Right 10/27/2016    Procedure: RIGHT SHOULDER CLOSED REDUCTION ;  Surgeon: Marshall Meyers MD;  Location:   [facility];  Service:     SHOULDER OPEN REDUCTION INTERNAL FIXATION Right 10/31/2016    Procedure: RIGHT SHOULDER OPEN REDUCTION INTERNAL FIXATION WITH PLATE FOR FRACTURE;  Surgeon: Jaguar Marsh MD;  Location:  [facility];  Service:     TONSILLECTOMY      TOTAL ABDOMINAL HYSTERECTOMY WITH SALPINGO OOPHORECTOMY      TOTAL KNEE ARTHROPLASTY Left 3/20/2025    Procedure: TOTAL KNEE ARTHROPLASTY WITH CORI ROBOT LEFT;  Surgeon: Jamir Chau MD;  Location:  [facility];  Service: Robotics - Ortho;  Laterality: Left;    TOTAL SHOULDER ARTHROPLASTY W/ DISTAL CLAVICLE EXCISION Right 8/7/2017    Procedure: REMOVAL RIGHT PROXIMAL HUMERUS PLATE, RIGHT REVERSE TOTAL SHOULDER ARTHROPLASTY, SHOULDER HARDWARE REMOVAL;  Surgeon: Jaguar Marsh MD;  Location:  [facility];  Service:     URETHRAL SUSPENSION      FOR STRESS INCONTINENCE      General Information       Row Name 03/21/25 1436          OT Time and Intention    Document Type evaluation  -LR     Mode of Treatment occupational therapy  -LR       Row Name 03/21/25 1436          General Information    Patient Profile Reviewed yes  -LR     Prior Level of Function independent:;ADL's;gait;transfer;bed mobility;driving;all household mobility;community mobility  -LR     Existing Precautions/Restrictions fall;other (see comments)  LLE WBAT, adductor nerve catheter  -LR     Barriers to Rehab none identified  -LR       Row Name 03/21/25 1436          Living Environment    Current Living Arrangements home  -LR     People in Home spouse;child(dominik), adult;grandchild(dominik)  -LR       Row Name 03/21/25 1436          Home Main Entrance    Number of Stairs, Main Entrance other (see comments)  1+2+1  -LR     Stair Railings, Main Entrance none  -LR       Row Name 03/21/25 1436          Stairs Within Home, Primary    Number of Stairs, Within Home, Primary twelve  -LR     Stair Railings, Within Home, Primary railings on both sides of stairs;railing on left side (ascending);other  (see comments)  bilat handrails for initial steps, then only L handrail  -       Row Name 03/21/25 1436          Cognition    Orientation Status (Cognition) oriented x 4  -LR       Row Name 03/21/25 1436          Safety Issues/Impairments Affecting Functional Mobility    Safety Issues Affecting Function (Mobility) safety precaution awareness;awareness of need for assistance  -     Impairments Affecting Function (Mobility) balance;endurance/activity tolerance;pain;range of motion (ROM);strength  -               User Key  (r) = Recorded By, (t) = Taken By, (c) = Cosigned By      Initials Name Provider Type    LR Abiola Amaro, OT Occupational Therapist                     Mobility/ADL's       Row Name 03/21/25 1438          Bed Mobility    Comment, (Bed Mobility) Sitting EOB with nsg on arrival  -       Row Name 03/21/25 1438          Transfers    Transfers sit-stand transfer;stand-sit transfer;toilet transfer  -     Comment, (Transfers) VC for hand placement. Good demo of feedback  -       Row Name 03/21/25 1438          Sit-Stand Transfer    Sit-Stand [place] (Transfers) contact guard;verbal cues  -LR     Assistive Device (Sit-Stand Transfers) walker, front-wheeled  -LR       Row Name 03/21/25 1438          Stand-Sit Transfer    Stand-Sit [place] (Transfers) contact guard;verbal cues  -LR     Assistive Device (Stand-Sit Transfers) walker, front-wheeled  -LR       Row Name 03/21/25 1438          Toilet Transfer    Type (Toilet Transfer) sit-stand;stand-sit  -LR     [place] Level (Toilet Transfer) contact guard;verbal cues;nonverbal cues (demo/gesture)  -     Assistive Device (Toilet Transfer) commode;raised toilet seat;grab bars/safety frame  -       Row Name 03/21/25 1438          Functional Mobility    Functional Mobility- Ind. Level contact guard assist;1 person;verbal cues required  -LR     Functional Mobility- Device walker, front-wheeled  -LR     Functional Mobility-Distance  (Feet) --  In room for ADLs  -LR     Patient was able to Ambulate yes  -LR       Row Name 03/21/25 1438          Activities of Daily Living    BADL Assessment/Intervention upper body dressing;lower body dressing;bathing;toileting  -LR       Row Name 03/21/25 1438          Mobility    Extremity Weight-bearing Status left lower extremity  -LR     Left Lower Extremity (Weight-bearing Status) weight-bearing as tolerated (WBAT)  -LR       Row Name 03/21/25 1438          Upper Body Dressing Assessment/Training    [place] Level (Upper Body Dressing) don;bra/undergarment;front opening garment;set up  -LR     Position (Upper Body Dressing) edge of bed sitting  -LR       Row Name 03/21/25 1438          Lower Body Dressing Assessment/Training    [place] Level (Lower Body Dressing) doff;socks;minimum assist (75% patient effort);don;pants/bottoms;shoes/slippers;moderate assist (50% patient effort)  -LR     Assistive Devices (Lower Body Dressing) leg ;long-handled shoe horn;reacher;sock-aid  -LR     Position (Lower Body Dressing) edge of bed sitting  -LR     Comment, (Lower Body Dressing) Administered and educated pt on AE for comfort. Good demo in trial when getting dressed. Educated pt on ADL retraining to avoid dislodgement.  -       Row Name 03/21/25 1438          Bathing Assessment/Intervention    Comment, (Bathing) Educated on no showering until nerve cath is out  -LR       Row Name 03/21/25 1438          Toileting Assessment/Training    [place] Level (Toileting) adjust/manage clothing;perform perineal hygiene;supervision  -LR     Assistive Devices (Toileting) commode;raised toilet seat;grab bar/safety frame  -LR     Position (Toileting) supported sitting  -LR     Comment, (Toileting) VC for hand placement on grab bars  -LR               User Key  (r) = Recorded By, (t) = Taken By, (c) = Cosigned By      Initials Name Provider Type    Abiola Story, OT Occupational Therapist                    Obj/Interventions       Row Name 03/21/25 1458          Sensory Assessment (Somatosensory)    Sensory Assessment (Somatosensory) UE sensation intact  -LR       Row Name 03/21/25 1458          Vision Assessment/Intervention    Visual Impairment/Limitations WFL  -LR       Row Name 03/21/25 1458          Range of Motion Comprehensive    General Range of Motion bilateral upper extremity ROM WFL  -LR       Row Name 03/21/25 1458          Strength Comprehensive (MMT)    General Manual Muscle Testing (MMT) Assessment upper extremity strength deficits identified  -LR     Comment, General Manual Muscle Testing (MMT) Assessment BUE grossly 4/5 MMT  -LR       Row Name 03/21/25 1458          Balance    Balance Assessment sitting static balance;sitting dynamic balance;standing static balance;standing dynamic balance  -LR     Static Sitting Balance independent  -LR     Dynamic Sitting Balance supervision  -LR     Position, Sitting Balance unsupported;sitting edge of bed  -LR     Static Standing Balance contact guard;standby assist;other (see comments)  progressed to SBA  -LR     Dynamic Standing Balance contact guard  -LR     Position/Device Used, Standing Balance supported;walker, front-wheeled  -LR     Balance Interventions sitting;standing;sit to stand;supported;static;dynamic;occupation based/functional task  -LR               User Key  (r) = Recorded By, (t) = Taken By, (c) = Cosigned By      Initials Name Provider Type    LR Abiola Amaro, OT Occupational Therapist                   Goals/Plan       Row Name 03/21/25 1505          Dressing Goal 1 (OT)    Activity/Device (Dressing Goal 1, OT) lower body dressing  -LR     [place]/Cues Needed (Dressing Goal 1, OT) independent;other (see comments)  AE PRN  -LR     Time Frame (Dressing Goal 1, OT) long term goal (LTG);5 days  -LR     Progress/Outcome (Dressing Goal 1, OT) goal ongoing;new goal  -LR       Row Name 03/21/25 1505          Grooming Goal 1 (OT)     Activity/Device (Grooming Goal 1, OT) hair care;oral care;wash face, hands  -LR     [place] (Grooming Goal 1, OT) standby assist  -LR     Time Frame (Grooming Goal 1, OT) short term goal (STG);2 days  -LR     Strategies/Barriers (Grooming Goal 1, OT) sink side with no complaints of pain  -LR     Progress/Outcome (Grooming Goal 1, OT) goal ongoing;new goal  -LR       Row Name 03/21/25 8059          Therapy Assessment/Plan (OT)    Planned Therapy Interventions (OT) activity tolerance training;adaptive equipment training;BADL retraining;occupation/activity based interventions;strengthening exercise;transfer/mobility retraining;functional balance retraining;IADL retraining;passive ROM/stretching;ROM/therapeutic exercise;patient/caregiver education/training  -LR               User Key  (r) = Recorded By, (t) = Taken By, (c) = Cosigned By      Initials Name Provider Type    LR Abiola Amaro, OT Occupational Therapist                   Clinical Impression       Row Name 03/21/25 5150          Pain Assessment    Pain Location knee  -LR     Pain Side/Orientation left  -LR     Pain Management Interventions activity modification encouraged;exercise or physical activity utilized  -LR     Response to Pain Interventions activity level improved;activity participation with tolerable pain  -LR     Additional Documentation Pain Scale: FACES Pre/Post-Treatment (Group)  -LR       Row Name 03/21/25 3260          Pain Scale: FACES Pre/Post-Treatment    Pain: FACES Scale, Pretreatment 2-->hurts little bit  -LR     Posttreatment Pain Rating 2-->hurts little bit  -LR       Row Name 03/21/25 5012          Plan of Care Review    Plan of Care Reviewed With patient  -LR     Progress no change  -LR     Outcome Evaluation OT eval complete. Pt presents below baseline with elevated pain, decreased activity tolerance, strength, and minimal balance deficits. Administered, educated, and trialed AE for LBD. Pt with good demo of learning. VC  throughout session for hand placement when performing transfers. Safe car and bed transfers discussed with leg  for comfort. Recommend home with assist at D/C.  -LR       Row Name 03/21/25 1451          Therapy Assessment/Plan (OT)    Patient/Family Therapy Goal Statement (OT) PLOF  -LR     Rehab Potential (OT) good  -LR     Criteria for Skilled Therapeutic Interventions Met (OT) yes;skilled treatment is necessary  -LR     Therapy Frequency (OT) daily  -LR     Predicted Duration of Therapy Intervention (OT) 5 days  -LR       Row Name 03/21/25 8922          Therapy Plan Review/Discharge Plan (OT)    Anticipated Discharge Disposition (OT) home with assist  -LR       Row Name 03/21/25 1457          Vital Signs    Pretreatment Heart Rate (beats/min) --  not connected  -LR     O2 Delivery Pre Treatment room air  -LR     O2 Delivery Intra Treatment room air  -LR     O2 Delivery Post Treatment room air  -LR     Pre Patient Position Supine  -LR     Intra Patient Position Standing  -LR     Post Patient Position Supine  -LR       Row Name 03/21/25 5150          Positioning and Restraints    Pre-Treatment Position in bed  -LR     Post Treatment Position bed  -LR     In Bed notified nsg;fowlers;call light within reach;encouraged to call for assist;exit alarm on;with family/caregiver  L heel elevated with ice pack over incision  -LR               User Key  (r) = Recorded By, (t) = Taken By, (c) = Cosigned By      Initials Name Provider Type    LR Abiloa Amaro, OT Occupational Therapist                   Outcome Measures       Row Name 03/21/25 4225          How much help from another is currently needed...    Putting on and taking off regular lower body clothing? 3  -LR     Bathing (including washing, rinsing, and drying) 3  -LR     Toileting (which includes using toilet bed pan or urinal) 3  -LR     Putting on and taking off regular upper body clothing 3  -LR     Taking care of personal grooming (such as brushing  teeth) 3  -LR     Eating meals 3  -LR     AM-PAC 6 Clicks Score (OT) 18  -LR       Row Name 03/21/25 1322          How much help from another person do you currently need...    Turning from your back to your side while in flat bed without using bedrails? 4  -CK     Moving from lying on back to sitting on the side of a flat bed without bedrails? 3  -CK     Moving to and from a bed to a chair (including a wheelchair)? 3  -CK     Standing up from a chair using your arms (e.g., wheelchair, bedside chair)? 3  -CK     Climbing 3-5 steps with a railing? 3  -CK     To walk in hospital room? 3  -CK     AM-PAC 6 Clicks Score (PT) 19  -CK     Highest Level of Mobility Goal 6 --> Walk 10 steps or more  -CK       Row Name 03/21/25 1505 03/21/25 1322       Functional Assessment    Outcome Measure Options AM-PAC 6 Clicks Daily Activity (OT)  -LR AM-PAC 6 Clicks Basic Mobility (PT);PADD  -CK              User Key  (r) = Recorded By, (t) = Taken By, (c) = Cosigned By      Initials Name Provider Type    CK Sarahy Mai, PT Physical Therapist    LR Abiola Amaro, OT Occupational Therapist                    Occupational Therapy Education       Title: PT OT SLP Therapies (In Progress)       Topic: Occupational Therapy (In Progress)       Point: ADL training (In Progress)       Learning Progress Summary            Patient Acceptance, E, NR by LR at 3/21/2025 1506                      Point: Home exercise program (In Progress)       Learning Progress Summary            Patient Acceptance, E, NR by LR at 3/21/2025 1506                      Point: Precautions (In Progress)       Learning Progress Summary            Patient Acceptance, E, NR by LR at 3/21/2025 1506                      Point: Body mechanics (In Progress)       Learning Progress Summary            Patient Acceptance, E, NR by LR at 3/21/2025 1506                                      User Key       Initials Effective Dates Name Provider Type Discipline    LR  10/09/24 -  Abiola Amaro OT Occupational Therapist OT                  OT Recommendation and Plan  Planned Therapy Interventions (OT): activity tolerance training, adaptive equipment training, BADL retraining, occupation/activity based interventions, strengthening exercise, transfer/mobility retraining, functional balance retraining, IADL retraining, passive ROM/stretching, ROM/therapeutic exercise, patient/caregiver education/training  Therapy Frequency (OT): daily  Plan of Care Review  Plan of Care Reviewed With: patient  Progress: no change  Outcome Evaluation: OT eval complete. Pt presents below baseline with elevated pain, decreased activity tolerance, strength, and minimal balance deficits. Administered, educated, and trialed AE for LBD. Pt with good demo of learning. VC throughout session for hand placement when performing transfers. Safe car and bed transfers discussed with leg  for comfort. Recommend home with assist at D/C.     Time Calculation:   Evaluation Complexity (OT)  Review Occupational Profile/Medical/Therapy History Complexity: brief/low complexity  Assessment, Occupational Performance/Identification of Deficit Complexity: 1-3 performance deficits  Clinical Decision Making Complexity (OT): problem focused assessment/low complexity  Overall Complexity of Evaluation (OT): low complexity     Time Calculation- OT       Row Name 03/21/25 1507             Time Calculation- OT    OT Start Time 1355  -LR      OT Received On 03/21/25  -LR      OT Goal Re-Cert Due Date 03/31/25  -LR         Timed Charges    80403 - OT Self Care/Mgmt Minutes 10  -LR         Untimed Charges    OT Eval/Re-eval Minutes 47  -LR         Total Minutes    Timed Charges Total Minutes 10  -LR      Untimed Charges Total Minutes 47  -LR       Total Minutes 57  -LR                User Key  (r) = Recorded By, (t) = Taken By, (c) = Cosigned By      Initials Name Provider Type    LR Abiola Amaro, OT Occupational Therapist                   Therapy Charges for Today       Code Description Service Date Service Provider Modifiers Qty    [id number]  OT SELF CARE/MGMT/TRAIN EA 15 MIN 3/21/2025 Abiola Amaro, OT GO 1    [id number]  OT EVAL LOW COMPLEXITY 4 3/21/2025 Abiola Amaro, OT GO 1                 Abiola Amaro, OT  3/21/2025

## 2025-05-20 ENCOUNTER — LAB (OUTPATIENT)
Dept: LAB | Facility: HOSPITAL | Age: 65
End: 2025-05-20
Payer: MEDICARE

## 2025-05-20 ENCOUNTER — OFFICE VISIT (OUTPATIENT)
Dept: FAMILY MEDICINE CLINIC | Facility: CLINIC | Age: 65
End: 2025-05-20
Payer: MEDICARE

## 2025-05-20 VITALS
WEIGHT: 193.8 LBS | HEIGHT: 69 IN | HEART RATE: 95 BPM | SYSTOLIC BLOOD PRESSURE: 118 MMHG | BODY MASS INDEX: 28.71 KG/M2 | TEMPERATURE: 98.2 F | DIASTOLIC BLOOD PRESSURE: 78 MMHG | OXYGEN SATURATION: 98 %

## 2025-05-20 DIAGNOSIS — R11.2 NAUSEA AND VOMITING, UNSPECIFIED VOMITING TYPE: Primary | ICD-10-CM

## 2025-05-20 DIAGNOSIS — E11.65 TYPE 2 DIABETES MELLITUS WITH HYPERGLYCEMIA, WITHOUT LONG-TERM CURRENT USE OF INSULIN: Chronic | ICD-10-CM

## 2025-05-20 DIAGNOSIS — R11.2 NAUSEA AND VOMITING, UNSPECIFIED VOMITING TYPE: ICD-10-CM

## 2025-05-20 LAB
BASOPHILS # BLD AUTO: 0.06 10*3/MM3 (ref 0–0.2)
BASOPHILS NFR BLD AUTO: 0.7 % (ref 0–1.5)
DEPRECATED RDW RBC AUTO: 43.2 FL (ref 37–54)
EOSINOPHIL # BLD AUTO: 0.02 10*3/MM3 (ref 0–0.4)
EOSINOPHIL NFR BLD AUTO: 0.2 % (ref 0.3–6.2)
ERYTHROCYTE [DISTWIDTH] IN BLOOD BY AUTOMATED COUNT: 13.1 % (ref 12.3–15.4)
HCT VFR BLD AUTO: 42.2 % (ref 34–46.6)
HGB BLD-MCNC: 13.9 G/DL (ref 12–15.9)
IMM GRANULOCYTES # BLD AUTO: 0.02 10*3/MM3 (ref 0–0.05)
IMM GRANULOCYTES NFR BLD AUTO: 0.2 % (ref 0–0.5)
LYMPHOCYTES # BLD AUTO: 2.29 10*3/MM3 (ref 0.7–3.1)
LYMPHOCYTES NFR BLD AUTO: 26.9 % (ref 19.6–45.3)
MCH RBC QN AUTO: 29.4 PG (ref 26.6–33)
MCHC RBC AUTO-ENTMCNC: 32.9 G/DL (ref 31.5–35.7)
MCV RBC AUTO: 89.4 FL (ref 79–97)
MONOCYTES # BLD AUTO: 0.7 10*3/MM3 (ref 0.1–0.9)
MONOCYTES NFR BLD AUTO: 8.2 % (ref 5–12)
NEUTROPHILS NFR BLD AUTO: 5.42 10*3/MM3 (ref 1.7–7)
NEUTROPHILS NFR BLD AUTO: 63.8 % (ref 42.7–76)
NRBC BLD AUTO-RTO: 0 /100 WBC (ref 0–0.2)
PLATELET # BLD AUTO: 343 10*3/MM3 (ref 140–450)
PMV BLD AUTO: 11 FL (ref 6–12)
RBC # BLD AUTO: 4.72 10*6/MM3 (ref 3.77–5.28)
WBC NRBC COR # BLD AUTO: 8.51 10*3/MM3 (ref 3.4–10.8)

## 2025-05-20 PROCEDURE — 85025 COMPLETE CBC W/AUTO DIFF WBC: CPT

## 2025-05-20 PROCEDURE — 80053 COMPREHEN METABOLIC PANEL: CPT

## 2025-05-20 PROCEDURE — 82150 ASSAY OF AMYLASE: CPT

## 2025-05-20 PROCEDURE — 36415 COLL VENOUS BLD VENIPUNCTURE: CPT

## 2025-05-20 RX ORDER — ONDANSETRON 4 MG/1
4 TABLET, ORALLY DISINTEGRATING ORAL EVERY 8 HOURS PRN
Qty: 12 TABLET | Refills: 0 | Status: SHIPPED | OUTPATIENT
Start: 2025-05-20

## 2025-05-20 RX ORDER — CYCLOBENZAPRINE HCL 10 MG
10 TABLET ORAL
COMMUNITY
Start: 2025-04-07

## 2025-05-21 LAB
ALBUMIN SERPL-MCNC: 4.7 G/DL (ref 3.5–5.2)
ALBUMIN/GLOB SERPL: 1.3 G/DL
ALP SERPL-CCNC: 128 U/L (ref 39–117)
ALT SERPL W P-5'-P-CCNC: 13 U/L (ref 1–33)
AMYLASE SERPL-CCNC: 55 U/L (ref 28–100)
ANION GAP SERPL CALCULATED.3IONS-SCNC: 16 MMOL/L (ref 5–15)
AST SERPL-CCNC: 20 U/L (ref 1–32)
BILIRUB SERPL-MCNC: 0.6 MG/DL (ref 0–1.2)
BUN SERPL-MCNC: 19 MG/DL (ref 8–23)
BUN/CREAT SERPL: 17 (ref 7–25)
CALCIUM SPEC-SCNC: 10.2 MG/DL (ref 8.6–10.5)
CHLORIDE SERPL-SCNC: 98 MMOL/L (ref 98–107)
CO2 SERPL-SCNC: 22 MMOL/L (ref 22–29)
CREAT SERPL-MCNC: 1.12 MG/DL (ref 0.57–1)
EGFRCR SERPLBLD CKD-EPI 2021: 54.7 ML/MIN/1.73
GLOBULIN UR ELPH-MCNC: 3.5 GM/DL
GLUCOSE SERPL-MCNC: 180 MG/DL (ref 65–99)
POTASSIUM SERPL-SCNC: 4.5 MMOL/L (ref 3.5–5.2)
PROT SERPL-MCNC: 8.2 G/DL (ref 6–8.5)
SODIUM SERPL-SCNC: 136 MMOL/L (ref 136–145)

## 2025-05-21 NOTE — PROGRESS NOTES
"Subjective   Octavia Rice is a 65 y.o. female.     Nausea  Symptoms are recurrent.   Onset was 1 to 6 months.   Symptoms occur constantly.   Symptoms have been worse since onset.   Symptoms include anorexia, headaches, nausea, sore throat and vomiting.    Pertinent negative symptoms include no abdominal pain.   Improvement on treatment was no relief.      [facility] follow up- patient states she went due to vomiting from knee replacement that was done 3/22/2025. Went to [facility] on Saturday and was given phenergan and abx and told to follow up with pcp. Tested for covid, flu, strep and all were negative. Patient wonders if it could be the ozempic     The following portions of the patient's history were reviewed and updated as appropriate: allergies, current medications, past family history, past medical history, past social history, past surgical history, and problem list.    Review of Systems   HENT:  Positive for sore throat.    Gastrointestinal:  Positive for anorexia, nausea and vomiting. Negative for abdominal pain.   Neurological:  Positive for headaches.       Objective     Vitals:    05/20/25 1130   BP: 118/78   Pulse: 95   Temp: 98.2 °F (36.8 °C)   TempSrc: Infrared   SpO2: 98%   Weight: 87.9 kg (193 lb 12.8 oz)   Height: 175.3 cm (69.02\")       Physical Exam  Vitals and nursing note reviewed.   Constitutional:       General: She is not in acute distress.     Appearance: She is well-developed. She is not diaphoretic.   HENT:      Head: Normocephalic and atraumatic.      Right Ear: External ear normal.      Left Ear: External ear normal.   Eyes:      General: Lids are normal. No scleral icterus.        Right eye: No discharge.         Left eye: No discharge.      Conjunctiva/sclera: Conjunctivae normal.      Pupils: Pupils are equal, round, and reactive to light.   Neck:      Thyroid: No thyroid mass or thyromegaly.      Vascular: No carotid bruit or JVD.      Trachea: No tracheal deviation.   Cardiovascular:     "  Rate and Rhythm: Normal rate and regular rhythm.      Heart sounds: Normal heart sounds. No murmur heard.     No friction rub. No gallop.   Pulmonary:      Effort: Pulmonary effort is normal. No respiratory distress.      Breath sounds: Normal breath sounds. No decreased breath sounds, wheezing, rhonchi or rales.   Chest:      Chest wall: No tenderness.   Abdominal:      General: Bowel sounds are normal. There is no distension.      Palpations: Abdomen is soft. There is no mass.      Tenderness: There is no abdominal tenderness. There is no guarding or rebound.      Hernia: No hernia is present.   Musculoskeletal:         General: Normal range of motion.   Lymphadenopathy:      Cervical: No cervical adenopathy.      Upper Body:      Right upper body: No supraclavicular adenopathy.      Left upper body: No supraclavicular adenopathy.   Skin:     Findings: No bruising, erythema or rash.      Nails: There is no clubbing.   Neurological:      Mental Status: She is alert and oriented to person, place, and time.      Cranial Nerves: No cranial nerve deficit.      Deep Tendon Reflexes: Reflexes are normal and symmetric. Reflexes normal.   Psychiatric:         Speech: Speech normal.         Behavior: Behavior normal.         Thought Content: Thought content normal.         Judgment: Judgment normal.         Assessment & Plan     Problem List Items Addressed This Visit          Endocrine and Metabolic    Type 2 diabetes mellitus (Chronic)       Gastrointestinal Abdominal     Nausea and vomiting - Primary    Relevant Medications    ondansetron ODT (ZOFRAN-ODT) 4 MG disintegrating tablet    Other Relevant Orders    CBC & Differential (Completed)    Comprehensive Metabolic Panel (Completed)    Amylase (Completed)     Dc ozempic to see if this is medication side effect.  Zofran and labs to eval for electrolyte abnormality or other cause for symptoms.

## 2025-05-22 ENCOUNTER — TELEPHONE (OUTPATIENT)
Dept: FAMILY MEDICINE CLINIC | Facility: CLINIC | Age: 65
End: 2025-05-22
Payer: MEDICARE

## 2025-05-22 NOTE — TELEPHONE ENCOUNTER
Name: Octavia Rice      Relationship: Self      Best Callback Number: [phone]       HUB PROVIDED THE RELAY MESSAGE FROM THE OFFICE      PATIENT: VOICED UNDERSTANDING AND HAS NO FURTHER QUESTIONS AT THIS TIME    ADDITIONAL INFORMATION:

## 2025-05-27 ENCOUNTER — OFFICE VISIT (OUTPATIENT)
Dept: ENDOCRINOLOGY | Facility: CLINIC | Age: 65
End: 2025-05-27
Payer: MEDICARE

## 2025-05-27 ENCOUNTER — PRIOR AUTHORIZATION (OUTPATIENT)
Dept: ENDOCRINOLOGY | Facility: CLINIC | Age: 65
End: 2025-05-27
Payer: MEDICARE

## 2025-05-27 VITALS
SYSTOLIC BLOOD PRESSURE: 120 MMHG | HEART RATE: 104 BPM | HEIGHT: 69 IN | WEIGHT: 197.6 LBS | DIASTOLIC BLOOD PRESSURE: 76 MMHG | BODY MASS INDEX: 29.27 KG/M2 | OXYGEN SATURATION: 100 %

## 2025-05-27 DIAGNOSIS — E11.65 TYPE 2 DIABETES MELLITUS WITH HYPERGLYCEMIA, WITHOUT LONG-TERM CURRENT USE OF INSULIN: Primary | ICD-10-CM

## 2025-05-27 DIAGNOSIS — E78.2 MIXED HYPERLIPIDEMIA: Chronic | ICD-10-CM

## 2025-05-27 LAB
EXPIRATION DATE: NORMAL
GLUCOSE BLDC GLUCOMTR-MCNC: 110 MG/DL (ref 70–130)
Lab: NORMAL

## 2025-05-27 PROCEDURE — 3074F SYST BP LT 130 MM HG: CPT | Performed by: INTERNAL MEDICINE

## 2025-05-27 PROCEDURE — 3044F HG A1C LEVEL LT 7.0%: CPT | Performed by: INTERNAL MEDICINE

## 2025-05-27 PROCEDURE — 99214 OFFICE O/P EST MOD 30 MIN: CPT | Performed by: INTERNAL MEDICINE

## 2025-05-27 PROCEDURE — 95251 CONT GLUC MNTR ANALYSIS I&R: CPT | Performed by: INTERNAL MEDICINE

## 2025-05-27 PROCEDURE — 3078F DIAST BP <80 MM HG: CPT | Performed by: INTERNAL MEDICINE

## 2025-05-27 RX ORDER — TIRZEPATIDE 2.5 MG/.5ML
2.5 INJECTION, SOLUTION SUBCUTANEOUS WEEKLY
Qty: 2 ML | Refills: 0 | Status: SHIPPED | OUTPATIENT
Start: 2025-05-27

## 2025-05-27 RX ORDER — INSULIN GLARGINE 100 [IU]/ML
30 INJECTION, SOLUTION SUBCUTANEOUS NIGHTLY
Qty: 45 ML | Refills: 3 | Status: SHIPPED | OUTPATIENT
Start: 2025-05-27

## 2025-05-27 NOTE — TELEPHONE ENCOUNTER
Octavia Rice ([place]: [id number])  Rx #: [id number]  Mounjaro 2.5MG/0.5ML auto-injectors  Form  [employer] Electronic PA Form  Created  24 minutes ago  Sent to Plan  2 minutes ago  Plan Response  1 minute ago  Submit Clinical Questions  1 minute ago  Determination  Favorable  less than a minute ago  Message from Plan  PA Case: [id number], Status: Approved, Coverage Starts on: 1/1/2025 12:00:00 AM, Coverage Ends on: 12/31/2025 12:00:00 AM. Questions? Contact [phone].. Authorization Expiration Date: December 31, 2025.

## 2025-05-27 NOTE — PROGRESS NOTES
Chief complaint  Diabetes (A1C 6.5 at the end of march)    Subjective   Octavia Rice is a 65 y.o. female is here today for follow-up of:    Diabetes Mellitus type 2: dx in 2003.  Diabetic complications: none  Eye exam current (within one year): no retinopathy, recent exam.   Current diabetic medications include.   Lantus  Ozempic    No hypoglycemia reported.     Past medications: 08/2016 Jardiance trial - frequent urination.     Monitoring  CGM    Other med problems: include hyperlipidemia on simvastatin.     Pt developed nausea and vomiting on ozempic when started it after surgery, had dehydration. The sx started after knee surgery. She was off the medication for 2 weeks. All April and May she was suffering from nausea and vomiting. She is feeling much better after stopping the Ozempic.     Medications    Current Outpatient Medications:     aspirin 81 MG EC tablet, Take 1 tablet by mouth Daily., Disp: , Rfl:     BD Pen Needle Sherri 2nd Gen 32G X 4 MM misc, USE ONE EACH DAY, Disp: 100 each, Rfl: 3    Continuous Blood Gluc Sensor (FreeStyle Camelia 2 Sensor) misc, Use 1 each Every 14 (Fourteen) Days., Disp: 6 each, Rfl: 3    cyclobenzaprine (FLEXERIL) 10 MG tablet, Take 1 tablet by mouth. PRN ONLY, Disp: , Rfl:     DULoxetine (CYMBALTA) 30 MG capsule, Take 1 capsule by mouth Daily., Disp: 90 capsule, Rfl: 3    Insulin Glargine (Lantus SoloStar) 100 UNIT/ML injection pen, INJECT 30 UNITS UNDER THE SKIN INTO THE APPROPRIATE AREA AS DIRECTED DAILY, Disp: 45 mL, Rfl: 3    lisinopril (PRINIVIL,ZESTRIL) 20 MG tablet, TAKE 1 TABLET BY MOUTH DAILY, Disp: 90 tablet, Rfl: 3    Multiple Minerals-Vitamins (CALCIUM-MAGNESIUM-ZINC-D3 PO), Take  by mouth., Disp: , Rfl:     ondansetron ODT (ZOFRAN-ODT) 4 MG disintegrating tablet, Place 1 tablet on the tongue Every 8 (Eight) Hours As Needed for Nausea or Vomiting., Disp: 12 tablet, Rfl: 0    QUEtiapine (SEROquel) 25 MG tablet, Take 3 tablets by mouth Every Night., Disp: 270  tablet, Rfl: 2    ropivacaine (NAROPIN) 0.2 % infusion (INFUSYSTEM), 4 mg/hr by Peripheral Nerve route Continuous., Disp: , Rfl:     Semaglutide, 2 MG/DOSE, (Ozempic, 2 MG/DOSE,) 8 MG/3ML solution pen-injector, Inject 2 mg under the skin into the appropriate area as directed 1 (One) Time Per Week., Disp: 3 mL, Rfl: 2    sertraline (Zoloft) 25 MG tablet, Take 1 tablet by mouth Daily., Disp: 90 tablet, Rfl: 3    simvastatin (ZOCOR) 40 MG tablet, Take 1 tablet by mouth Every Night., Disp: 90 tablet, Rfl: 1    vitamin D (ERGOCALCIFEROL) 1.25 MG (68071 UT) capsule capsule, Take 1 capsule by mouth 1 (One) Time Per Week., Disp: 15 capsule, Rfl: 3    acetaminophen (TYLENOL) 500 MG tablet, Take 2 tablets by mouth Every 8 (Eight) Hours. Change to every 8 hours  as needed after 1 week, Disp: 60 tablet, Rfl: 0    Droplet Pen Needles 32G X 4 MM misc, USE ONE NEEDLE EACH TIME ONCE DAILY., Disp: 100 each, Rfl: 1    glucose blood test strip, Use as instructed 2-3 times a day, provide strips for the Onetouch meter, Disp: 50 each, Rfl: 12    levocetirizine (Xyzal) 5 MG tablet, Take 1 tablet by mouth Every Evening for 30 days., Disp: 30 tablet, Rfl: 6      Review of systems  Review of Systems   Constitutional:  Positive for fatigue.   Gastrointestinal:  Positive for constipation, nausea and vomiting.   Allergic/Immunologic: Positive for environmental allergies.   Hematological: Negative.    Psychiatric/Behavioral:  Positive for decreased concentration and sleep disturbance. Negative for self-injury. Behavioral problem: phobia of crowds..The patient is nervous/anxious.        Physical exam  Objective   Vitals:    05/27/25 1259   BP: 120/76   Pulse: 104   SpO2: 100%    Body mass index is 29.16 kg/m².  Physical Exam  Vitals reviewed.   Constitutional:       Appearance: Normal appearance.   Cardiovascular:      Rate and Rhythm: Normal rate and regular rhythm.      Pulses: Normal pulses.      Heart sounds: Normal heart sounds.   Pulmonary:       Effort: Pulmonary effort is normal.      Breath sounds: Normal breath sounds.   Musculoskeletal:         General: No swelling.   Neurological:      Mental Status: She is alert and oriented to person, place, and time.   Psychiatric:         Mood and Affect: Mood normal.         Thought Content: Thought content normal.              LABS AND IMAGING  Results for orders placed or performed in visit on 05/27/25   POC Glucose, Blood    Collection Time: 05/27/25  1:11 PM    Specimen: Blood   Result Value Ref Range    Glucose 110 70 - 130 mg/dL    Lot Number [id number]     Expiration Date 12/14/25            Assessment:         Diagnoses and all orders for this visit:    Type 2 diabetes mellitus with hyperglycemia, without long-term current use of insulin  -     POC Glucose, Blood    Mixed hyperlipidemia        Plan:    Diabetes mellitus type 2 with hyperglycemia, glucose improved after basal insulin/GLP-1 is is added. A1C was 6.5   -Lantus 30 units and off Ozempic since May 20  due to side effects and dehydration. She still has some nausea and seeing her PCP for that, she has stopped Ozempic 2 weeks ago.   -In 1 -2 weeks start Mounjaro at 2.5 mg weekly and increase to 5 mg after 1 month. We may be able to reduce insulin dose at this time. Titration instructions reviewed.     Glucose was 400 at that time. Glucose improved now but after meals are high    CGM reviewed 89% in range, no hypoglycemia.     Uptodate with eye exam, no retinopathy. Urine microalbumin is negative.       There are no Patient Instructions on file for this visit.    Mixed hyperlipidemia-cont simvastatin. Fasting labs ordered   Vit D deficiency - restarted weekly Vit D.    Follow up:  3  months

## 2025-06-18 ENCOUNTER — OFFICE VISIT (OUTPATIENT)
Dept: FAMILY MEDICINE CLINIC | Facility: CLINIC | Age: 65
End: 2025-06-18
Payer: MEDICARE

## 2025-06-18 VITALS
HEIGHT: 69 IN | BODY MASS INDEX: 29.44 KG/M2 | TEMPERATURE: 98 F | SYSTOLIC BLOOD PRESSURE: 116 MMHG | WEIGHT: 198.8 LBS | OXYGEN SATURATION: 97 % | DIASTOLIC BLOOD PRESSURE: 72 MMHG | HEART RATE: 85 BPM

## 2025-06-18 DIAGNOSIS — E78.2 MIXED HYPERLIPIDEMIA: ICD-10-CM

## 2025-06-18 DIAGNOSIS — E11.65 TYPE 2 DIABETES MELLITUS WITH HYPERGLYCEMIA, WITHOUT LONG-TERM CURRENT USE OF INSULIN: Primary | Chronic | ICD-10-CM

## 2025-06-18 DIAGNOSIS — Z78.0 POST-MENOPAUSAL: ICD-10-CM

## 2025-06-18 DIAGNOSIS — G47.09 OTHER INSOMNIA: ICD-10-CM

## 2025-06-18 DIAGNOSIS — F41.9 ANXIETY: ICD-10-CM

## 2025-06-18 NOTE — PROGRESS NOTES
"Subjective   Octavia Rice is a 65 y.o. female.     History of Present Illness she is here for diabetes follow-up she has recently seen on May 27, 2025 endocrinology it looks like at that time her A1c was 6.5 which was down from 7.  She did not tolerate the Ozempic so she has stopped that and plans to start Mounjaro.      She is also taking simvastatin 40 mg for high cholesterol.  She has restarted her vitamin D weekly.    She had an eye exam with no retinopathy and her urine microalbumin is normal she has an upcoming appointment with Dr. Jurado next month.    She has anxiety and difficulty sleeping right now she is on 25 mg of Zoloft 30 mg of Cymbalta and is taking 25 mg 3 tablets of Seroquel at night to help sleep.    She also takes lisinopril 20 mg  The following portions of the patient's history were reviewed and updated as appropriate: allergies, current medications, past family history, past medical history, past social history, past surgical history, and problem list.    Review of Systems   Constitutional: Negative.    HENT: Negative.     Eyes: Negative.    Respiratory: Negative.     Cardiovascular: Negative.    Gastrointestinal: Negative.    Endocrine: Negative.    Genitourinary: Negative.    Musculoskeletal: Negative.    Skin: Negative.    Allergic/Immunologic: Negative.    Neurological: Negative.    Hematological: Negative.    Psychiatric/Behavioral: Negative.     All other systems reviewed and are negative.      Objective     Vitals:    06/18/25 1039   BP: 116/72   Pulse: 85   Temp: 98 °F (36.7 °C)   TempSrc: Infrared   SpO2: 97%   Weight: 90.2 kg (198 lb 12.8 oz)   Height: 175.3 cm (69.02\")       Physical Exam  Vitals and nursing note reviewed.   Constitutional:       General: She is not in acute distress.     Appearance: She is well-developed. She is not diaphoretic.   HENT:      Head: Normocephalic and atraumatic.      Right Ear: External ear normal.      Left Ear: External ear normal.   Eyes:    "   General: Lids are normal. No scleral icterus.        Right eye: No discharge.         Left eye: No discharge.      Conjunctiva/sclera: Conjunctivae normal.      Pupils: Pupils are equal, round, and reactive to light.   Neck:      Thyroid: No thyroid mass or thyromegaly.      Vascular: No carotid bruit or JVD.      Trachea: No tracheal deviation.   Cardiovascular:      Rate and Rhythm: Normal rate and regular rhythm.      Heart sounds: Normal heart sounds. No murmur heard.     No friction rub. No gallop.   Pulmonary:      Effort: Pulmonary effort is normal. No respiratory distress.      Breath sounds: Normal breath sounds. No decreased breath sounds, wheezing, rhonchi or rales.   Chest:      Chest wall: No tenderness.   Abdominal:      General: Bowel sounds are normal. There is no distension.      Palpations: Abdomen is soft. There is no mass.      Tenderness: There is no abdominal tenderness. There is no guarding or rebound.      Hernia: No hernia is present.   Musculoskeletal:         General: Normal range of motion.   Lymphadenopathy:      Cervical: No cervical adenopathy.      Upper Body:      Right upper body: No supraclavicular adenopathy.      Left upper body: No supraclavicular adenopathy.   Skin:     Findings: No bruising, erythema or rash.      Nails: There is no clubbing.   Neurological:      Mental Status: She is alert and oriented to person, place, and time.      Cranial Nerves: No cranial nerve deficit.      Deep Tendon Reflexes: Reflexes are normal and symmetric. Reflexes normal.   Psychiatric:         Speech: Speech normal.         Behavior: Behavior normal.         Thought Content: Thought content normal.         Judgment: Judgment normal.         Assessment & Plan     Problem List Items Addressed This Visit          Cardiac and Vasculature    Hyperlipidemia (Chronic)       Endocrine and Metabolic    Type 2 diabetes mellitus - Primary (Chronic)    Relevant Orders    CBC & Differential     Comprehensive Metabolic Panel    Lipid Panel       Mental Health    Anxiety       Sleep    Insomnia     Other Visit Diagnoses         Post-menopausal        Relevant Orders    DEXA Bone Density Axial

## 2025-06-23 ENCOUNTER — TELEPHONE (OUTPATIENT)
Dept: ENDOCRINOLOGY | Facility: CLINIC | Age: 65
End: 2025-06-23
Payer: MEDICARE

## 2025-06-23 NOTE — TELEPHONE ENCOUNTER
Last office visit with prescribing clinician: 5/27/2025       Next office visit with prescribing clinician: 8/27/2025     {   Message read by patient. This encounter has been closed. urine sent per order medications given as charted per order Pt d/c'd by Dr Edwards

## 2025-07-07 RX ORDER — DULOXETIN HYDROCHLORIDE 20 MG/1
20 CAPSULE, DELAYED RELEASE ORAL DAILY
Qty: 90 CAPSULE | Refills: 0 | Status: SHIPPED | OUTPATIENT
Start: 2025-07-07

## 2025-07-08 RX ORDER — PEN NEEDLE, DIABETIC 32GX 5/32"
NEEDLE, DISPOSABLE MISCELLANEOUS
Qty: 100 EACH | Refills: 3 | Status: SHIPPED | OUTPATIENT
Start: 2025-07-08

## 2025-07-08 NOTE — TELEPHONE ENCOUNTER
Rx Refill Note  Requested Prescriptions     Pending Prescriptions Disp Refills    Droplet Pen [place] 32G X 4 MM misc [Pharmacy Med Name: DROPLET PEN NEEDLE 32G 4MM] 100 each 3     Sig: USE ONE NEEDLE ONCE DAILY      Last office visit with prescribing clinician: 5/27/2025     Next office visit with prescribing clinician: 8/27/2025

## 2025-07-21 NOTE — TELEPHONE ENCOUNTER
Last office visit with prescribing clinician: 5/27/2025       Next office visit with prescribing clinician: 8/27/2025     {

## 2025-07-21 NOTE — TELEPHONE ENCOUNTER
PT STATED DR SOLORIO TOLD HER TO CALL IF SHE FELT SHE WANTS TO INCREASE HER MOUNJARO. PT STATED SHE WOULD LIKE TO INCREASE THE MOUNJARO. [facility], [place].

## 2025-07-29 ENCOUNTER — LAB (OUTPATIENT)
Dept: LAB | Facility: HOSPITAL | Age: 65
End: 2025-07-29
Payer: MEDICARE

## 2025-07-29 DIAGNOSIS — E11.65 TYPE 2 DIABETES MELLITUS WITH HYPERGLYCEMIA, WITHOUT LONG-TERM CURRENT USE OF INSULIN: ICD-10-CM

## 2025-07-29 LAB
ALBUMIN SERPL-MCNC: 4.1 G/DL (ref 3.5–5.2)
ALBUMIN/GLOB SERPL: 1.2 G/DL
ALP SERPL-CCNC: 108 U/L (ref 39–117)
ALT SERPL W P-5'-P-CCNC: 10 U/L (ref 1–33)
ANION GAP SERPL CALCULATED.3IONS-SCNC: 13.2 MMOL/L (ref 5–15)
AST SERPL-CCNC: 17 U/L (ref 1–32)
BASOPHILS # BLD AUTO: 0.06 10*3/MM3 (ref 0–0.2)
BASOPHILS NFR BLD AUTO: 0.6 % (ref 0–1.5)
BILIRUB SERPL-MCNC: 0.4 MG/DL (ref 0–1.2)
BUN SERPL-MCNC: 21 MG/DL (ref 8–23)
BUN/CREAT SERPL: 17.8 (ref 7–25)
CALCIUM SPEC-SCNC: 9.5 MG/DL (ref 8.6–10.5)
CHLORIDE SERPL-SCNC: 103 MMOL/L (ref 98–107)
CHOLEST SERPL-MCNC: 163 MG/DL (ref 0–200)
CO2 SERPL-SCNC: 23.8 MMOL/L (ref 22–29)
CREAT SERPL-MCNC: 1.18 MG/DL (ref 0.57–1)
DEPRECATED RDW RBC AUTO: 47.2 FL (ref 37–54)
EGFRCR SERPLBLD CKD-EPI 2021: 51.4 ML/MIN/1.73
EOSINOPHIL # BLD AUTO: 0.12 10*3/MM3 (ref 0–0.4)
EOSINOPHIL NFR BLD AUTO: 1.2 % (ref 0.3–6.2)
ERYTHROCYTE [DISTWIDTH] IN BLOOD BY AUTOMATED COUNT: 14.2 % (ref 12.3–15.4)
GLOBULIN UR ELPH-MCNC: 3.3 GM/DL
GLUCOSE SERPL-MCNC: 88 MG/DL (ref 65–99)
HCT VFR BLD AUTO: 41.6 % (ref 34–46.6)
HDLC SERPL-MCNC: 47 MG/DL (ref 40–60)
HGB BLD-MCNC: 13 G/DL (ref 12–15.9)
IMM GRANULOCYTES # BLD AUTO: 0.03 10*3/MM3 (ref 0–0.05)
IMM GRANULOCYTES NFR BLD AUTO: 0.3 % (ref 0–0.5)
LDLC SERPL CALC-MCNC: 93 MG/DL (ref 0–100)
LDLC/HDLC SERPL: 1.91 {RATIO}
LYMPHOCYTES # BLD AUTO: 2.92 10*3/MM3 (ref 0.7–3.1)
LYMPHOCYTES NFR BLD AUTO: 28.8 % (ref 19.6–45.3)
MCH RBC QN AUTO: 28.6 PG (ref 26.6–33)
MCHC RBC AUTO-ENTMCNC: 31.3 G/DL (ref 31.5–35.7)
MCV RBC AUTO: 91.4 FL (ref 79–97)
MONOCYTES # BLD AUTO: 0.72 10*3/MM3 (ref 0.1–0.9)
MONOCYTES NFR BLD AUTO: 7.1 % (ref 5–12)
NEUTROPHILS NFR BLD AUTO: 6.29 10*3/MM3 (ref 1.7–7)
NEUTROPHILS NFR BLD AUTO: 62 % (ref 42.7–76)
NRBC BLD AUTO-RTO: 0 /100 WBC (ref 0–0.2)
PLATELET # BLD AUTO: 332 10*3/MM3 (ref 140–450)
PMV BLD AUTO: 10.7 FL (ref 6–12)
POTASSIUM SERPL-SCNC: 4.4 MMOL/L (ref 3.5–5.2)
PROT SERPL-MCNC: 7.4 G/DL (ref 6–8.5)
RBC # BLD AUTO: 4.55 10*6/MM3 (ref 3.77–5.28)
SODIUM SERPL-SCNC: 140 MMOL/L (ref 136–145)
TRIGL SERPL-MCNC: 131 MG/DL (ref 0–150)
VLDLC SERPL-MCNC: 23 MG/DL (ref 5–40)
WBC NRBC COR # BLD AUTO: 10.14 10*3/MM3 (ref 3.4–10.8)

## 2025-07-29 PROCEDURE — 80061 LIPID PANEL: CPT

## 2025-07-29 PROCEDURE — 80053 COMPREHEN METABOLIC PANEL: CPT

## 2025-07-29 PROCEDURE — 85025 COMPLETE CBC W/AUTO DIFF WBC: CPT

## 2025-07-30 NOTE — TELEPHONE ENCOUNTER
Rx Refill Note  Requested Prescriptions     Pending Prescriptions Disp Refills    simvastatin (ZOCOR) 40 MG tablet [Pharmacy Med Name: SIMVASTATIN 40 MG TABLET] 90 tablet 1     Sig: TAKE ONE TABLET BY MOUTH ONCE NIGHTLY      Last office visit with prescribing clinician: 5/27/2025     Next office visit with prescribing clinician: 8/27/2025     }    Jacqueline Tsai MA  07/30/25, 10:37 EDT

## 2025-07-31 RX ORDER — SIMVASTATIN 40 MG
40 TABLET ORAL NIGHTLY
Qty: 30 TABLET | Refills: 0 | Status: SHIPPED | OUTPATIENT
Start: 2025-07-31

## 2025-08-05 DIAGNOSIS — F41.9 ANXIETY: ICD-10-CM

## 2025-08-05 RX ORDER — DULOXETIN HYDROCHLORIDE 30 MG/1
30 CAPSULE, DELAYED RELEASE ORAL DAILY
Qty: 90 CAPSULE | Refills: 3 | Status: SHIPPED | OUTPATIENT
Start: 2025-08-05

## 2025-08-26 RX ORDER — SIMVASTATIN 40 MG
40 TABLET ORAL NIGHTLY
Qty: 90 TABLET | Refills: 0 | Status: SHIPPED | OUTPATIENT
Start: 2025-08-26

## 2025-08-27 ENCOUNTER — OFFICE VISIT (OUTPATIENT)
Dept: ENDOCRINOLOGY | Facility: CLINIC | Age: 65
End: 2025-08-27
Payer: MEDICARE

## 2025-08-27 VITALS
BODY MASS INDEX: 29.33 KG/M2 | HEART RATE: 87 BPM | SYSTOLIC BLOOD PRESSURE: 116 MMHG | OXYGEN SATURATION: 99 % | DIASTOLIC BLOOD PRESSURE: 60 MMHG | HEIGHT: 69 IN | WEIGHT: 198 LBS

## 2025-08-27 DIAGNOSIS — E11.49 DM (DIABETES MELLITUS), TYPE 2 WITH NEUROLOGICAL COMPLICATIONS: Primary | ICD-10-CM

## 2025-08-27 DIAGNOSIS — E78.2 MIXED HYPERLIPIDEMIA: Chronic | ICD-10-CM

## 2025-08-27 PROBLEM — E11.649 TYPE 2 DIABETES MELLITUS WITH HYPOGLYCEMIA: Status: ACTIVE | Noted: 2017-08-07

## 2025-08-27 LAB
EXPIRATION DATE: ABNORMAL
EXPIRATION DATE: NORMAL
GLUCOSE BLDC GLUCOMTR-MCNC: 104 MG/DL (ref 70–130)
HBA1C MFR BLD: 6.1 % (ref 4.5–5.7)
Lab: ABNORMAL
Lab: NORMAL

## 2025-08-27 RX ORDER — HYDROCHLOROTHIAZIDE 12.5 MG/1
CAPSULE ORAL
Qty: 2 EACH | Refills: 11 | Status: SHIPPED | OUTPATIENT
Start: 2025-08-27

## (undated) DEVICE — NERVE BLOCK SUPPORT KIT/BLUE: Brand: MEDLINE INDUSTRIES, INC.

## (undated) DEVICE — CANNULA,ADULT,SOFT-TOUCH,7TUBE,SC: Brand: MEDLINE

## (undated) DEVICE — T4 PULLOVER TOGA, LARGE

## (undated) DEVICE — PATIENT RETURN ELECTRODE, SINGLE-USE, CONTACT QUALITY MONITORING, ADULT, WITH 9FT CORD, FOR PATIENTS WEIGING OVER 33LBS. (15KG): Brand: MEGADYNE

## (undated) DEVICE — KT PUMP PAIN ONQ CBLOC SELCTAFLO 400ML

## (undated) DEVICE — SUT MONOCRYL PLS ANTIB UND 3/0  PS1 27IN

## (undated) DEVICE — PK MAJ SHLDR SPLT 10

## (undated) DEVICE — INTENDED FOR TISSUE SEPARATION, AND OTHER PROCEDURES THAT REQUIRE A SHARP SURGICAL BLADE TO PUNCTURE OR CUT.: Brand: BARD-PARKER ® STAINLESS STEEL BLADES

## (undated) DEVICE — SOL POVIDONE IODINE 10PCT 3/4OZ STRL

## (undated) DEVICE — CVR HNDL LT SURG ACCSSRY BLU STRL

## (undated) DEVICE — SST TWIST DRILL, STANDARD, 2.4MM DIA. X 127MM: Brand: MICROAIRE®

## (undated) DEVICE — SYR CATH/TIP 50ML 2OZ STRL 1P/U

## (undated) DEVICE — AIRWY 90MM NO9

## (undated) DEVICE — UNDERCAST PADDING: Brand: DEROYAL

## (undated) DEVICE — ANTIBACTERIAL UNDYED BRAIDED (POLYGLACTIN 910), SYNTHETIC ABSORBABLE SUTURE: Brand: COATED VICRYL

## (undated) DEVICE — COVER,MAYO STAND,STERILE: Brand: MEDLINE

## (undated) DEVICE — SPINAL TRAY/P: Brand: MEDLINE INDUSTRIES, INC.

## (undated) DEVICE — SYR LUERLOK 30CC

## (undated) DEVICE — BIT DRL EQUINOXE 2 AND 3.2MM

## (undated) DEVICE — CVR FTSWITCH UNIV

## (undated) DEVICE — STRYKER PERFORMANCE SERIES SAGITTAL BLADE: Brand: STRYKER PERFORMANCE SERIES

## (undated) DEVICE — NEEDLE, QUINCKE, 18GX3.5": Brand: MEDLINE

## (undated) DEVICE — BLANKT WARM UPPR/BDY ARM/OUT 57X196CM

## (undated) DEVICE — 3 BONE CEMENT MIXER: Brand: MIXEVAC

## (undated) DEVICE — SOL PVPI 10PCT 0.75OZ PEEL/PCH/PACKET 1P/U STRL

## (undated) DEVICE — SYS SKIN EXOFIN WND CLS 4X22CM

## (undated) DEVICE — 3M™ IOBAN™ 2 ANTIMICROBIAL INCISE DRAPE 6651EZ: Brand: IOBAN™ 2

## (undated) DEVICE — PK KN TOTL 10

## (undated) DEVICE — SUT VIC 0 TIES 18IN J912G

## (undated) DEVICE — DRSNG PAD ABD 8X10IN STRL

## (undated) DEVICE — SKIN AFFIX SURG ADHESIVE 72/CS 0.55ML: Brand: MEDLINE

## (undated) DEVICE — HEWSON SUTURE RETRIEVER: Brand: HEWSON SUTURE RETRIEVER

## (undated) DEVICE — MEDI-VAC YANKAUER SUCTION HANDLE W/BULBOUS TIP: Brand: CARDINAL HEALTH

## (undated) DEVICE — MEDI-VAC NON-CONDUCTIVE SUCTION TUBING: Brand: CARDINAL HEALTH

## (undated) DEVICE — SHEET,DRAPE,53X77,STERILE: Brand: MEDLINE

## (undated) DEVICE — PENCL SMOKE/EVAC MEGADYNE TELESCP 10FT

## (undated) DEVICE — DRSNG SURG AQUACEL AG 9X25CM

## (undated) DEVICE — DRAPE,SHOULDER,BEACH CHAIR,STERILE: Brand: MEDLINE

## (undated) DEVICE — ST NERV BLCK CONT CONTIPLEX ECHO CLSD 18G 2IN

## (undated) DEVICE — Device

## (undated) DEVICE — SOL LR 1000ML

## (undated) DEVICE — BNDG ELAS W/CLIP 6IN 10YD LF STRL

## (undated) DEVICE — POSTN HD UNIV

## (undated) DEVICE — GLV SURG TRIUMPH CLASSIC PF LTX 7.5 STRL

## (undated) DEVICE — TOWEL,OR,DSP,ST,BLUE,STD,4/PK,20PK/CS: Brand: MEDLINE

## (undated) DEVICE — KT PUMP INFUBLOCK MDL 2100 PMKITSOLIS

## (undated) DEVICE — TRAP FLD MINIVAC MEGADYNE 100ML

## (undated) DEVICE — GLV SURG PREMIERPRO MIC LTX PF SZ8 BRN

## (undated) DEVICE — GLV SURG SIGNATURE TOUCH PF LTX 8 STRL BX/50